# Patient Record
Sex: MALE | Race: WHITE | NOT HISPANIC OR LATINO | Employment: FULL TIME | URBAN - METROPOLITAN AREA
[De-identification: names, ages, dates, MRNs, and addresses within clinical notes are randomized per-mention and may not be internally consistent; named-entity substitution may affect disease eponyms.]

---

## 2017-02-01 DIAGNOSIS — E11.9 TYPE 2 DIABETES MELLITUS WITHOUT COMPLICATION, WITHOUT LONG-TERM CURRENT USE OF INSULIN (HCC): ICD-10-CM

## 2017-02-01 RX ORDER — FOLIC ACID 1 MG/1
1000 TABLET ORAL DAILY
Qty: 30 TABLET | Refills: 2 | Status: SHIPPED | OUTPATIENT
Start: 2017-02-01 | End: 2018-01-30 | Stop reason: SDUPTHER

## 2017-02-01 RX ORDER — ATORVASTATIN CALCIUM 80 MG/1
80 TABLET, FILM COATED ORAL DAILY
Qty: 30 TABLET | Refills: 2 | Status: SHIPPED | OUTPATIENT
Start: 2017-02-01 | End: 2017-06-14 | Stop reason: SDUPTHER

## 2017-02-01 RX ORDER — EZETIMIBE 10 MG/1
10 TABLET ORAL DAILY
Qty: 30 TABLET | Refills: 2 | Status: SHIPPED | OUTPATIENT
Start: 2017-02-01 | End: 2018-01-30 | Stop reason: SDUPTHER

## 2017-02-15 RX ORDER — GLIMEPIRIDE 4 MG/1
TABLET ORAL
Qty: 180 TABLET | Refills: 0 | Status: SHIPPED | OUTPATIENT
Start: 2017-02-15 | End: 2017-05-21 | Stop reason: SDUPTHER

## 2017-02-24 DIAGNOSIS — Z11.59 NEED FOR HEPATITIS C SCREENING TEST: Primary | ICD-10-CM

## 2017-02-28 LAB — HCV AB S/CO SERPL IA: <0.1 S/CO RATIO (ref 0–0.9)

## 2017-03-12 PROBLEM — E11.9 DIABETIC EYE EXAM: Status: ACTIVE | Noted: 2017-03-12

## 2017-03-12 PROBLEM — Z01.00 DIABETIC EYE EXAM: Status: ACTIVE | Noted: 2017-03-12

## 2017-03-12 PROBLEM — E11.9 ENCOUNTER FOR DIABETIC FOOT EXAM: Status: ACTIVE | Noted: 2017-03-12

## 2017-03-14 ENCOUNTER — OFFICE VISIT (OUTPATIENT)
Dept: INTERNAL MEDICINE | Facility: CLINIC | Age: 62
End: 2017-03-14

## 2017-03-14 VITALS
DIASTOLIC BLOOD PRESSURE: 60 MMHG | SYSTOLIC BLOOD PRESSURE: 130 MMHG | BODY MASS INDEX: 37.33 KG/M2 | OXYGEN SATURATION: 94 % | WEIGHT: 252 LBS | HEART RATE: 86 BPM | HEIGHT: 69 IN

## 2017-03-14 DIAGNOSIS — R80.9 MICROALBUMINURIA: Chronic | ICD-10-CM

## 2017-03-14 DIAGNOSIS — Z12.11 COLON CANCER SCREENING: ICD-10-CM

## 2017-03-14 DIAGNOSIS — Z23 NEED FOR PNEUMOCOCCAL VACCINATION: ICD-10-CM

## 2017-03-14 DIAGNOSIS — Z51.81 THERAPEUTIC DRUG MONITORING: ICD-10-CM

## 2017-03-14 DIAGNOSIS — E29.1 HYPOGONADISM MALE: Chronic | ICD-10-CM

## 2017-03-14 DIAGNOSIS — I25.10 OCCLUSIVE CORONARY ARTERY DISEASE: Chronic | ICD-10-CM

## 2017-03-14 DIAGNOSIS — Z11.59 NEED FOR HEPATITIS C SCREENING TEST: ICD-10-CM

## 2017-03-14 DIAGNOSIS — E11.9 ENCOUNTER FOR DIABETIC FOOT EXAM (HCC): Chronic | ICD-10-CM

## 2017-03-14 DIAGNOSIS — G47.33 OBSTRUCTIVE SLEEP APNEA: Chronic | ICD-10-CM

## 2017-03-14 DIAGNOSIS — E11.9 TYPE 2 DIABETES MELLITUS WITHOUT COMPLICATION, WITHOUT LONG-TERM CURRENT USE OF INSULIN (HCC): Primary | Chronic | ICD-10-CM

## 2017-03-14 DIAGNOSIS — E11.9 DIABETIC EYE EXAM (HCC): Chronic | ICD-10-CM

## 2017-03-14 DIAGNOSIS — Z01.00 DIABETIC EYE EXAM (HCC): Chronic | ICD-10-CM

## 2017-03-14 DIAGNOSIS — Z00.00 ROUTINE PHYSICAL EXAMINATION: ICD-10-CM

## 2017-03-14 DIAGNOSIS — Z82.49 FAMILY HISTORY OF PREMATURE CORONARY ARTERY DISEASE: Chronic | ICD-10-CM

## 2017-03-14 DIAGNOSIS — E55.9 VITAMIN D DEFICIENCY: ICD-10-CM

## 2017-03-14 DIAGNOSIS — K75.81 NASH (NONALCOHOLIC STEATOHEPATITIS): Chronic | ICD-10-CM

## 2017-03-14 DIAGNOSIS — G47.34 SLEEP RELATED HYPOXIA: Chronic | ICD-10-CM

## 2017-03-14 DIAGNOSIS — E78.5 HYPERLIPIDEMIA, UNSPECIFIED HYPERLIPIDEMIA TYPE: Chronic | ICD-10-CM

## 2017-03-14 PROBLEM — Z92.29 HISTORY OF PNEUMOCOCCAL VACCINATION: Status: ACTIVE | Noted: 2017-03-14

## 2017-03-14 PROBLEM — Z92.29 HISTORY OF PNEUMOCOCCAL VACCINATION: Status: RESOLVED | Noted: 2017-03-14 | Resolved: 2017-03-14

## 2017-03-14 PROCEDURE — 90471 IMMUNIZATION ADMIN: CPT | Performed by: INTERNAL MEDICINE

## 2017-03-14 PROCEDURE — 90732 PPSV23 VACC 2 YRS+ SUBQ/IM: CPT | Performed by: INTERNAL MEDICINE

## 2017-03-14 PROCEDURE — 99214 OFFICE O/P EST MOD 30 MIN: CPT | Performed by: INTERNAL MEDICINE
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
## 2017-03-15 ENCOUNTER — RESULTS ENCOUNTER (OUTPATIENT)
Dept: INTERNAL MEDICINE | Facility: CLINIC | Age: 62
End: 2017-03-15

## 2017-03-15 DIAGNOSIS — E11.9 TYPE 2 DIABETES MELLITUS WITHOUT COMPLICATION, WITHOUT LONG-TERM CURRENT USE OF INSULIN (HCC): Chronic | ICD-10-CM

## 2017-03-15 DIAGNOSIS — E55.9 VITAMIN D DEFICIENCY: ICD-10-CM

## 2017-03-15 DIAGNOSIS — E78.5 HYPERLIPIDEMIA, UNSPECIFIED HYPERLIPIDEMIA TYPE: Chronic | ICD-10-CM

## 2017-03-15 DIAGNOSIS — Z11.59 NEED FOR HEPATITIS C SCREENING TEST: ICD-10-CM

## 2017-03-15 DIAGNOSIS — R80.9 MICROALBUMINURIA: Chronic | ICD-10-CM

## 2017-03-15 DIAGNOSIS — Z00.00 ROUTINE PHYSICAL EXAMINATION: ICD-10-CM

## 2017-05-22 RX ORDER — GLIMEPIRIDE 4 MG/1
TABLET ORAL
Qty: 180 TABLET | Refills: 0 | Status: SHIPPED | OUTPATIENT
Start: 2017-05-22 | End: 2017-08-21 | Stop reason: SDUPTHER

## 2017-06-14 RX ORDER — ATORVASTATIN CALCIUM 80 MG/1
TABLET, FILM COATED ORAL
Qty: 30 TABLET | Refills: 0 | Status: SHIPPED | OUTPATIENT
Start: 2017-06-14 | End: 2017-07-30 | Stop reason: SDUPTHER

## 2017-06-16 LAB
25(OH)D3+25(OH)D2 SERPL-MCNC: 23.7 NG/ML (ref 30–100)
ALBUMIN SERPL-MCNC: 4.3 G/DL (ref 3.5–5.2)
ALBUMIN/CREAT UR: 7.8 MG/G CREAT (ref 0–30)
ALBUMIN/GLOB SERPL: 1.4 G/DL
ALP SERPL-CCNC: 111 U/L (ref 39–117)
ALT SERPL-CCNC: 38 U/L (ref 1–41)
APPEARANCE UR: CLEAR
AST SERPL-CCNC: 31 U/L (ref 1–40)
BILIRUB SERPL-MCNC: 1 MG/DL (ref 0.1–1.2)
BILIRUB UR QL STRIP: NEGATIVE
BUN SERPL-MCNC: 13 MG/DL (ref 8–23)
BUN/CREAT SERPL: 15.7 (ref 7–25)
CALCIUM SERPL-MCNC: 9.7 MG/DL (ref 8.6–10.5)
CHLORIDE SERPL-SCNC: 104 MMOL/L (ref 98–107)
CHOLEST SERPL-MCNC: 131 MG/DL (ref 100–199)
CK SERPL-CCNC: 73 U/L (ref 20–200)
CO2 SERPL-SCNC: 24.8 MMOL/L (ref 22–29)
COLOR UR: YELLOW
CREAT SERPL-MCNC: 0.83 MG/DL (ref 0.76–1.27)
CREAT UR-MCNC: 48.5 MG/DL
ERYTHROCYTE [DISTWIDTH] IN BLOOD BY AUTOMATED COUNT: 14.1 % (ref 11.5–14.5)
GLOBULIN SER CALC-MCNC: 3 GM/DL
GLUCOSE SERPL-MCNC: 146 MG/DL (ref 65–99)
GLUCOSE UR QL: ABNORMAL
HBA1C MFR BLD: 7.9 % (ref 4.8–5.6)
HCT VFR BLD AUTO: 51.6 % (ref 40.4–52.2)
HCV AB S/CO SERPL IA: <0.1 S/CO RATIO (ref 0–0.9)
HDL SERPL-SCNC: 31.3 UMOL/L
HDLC SERPL-MCNC: 51 MG/DL
HGB BLD-MCNC: 17.7 G/DL (ref 13.7–17.6)
HGB UR QL STRIP: NEGATIVE
KETONES UR QL STRIP: NEGATIVE
LDL SERPL QN: 20.9 NM
LDL SERPL-SCNC: 777 NMOL/L
LDL SMALL SERPL-SCNC: 164 NMOL/L
LDLC SERPL CALC-MCNC: 60 MG/DL (ref 0–99)
LEUKOCYTE ESTERASE UR QL STRIP: NEGATIVE
MCH RBC QN AUTO: 32.1 PG (ref 27–32.7)
MCHC RBC AUTO-ENTMCNC: 34.3 G/DL (ref 32.6–36.4)
MCV RBC AUTO: 93.6 FL (ref 79.8–96.2)
MICROALBUMIN UR-MCNC: 3.8 UG/ML
NITRITE UR QL STRIP: NEGATIVE
PH UR STRIP: 5.5 [PH] (ref 5–8)
PLATELET # BLD AUTO: 91 10*3/MM3 (ref 140–500)
POTASSIUM SERPL-SCNC: 4.1 MMOL/L (ref 3.5–5.2)
PROT SERPL-MCNC: 7.3 G/DL (ref 6–8.5)
PROT UR QL STRIP: NEGATIVE
PSA SERPL-MCNC: 0.2 NG/ML (ref 0–4)
RBC # BLD AUTO: 5.51 10*6/MM3 (ref 4.6–6)
SODIUM SERPL-SCNC: 143 MMOL/L (ref 136–145)
SP GR UR: ABNORMAL (ref 1–1.03)
T3FREE SERPL-MCNC: 3.3 PG/ML (ref 2–4.4)
T4 FREE SERPL-MCNC: 0.88 NG/DL (ref 0.93–1.7)
TRIGL SERPL-MCNC: 101 MG/DL (ref 0–149)
TSH SERPL DL<=0.005 MIU/L-ACNC: 1.8 MIU/ML (ref 0.27–4.2)
UROBILINOGEN UR STRIP-MCNC: ABNORMAL MG/DL
WBC # BLD AUTO: 5.45 10*3/MM3 (ref 4.5–10.7)

## 2017-06-21 ENCOUNTER — APPOINTMENT (OUTPATIENT)
Dept: GENERAL RADIOLOGY | Facility: HOSPITAL | Age: 62
End: 2017-06-21

## 2017-06-21 ENCOUNTER — APPOINTMENT (OUTPATIENT)
Dept: CT IMAGING | Facility: HOSPITAL | Age: 62
End: 2017-06-21

## 2017-06-21 ENCOUNTER — HOSPITAL ENCOUNTER (EMERGENCY)
Facility: HOSPITAL | Age: 62
Discharge: HOME | End: 2017-06-21
Attending: EMERGENCY MEDICINE | Admitting: EMERGENCY MEDICINE

## 2017-06-21 VITALS
SYSTOLIC BLOOD PRESSURE: 131 MMHG | TEMPERATURE: 97.9 F | HEIGHT: 69 IN | BODY MASS INDEX: 38.51 KG/M2 | OXYGEN SATURATION: 93 % | HEART RATE: 82 BPM | RESPIRATION RATE: 18 BRPM | WEIGHT: 260 LBS | DIASTOLIC BLOOD PRESSURE: 95 MMHG

## 2017-06-21 DIAGNOSIS — S90.32XA CONTUSION OF FOOT INCLUDING TOES, LEFT, INITIAL ENCOUNTER: ICD-10-CM
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
## 2017-06-21 DIAGNOSIS — S90.122A CONTUSION OF FOOT INCLUDING TOES, LEFT, INITIAL ENCOUNTER: ICD-10-CM

## 2017-06-21 DIAGNOSIS — S16.1XXA CERVICAL STRAIN, ACUTE, INITIAL ENCOUNTER: ICD-10-CM

## 2017-06-21 PROCEDURE — 72125 CT NECK SPINE W/O DYE: CPT

## 2017-06-21 PROCEDURE — 73630 X-RAY EXAM OF FOOT: CPT

## 2017-06-21 PROCEDURE — 70450 CT HEAD/BRAIN W/O DYE: CPT

## 2017-06-21 PROCEDURE — 99283 EMERGENCY DEPT VISIT LOW MDM: CPT
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
## 2017-06-22 ENCOUNTER — TELEPHONE (OUTPATIENT)
Dept: SOCIAL WORK | Facility: HOSPITAL | Age: 62
End: 2017-06-22

## 2017-06-22 NOTE — TELEPHONE ENCOUNTER
ED f/u phone call. States that he is doing well, and has f/u omkar't w/ PCP scheduled. No questions/concerns

## 2017-07-11 ENCOUNTER — OFFICE VISIT (OUTPATIENT)
Dept: INTERNAL MEDICINE | Facility: CLINIC | Age: 62
End: 2017-07-11

## 2017-07-11 VITALS
HEIGHT: 69 IN | SYSTOLIC BLOOD PRESSURE: 112 MMHG | OXYGEN SATURATION: 94 % | BODY MASS INDEX: 37.18 KG/M2 | WEIGHT: 251 LBS | DIASTOLIC BLOOD PRESSURE: 70 MMHG | HEART RATE: 76 BPM

## 2017-07-11 DIAGNOSIS — E55.9 VITAMIN D DEFICIENCY: Chronic | ICD-10-CM

## 2017-07-11 DIAGNOSIS — G47.33 OBSTRUCTIVE SLEEP APNEA: Chronic | ICD-10-CM

## 2017-07-11 DIAGNOSIS — Z00.00 ROUTINE PHYSICAL EXAMINATION: Primary | ICD-10-CM

## 2017-07-11 DIAGNOSIS — E78.5 HYPERLIPIDEMIA, UNSPECIFIED HYPERLIPIDEMIA TYPE: Chronic | ICD-10-CM

## 2017-07-11 DIAGNOSIS — Z12.11 COLON CANCER SCREENING: ICD-10-CM

## 2017-07-11 DIAGNOSIS — I25.10 OCCLUSIVE CORONARY ARTERY DISEASE: Chronic | ICD-10-CM

## 2017-07-11 DIAGNOSIS — G47.34 SLEEP RELATED HYPOXIA: Chronic | ICD-10-CM

## 2017-07-11 DIAGNOSIS — E29.1 HYPOGONADISM MALE: Chronic | ICD-10-CM

## 2017-07-11 DIAGNOSIS — R80.9 MICROALBUMINURIA: Chronic | ICD-10-CM

## 2017-07-11 DIAGNOSIS — I25.2 HISTORY OF MYOCARDIAL INFARCTION: Chronic | ICD-10-CM

## 2017-07-11 DIAGNOSIS — K75.81 NASH (NONALCOHOLIC STEATOHEPATITIS): Chronic | ICD-10-CM

## 2017-07-11 DIAGNOSIS — E11.9 TYPE 2 DIABETES MELLITUS WITHOUT COMPLICATION, WITHOUT LONG-TERM CURRENT USE OF INSULIN (HCC): Chronic | ICD-10-CM

## 2017-07-11 PROBLEM — Z01.00 DIABETIC EYE EXAM (HCC): Chronic | Status: ACTIVE | Noted: 2017-05-22

## 2017-07-11 PROCEDURE — 99396 PREV VISIT EST AGE 40-64: CPT | Performed by: INTERNAL MEDICINE
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
## 2017-07-31 RX ORDER — ATORVASTATIN CALCIUM 80 MG/1
TABLET, FILM COATED ORAL
Qty: 30 TABLET | Refills: 0 | Status: SHIPPED | OUTPATIENT
Start: 2017-07-31 | End: 2017-09-02 | Stop reason: SDUPTHER

## 2017-07-31 RX ORDER — SITAGLIPTIN AND METFORMIN HYDROCHLORIDE 1000; 50 MG/1; MG/1
TABLET, FILM COATED, EXTENDED RELEASE ORAL
Qty: 60 TABLET | Refills: 0 | Status: SHIPPED | OUTPATIENT
Start: 2017-07-31 | End: 2017-09-02 | Stop reason: SDUPTHER

## 2017-08-01 ENCOUNTER — OFFICE VISIT (OUTPATIENT)
Dept: INTERNAL MEDICINE | Facility: CLINIC | Age: 62
End: 2017-08-01

## 2017-08-01 VITALS
HEART RATE: 78 BPM | SYSTOLIC BLOOD PRESSURE: 118 MMHG | DIASTOLIC BLOOD PRESSURE: 60 MMHG | TEMPERATURE: 97.8 F | WEIGHT: 249.8 LBS | BODY MASS INDEX: 37 KG/M2 | HEIGHT: 69 IN | OXYGEN SATURATION: 93 %

## 2017-08-01 DIAGNOSIS — S13.4XXD WHIPLASH INJURY TO NECK, SUBSEQUENT ENCOUNTER: ICD-10-CM

## 2017-08-01 DIAGNOSIS — Z78.9 HISTORY OF MOTORCYCLE ACCIDENT: Primary | ICD-10-CM

## 2017-08-01 PROBLEM — S13.4XXA WHIPLASH INJURY TO NECK: Status: ACTIVE | Noted: 2017-08-01

## 2017-08-01 PROCEDURE — 99214 OFFICE O/P EST MOD 30 MIN: CPT | Performed by: INTERNAL MEDICINE
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
## 2017-08-21 RX ORDER — GLIMEPIRIDE 4 MG/1
TABLET ORAL
Qty: 180 TABLET | Refills: 1 | Status: SHIPPED | OUTPATIENT
Start: 2017-08-21 | End: 2018-01-30 | Stop reason: SDUPTHER

## 2017-09-05 RX ORDER — SITAGLIPTIN AND METFORMIN HYDROCHLORIDE 1000; 50 MG/1; MG/1
TABLET, FILM COATED, EXTENDED RELEASE ORAL
Qty: 60 TABLET | Refills: 3 | Status: SHIPPED | OUTPATIENT
Start: 2017-09-05 | End: 2017-09-05 | Stop reason: SDUPTHER

## 2017-09-05 RX ORDER — ATORVASTATIN CALCIUM 80 MG/1
80 TABLET, FILM COATED ORAL DAILY
Qty: 30 TABLET | Refills: 2 | Status: SHIPPED | OUTPATIENT
Start: 2017-09-05 | End: 2018-01-30 | Stop reason: SDUPTHER

## 2017-09-05 RX ORDER — ATORVASTATIN CALCIUM 80 MG/1
TABLET, FILM COATED ORAL
Qty: 30 TABLET | Refills: 3 | Status: SHIPPED | OUTPATIENT
Start: 2017-09-05 | End: 2017-09-05 | Stop reason: SDUPTHER

## 2018-01-12 DIAGNOSIS — E78.5 HYPERLIPIDEMIA, UNSPECIFIED HYPERLIPIDEMIA TYPE: Chronic | ICD-10-CM

## 2018-01-12 DIAGNOSIS — E11.9 TYPE 2 DIABETES MELLITUS WITHOUT COMPLICATION, WITHOUT LONG-TERM CURRENT USE OF INSULIN (HCC): Chronic | ICD-10-CM

## 2018-01-12 DIAGNOSIS — E55.9 VITAMIN D DEFICIENCY: Chronic | ICD-10-CM

## 2018-01-12 DIAGNOSIS — G47.34 SLEEP RELATED HYPOXIA: Chronic | ICD-10-CM

## 2018-01-18 LAB
25(OH)D3+25(OH)D2 SERPL-MCNC: 21.4 NG/ML (ref 30–100)
ALBUMIN SERPL-MCNC: 3.9 G/DL (ref 3.5–5.2)
ALBUMIN/GLOB SERPL: 1.2 G/DL
ALP SERPL-CCNC: 165 U/L (ref 39–117)
ALT SERPL-CCNC: 47 U/L (ref 1–41)
AST SERPL-CCNC: 38 U/L (ref 1–40)
BILIRUB SERPL-MCNC: 0.8 MG/DL (ref 0.1–1.2)
BUN SERPL-MCNC: 13 MG/DL (ref 8–23)
BUN/CREAT SERPL: 15.5 (ref 7–25)
CALCIUM SERPL-MCNC: 9.3 MG/DL (ref 8.6–10.5)
CHLORIDE SERPL-SCNC: 101 MMOL/L (ref 98–107)
CHOLEST SERPL-MCNC: 170 MG/DL (ref 100–199)
CK SERPL-CCNC: 74 U/L (ref 20–200)
CO2 SERPL-SCNC: 25.3 MMOL/L (ref 22–29)
CREAT SERPL-MCNC: 0.84 MG/DL (ref 0.76–1.27)
ERYTHROCYTE [DISTWIDTH] IN BLOOD BY AUTOMATED COUNT: 13.2 % (ref 11.5–14.5)
GLOBULIN SER CALC-MCNC: 3.3 GM/DL
GLUCOSE SERPL-MCNC: 313 MG/DL (ref 65–99)
HBA1C MFR BLD: 10.74 % (ref 4.8–5.6)
HCT VFR BLD AUTO: 49.5 % (ref 40.4–52.2)
HDL SERPL-SCNC: 26.4 UMOL/L
HDLC SERPL-MCNC: 49 MG/DL
HGB BLD-MCNC: 16.7 G/DL (ref 13.7–17.6)
LDL SERPL QN: 21.2 NM
LDL SERPL-SCNC: 1187 NMOL/L
LDL SMALL SERPL-SCNC: 497 NMOL/L
LDLC SERPL CALC-MCNC: 100 MG/DL (ref 0–99)
MCH RBC QN AUTO: 31.5 PG (ref 27–32.7)
MCHC RBC AUTO-ENTMCNC: 33.7 G/DL (ref 32.6–36.4)
MCV RBC AUTO: 93.4 FL (ref 79.8–96.2)
PLATELET # BLD AUTO: 91 10*3/MM3 (ref 140–500)
POTASSIUM SERPL-SCNC: 4 MMOL/L (ref 3.5–5.2)
PROT SERPL-MCNC: 7.2 G/DL (ref 6–8.5)
RBC # BLD AUTO: 5.3 10*6/MM3 (ref 4.6–6)
SODIUM SERPL-SCNC: 139 MMOL/L (ref 136–145)
T3FREE SERPL-MCNC: 3.2 PG/ML (ref 2–4.4)
T4 FREE SERPL-MCNC: 0.89 NG/DL (ref 0.93–1.7)
TRIGL SERPL-MCNC: 103 MG/DL (ref 0–149)
TSH SERPL DL<=0.005 MIU/L-ACNC: 2.14 MIU/ML (ref 0.27–4.2)
WBC # BLD AUTO: 4.66 10*3/MM3 (ref 4.5–10.7)

## 2018-01-25 ENCOUNTER — OFFICE VISIT (OUTPATIENT)
Dept: INTERNAL MEDICINE | Facility: CLINIC | Age: 63
End: 2018-01-25

## 2018-01-25 VITALS
DIASTOLIC BLOOD PRESSURE: 60 MMHG | WEIGHT: 247.8 LBS | OXYGEN SATURATION: 98 % | SYSTOLIC BLOOD PRESSURE: 134 MMHG | HEART RATE: 72 BPM | HEIGHT: 69 IN | BODY MASS INDEX: 36.7 KG/M2

## 2018-01-25 DIAGNOSIS — G47.34 SLEEP RELATED HYPOXIA: Chronic | ICD-10-CM

## 2018-01-25 DIAGNOSIS — I25.10 OCCLUSIVE CORONARY ARTERY DISEASE: Chronic | ICD-10-CM

## 2018-01-25 DIAGNOSIS — K75.81 NASH (NONALCOHOLIC STEATOHEPATITIS): Chronic | ICD-10-CM

## 2018-01-25 DIAGNOSIS — D69.6 THROMBOCYTOPENIA (HCC): ICD-10-CM

## 2018-01-25 DIAGNOSIS — E11.9 TYPE 2 DIABETES MELLITUS WITHOUT COMPLICATION, WITHOUT LONG-TERM CURRENT USE OF INSULIN (HCC): Primary | Chronic | ICD-10-CM

## 2018-01-25 DIAGNOSIS — R80.9 MICROALBUMINURIA: Chronic | ICD-10-CM

## 2018-01-25 DIAGNOSIS — E55.9 VITAMIN D DEFICIENCY: Chronic | ICD-10-CM

## 2018-01-25 DIAGNOSIS — I25.2 HISTORY OF MYOCARDIAL INFARCTION: Chronic | ICD-10-CM

## 2018-01-25 DIAGNOSIS — Z82.49 FAMILY HISTORY OF PREMATURE CORONARY ARTERY DISEASE: Chronic | ICD-10-CM

## 2018-01-25 DIAGNOSIS — E78.5 HYPERLIPIDEMIA, UNSPECIFIED HYPERLIPIDEMIA TYPE: Chronic | ICD-10-CM

## 2018-01-25 DIAGNOSIS — Z51.81 THERAPEUTIC DRUG MONITORING: ICD-10-CM

## 2018-01-25 DIAGNOSIS — G47.33 OBSTRUCTIVE SLEEP APNEA: Chronic | ICD-10-CM

## 2018-01-25 DIAGNOSIS — E29.1 HYPOGONADISM MALE: Chronic | ICD-10-CM

## 2018-01-25 DIAGNOSIS — Z12.11 COLON CANCER SCREENING: ICD-10-CM

## 2018-01-25 PROBLEM — Z78.9 HISTORY OF MOTORCYCLE ACCIDENT: Status: RESOLVED | Noted: 2017-08-01 | Resolved: 2018-01-25

## 2018-01-25 PROBLEM — S13.4XXA WHIPLASH INJURY TO NECK: Status: RESOLVED | Noted: 2017-08-01 | Resolved: 2018-01-25

## 2018-01-25 LAB
BASOPHILS # BLD AUTO: 0.02 10*3/MM3 (ref 0–0.2)
BASOPHILS NFR BLD AUTO: 0.3 % (ref 0–1.5)
EOSINOPHIL # BLD AUTO: 0.11 10*3/MM3 (ref 0–0.7)
EOSINOPHIL NFR BLD AUTO: 1.6 % (ref 0.3–6.2)
ERYTHROCYTE [DISTWIDTH] IN BLOOD BY AUTOMATED COUNT: 13.6 % (ref 11.5–14.5)
HCT VFR BLD AUTO: 51.3 % (ref 40.4–52.2)
HGB BLD-MCNC: 17.3 G/DL (ref 13.7–17.6)
IMM GRANULOCYTES # BLD: 0 10*3/MM3 (ref 0–0.03)
IMM GRANULOCYTES NFR BLD: 0 % (ref 0–0.5)
LYMPHOCYTES # BLD AUTO: 1.35 10*3/MM3 (ref 0.9–4.8)
LYMPHOCYTES NFR BLD AUTO: 19 % (ref 19.6–45.3)
MCH RBC QN AUTO: 31.3 PG (ref 27–32.7)
MCHC RBC AUTO-ENTMCNC: 33.7 G/DL (ref 32.6–36.4)
MCV RBC AUTO: 92.8 FL (ref 79.8–96.2)
MONOCYTES # BLD AUTO: 0.56 10*3/MM3 (ref 0.2–1.2)
MONOCYTES NFR BLD AUTO: 7.9 % (ref 5–12)
NEUTROPHILS # BLD AUTO: 5.05 10*3/MM3 (ref 1.9–8.1)
NEUTROPHILS NFR BLD AUTO: 71.2 % (ref 42.7–76)
NRBC BLD AUTO-RTO: 0 /100 WBC (ref 0–0)
PLATELET # BLD AUTO: 99 10*3/MM3 (ref 140–500)
RBC # BLD AUTO: 5.53 10*6/MM3 (ref 4.6–6)
WBC # BLD AUTO: 7.09 10*3/MM3 (ref 4.5–10.7)

## 2018-01-25 PROCEDURE — 99214 OFFICE O/P EST MOD 30 MIN: CPT | Performed by: INTERNAL MEDICINE
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
## 2018-01-30 DIAGNOSIS — E11.9 TYPE 2 DIABETES MELLITUS WITHOUT COMPLICATION, WITHOUT LONG-TERM CURRENT USE OF INSULIN (HCC): ICD-10-CM

## 2018-01-30 RX ORDER — FOLIC ACID 1 MG/1
1000 TABLET ORAL DAILY
Qty: 30 TABLET | Refills: 2 | Status: SHIPPED | OUTPATIENT
Start: 2018-01-30 | End: 2018-05-05 | Stop reason: SDUPTHER

## 2018-01-30 RX ORDER — EZETIMIBE 10 MG/1
10 TABLET ORAL DAILY
Qty: 30 TABLET | Refills: 2 | Status: SHIPPED | OUTPATIENT
Start: 2018-01-30 | End: 2018-05-05 | Stop reason: SDUPTHER

## 2018-01-30 RX ORDER — GLIMEPIRIDE 4 MG/1
4 TABLET ORAL 2 TIMES DAILY
Qty: 180 TABLET | Refills: 0 | Status: SHIPPED | OUTPATIENT
Start: 2018-01-30 | End: 2018-04-29 | Stop reason: SDUPTHER

## 2018-01-30 RX ORDER — ATORVASTATIN CALCIUM 80 MG/1
80 TABLET, FILM COATED ORAL DAILY
Qty: 30 TABLET | Refills: 2 | Status: SHIPPED | OUTPATIENT
Start: 2018-01-30 | End: 2018-05-06 | Stop reason: SDUPTHER

## 2018-03-01 ENCOUNTER — OFFICE VISIT (OUTPATIENT)
Dept: INTERNAL MEDICINE | Facility: CLINIC | Age: 63
End: 2018-03-01

## 2018-03-01 VITALS
SYSTOLIC BLOOD PRESSURE: 122 MMHG | OXYGEN SATURATION: 97 % | HEIGHT: 69 IN | HEART RATE: 75 BPM | BODY MASS INDEX: 36.14 KG/M2 | WEIGHT: 244 LBS | DIASTOLIC BLOOD PRESSURE: 64 MMHG

## 2018-03-01 DIAGNOSIS — R80.9 MICROALBUMINURIA: Chronic | ICD-10-CM

## 2018-03-01 DIAGNOSIS — K75.81 NASH (NONALCOHOLIC STEATOHEPATITIS): Chronic | ICD-10-CM

## 2018-03-01 DIAGNOSIS — I25.10 OCCLUSIVE CORONARY ARTERY DISEASE: Chronic | ICD-10-CM

## 2018-03-01 DIAGNOSIS — D69.6 THROMBOCYTOPENIA (HCC): ICD-10-CM

## 2018-03-01 DIAGNOSIS — E11.9 TYPE 2 DIABETES MELLITUS WITHOUT COMPLICATION, WITHOUT LONG-TERM CURRENT USE OF INSULIN (HCC): Primary | Chronic | ICD-10-CM

## 2018-03-01 DIAGNOSIS — E78.5 HYPERLIPIDEMIA, UNSPECIFIED HYPERLIPIDEMIA TYPE: Chronic | ICD-10-CM

## 2018-03-01 DIAGNOSIS — E55.9 VITAMIN D DEFICIENCY: Chronic | ICD-10-CM

## 2018-03-01 DIAGNOSIS — G47.33 OBSTRUCTIVE SLEEP APNEA: Chronic | ICD-10-CM

## 2018-03-01 DIAGNOSIS — I25.2 HISTORY OF MYOCARDIAL INFARCTION: Chronic | ICD-10-CM

## 2018-03-01 DIAGNOSIS — Z51.81 THERAPEUTIC DRUG MONITORING: ICD-10-CM

## 2018-03-01 PROCEDURE — 99214 OFFICE O/P EST MOD 30 MIN: CPT | Performed by: INTERNAL MEDICINE
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
## 2018-03-07 ENCOUNTER — HOSPITAL ENCOUNTER (OUTPATIENT)
Dept: CT IMAGING | Facility: HOSPITAL | Age: 63
Discharge: HOME | End: 2018-03-07
Admitting: INTERNAL MEDICINE

## 2018-03-07 DIAGNOSIS — K75.81 NASH (NONALCOHOLIC STEATOHEPATITIS): Chronic | ICD-10-CM

## 2018-03-07 DIAGNOSIS — D69.6 THROMBOCYTOPENIA (HCC): ICD-10-CM

## 2018-03-07 LAB — CREAT BLDA-MCNC: 0.6 MG/DL (ref 0.6–1.3)

## 2018-03-07 PROCEDURE — 82565 ASSAY OF CREATININE: CPT

## 2018-03-07 PROCEDURE — 74160 CT ABDOMEN W/CONTRAST: CPT

## 2018-03-07 PROCEDURE — 0 IOPAMIDOL 61 % SOLUTION: Performed by: INTERNAL MEDICINE

## 2018-03-07 RX ADMIN — IOPAMIDOL 85 ML: 612 INJECTION, SOLUTION INTRAVENOUS at 09:20

## 2018-03-09 DIAGNOSIS — E11.9 TYPE 2 DIABETES MELLITUS WITHOUT COMPLICATION, WITHOUT LONG-TERM CURRENT USE OF INSULIN (HCC): Chronic | ICD-10-CM

## 2018-04-30 RX ORDER — GLIMEPIRIDE 4 MG/1
TABLET ORAL
Qty: 180 TABLET | Refills: 0 | Status: SHIPPED | OUTPATIENT
Start: 2018-04-30 | End: 2018-08-20 | Stop reason: SDUPTHER

## 2018-05-05 DIAGNOSIS — E11.9 TYPE 2 DIABETES MELLITUS WITHOUT COMPLICATION, WITHOUT LONG-TERM CURRENT USE OF INSULIN (HCC): ICD-10-CM

## 2018-05-07 RX ORDER — FOLIC ACID 1 MG/1
1000 TABLET ORAL DAILY
Qty: 30 TABLET | Refills: 0 | Status: SHIPPED | OUTPATIENT
Start: 2018-05-07 | End: 2018-06-07 | Stop reason: SDUPTHER

## 2018-05-07 RX ORDER — EMPAGLIFLOZIN 25 MG/1
TABLET, FILM COATED ORAL
Qty: 30 TABLET | Refills: 0 | Status: SHIPPED | OUTPATIENT
Start: 2018-05-07 | End: 2018-06-07 | Stop reason: SDUPTHER

## 2018-05-07 RX ORDER — ATORVASTATIN CALCIUM 80 MG/1
80 TABLET, FILM COATED ORAL DAILY
Qty: 30 TABLET | Refills: 0 | Status: SHIPPED | OUTPATIENT
Start: 2018-05-07 | End: 2018-08-06 | Stop reason: SDUPTHER

## 2018-05-07 RX ORDER — EZETIMIBE 10 MG/1
10 TABLET ORAL DAILY
Qty: 30 TABLET | Refills: 0 | Status: SHIPPED | OUTPATIENT
Start: 2018-05-07 | End: 2018-06-07 | Stop reason: SDUPTHER

## 2018-05-24 RX ORDER — SITAGLIPTIN AND METFORMIN HYDROCHLORIDE 1000; 50 MG/1; MG/1
TABLET, FILM COATED, EXTENDED RELEASE ORAL
Qty: 60 TABLET | Refills: 0 | Status: SHIPPED | OUTPATIENT
Start: 2018-05-24 | End: 2018-06-20 | Stop reason: SDUPTHER

## 2018-05-30 DIAGNOSIS — D69.6 THROMBOCYTOPENIA (HCC): ICD-10-CM

## 2018-05-30 DIAGNOSIS — E78.5 HYPERLIPIDEMIA, UNSPECIFIED HYPERLIPIDEMIA TYPE: Chronic | ICD-10-CM

## 2018-05-30 DIAGNOSIS — E11.9 TYPE 2 DIABETES MELLITUS WITHOUT COMPLICATION, WITHOUT LONG-TERM CURRENT USE OF INSULIN (HCC): Chronic | ICD-10-CM

## 2018-06-02 LAB
ALBUMIN SERPL-MCNC: 4.1 G/DL (ref 3.5–5.2)
ALBUMIN/GLOB SERPL: 1.6 G/DL
ALP SERPL-CCNC: 104 U/L (ref 39–117)
ALT SERPL-CCNC: 29 U/L (ref 1–41)
AST SERPL-CCNC: 30 U/L (ref 1–40)
BILIRUB SERPL-MCNC: 0.6 MG/DL (ref 0.1–1.2)
BUN SERPL-MCNC: 12 MG/DL (ref 8–23)
BUN/CREAT SERPL: 15.2 (ref 7–25)
CALCIUM SERPL-MCNC: 9.3 MG/DL (ref 8.6–10.5)
CHLORIDE SERPL-SCNC: 104 MMOL/L (ref 98–107)
CHOLEST SERPL-MCNC: 112 MG/DL (ref 100–199)
CK SERPL-CCNC: 89 U/L (ref 20–200)
CO2 SERPL-SCNC: 27.2 MMOL/L (ref 22–29)
CREAT SERPL-MCNC: 0.79 MG/DL (ref 0.76–1.27)
ERYTHROCYTE [DISTWIDTH] IN BLOOD BY AUTOMATED COUNT: 13.6 % (ref 11.5–14.5)
GFR SERPLBLD CREATININE-BSD FMLA CKD-EPI: 100 ML/MIN/1.73
GFR SERPLBLD CREATININE-BSD FMLA CKD-EPI: 121 ML/MIN/1.73
GLOBULIN SER CALC-MCNC: 2.5 GM/DL
GLUCOSE SERPL-MCNC: 95 MG/DL (ref 65–99)
HBA1C MFR BLD: 6.6 % (ref 4.8–5.6)
HCT VFR BLD AUTO: 52.2 % (ref 40.4–52.2)
HDL SERPL-SCNC: 32.4 UMOL/L
HDLC SERPL-MCNC: 49 MG/DL
HGB BLD-MCNC: 17.6 G/DL (ref 13.7–17.6)
LDL SERPL QN: 20.6 NM
LDL SERPL-SCNC: 749 NMOL/L
LDL SMALL SERPL-SCNC: 406 NMOL/L
LDLC SERPL CALC-MCNC: 48 MG/DL (ref 0–99)
MCH RBC QN AUTO: 31.7 PG (ref 27–32.7)
MCHC RBC AUTO-ENTMCNC: 33.7 G/DL (ref 32.6–36.4)
MCV RBC AUTO: 94.1 FL (ref 79.8–96.2)
PLATELET # BLD AUTO: 86 10*3/MM3 (ref 140–500)
POTASSIUM SERPL-SCNC: 4.2 MMOL/L (ref 3.5–5.2)
PROT SERPL-MCNC: 6.6 G/DL (ref 6–8.5)
RBC # BLD AUTO: 5.55 10*6/MM3 (ref 4.6–6)
SODIUM SERPL-SCNC: 145 MMOL/L (ref 136–145)
TRIGL SERPL-MCNC: 76 MG/DL (ref 0–149)
WBC # BLD AUTO: 4.81 10*3/MM3 (ref 4.5–10.7)

## 2018-06-07 ENCOUNTER — OFFICE VISIT (OUTPATIENT)
Dept: INTERNAL MEDICINE | Facility: CLINIC | Age: 63
End: 2018-06-07

## 2018-06-07 VITALS
WEIGHT: 240 LBS | SYSTOLIC BLOOD PRESSURE: 120 MMHG | BODY MASS INDEX: 35.55 KG/M2 | DIASTOLIC BLOOD PRESSURE: 72 MMHG | HEIGHT: 69 IN | HEART RATE: 80 BPM | OXYGEN SATURATION: 90 %

## 2018-06-07 DIAGNOSIS — E11.9 TYPE 2 DIABETES MELLITUS WITHOUT COMPLICATION, WITHOUT LONG-TERM CURRENT USE OF INSULIN (HCC): ICD-10-CM

## 2018-06-07 DIAGNOSIS — K75.81 NASH (NONALCOHOLIC STEATOHEPATITIS): Chronic | ICD-10-CM

## 2018-06-07 DIAGNOSIS — E11.9 DIABETIC EYE EXAM (HCC): Chronic | ICD-10-CM

## 2018-06-07 DIAGNOSIS — E29.1 HYPOGONADISM MALE: Chronic | ICD-10-CM

## 2018-06-07 DIAGNOSIS — G47.33 OBSTRUCTIVE SLEEP APNEA: Chronic | ICD-10-CM

## 2018-06-07 DIAGNOSIS — I25.10 OCCLUSIVE CORONARY ARTERY DISEASE: Chronic | ICD-10-CM

## 2018-06-07 DIAGNOSIS — D69.6 THROMBOCYTOPENIA (HCC): Chronic | ICD-10-CM

## 2018-06-07 DIAGNOSIS — G47.34 SLEEP RELATED HYPOXIA: Chronic | ICD-10-CM

## 2018-06-07 DIAGNOSIS — Z82.49 FAMILY HISTORY OF PREMATURE CORONARY ARTERY DISEASE: Chronic | ICD-10-CM

## 2018-06-07 DIAGNOSIS — Z00.00 ROUTINE PHYSICAL EXAMINATION: ICD-10-CM

## 2018-06-07 DIAGNOSIS — Z01.00 DIABETIC EYE EXAM (HCC): Chronic | ICD-10-CM

## 2018-06-07 DIAGNOSIS — E78.5 HYPERLIPIDEMIA, UNSPECIFIED HYPERLIPIDEMIA TYPE: Chronic | ICD-10-CM

## 2018-06-07 DIAGNOSIS — E11.9 TYPE 2 DIABETES MELLITUS WITHOUT COMPLICATION, WITHOUT LONG-TERM CURRENT USE OF INSULIN (HCC): Primary | Chronic | ICD-10-CM

## 2018-06-07 DIAGNOSIS — Z51.81 THERAPEUTIC DRUG MONITORING: ICD-10-CM

## 2018-06-07 DIAGNOSIS — E11.9 ENCOUNTER FOR DIABETIC FOOT EXAM (HCC): Chronic | ICD-10-CM

## 2018-06-07 DIAGNOSIS — R80.9 MICROALBUMINURIA: Chronic | ICD-10-CM

## 2018-06-07 DIAGNOSIS — E55.9 VITAMIN D DEFICIENCY: Chronic | ICD-10-CM

## 2018-06-07 PROCEDURE — 99214 OFFICE O/P EST MOD 30 MIN: CPT | Performed by: INTERNAL MEDICINE

## 2018-06-07 RX ORDER — EMPAGLIFLOZIN 25 MG/1
TABLET, FILM COATED ORAL
Qty: 30 TABLET | Refills: 0 | Status: SHIPPED | OUTPATIENT
Start: 2018-06-07 | End: 2018-07-06 | Stop reason: SDUPTHER

## 2018-06-07 RX ORDER — FOLIC ACID 1 MG/1
1000 TABLET ORAL DAILY
Qty: 30 TABLET | Refills: 0 | Status: SHIPPED | OUTPATIENT
Start: 2018-06-07 | End: 2018-07-06 | Stop reason: SDUPTHER

## 2018-06-07 RX ORDER — EZETIMIBE 10 MG/1
10 TABLET ORAL DAILY
Qty: 30 TABLET | Refills: 0 | Status: SHIPPED | OUTPATIENT
Start: 2018-06-07 | End: 2018-07-06 | Stop reason: SDUPTHER

## 2018-06-07 NOTE — PROGRESS NOTES
06/07/2018    Patient Information  Rajan Dc                                                                                          [address] [facility] [address]      1955  [phone]    Chief Complaint:     Follow-up poorly controlled type 2 diabetes, hyperlipidemia, RUIZ, thrombocytopenia, coronary artery disease and history of MI, hypogonadism, microalbuminuria, sleep apnea and sleep related hypoxia, vitamin D deficiency, family history of premature coronary artery disease.  No new acute complaints.    History of Present Illness:    With a history of poorly controlled type 2 diabetes presents today for a follow-up with lab prior in order to monitor his chronic medical issues.  Patient also is noted to have thrombocytopenia and a CT scan of the abdomen and pelvis was ordered which we will review today.  Past medical history reviewed and updated where necessary including health maintenance parameters.  This reveals he is up-to-date or account for.  Patient will be due for his annual physical in the near future and we will defer his diabetic foot exam until that time.  We are trying to obtain documentation regarding his diabetic eye exam.    Review of Systems   Constitution: Negative.   HENT: Negative.    Eyes: Negative.    Cardiovascular: Negative.    Respiratory: Negative.    Endocrine: Negative.    Hematologic/Lymphatic: Negative.    Skin: Negative.    Musculoskeletal: Negative.    Gastrointestinal: Negative.    Genitourinary: Negative.    Neurological: Negative.    Psychiatric/Behavioral: Negative.    Allergic/Immunologic: Negative.        Active Problems:    Patient Active Problem List   Diagnosis   • Occlusive coronary artery disease, 01/01/2005--status post MI.  January 2005--CABG ×4.  Details not known.   • Folic acid deficiency   • Hyperlipidemia   • Hypogonadism male, TRT ineffective   • Microalbuminuria   • RUIZ (nonalcoholic steatohepatitis)   •  Obstructive sleep apnea, 2012--mild to moderate NIKI.  Unable to tolerate CPAP.  Cannot use oral appliance.   • Sleep related hypoxia   • Type 2 diabetes mellitus   • Vitamin D deficiency   • Therapeutic drug monitoring   • Routine physical examination   • Family history of premature coronary artery disease   • Diabetic eye exam   • Diabetic foot exam   • History of myocardial infarction, .   • Thrombocytopenia         Past Medical History:   Diagnosis Date   • Diabetic eye exam 2017--routine diabetic eye exam reveals no evidence of diabetic retinopathy.  Astigmatism, presbyopia, hypermetropia noted.  Very early cataracts noted bilaterally.  2015--patient reports a routine diabetic eye exam without evidence of diabetic retinopathy.   • Diabetic foot exam 3/12/2017    2017--diabetic foot exam reveals no evidence of diabetic foot ulcer or pre-ulcerative callus.  Distal pulses palpable.  No signs of ischemia.  Sensation subjectively intact per monofilament.   • Family history of premature coronary artery disease 2016    Father  from a myocardial infarction age 61.   • Folic acid deficiency 2016   • History of Abnormal pulse oximetry 10/07/2012    10/07/2012--overnight oximetry test revealed significant nocturnal hypoxemia. Oxygen saturations were greater than 90% for only 50.5% of the study. Oxygen saturation less than 90% for three hours 47 minutes which was 49.5% of the study. Oxygen saturation less than 88% for one hour and 36 minutes and 48 seconds, 21.1% of the study.   • History of CABG 2005 status post four-vessel CABG.   • History of myocardial infarction, . 2005--status post myocardial infarction.   2005--status post four-vessel CABG   • Hyperlipidemia 2016   • Hypogonadism male, TRT ineffective 2012--treatment for symptomatic hypogonadism begun. This was later discontinued due to  lack of efficacy and cost.   • Microalbuminuria 4/25/2014 04/25/2014--urine microalbumin elevated 28.7. Normal range 0.0--17.0.   • RUIZ (nonalcoholic steatohepatitis) 4/28/2016   • Obstructive sleep apnea, 11/13/2012--mild to moderate NIKI.  Unable to tolerate CPAP.  Cannot use oral appliance. 10/7/2012    05/02/2014--nocturnal oxygen 2 L per nasal cannula ordered.   12/03/2013--patient was referred for an oral appliance but this could not be done because patient wears dentures.   11/13/2012--diagnosed with mild to moderate obstructive sleep apnea. Patient unable to tolerate CPAP.   10/07/2012--overnight oximetry test revealed significant nocturnal hypoxemia. Oxygen saturations were greater than 90% for only 50.5% of the study. Oxygen saturation less than 90% for three hours 47 minutes which was 49.5% of the study. Oxygen saturation less than 88% for one hour and 36 minutes and 48 seconds, 21.1% of the study.   • Occlusive coronary artery disease, 01/01/2005--status post MI.  January 2005--CABG ×4.  Details not known. 1/1/2005 01/01/2005--status post myocardial infarction.   January 2005--status post four-vessel CABG   • Sleep related hypoxia 10/7/2012    05/02/2014--nocturnal oxygen 2 L per nasal cannula ordered.  12/03/2013--patient was referred for an oral appliance but this could not be done because patient wears dentures.   11/13/2012--diagnosed with mild to moderate obstructive sleep apnea. Patient unable to tolerate CPAP.   10/07/2012--overnight oximetry test revealed significant nocturnal hypoxemia. Oxygen saturations were greater than 90% for only 50.5% of the study. Oxygen saturation less than 90% for three hours 47 minutes which was 49.5% of the study. Oxygen saturation less than 88% for one hour and 36 minutes and 48 seconds, 21.1% of the study.   • Thrombocytopenia 1/25/2018 06/07/2018--follow-up.  Platelets are still low at 86.  I reviewed the results of the CT scan of the abdomen with the  patient.  Etiology of the mild splenomegaly not clear.  Patient referred to hematology.  03/07/2018--CT scan of the abdomen with contrast performed for elevated liver enzymes and thrombocytopenia.  This revealed minimal fatty infiltration of the liver.  Tiny calcified gallstone.  Mild splenomegaly.  Small amount of calcification is seen along the periphery of the spleen which could be related to remote hemorrhage.  It is of doubtful clinical significance.  Mild bilateral perinephric stranding.  Please correlate for signs of urinary tract infection.  No hydronephrosis, renal masses, or stones are seen.  01/25/2018--platelet count low at 99.  Lymphocytes low at 19%.  Otherwise, differential was unremarkable.  01/25/2018--routine follow-up.  Platelet count is 91.  CBC with differential ordered.   • Type 2 diabetes mellitus 1/1/2005    Diagnosed in 2005.   • Vitamin D deficiency 4/28/2016         Past Surgical History:   Procedure Laterality Date   • CORONARY ARTERY BYPASS GRAFT  01/2005 January 2005 status post four-vessel CABG.         No Known Allergies        Current Outpatient Prescriptions:   •  aspirin 81 MG tablet, Take 1 tablet by mouth daily., Disp: , Rfl:   •  atorvastatin (LIPITOR) 80 MG tablet, TAKE 1 TABLET BY MOUTH DAILY, Disp: 30 tablet, Rfl: 0  •  Cholecalciferol (VITAMIN D3) 5000 UNITS capsule capsule, 1 by mouth daily as directed, Disp: 30 capsule, Rfl: 11  •  ezetimibe (ZETIA) 10 MG tablet, TAKE 1 TABLET BY MOUTH DAILY, Disp: 30 tablet, Rfl: 0  •  folic acid (FOLVITE) 1 MG tablet, TAKE 1 TABLET BY MOUTH DAILY, Disp: 30 tablet, Rfl: 0  •  glimepiride (AMARYL) 4 MG tablet, TAKE 1 TABLET BY MOUTH TWICE DAILY, Disp: 180 tablet, Rfl: 0  •  JANUMET XR  MG tablet, TAKE 1 TABLET BY MOUTH TWICE DAILY, Disp: 60 tablet, Rfl: 0  •  JARDIANCE 25 MG tablet, TAKE 1 TABLET BY MOUTH EVERY MORNING BEFORE BREAKFAST, Disp: 30 tablet, Rfl: 0  •  Liraglutide (VICTOZA) 18 MG/3ML solution pen-injector injection,  "Inject 1.8 mg subcutaneously daily as directed., Disp: 3 pen, Rfl: 6      Family History   Problem Relation Age of Onset   • Other Mother         Ventricular Septal Defect   • Heart attack Father         Prior Myocardial Infarction.  Dad  from a myocardial infarction at age 59.   • Heart disease Other         Congenital Heart Disease. Multiple family members with septal defects that required surgery.         Social History     Social History   • Marital status:      Spouse name: N/A   • Number of children: N/A   • Years of education: N/A     Occupational History   •       Social History Main Topics   • Smoking status: Former Smoker     Quit date: 2005   • Smokeless tobacco: Never Used   • Alcohol use No   • Drug use: No   • Sexual activity: Yes     Partners: Female     Other Topics Concern   • Not on file     Social History Narrative   • No narrative on file         Vitals:    18 0808   BP: 120/72   Pulse: 80   SpO2: 90%   Weight: 109 kg (240 lb)   Height: 175.3 cm (69.02\")          Physical Exam:    General: Alert and oriented x 3.  No acute distress. Obese.  Normal affect.  HEENT: Pupils equal, round, reactive to light; extraocular movements intact; sclerae nonicteric; pharynx, ear canals and TMs normal.  Neck: Without JVD, thyromegaly, bruit, or adenopathy.  Lungs: Clear to auscultation in all fields.  Heart: Regular rate and rhythm without murmur, rub, gallop, or click.  Abdomen: Soft, nontender, without hepatosplenomegaly or hernia.  Bowel sounds normal.  : Deferred.  Rectal: Deferred.  Extremities: Without clubbing, cyanosis, edema, or pulse deficit.  Neurologic: Intact without focal deficit.  Normal station and gait observed during ingress and egress from the examination room.  Skin: Without significant lesion.  Musculoskeletal: Unremarkable.      Lab/other results:    I reviewed the results of CT scan of the abdomen with contrast.  NMR is absolutely perfect.  CMP is " normal.  CBC normal except platelets are low at 86.  Hemoglobin A1c excellent at 6.6.  CPK normal.    Assessment/Plan:     Diagnosis Plan   1. Type 2 diabetes mellitus without complication, without long-term current use of insulin     2. Hyperlipidemia, unspecified hyperlipidemia type     3. RUIZ (nonalcoholic steatohepatitis)     4. Thrombocytopenia     5. Occlusive coronary artery disease, 01/01/2005--status post MI.  January 2005--CABG ×4.  Details not known.     6. Hypogonadism male, TRT ineffective     7. Microalbuminuria     8. Obstructive sleep apnea, 11/13/2012--mild to moderate NIKI.  Unable to tolerate CPAP.  Cannot use oral appliance.     9. Sleep related hypoxia     10. Vitamin D deficiency     11. Family history of premature coronary artery disease     12. Diabetic eye exam     13. Diabetic foot exam     14. Therapeutic drug monitoring     15. Routine physical examination       Patient's type 2 diabetes is now under excellent control.  Hyperlipidemia also is under excellent control.  Patient has RUIZ which is documented on the recent CT scan.  Encouraged weight loss.  Patient also has thrombocytopenia and mild splenomegaly, etiology of which is not clear.  Hematology consultation is indicated.  His coronary artery disease is stable.  Patient has hypogonadism and testosterone replacement therapy was ineffective.  Microalbuminuria has been stable.  Patient has sleep apnea but cannot tolerate CPAP.  There is really not much more that we can do for this condition.  Vitamin D has been therapeutic.  Patient reports she is due for his diabetic eye exam and we are trying to obtain documentation from the last eye exam.  We will defer his diabetic foot exam for his physical in about 3 months.    Plan is as follows: Hematology referral given.  Schedule physical examination with lab in about 3 months.  Once again, I encouraged patient to get colonoscopy.        Procedures

## 2018-06-08 ENCOUNTER — CONSULT (OUTPATIENT)
Dept: ONCOLOGY | Facility: CLINIC | Age: 63
End: 2018-06-08

## 2018-06-08 ENCOUNTER — LAB (OUTPATIENT)
Dept: LAB | Facility: HOSPITAL | Age: 63
End: 2018-06-08

## 2018-06-08 VITALS
HEART RATE: 76 BPM | WEIGHT: 243 LBS | BODY MASS INDEX: 36.83 KG/M2 | RESPIRATION RATE: 16 BRPM | HEIGHT: 68 IN | DIASTOLIC BLOOD PRESSURE: 68 MMHG | TEMPERATURE: 97.9 F | SYSTOLIC BLOOD PRESSURE: 118 MMHG | OXYGEN SATURATION: 93 %

## 2018-06-08 DIAGNOSIS — D69.6 THROMBOCYTOPENIA (HCC): Primary | ICD-10-CM

## 2018-06-08 DIAGNOSIS — D69.6 THROMBOCYTOPENIA (HCC): Primary | Chronic | ICD-10-CM

## 2018-06-08 LAB
BASOPHILS # BLD AUTO: 0.03 10*3/MM3 (ref 0–0.1)
BASOPHILS NFR BLD AUTO: 0.5 % (ref 0–1.1)
DEPRECATED RDW RBC AUTO: 43.7 FL (ref 37–49)
EOSINOPHIL # BLD AUTO: 0.08 10*3/MM3 (ref 0–0.36)
EOSINOPHIL NFR BLD AUTO: 1.3 % (ref 1–5)
ERYTHROCYTE [DISTWIDTH] IN BLOOD BY AUTOMATED COUNT: 13.2 % (ref 11.7–14.5)
HCT VFR BLD AUTO: 48.7 % (ref 40–49)
HGB BLD-MCNC: 16.8 G/DL (ref 13.5–16.5)
IMM GRANULOCYTES # BLD: 0.02 10*3/MM3 (ref 0–0.03)
IMM GRANULOCYTES NFR BLD: 0.3 % (ref 0–0.5)
LYMPHOCYTES # BLD AUTO: 1.65 10*3/MM3 (ref 1–3.5)
LYMPHOCYTES NFR BLD AUTO: 25.9 % (ref 20–49)
MCH RBC QN AUTO: 31.2 PG (ref 27–33)
MCHC RBC AUTO-ENTMCNC: 34.5 G/DL (ref 32–35)
MCV RBC AUTO: 90.4 FL (ref 83–97)
MONOCYTES # BLD AUTO: 0.61 10*3/MM3 (ref 0.25–0.8)
MONOCYTES NFR BLD AUTO: 9.6 % (ref 4–12)
NEUTROPHILS # BLD AUTO: 3.98 10*3/MM3 (ref 1.5–7)
NEUTROPHILS NFR BLD AUTO: 62.4 % (ref 39–75)
NRBC BLD MANUAL-RTO: 0 /100 WBC (ref 0–0)
PLATELET # BLD AUTO: 91 10*3/MM3 (ref 150–375)
PLATELETS.RETICULATED NFR BLD AUTO: 24 % (ref 1.1–6.1)
PMV BLD AUTO: 13.7 FL (ref 8.9–12.1)
RBC # BLD AUTO: 5.39 10*6/MM3 (ref 4.3–5.5)
VIT B12 BLD-MCNC: 419 PG/ML (ref 211–946)
WBC NRBC COR # BLD: 6.37 10*3/MM3 (ref 4–10)

## 2018-06-08 PROCEDURE — 99245 OFF/OP CONSLTJ NEW/EST HI 55: CPT | Performed by: INTERNAL MEDICINE

## 2018-06-08 PROCEDURE — 85025 COMPLETE CBC W/AUTO DIFF WBC: CPT

## 2018-06-08 PROCEDURE — 82607 VITAMIN B-12: CPT | Performed by: INTERNAL MEDICINE

## 2018-06-08 PROCEDURE — 36415 COLL VENOUS BLD VENIPUNCTURE: CPT | Performed by: INTERNAL MEDICINE

## 2018-06-08 PROCEDURE — 85055 RETICULATED PLATELET ASSAY: CPT | Performed by: INTERNAL MEDICINE

## 2018-06-08 NOTE — PROGRESS NOTES
REFERRING PROVIDER:    Scott Eaton MD  [address] [facility]  [address]  [place] [address]    REASON FOR CONSULTATION:    Thrombocytopenia    HISTORY OF PRESENT ILLNESS:  Rajan Dc is a 62 y.o. male who is referred today for further evaluation of thrombocytopenia.    I have reviewed labs dating back to 2014.  His platelet count at that time was 110,000 and his platelet count has been very stable around ,000 since that time.  His hemoglobin has been a high normal.  His white blood cell count has been normal.    He generally feels well.  He has lost some weight after changing around his diabetes medication.  Energy level is excellent.  He denies any bleeding.  No history of bleeding problems.  No abdominal pain.  No known history of liver disease although he did have some fatty liver on recent CT imaging in his spleen was noted to be somewhat enlarged although no exact measurement was provided by the radiologist.        Past Medical History:   Diagnosis Date   • Diabetic eye exam 2017--routine diabetic eye exam reveals no evidence of diabetic retinopathy.  Astigmatism, presbyopia, hypermetropia noted.  Very early cataracts noted bilaterally.  2015--patient reports a routine diabetic eye exam without evidence of diabetic retinopathy.   • Diabetic foot exam 3/12/2017    2017--diabetic foot exam reveals no evidence of diabetic foot ulcer or pre-ulcerative callus.  Distal pulses palpable.  No signs of ischemia.  Sensation subjectively intact per monofilament.   • Family history of premature coronary artery disease 2016    Father  from a myocardial infarction age 61.   • Folic acid deficiency 2016   • History of Abnormal pulse oximetry 10/07/2012    10/07/2012--overnight oximetry test revealed significant nocturnal hypoxemia. Oxygen saturations were greater than 90% for only 50.5% of the study. Oxygen saturation less than 90% for three hours 47 minutes  which was 49.5% of the study. Oxygen saturation less than 88% for one hour and 36 minutes and 48 seconds, 21.1% of the study.   • History of CABG 01/2005 January 2005 status post four-vessel CABG.   • History of myocardial infarction, 2005. 4/28/2016 01/01/2005--status post myocardial infarction.   January 2005--status post four-vessel CABG   • Hyperlipidemia 4/28/2016   • Hypogonadism male, TRT ineffective 9/13/2012 09/13/2012--treatment for symptomatic hypogonadism begun. This was later discontinued due to lack of efficacy and cost.   • Microalbuminuria 4/25/2014 04/25/2014--urine microalbumin elevated 28.7. Normal range 0.0--17.0.   • RUIZ (nonalcoholic steatohepatitis) 4/28/2016   • Obstructive sleep apnea, 11/13/2012--mild to moderate NIKI.  Unable to tolerate CPAP.  Cannot use oral appliance. 10/7/2012    05/02/2014--nocturnal oxygen 2 L per nasal cannula ordered.   12/03/2013--patient was referred for an oral appliance but this could not be done because patient wears dentures.   11/13/2012--diagnosed with mild to moderate obstructive sleep apnea. Patient unable to tolerate CPAP.   10/07/2012--overnight oximetry test revealed significant nocturnal hypoxemia. Oxygen saturations were greater than 90% for only 50.5% of the study. Oxygen saturation less than 90% for three hours 47 minutes which was 49.5% of the study. Oxygen saturation less than 88% for one hour and 36 minutes and 48 seconds, 21.1% of the study.   • Occlusive coronary artery disease, 01/01/2005--status post MI.  January 2005--CABG ×4.  Details not known. 1/1/2005 01/01/2005--status post myocardial infarction.   January 2005--status post four-vessel CABG   • Sleep related hypoxia 10/7/2012    05/02/2014--nocturnal oxygen 2 L per nasal cannula ordered.  12/03/2013--patient was referred for an oral appliance but this could not be done because patient wears dentures.   11/13/2012--diagnosed with mild to moderate obstructive sleep apnea.  Patient unable to tolerate CPAP.   10/07/2012--overnight oximetry test revealed significant nocturnal hypoxemia. Oxygen saturations were greater than 90% for only 50.5% of the study. Oxygen saturation less than 90% for three hours 47 minutes which was 49.5% of the study. Oxygen saturation less than 88% for one hour and 36 minutes and 48 seconds, 21.1% of the study.   • Thrombocytopenia 1/25/2018 06/07/2018--follow-up.  Platelets are still low at 86.  I reviewed the results of the CT scan of the abdomen with the patient.  Etiology of the mild splenomegaly not clear.  Patient referred to hematology.  03/07/2018--CT scan of the abdomen with contrast performed for elevated liver enzymes and thrombocytopenia.  This revealed minimal fatty infiltration of the liver.  Tiny calcified gallstone.  Mild splenomegaly.  Small amount of calcification is seen along the periphery of the spleen which could be related to remote hemorrhage.  It is of doubtful clinical significance.  Mild bilateral perinephric stranding.  Please correlate for signs of urinary tract infection.  No hydronephrosis, renal masses, or stones are seen.  01/25/2018--platelet count low at 99.  Lymphocytes low at 19%.  Otherwise, differential was unremarkable.  01/25/2018--routine follow-up.  Platelet count is 91.  CBC with differential ordered.   • Type 2 diabetes mellitus 1/1/2005    Diagnosed in 2005.   • Vitamin D deficiency 4/28/2016       Past Surgical History:   Procedure Laterality Date   • CORONARY ARTERY BYPASS GRAFT  01/2005 January 2005 status post four-vessel CABG.       SOCIAL HISTORY:   reports that he quit smoking about 13 years ago. He has never used smokeless tobacco. He reports that he does not drink alcohol or use drugs.    FAMILY HISTORY:  family history includes Heart attack in his father; Heart disease in his other; Other in his mother.  No known family history of hematologic disease    ALLERGIES:  No Known  "Allergies    MEDICATIONS:  The medication list has been reviewed with the patient by the medical assistant, and the list has been updated in the electronic medical record, which I reviewed.  Medication dosages and frequencies were confirmed to be accurate.    Review of Systems   Constitutional: Negative for appetite change, chills, fatigue, fever and unexpected weight change.   HENT: Negative for congestion, dental problem, ear pain, hearing loss, mouth sores, nosebleeds, postnasal drip, rhinorrhea, tinnitus and trouble swallowing.    Eyes: Negative.    Respiratory: Negative for cough, chest tightness, shortness of breath, wheezing and stridor.    Cardiovascular: Negative for chest pain, palpitations and leg swelling.   Gastrointestinal: Negative for abdominal distention, abdominal pain, blood in stool, constipation, diarrhea, nausea and vomiting.   Endocrine: Negative.    Genitourinary: Negative for decreased urine volume, difficulty urinating, dysuria, flank pain, frequency, hematuria, scrotal swelling, testicular pain and urgency.   Musculoskeletal: Negative for arthralgias, back pain and joint swelling.   Allergic/Immunologic: Negative.    Neurological: Negative for dizziness, seizures, syncope, weakness, light-headedness, numbness and headaches.   Hematological: Negative for adenopathy. Does not bruise/bleed easily.   Psychiatric/Behavioral: Negative for confusion and sleep disturbance. The patient is not nervous/anxious.    All other systems reviewed and are negative.      Vitals:    06/08/18 1529   BP: 118/68   Pulse: 76   Resp: 16   Temp: 97.9 °F (36.6 °C)   TempSrc: Oral   SpO2: 93%   Weight: 110 kg (243 lb)   Height: 173.5 cm (68.31\")   PainSc: 0-No pain       Physical Exam   Constitutional: He is oriented to person, place, and time. He appears well-developed and well-nourished. No distress.   HENT:   Head: Normocephalic and atraumatic.   Mouth/Throat: Oropharynx is clear and moist.   Poor dentition "   Eyes: Conjunctivae and EOM are normal. Pupils are equal, round, and reactive to light.   Neck: Normal range of motion. Neck supple. No thyromegaly present.   Cardiovascular: Normal rate, regular rhythm and normal heart sounds.  Exam reveals no gallop and no friction rub.    No murmur heard.  Pulmonary/Chest: Effort normal and breath sounds normal. No respiratory distress. He has no wheezes. He has no rales.   Abdominal: Soft. Bowel sounds are normal. He exhibits no distension and no mass. There is no hepatosplenomegaly. There is no tenderness. There is no rebound and no guarding.   Musculoskeletal: Normal range of motion. He exhibits edema (Trace bilateral lower extremity). He exhibits no tenderness.   Lymphadenopathy:     He has no cervical adenopathy.   Neurological: He is alert and oriented to person, place, and time. He has normal reflexes.   Skin: Skin is warm and dry. No rash noted. He is not diaphoretic.   Psychiatric: He has a normal mood and affect. His behavior is normal.   Nursing note and vitals reviewed.      DIAGNOSTIC DATA:  Results for orders placed or performed in visit on 06/08/18   CBC Auto Differential   Result Value Ref Range    WBC 6.37 4.00 - 10.00 10*3/mm3    RBC 5.39 4.30 - 5.50 10*6/mm3    Hemoglobin 16.8 (H) 13.5 - 16.5 g/dL    Hematocrit 48.7 40.0 - 49.0 %    MCV 90.4 83.0 - 97.0 fL    MCH 31.2 27.0 - 33.0 pg    MCHC 34.5 32.0 - 35.0 g/dL    RDW 13.2 11.7 - 14.5 %    RDW-SD 43.7 37.0 - 49.0 fl    MPV 13.7 (H) 8.9 - 12.1 fL    Platelets 91 (L) 150 - 375 10*3/mm3    Neutrophil % 62.4 39.0 - 75.0 %    Lymphocyte % 25.9 20.0 - 49.0 %    Monocyte % 9.6 4.0 - 12.0 %    Eosinophil % 1.3 1.0 - 5.0 %    Basophil % 0.5 0.0 - 1.1 %    Immature Grans % 0.3 0.0 - 0.5 %    Neutrophils, Absolute 3.98 1.50 - 7.00 10*3/mm3    Lymphocytes, Absolute 1.65 1.00 - 3.50 10*3/mm3    Monocytes, Absolute 0.61 0.25 - 0.80 10*3/mm3    Eosinophils, Absolute 0.08 0.00 - 0.36 10*3/mm3    Basophils, Absolute 0.03 0.00  - 0.10 10*3/mm3    Immature Grans, Absolute 0.02 0.00 - 0.03 10*3/mm3    nRBC 0.0 0.0 - 0.0 /100 WBC       IMAGING:    I personally reviewed his peripheral blood smear.  White blood cells appear normal in maturation and distribution.  No hypersegmented neutrophils.  A few large platelets are present.  Otherwise platelets are normal in morphology and obviously slightly low in number.  Red blood cells appear normal.    I personally reviewed CT imaging of the abdomen and pelvis from 3/7/2018.  Mild splenomegaly.  This measured about 15 cm for me.  There is a small amount of calcification is visualized along the periphery of the spleen.  Fatty liver noted.    ASSESSMENT:  This is a 62 y.o. male with:  1.  Thrombocytopenia: This is been a chronic issue over at least the past few years based on my review of old CBCs.  We will check an immature platelet fraction today to gauge his production.  We discussed possibilities in general for this including a lack of production in the bone marrow, adequate production but increased clearance of platelets which is typically an immune mediated process, and adequate platelet production but platelet sequestration typically within an enlarged spleen.  His spleen is large for an unknown reason.  The exact measurement was not provided on the radiologist interpretation of his recent CT scan but his spleen measured approximately 15 cm for me on the imaging.  He might have some fatty liver disease on imaging which might be contributing to the splenomegaly.  His platelet count is low but adequate.  I don't think any medication is contributing to this as I reviewed his list in detail today.  We will check a vitamin B12 level.  2.  He does have a very mild erythrocytosis which we will continue to monitor.  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  He does not smoke cigarettes.  Of course polycythemia vera can be associated with splenomegaly.  My  suspicion for a myeloproliferative disorder is low.  Again, his hemoglobin and hematocrit are not that significantly elevated.  We will continue to monitor this.    PLAN:   1.  Immature platelet fraction and vitamin B12 level today  2.  I will see him back in a few months for follow-up with a CBC and immature platelet fraction with further evaluation as appropriate.  3.  I'll communicate with Dr. Trinidad, the radiologist who read his CT scan, to see if he can give me an accurate measurement on his spleen size.    Addendum: His immature platelet fraction is 24% which supports adequate production but increased clearance likely secondary to immune mediated thrombocytopenia.  No treatment is required at this time.

## 2018-06-20 RX ORDER — SITAGLIPTIN AND METFORMIN HYDROCHLORIDE 1000; 50 MG/1; MG/1
TABLET, FILM COATED, EXTENDED RELEASE ORAL
Qty: 60 TABLET | Refills: 3 | Status: SHIPPED | OUTPATIENT
Start: 2018-06-20 | End: 2018-10-21 | Stop reason: SDUPTHER

## 2018-07-06 DIAGNOSIS — E11.9 TYPE 2 DIABETES MELLITUS WITHOUT COMPLICATION, WITHOUT LONG-TERM CURRENT USE OF INSULIN (HCC): ICD-10-CM

## 2018-07-06 RX ORDER — FOLIC ACID 1 MG/1
1000 TABLET ORAL DAILY
Qty: 30 TABLET | Refills: 2 | Status: SHIPPED | OUTPATIENT
Start: 2018-07-06

## 2018-07-06 RX ORDER — EMPAGLIFLOZIN 25 MG/1
TABLET, FILM COATED ORAL
Qty: 30 TABLET | Refills: 2 | Status: SHIPPED | OUTPATIENT
Start: 2018-07-06 | End: 2019-01-04 | Stop reason: SDUPTHER

## 2018-07-06 RX ORDER — EZETIMIBE 10 MG/1
10 TABLET ORAL DAILY
Qty: 30 TABLET | Refills: 2 | Status: SHIPPED | OUTPATIENT
Start: 2018-07-06 | End: 2019-06-23

## 2018-08-06 RX ORDER — ATORVASTATIN CALCIUM 80 MG/1
80 TABLET, FILM COATED ORAL DAILY
Qty: 30 TABLET | Refills: 2 | Status: SHIPPED | OUTPATIENT
Start: 2018-08-06 | End: 2019-01-04 | Stop reason: SDUPTHER

## 2018-08-20 RX ORDER — GLIMEPIRIDE 4 MG/1
TABLET ORAL
Qty: 180 TABLET | Refills: 0 | Status: SHIPPED | OUTPATIENT
Start: 2018-08-20 | End: 2018-09-17 | Stop reason: SDUPTHER

## 2018-09-05 DIAGNOSIS — E11.9 TYPE 2 DIABETES MELLITUS WITHOUT COMPLICATION, WITHOUT LONG-TERM CURRENT USE OF INSULIN (HCC): Chronic | ICD-10-CM

## 2018-09-05 DIAGNOSIS — Z00.00 ROUTINE PHYSICAL EXAMINATION: ICD-10-CM

## 2018-09-05 DIAGNOSIS — E55.9 VITAMIN D DEFICIENCY: Chronic | ICD-10-CM

## 2018-09-05 DIAGNOSIS — E78.5 HYPERLIPIDEMIA, UNSPECIFIED HYPERLIPIDEMIA TYPE: Chronic | ICD-10-CM

## 2018-09-07 LAB
25(OH)D3+25(OH)D2 SERPL-MCNC: 32.1 NG/ML (ref 30–100)
ALBUMIN SERPL-MCNC: 4.3 G/DL (ref 3.5–5.2)
ALBUMIN/CREAT UR: 8.9 MG/G CREAT (ref 0–30)
ALBUMIN/GLOB SERPL: 1.6 G/DL
ALP SERPL-CCNC: 133 U/L (ref 39–117)
ALT SERPL-CCNC: 41 U/L (ref 1–41)
APPEARANCE UR: CLEAR
AST SERPL-CCNC: 41 U/L (ref 1–40)
BASOPHILS # BLD AUTO: 0.01 10*3/MM3 (ref 0–0.2)
BASOPHILS NFR BLD AUTO: 0.1 % (ref 0–1.5)
BILIRUB SERPL-MCNC: 0.6 MG/DL (ref 0.1–1.2)
BILIRUB UR QL STRIP: NEGATIVE
BUN SERPL-MCNC: 9 MG/DL (ref 8–23)
BUN/CREAT SERPL: 10.8 (ref 7–25)
CALCIUM SERPL-MCNC: 9.4 MG/DL (ref 8.6–10.5)
CHLORIDE SERPL-SCNC: 107 MMOL/L (ref 98–107)
CHOLEST SERPL-MCNC: 117 MG/DL (ref 100–199)
CK SERPL-CCNC: 87 U/L (ref 20–200)
CO2 SERPL-SCNC: 27.6 MMOL/L (ref 22–29)
COLOR UR: YELLOW
CREAT SERPL-MCNC: 0.83 MG/DL (ref 0.76–1.27)
CREAT UR-MCNC: 90 MG/DL
EOSINOPHIL # BLD AUTO: 0.11 10*3/MM3 (ref 0–0.7)
EOSINOPHIL NFR BLD AUTO: 1.5 % (ref 0.3–6.2)
ERYTHROCYTE [DISTWIDTH] IN BLOOD BY AUTOMATED COUNT: 13.7 % (ref 11.5–14.5)
GLOBULIN SER CALC-MCNC: 2.7 GM/DL
GLUCOSE SERPL-MCNC: 122 MG/DL (ref 65–99)
GLUCOSE UR QL: ABNORMAL
HBA1C MFR BLD: 6.68 % (ref 4.8–5.6)
HCT VFR BLD AUTO: 51.1 % (ref 40.4–52.2)
HDL SERPL-SCNC: 31.6 UMOL/L
HDLC SERPL-MCNC: 44 MG/DL
HGB BLD-MCNC: 17.3 G/DL (ref 13.7–17.6)
HGB UR QL STRIP: NEGATIVE
IMM GRANULOCYTES # BLD: 0.01 10*3/MM3 (ref 0–0.03)
IMM GRANULOCYTES NFR BLD: 0.1 % (ref 0–0.5)
KETONES UR QL STRIP: NEGATIVE
LDL SERPL QN: 21.2 NM
LDL SERPL-SCNC: 859 NMOL/L
LDL SMALL SERPL-SCNC: 434 NMOL/L
LDLC SERPL CALC-MCNC: 56 MG/DL (ref 0–99)
LEUKOCYTE ESTERASE UR QL STRIP: NEGATIVE
LYMPHOCYTES # BLD AUTO: 0.92 10*3/MM3 (ref 0.9–4.8)
LYMPHOCYTES NFR BLD AUTO: 13 % (ref 19.6–45.3)
MCH RBC QN AUTO: 31.3 PG (ref 27–32.7)
MCHC RBC AUTO-ENTMCNC: 33.9 G/DL (ref 32.6–36.4)
MCV RBC AUTO: 92.6 FL (ref 79.8–96.2)
MICROALBUMIN UR-MCNC: 8 UG/ML
MONOCYTES # BLD AUTO: 0.62 10*3/MM3 (ref 0.2–1.2)
MONOCYTES NFR BLD AUTO: 8.7 % (ref 5–12)
NEUTROPHILS # BLD AUTO: 5.44 10*3/MM3 (ref 1.9–8.1)
NEUTROPHILS NFR BLD AUTO: 76.7 % (ref 42.7–76)
NITRITE UR QL STRIP: NEGATIVE
PH UR STRIP: 5.5 [PH] (ref 5–8)
PLATELET # BLD AUTO: 93 10*3/MM3 (ref 140–500)
POTASSIUM SERPL-SCNC: 4.1 MMOL/L (ref 3.5–5.2)
PROT SERPL-MCNC: 7 G/DL (ref 6–8.5)
PROT UR QL STRIP: NEGATIVE
PSA SERPL-MCNC: 0.18 NG/ML (ref 0–4)
RBC # BLD AUTO: 5.52 10*6/MM3 (ref 4.6–6)
SODIUM SERPL-SCNC: 146 MMOL/L (ref 136–145)
SP GR UR: ABNORMAL (ref 1–1.03)
T3FREE SERPL-MCNC: 3.6 PG/ML (ref 2–4.4)
T4 FREE SERPL-MCNC: 0.95 NG/DL (ref 0.93–1.7)
TRIGL SERPL-MCNC: 84 MG/DL (ref 0–149)
TSH SERPL DL<=0.005 MIU/L-ACNC: 3.43 MIU/ML (ref 0.27–4.2)
UROBILINOGEN UR STRIP-MCNC: ABNORMAL MG/DL
WBC # BLD AUTO: 7.1 10*3/MM3 (ref 4.5–10.7)

## 2018-09-17 ENCOUNTER — OFFICE VISIT (OUTPATIENT)
Dept: INTERNAL MEDICINE | Facility: CLINIC | Age: 63
End: 2018-09-17

## 2018-09-17 VITALS
BODY MASS INDEX: 36.31 KG/M2 | OXYGEN SATURATION: 95 % | SYSTOLIC BLOOD PRESSURE: 122 MMHG | HEIGHT: 68 IN | HEART RATE: 78 BPM | WEIGHT: 239.6 LBS | DIASTOLIC BLOOD PRESSURE: 66 MMHG

## 2018-09-17 DIAGNOSIS — Z00.00 ROUTINE PHYSICAL EXAMINATION: Primary | ICD-10-CM

## 2018-09-17 DIAGNOSIS — Z51.81 THERAPEUTIC DRUG MONITORING: ICD-10-CM

## 2018-09-17 DIAGNOSIS — Z01.00 DIABETIC EYE EXAM (HCC): Chronic | ICD-10-CM

## 2018-09-17 DIAGNOSIS — I25.10 OCCLUSIVE CORONARY ARTERY DISEASE: Chronic | ICD-10-CM

## 2018-09-17 DIAGNOSIS — E11.9 TYPE 2 DIABETES MELLITUS WITHOUT COMPLICATION, WITHOUT LONG-TERM CURRENT USE OF INSULIN (HCC): Chronic | ICD-10-CM

## 2018-09-17 DIAGNOSIS — R80.9 MICROALBUMINURIA: Chronic | ICD-10-CM

## 2018-09-17 DIAGNOSIS — G47.33 OBSTRUCTIVE SLEEP APNEA: Chronic | ICD-10-CM

## 2018-09-17 DIAGNOSIS — Z12.2 ENCOUNTER FOR SCREENING FOR LUNG CANCER: ICD-10-CM

## 2018-09-17 DIAGNOSIS — E11.9 DIABETIC EYE EXAM (HCC): Chronic | ICD-10-CM

## 2018-09-17 DIAGNOSIS — D69.6 THROMBOCYTOPENIA (HCC): ICD-10-CM

## 2018-09-17 DIAGNOSIS — Z87.891 FORMER CIGARETTE SMOKER OF UNKNOWN AMOUNT: ICD-10-CM

## 2018-09-17 DIAGNOSIS — G47.34 SLEEP RELATED HYPOXIA: Chronic | ICD-10-CM

## 2018-09-17 DIAGNOSIS — E55.9 VITAMIN D DEFICIENCY: Chronic | ICD-10-CM

## 2018-09-17 DIAGNOSIS — Z82.49 FAMILY HISTORY OF PREMATURE CORONARY ARTERY DISEASE: Chronic | ICD-10-CM

## 2018-09-17 DIAGNOSIS — K75.81 NASH (NONALCOHOLIC STEATOHEPATITIS): Chronic | ICD-10-CM

## 2018-09-17 DIAGNOSIS — E78.5 HYPERLIPIDEMIA, UNSPECIFIED HYPERLIPIDEMIA TYPE: Chronic | ICD-10-CM

## 2018-09-17 DIAGNOSIS — E29.1 HYPOGONADISM MALE: Chronic | ICD-10-CM

## 2018-09-17 DIAGNOSIS — I25.2 HISTORY OF MYOCARDIAL INFARCTION: Chronic | ICD-10-CM

## 2018-09-17 DIAGNOSIS — E11.9 ENCOUNTER FOR DIABETIC FOOT EXAM (HCC): Chronic | ICD-10-CM

## 2018-09-17 PROCEDURE — 99396 PREV VISIT EST AGE 40-64: CPT | Performed by: INTERNAL MEDICINE

## 2018-09-17 RX ORDER — GLIMEPIRIDE 4 MG/1
TABLET ORAL
Qty: 90 TABLET | Refills: 3 | Status: SHIPPED | OUTPATIENT
Start: 2018-09-17 | End: 2021-06-02 | Stop reason: SDUPTHER

## 2018-09-17 NOTE — PROGRESS NOTES
09/17/2018    Patient Information  Rajan Dc                                                                                          [address] [facility] [address]      1955  [phone]    Chief Complaint:     Routine annual physical examination and follow-up lab work.    History of Present Illness:    Patient with a history of type 2 diabetes that was previously poorly controlled but now under excellent control, history of coronary artery disease and myocardial infarction in 2005, hyperlipidemia, hypogonadism, microalbuminuria, RUIZ, sleep apnea, sleep related hypoxia, vitamin D deficiency, family history of premature coronary artery disease, thrombocytopenia.  He presents today for his routine annual exam and follow-up lab work.  Patient has been working on his diet and has lost 5 pounds in about 6 months.  Past medical history reviewed and updated where necessary including health maintenance parameters.  This reveals he is up-to-date except he still has not had his colonoscopy.  He also needs diabetic eye exam and patient indicates his wife is scheduling this.  He also needs a screening lung CT scan due to his history of smoking.  Patient prefers not to get influenza vaccination.  We did discuss the shingles vaccination which she can receive at the local drug store.    Review of Systems   Constitution: Negative.   HENT: Negative.    Eyes: Negative.    Cardiovascular: Negative.    Respiratory: Negative.    Endocrine: Negative.    Hematologic/Lymphatic: Negative.    Skin: Negative.    Musculoskeletal: Negative.    Gastrointestinal: Negative.    Genitourinary: Negative.    Neurological: Negative.    Psychiatric/Behavioral: Negative.    Allergic/Immunologic: Negative.        Active Problems:    Patient Active Problem List   Diagnosis   • Occlusive coronary artery disease, 01/01/2005--status post MI.  January 2005--CABG ×4.  Details not known.   • Folic acid deficiency    • Hyperlipidemia   • Hypogonadism male, TRT ineffective   • Microalbuminuria   • RUIZ (nonalcoholic steatohepatitis)   • Obstructive sleep apnea, 2012--mild to moderate NIKI.  Unable to tolerate CPAP.  Cannot use oral appliance.   • Sleep related hypoxia   • Type 2 diabetes mellitus (CMS/[facility])   • Vitamin D deficiency   • Therapeutic drug monitoring   • Routine physical examination   • Family history of premature coronary artery disease   • Diabetic eye exam (CMS/HCC)   • Diabetic foot exam   • History of myocardial infarction, .   • Thrombocytopenia (CMS/[facility])         Past Medical History:   Diagnosis Date   • Arthritis    • Diabetic eye exam (CMS/HCC) 2017--routine diabetic eye exam reveals no evidence of diabetic retinopathy.  Astigmatism, presbyopia, hypermetropia noted.  Very early cataracts noted bilaterally.  2015--patient reports a routine diabetic eye exam without evidence of diabetic retinopathy.   • Diabetic foot exam 3/12/2017    2017--diabetic foot exam reveals no evidence of diabetic foot ulcer or pre-ulcerative callus.  Distal pulses palpable.  No signs of ischemia.  Sensation subjectively intact per monofilament.   • Family history of premature coronary artery disease 2016    Father  from a myocardial infarction age 61.   • Folic acid deficiency 2016   • History of Abnormal pulse oximetry 10/07/2012    10/07/2012--overnight oximetry test revealed significant nocturnal hypoxemia. Oxygen saturations were greater than 90% for only 50.5% of the study. Oxygen saturation less than 90% for three hours 47 minutes which was 49.5% of the study. Oxygen saturation less than 88% for one hour and 36 minutes and 48 seconds, 21.1% of the study.   • History of CABG 2005 status post four-vessel CABG.   • History of myocardial infarction, . 2005--status post myocardial infarction.   2005--status post four-vessel  CABG   • Hyperlipidemia 4/28/2016   • Hypogonadism male, TRT ineffective 9/13/2012 09/13/2012--treatment for symptomatic hypogonadism begun. This was later discontinued due to lack of efficacy and cost.   • Microalbuminuria 4/25/2014 04/25/2014--urine microalbumin elevated 28.7. Normal range 0.0--17.0.   • RUIZ (nonalcoholic steatohepatitis) 4/28/2016   • Obstructive sleep apnea, 11/13/2012--mild to moderate NIKI.  Unable to tolerate CPAP.  Cannot use oral appliance. 10/7/2012    05/02/2014--nocturnal oxygen 2 L per nasal cannula ordered.   12/03/2013--patient was referred for an oral appliance but this could not be done because patient wears dentures.   11/13/2012--diagnosed with mild to moderate obstructive sleep apnea. Patient unable to tolerate CPAP.   10/07/2012--overnight oximetry test revealed significant nocturnal hypoxemia. Oxygen saturations were greater than 90% for only 50.5% of the study. Oxygen saturation less than 90% for three hours 47 minutes which was 49.5% of the study. Oxygen saturation less than 88% for one hour and 36 minutes and 48 seconds, 21.1% of the study.   • Occlusive coronary artery disease, 01/01/2005--status post MI.  January 2005--CABG ×4.  Details not known. 1/1/2005 01/01/2005--status post myocardial infarction.   January 2005--status post four-vessel CABG   • Sleep related hypoxia 10/7/2012    05/02/2014--nocturnal oxygen 2 L per nasal cannula ordered.  12/03/2013--patient was referred for an oral appliance but this could not be done because patient wears dentures.   11/13/2012--diagnosed with mild to moderate obstructive sleep apnea. Patient unable to tolerate CPAP.   10/07/2012--overnight oximetry test revealed significant nocturnal hypoxemia. Oxygen saturations were greater than 90% for only 50.5% of the study. Oxygen saturation less than 90% for three hours 47 minutes which was 49.5% of the study. Oxygen saturation less than 88% for one hour and 36 minutes and 48  seconds, 21.1% of the study.   • Thrombocytopenia (CMS/HCC) 1/25/2018 06/07/2018--follow-up.  Platelets are still low at 86.  I reviewed the results of the CT scan of the abdomen with the patient.  Etiology of the mild splenomegaly not clear.  Patient referred to hematology.  03/07/2018--CT scan of the abdomen with contrast performed for elevated liver enzymes and thrombocytopenia.  This revealed minimal fatty infiltration of the liver.  Tiny calcified gallstone.  Mild splenomegaly.  Small amount of calcification is seen along the periphery of the spleen which could be related to remote hemorrhage.  It is of doubtful clinical significance.  Mild bilateral perinephric stranding.  Please correlate for signs of urinary tract infection.  No hydronephrosis, renal masses, or stones are seen.  01/25/2018--platelet count low at 99.  Lymphocytes low at 19%.  Otherwise, differential was unremarkable.  01/25/2018--routine follow-up.  Platelet count is 91.  CBC with differential ordered.   • Type 2 diabetes mellitus (CMS/HCC) 1/1/2005    Diagnosed in 2005.   • Vitamin D deficiency 4/28/2016         Past Surgical History:   Procedure Laterality Date   • CORONARY ARTERY BYPASS GRAFT  01/2005 January 2005 status post four-vessel CABG.         No Known Allergies        Current Outpatient Prescriptions:   •  aspirin 81 MG tablet, Take 1 tablet by mouth daily., Disp: , Rfl:   •  atorvastatin (LIPITOR) 80 MG tablet, TAKE 1 TABLET BY MOUTH DAILY, Disp: 30 tablet, Rfl: 2  •  Cholecalciferol (VITAMIN D3) 5000 UNITS capsule capsule, 1 by mouth daily as directed, Disp: 30 capsule, Rfl: 11  •  ezetimibe (ZETIA) 10 MG tablet, TAKE 1 TABLET BY MOUTH DAILY, Disp: 30 tablet, Rfl: 2  •  folic acid (FOLVITE) 1 MG tablet, TAKE 1 TABLET BY MOUTH DAILY, Disp: 30 tablet, Rfl: 2  •  glimepiride (AMARYL) 4 MG tablet, TAKE 1 TABLET BY MOUTH TWICE DAILY, Disp: 180 tablet, Rfl: 0  •  JANUMET XR  MG tablet, TAKE 1 TABLET BY MOUTH TWICE DAILY,  "Disp: 60 tablet, Rfl: 3  •  JARDIANCE 25 MG tablet, TAKE 1 TABLET BY MOUTH EVERY MORNING BEFORE BREAKFAST, Disp: 30 tablet, Rfl: 2  •  Liraglutide (VICTOZA) 18 MG/3ML solution pen-injector injection, Inject 1.8 mg subcutaneously daily as directed., Disp: 3 pen, Rfl: 6      Family History   Problem Relation Age of Onset   • Other Mother         Ventricular Septal Defect   • Heart disease Mother    • Hypertension Mother    • Cancer Mother    • Diabetes Mother    • Heart attack Father         Prior Myocardial Infarction.  Dad  from a myocardial infarction at age 59.   • Heart disease Father    • Heart disease Other         Congenital Heart Disease. Multiple family members with septal defects that required surgery.   • Heart disease Sister    • Heart disease Brother    • Cancer Daughter          Social History     Social History   • Marital status:      Spouse name: Dasha   • Number of children: N/A   • Years of education: College     Occupational History   •  [employer]     Social History Main Topics   • Smoking status: Former Smoker     Packs/day: 4.00     Years: 13.00     Types: Cigarettes     Quit date: 2005   • Smokeless tobacco: Never Used   • Alcohol use No   • Drug use: No   • Sexual activity: Yes     Partners: Female     Other Topics Concern   • Not on file     Social History Narrative   • No narrative on file         Vitals:    18 0720   BP: 122/66   BP Location: Left arm   Pulse: 78   SpO2: 95%   Weight: 109 kg (239 lb 9.6 oz)   Height: 173.5 cm (68.31\")          Physical Exam:    General: Alert and oriented x 3, with appropriate affect; no acute distress. Obese. HEENT: pupils equal, round, and reactive to light; extraocular movements intact; sclera nonicteric; nasal mucosa normal; pharynx normal; tympanic membranes and ear canals normal.  Neck: without JVD, thyromegaly, bruit, or adenopathy.  Lungs: clear to auscultation in all fields.  Heart: auscultation reveals " regular rate and rhythm without murmur, rub, gallop, or click.  Abdomen: is soft and nontender, without hepato-splenomegaly, mass or hernia. Normal bowel sounds; .  Urologic exam: reveals normal male genitalia without testicular mass or penile/scrotal lesion.  Digital rectal exam and Prostate: deferred.  Extremities: are without clubbing, cyanosis, or edema.  Vascular: no signs of peripheral arterial disease or venous insufficiency/varicosities.  Neurological: intact without focal deficit, including cranial and peripheral nerves.  Station and gait observed to be normal during ingress and egress from the examination area.  Sensation and deep tendon reflexes tested if clinically indicated and are normal.  Musculoskeletal: exam is normal, without signs of synovitis, significant degeneration or deformity. Skin examination: without rash or significant lesions.      Lab/other results:    NMR is absolutely perfect.  CBC normal except neutrophils are high at 76.7.  Platelets are low at 93.  CMP normal except blood sugar elevated at 122, sodium slightly elevated at 146, alkaline phosphatase mildly elevated 133, AST slightly elevated at 41.  Urinalysis normal except 3+ glucose as expected.  Albumin/creatinine ratio normal at 8.9.  Hemoglobin A1c excellent at 6.68.  Thyroid function tests normal.  PSA normal.  Vitamin D normal.  CPK normal.    Assessment/Plan:     Diagnosis Plan   1. Routine physical examination     2. Type 2 diabetes mellitus without complication, without long-term current use of insulin (CMS/[facility])     3. Occlusive coronary artery disease, 01/01/2005--status post MI.  January 2005--CABG ×4.  Details not known.     4. Hyperlipidemia, unspecified hyperlipidemia type     5. Hypogonadism male, TRT ineffective     6. Microalbuminuria     7. RUIZ (nonalcoholic steatohepatitis)     8. Obstructive sleep apnea, 11/13/2012--mild to moderate NKII.  Unable to tolerate CPAP.  Cannot use oral appliance.     9. Sleep related  hypoxia     10. Vitamin D deficiency     11. Family history of premature coronary artery disease     12. Diabetic eye exam (CMS/HCC)     13. Diabetic foot exam     14. History of myocardial infarction, 2005.     15. Thrombocytopenia (CMS/[facility])     16. Therapeutic drug monitoring       Patient presents with essentially normal annual physical except for the following issues: He has type 2 diabetes which is under excellent control.  In fact I think dose reduction regarding glimepiride can be made.  This may help with weight loss as well.  His coronary artery disease is stable.  Hyperlipidemia is under excellent control.  Microalbuminuria is well controlled.  Hypogonadism has not been symptomatic and TRT really did not make any difference.  He has RUIZ with mild elevation of liver enzymes.  Patient has sleep apnea but could not tolerate CPAP or an oral appliance.  He has nocturnal oxygen at home but has not been able to sleep with this.  He reports he is going to try again.  Vitamin D therapeutic.  Patient is behind on his diabetic eye exam and I encouraged him to do this.  Diabetic foot exam unremarkable.  His thrombocytopenia persists and patient does not have a follow-up with the hematologist.  I will make one.    Plan is as follows: Decrease glimepiride down to 4 mg once daily.  No other changes in medical regimen.  Hematology referral given.  Encouraged patient to get the new shingles vaccination as well as colonoscopy and I exam.    Addendum: Low dose lung CT for lung cancer screening ordered.    Procedures

## 2018-09-19 ENCOUNTER — APPOINTMENT (OUTPATIENT)
Dept: ONCOLOGY | Facility: CLINIC | Age: 63
End: 2018-09-19

## 2018-09-19 ENCOUNTER — CLINICAL SUPPORT (OUTPATIENT)
Dept: OTHER | Facility: HOSPITAL | Age: 63
End: 2018-09-19

## 2018-09-19 ENCOUNTER — HOSPITAL ENCOUNTER (OUTPATIENT)
Dept: PET IMAGING | Facility: HOSPITAL | Age: 63
Discharge: HOME | End: 2018-09-19
Admitting: CLINICAL NURSE SPECIALIST

## 2018-09-19 ENCOUNTER — OFFICE VISIT (OUTPATIENT)
Dept: ONCOLOGY | Facility: CLINIC | Age: 63
End: 2018-09-19

## 2018-09-19 ENCOUNTER — LAB (OUTPATIENT)
Dept: LAB | Facility: HOSPITAL | Age: 63
End: 2018-09-19

## 2018-09-19 ENCOUNTER — APPOINTMENT (OUTPATIENT)
Dept: LAB | Facility: HOSPITAL | Age: 63
End: 2018-09-19

## 2018-09-19 VITALS — HEIGHT: 70 IN | BODY MASS INDEX: 34.62 KG/M2 | WEIGHT: 241.8 LBS

## 2018-09-19 VITALS
SYSTOLIC BLOOD PRESSURE: 130 MMHG | RESPIRATION RATE: 16 BRPM | TEMPERATURE: 97.7 F | BODY MASS INDEX: 35.78 KG/M2 | WEIGHT: 241.6 LBS | HEIGHT: 69 IN | OXYGEN SATURATION: 96 % | HEART RATE: 77 BPM | DIASTOLIC BLOOD PRESSURE: 64 MMHG

## 2018-09-19 DIAGNOSIS — D69.6 THROMBOCYTOPENIA (HCC): ICD-10-CM

## 2018-09-19 DIAGNOSIS — Z12.2 SCREENING FOR MALIGNANT NEOPLASM OF RESPIRATORY ORGAN: Primary | ICD-10-CM

## 2018-09-19 DIAGNOSIS — Z12.2 SCREENING FOR MALIGNANT NEOPLASM OF RESPIRATORY ORGAN: ICD-10-CM

## 2018-09-19 DIAGNOSIS — D69.3 CHRONIC ITP (IDIOPATHIC THROMBOCYTOPENIA) (HCC): Primary | ICD-10-CM

## 2018-09-19 DIAGNOSIS — Z87.891 HISTORY OF SMOKING 30 OR MORE PACK YEARS: ICD-10-CM

## 2018-09-19 LAB
BASOPHILS # BLD AUTO: 0.03 10*3/MM3 (ref 0–0.1)
BASOPHILS NFR BLD AUTO: 0.5 % (ref 0–1.1)
DEPRECATED RDW RBC AUTO: 43.3 FL (ref 37–49)
EOSINOPHIL # BLD AUTO: 0.13 10*3/MM3 (ref 0–0.36)
EOSINOPHIL NFR BLD AUTO: 2.3 % (ref 1–5)
ERYTHROCYTE [DISTWIDTH] IN BLOOD BY AUTOMATED COUNT: 12.9 % (ref 11.7–14.5)
HCT VFR BLD AUTO: 48.6 % (ref 40–49)
HGB BLD-MCNC: 16.6 G/DL (ref 13.5–16.5)
IMM GRANULOCYTES # BLD: 0.01 10*3/MM3 (ref 0–0.03)
IMM GRANULOCYTES NFR BLD: 0.2 % (ref 0–0.5)
LYMPHOCYTES # BLD AUTO: 1.26 10*3/MM3 (ref 1–3.5)
LYMPHOCYTES NFR BLD AUTO: 22 % (ref 20–49)
MCH RBC QN AUTO: 31.2 PG (ref 27–33)
MCHC RBC AUTO-ENTMCNC: 34.2 G/DL (ref 32–35)
MCV RBC AUTO: 91.4 FL (ref 83–97)
MONOCYTES # BLD AUTO: 0.53 10*3/MM3 (ref 0.25–0.8)
MONOCYTES NFR BLD AUTO: 9.3 % (ref 4–12)
NEUTROPHILS # BLD AUTO: 3.76 10*3/MM3 (ref 1.5–7)
NEUTROPHILS NFR BLD AUTO: 65.7 % (ref 39–75)
NRBC BLD MANUAL-RTO: 0 /100 WBC (ref 0–0)
PLATELET # BLD AUTO: 92 10*3/MM3 (ref 150–375)
PLATELETS.RETICULATED NFR BLD AUTO: 19.7 % (ref 1.1–6.1)
PMV BLD AUTO: 12.9 FL (ref 8.9–12.1)
RBC # BLD AUTO: 5.32 10*6/MM3 (ref 4.3–5.5)
WBC NRBC COR # BLD: 5.72 10*3/MM3 (ref 4–10)

## 2018-09-19 PROCEDURE — G0297 LDCT FOR LUNG CA SCREEN: HCPCS

## 2018-09-19 PROCEDURE — 85055 RETICULATED PLATELET ASSAY: CPT | Performed by: INTERNAL MEDICINE

## 2018-09-19 PROCEDURE — 85025 COMPLETE CBC W/AUTO DIFF WBC: CPT | Performed by: INTERNAL MEDICINE

## 2018-09-19 PROCEDURE — 99213 OFFICE O/P EST LOW 20 MIN: CPT | Performed by: INTERNAL MEDICINE

## 2018-09-19 PROCEDURE — 36415 COLL VENOUS BLD VENIPUNCTURE: CPT | Performed by: INTERNAL MEDICINE

## 2018-09-19 NOTE — PROGRESS NOTES
[facility] GROUP OUTPATIENT FOLLOW UP CLINIC VISIT    REASON FOR FOLLOW-UP:    Thrombocytopenia    HISTORY OF PRESENT ILLNESS:  Rajan Dc is a 63 y.o. male who returns today for follow up of the above issue.      He was referred to the multidisciplinary thoracic clinic for low-dose screening CT due to history of tobacco use.  He had the CT scan done this morning.    He feels well.  He denies any bleeding.  He denies any acute issues.      HEMATOLOGIC HISTORY:  I have reviewed labs dating back to 12/4/2014.  His platelet count at that time was 110,000 and his platelet count has been very stable around ,000 since that time.  His hemoglobin has been a high normal.  His white blood cell count has been normal.    He was seen initially on 6/8/2018.  His platelet count was 91,000 with an immature platelet fraction of 24%, suggesting immune mediated thrombocythemia due to accelerated platelet destruction.  His vitamin B12 level was normal at 419.    CT imaging of the abdomen in the past on 3/7/2018 showed very mild splenomegaly measuring 13.1 cm x 11.4 x 9.3 cm.    ALLERGIES:  No Known Allergies    MEDICATIONS:  The medication list has been reviewed with the patient by the medical assistant, and the list has been updated in the electronic medical record, which I reviewed.  Medication dosages and frequencies were confirmed to be accurate.    REVIEW OF SYSTEMS:  PAIN:  See Vital Signs below.  GENERAL:  No fevers, chills, night sweats, or unintended weight loss.  SKIN:  Scattered abrasions  HEME/LYMPH:  No abnormal bleeding.  No palpable lymphadenopathy.  EYES:  No vision changes or diplopia.  ENT:  No sore throat or difficulty swallowing.  RESPIRATORY:  No cough, shortness of breath, hemoptysis, or wheezing.  CARDIOVASCULAR:  No chest pain, palpitations, orthopnea, or dyspnea on exertion.  GASTROINTESTINAL:  No abdominal pain, nausea, vomiting, constipation, diarrhea, melena, or  "hematochezia.  GENITOURINARY:  No dysuria or hematuria.  MUSCULOSKELETAL:  No joint pain, swelling, or erythema.  NEUROLOGIC:  No dizziness, loss of consciousness, or seizures.  PSYCHIATRIC:  No depression, anxiety, or mood changes.    Vitals:    09/19/18 0937   BP: 130/64   Pulse: 77   Resp: 16   Temp: 97.7 °F (36.5 °C)   TempSrc: Oral   SpO2: 96%   Weight: 110 kg (241 lb 9.6 oz)   Height: 176 cm (69.29\")   PainSc: 0-No pain       PHYSICAL EXAMINATION:  GENERAL:  Well-developed well-nourished male; awake, alert and oriented, in no acute distress.  SKIN:  Scattered abrasions  HEAD:  Normocephalic, atraumatic.  EYES:  Pupils equal, round and reactive to light.  Extraocular movements intact.  Conjunctivae normal.  EARS:  Hearing intact.  NOSE:  Septum midline.  No excoriations or nasal discharge.  MOUTH:  No stomatitis or ulcers.  Lips are normal.  THROAT:  Oropharynx without lesions or exudates.  NECK:  Supple with good range of motion; no thyromegaly or masses; no JVD or bruits.  LYMPHATICS:  No cervical, supraclavicular, or axillary lymphadenopathy.  CHEST:  Lungs are clear to auscultation bilaterally.  No wheezes, rales, or rhonchi.  HEART:  Regular rate; normal rhythm.  No murmurs, gallops or rubs.  ABDOMEN:  Soft, non-tender, non-distended.  Normal active bowel sounds.  No organomegaly.  EXTREMITIES:  Trace BLE edema.  NEUROLOGICAL:  No focal neurologic deficits.    DIAGNOSTIC DATA:  Results for orders placed or performed in visit on 09/19/18   Immature Platelet Fraction   Result Value Ref Range    IPF 19.70 (H) 1.10 - 6.10 %   CBC Auto Differential   Result Value Ref Range    WBC 5.72 4.00 - 10.00 10*3/mm3    RBC 5.32 4.30 - 5.50 10*6/mm3    Hemoglobin 16.6 (H) 13.5 - 16.5 g/dL    Hematocrit 48.6 40.0 - 49.0 %    MCV 91.4 83.0 - 97.0 fL    MCH 31.2 27.0 - 33.0 pg    MCHC 34.2 32.0 - 35.0 g/dL    RDW 12.9 11.7 - 14.5 %    RDW-SD 43.3 37.0 - 49.0 fl    MPV 12.9 (H) 8.9 - 12.1 fL    Platelets 92 (L) 150 - 375 " 10*3/mm3    Neutrophil % 65.7 39.0 - 75.0 %    Lymphocyte % 22.0 20.0 - 49.0 %    Monocyte % 9.3 4.0 - 12.0 %    Eosinophil % 2.3 1.0 - 5.0 %    Basophil % 0.5 0.0 - 1.1 %    Immature Grans % 0.2 0.0 - 0.5 %    Neutrophils, Absolute 3.76 1.50 - 7.00 10*3/mm3    Lymphocytes, Absolute 1.26 1.00 - 3.50 10*3/mm3    Monocytes, Absolute 0.53 0.25 - 0.80 10*3/mm3    Eosinophils, Absolute 0.13 0.00 - 0.36 10*3/mm3    Basophils, Absolute 0.03 0.00 - 0.10 10*3/mm3    Immature Grans, Absolute 0.01 0.00 - 0.03 10*3/mm3    nRBC 0.0 0.0 - 0.0 /100 WBC       IMAGING:  None reviewed    ASSESSMENT:  This is a 63 y.o. male with:  1.  Chronic thrombocytopenia with an elevated immature platelet fraction suggesting chronic ITP (immune mediated thrombocytopenia).  2.  Very mild erythrocytosis: He works with [employer] and is around carbon monoxide a lot.  He no longer smokes cigarettes.  Low suspicion for a myeloproliferative disorder.  Continue monitoring.  3.  13.1 cm spleen on imaging performed on 3/7/2018: Not clinically significant splenomegaly.    PLAN:  1.  No treatment is necessary at this time.  I will see him back in 6 months with a CBC and immature platelet fraction.

## 2018-09-19 NOTE — PROGRESS NOTES
[facility] Low-Dose Lung Cancer CT Screening Visit    Rajan Dc is a pleasant 63 y.o. male seen today at the request of Dr. Scott Eaton in our Lung Cancer Screening Program, being seen for Lung Cancer Screening Counseling and a Shared Decision Making Visit prior to Low-Dose Lung Cancer Screening CT exam.    SMOKING HISTORY:   History   Smoking Status   • Former Smoker   • Types: Cigarettes   • Quit date: 2005   Smokeless Tobacco   • Never Used     Comment: Former smoker quit 13 years ago with a 64.5 pack year history.  Smoked 1 ppd for 32 years and 4.5 ppd for 5 years and quit when he had his open heart surgery.       Rajan Dc is a former smoker quitting 13 years ago with a 64.5 pack year history.  Reports no use of alternate forms of tobacco, electronic cigarettes, marijuana or other substances.  Based on the recommendation of the [place] Preventive Services Task Force, this patient is at high risk for lung cancer and a low-dose CT screening scan is recommended.     We discussed the connection between Radon and Lung Cancer and the availability of free test kits. He does not have a basement in his home.  The patient reports occupational exposure to asbestos as he is an  and was involved in changing asbestos containing brakes for about 3 years.  Some second-hand smoke exposure as a child with his father smoking in their home.  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      The patient has had no hemoptysis, unintentional weight loss or increasing shortness of breath. The patient is asymptomatic and has no signs or symptoms of lung cancer.   The patient reports no personal history of cancer.  Additionally, reports family history of lung cancer in his maternal grandmother and maternal grandfather.      Together we discussed the potential benefits and potential harms of being screened  for lung cancer including the potential for follow-up diagnostic testing, risk for over diagnosis, false positive rate and total radiation exposure using the [facility] standardized decision aid.  We reviewed the patient's smoking history and counseled on the importance and health benefits of maintaining cigarette smoking abstinence.      Smoking Abstinence  DISCUSSION HELD TODAY:     We discussed that there are a number of resources and tobacco cessation interventions to assist with smoking cessation including the 1-800-Quit Now line, Health Department programs, [place] [facility] resources, and use of the U.[place]. Department of Health and Human Services five keys for quitting and quit plan worksheet. On a scale of zero to ten, the patient rates their motivation to stay quit at a *** out of 10 today.  The patient is confident that they will never smoke in the future.      Recommendations for continued lung cancer screening:      We discussed the NCCN guidelines for lung cancer screening and the patient verbalized understanding that annual screening is recommended until fifteen years beyond smoking as long as they have no other disease or comorbidity that would prevent them from receiving cancer treatments such as surgery should a lung cancer be detected. The [facility] Lung Cancer Screening Shared Decision-Making Tool was utilized as an aid in discussing whether or not screening was right. The patient has decided to proceed with a Low Dose Lung Cancer Screening CT today. The patient is aware that the results of his screening will be shared with the referring provider or PCP as well.       The patient verbalizes understanding that results of this low dose lung CT exam will be called and that assistance will be provided in arranging any necessary follow-up.    After review of the NCCN guidelines and recommendations for ongoing screening, the patient verbalized understanding of recommendations  for follow-up. We discussed the importance of adherence to continued annual screening until 15 years beyond smoking or until other life-limiting comorbidities are present. We discussed the impact of comorbidities and ability and willing to undergo diagnosis and treatment.    The patient was counseled on the continued health benefits of having quit tobacco, maintaining smoking abstinence, smoking cessation strategies and resources, as well as the importance of adherence to annual lung cancer low-dose CT screening.    Janna Cardoso, MSN, APRN, ACNS-BC, AOCN, CHPN  Clinical Nurse Specialist  [facility]

## 2018-10-22 RX ORDER — SITAGLIPTIN AND METFORMIN HYDROCHLORIDE 1000; 50 MG/1; MG/1
TABLET, FILM COATED, EXTENDED RELEASE ORAL
Qty: 60 TABLET | Refills: 4 | Status: SHIPPED | OUTPATIENT
Start: 2018-10-22 | End: 2019-02-13 | Stop reason: SDUPTHER

## 2019-01-04 DIAGNOSIS — E11.9 TYPE 2 DIABETES MELLITUS WITHOUT COMPLICATION, WITHOUT LONG-TERM CURRENT USE OF INSULIN (HCC): ICD-10-CM

## 2019-01-04 RX ORDER — ATORVASTATIN CALCIUM 80 MG/1
80 TABLET, FILM COATED ORAL DAILY
Qty: 30 TABLET | Refills: 2 | Status: SHIPPED | OUTPATIENT
Start: 2019-01-04 | End: 2019-05-22 | Stop reason: SDUPTHER

## 2019-01-04 RX ORDER — EMPAGLIFLOZIN 25 MG/1
TABLET, FILM COATED ORAL
Qty: 30 TABLET | Refills: 2 | Status: SHIPPED | OUTPATIENT
Start: 2019-01-04 | End: 2019-01-07 | Stop reason: SDUPTHER

## 2019-01-07 DIAGNOSIS — E11.9 TYPE 2 DIABETES MELLITUS WITHOUT COMPLICATION, WITHOUT LONG-TERM CURRENT USE OF INSULIN (HCC): ICD-10-CM

## 2019-01-08 DIAGNOSIS — E11.9 TYPE 2 DIABETES MELLITUS WITHOUT COMPLICATION, WITHOUT LONG-TERM CURRENT USE OF INSULIN (HCC): ICD-10-CM

## 2019-02-13 DIAGNOSIS — E11.9 TYPE 2 DIABETES MELLITUS WITHOUT COMPLICATION, WITHOUT LONG-TERM CURRENT USE OF INSULIN (HCC): ICD-10-CM

## 2019-03-10 DIAGNOSIS — E11.9 TYPE 2 DIABETES MELLITUS WITHOUT COMPLICATION, WITHOUT LONG-TERM CURRENT USE OF INSULIN (HCC): Chronic | ICD-10-CM

## 2019-03-11 DIAGNOSIS — D69.6 THROMBOCYTOPENIA (HCC): ICD-10-CM

## 2019-03-11 DIAGNOSIS — E11.9 TYPE 2 DIABETES MELLITUS WITHOUT COMPLICATION, WITHOUT LONG-TERM CURRENT USE OF INSULIN (HCC): Chronic | ICD-10-CM

## 2019-03-11 DIAGNOSIS — E78.5 HYPERLIPIDEMIA, UNSPECIFIED HYPERLIPIDEMIA TYPE: Chronic | ICD-10-CM

## 2019-03-13 DIAGNOSIS — E11.9 TYPE 2 DIABETES MELLITUS WITHOUT COMPLICATION, WITHOUT LONG-TERM CURRENT USE OF INSULIN (HCC): Chronic | ICD-10-CM

## 2019-03-15 LAB
ALBUMIN SERPL-MCNC: 4.3 G/DL (ref 3.5–5.2)
ALBUMIN/GLOB SERPL: 1.7 G/DL
ALP SERPL-CCNC: 114 U/L (ref 39–117)
ALT SERPL-CCNC: 35 U/L (ref 1–41)
AST SERPL-CCNC: 25 U/L (ref 1–40)
BILIRUB SERPL-MCNC: 0.9 MG/DL (ref 0.1–1.2)
BUN SERPL-MCNC: 12 MG/DL (ref 8–23)
BUN/CREAT SERPL: 16.2 (ref 7–25)
CALCIUM SERPL-MCNC: 9.4 MG/DL (ref 8.6–10.5)
CHLORIDE SERPL-SCNC: 105 MMOL/L (ref 98–107)
CHOLEST SERPL-MCNC: 136 MG/DL (ref 100–199)
CK SERPL-CCNC: 96 U/L (ref 20–200)
CO2 SERPL-SCNC: 26.2 MMOL/L (ref 22–29)
CREAT SERPL-MCNC: 0.74 MG/DL (ref 0.76–1.27)
ERYTHROCYTE [DISTWIDTH] IN BLOOD BY AUTOMATED COUNT: 13.5 % (ref 12.3–15.4)
GLOBULIN SER CALC-MCNC: 2.6 GM/DL
GLUCOSE SERPL-MCNC: 126 MG/DL (ref 65–99)
HBA1C MFR BLD: 6.7 % (ref 4.8–5.6)
HCT VFR BLD AUTO: 53 % (ref 37.5–51)
HDL SERPL-SCNC: 31.1 UMOL/L
HDLC SERPL-MCNC: 49 MG/DL
HGB BLD-MCNC: 17.4 G/DL (ref 13–17.7)
LDL SERPL QN: 21.1 NM
LDL SERPL-SCNC: 802 NMOL/L
LDL SMALL SERPL-SCNC: 287 NMOL/L
LDLC SERPL CALC-MCNC: 67 MG/DL (ref 0–99)
MCH RBC QN AUTO: 30.5 PG (ref 26.6–33)
MCHC RBC AUTO-ENTMCNC: 32.8 G/DL (ref 31.5–35.7)
MCV RBC AUTO: 93 FL (ref 79–97)
PLATELET # BLD AUTO: 88 10*3/MM3 (ref 140–450)
POTASSIUM SERPL-SCNC: 4.3 MMOL/L (ref 3.5–5.2)
PROT SERPL-MCNC: 6.9 G/DL (ref 6–8.5)
RBC # BLD AUTO: 5.7 10*6/MM3 (ref 4.14–5.8)
SODIUM SERPL-SCNC: 144 MMOL/L (ref 136–145)
TRIGL SERPL-MCNC: 98 MG/DL (ref 0–149)
WBC # BLD AUTO: 4.33 10*3/MM3 (ref 3.4–10.8)

## 2019-03-18 ENCOUNTER — OFFICE VISIT (OUTPATIENT)
Dept: ONCOLOGY | Facility: CLINIC | Age: 64
End: 2019-03-18

## 2019-03-18 ENCOUNTER — LAB (OUTPATIENT)
Dept: LAB | Facility: HOSPITAL | Age: 64
End: 2019-03-18

## 2019-03-18 VITALS
DIASTOLIC BLOOD PRESSURE: 81 MMHG | TEMPERATURE: 97.9 F | BODY MASS INDEX: 35.37 KG/M2 | OXYGEN SATURATION: 95 % | SYSTOLIC BLOOD PRESSURE: 135 MMHG | WEIGHT: 247.1 LBS | HEART RATE: 79 BPM | HEIGHT: 70 IN | RESPIRATION RATE: 16 BRPM

## 2019-03-18 DIAGNOSIS — D69.6 THROMBOCYTOPENIA (HCC): Primary | Chronic | ICD-10-CM

## 2019-03-18 DIAGNOSIS — D75.1 ERYTHROCYTOSIS: ICD-10-CM

## 2019-03-18 DIAGNOSIS — D69.3 CHRONIC ITP (IDIOPATHIC THROMBOCYTOPENIA) (HCC): ICD-10-CM

## 2019-03-18 LAB
BASOPHILS # BLD AUTO: 0.03 10*3/MM3 (ref 0–0.2)
BASOPHILS NFR BLD AUTO: 0.6 % (ref 0–1.5)
DEPRECATED RDW RBC AUTO: 43.7 FL (ref 37–54)
EOSINOPHIL # BLD AUTO: 0.16 10*3/MM3 (ref 0–0.4)
EOSINOPHIL NFR BLD AUTO: 3.1 % (ref 0.3–6.2)
ERYTHROCYTE [DISTWIDTH] IN BLOOD BY AUTOMATED COUNT: 13.2 % (ref 12.3–15.4)
HCT VFR BLD AUTO: 52.6 % (ref 37.5–51)
HGB BLD-MCNC: 18 G/DL (ref 13–17.7)
IMM GRANULOCYTES # BLD AUTO: 0.02 10*3/MM3 (ref 0–0.05)
IMM GRANULOCYTES NFR BLD AUTO: 0.4 % (ref 0–0.5)
LYMPHOCYTES # BLD AUTO: 0.91 10*3/MM3 (ref 0.7–3.1)
LYMPHOCYTES NFR BLD AUTO: 17.6 % (ref 19.6–45.3)
MCH RBC QN AUTO: 31 PG (ref 26.6–33)
MCHC RBC AUTO-ENTMCNC: 34.2 G/DL (ref 31.5–35.7)
MCV RBC AUTO: 90.5 FL (ref 79–97)
MONOCYTES # BLD AUTO: 0.44 10*3/MM3 (ref 0.1–0.9)
MONOCYTES NFR BLD AUTO: 8.5 % (ref 5–12)
NEUTROPHILS # BLD AUTO: 3.61 10*3/MM3 (ref 1.4–7)
NEUTROPHILS NFR BLD AUTO: 69.8 % (ref 42.7–76)
NRBC BLD AUTO-RTO: 0 /100 WBC (ref 0–0)
PLATELET # BLD AUTO: 84 10*3/MM3 (ref 140–450)
PLATELETS.RETICULATED NFR BLD AUTO: 18.8 % (ref 1.1–6.1)
PMV BLD AUTO: 13.8 FL (ref 6–12)
RBC # BLD AUTO: 5.81 10*6/MM3 (ref 4.14–5.8)
WBC NRBC COR # BLD: 5.17 10*3/MM3 (ref 3.4–10.8)

## 2019-03-18 PROCEDURE — 85025 COMPLETE CBC W/AUTO DIFF WBC: CPT | Performed by: INTERNAL MEDICINE

## 2019-03-18 PROCEDURE — 99214 OFFICE O/P EST MOD 30 MIN: CPT | Performed by: INTERNAL MEDICINE

## 2019-03-18 PROCEDURE — 85055 RETICULATED PLATELET ASSAY: CPT | Performed by: INTERNAL MEDICINE

## 2019-03-18 PROCEDURE — 36415 COLL VENOUS BLD VENIPUNCTURE: CPT | Performed by: INTERNAL MEDICINE

## 2019-03-18 NOTE — PROGRESS NOTES
[facility] GROUP OUTPATIENT FOLLOW UP CLINIC VISIT    REASON FOR FOLLOW-UP:    Thrombocytopenia    HISTORY OF PRESENT ILLNESS:  Rajan Dc is a 63 y.o. male who returns today for follow up of the above issue.      He continues to do well.  He continues to work around cars.  He does state that he has sleep apnea but he has been intolerant of CPAP and therefore has not been using it.  He denies any bleeding.  No fevers or chills.  No abdominal pain.    HEMATOLOGIC HISTORY:  I have reviewed labs dating back to 12/4/2014.  His platelet count at that time was 110,000 and his platelet count has been very stable around ,000 since that time.  His hemoglobin has been a high normal.  His white blood cell count has been normal.    He was seen initially on 6/8/2018.  His platelet count was 91,000 with an immature platelet fraction of 24%, suggesting immune mediated thrombocythemia due to accelerated platelet destruction.  His vitamin B12 level was normal at 419.    CT imaging of the abdomen in the past on 3/7/2018 showed very mild splenomegaly measuring 13.1 cm x 11.4 x 9.3 cm.    ALLERGIES:  No Known Allergies    MEDICATIONS:  The medication list has been reviewed with the patient by the medical assistant, and the list has been updated in the electronic medical record, which I reviewed.  Medication dosages and frequencies were confirmed to be accurate.    REVIEW OF SYSTEMS:  PAIN:  See Vital Signs below.  GENERAL:  No fevers, chills, night sweats, or unintended weight loss.  SKIN:  Scattered abrasions  HEME/LYMPH:  No abnormal bleeding.  No palpable lymphadenopathy.  EYES:  No vision changes or diplopia.  ENT:  No sore throat or difficulty swallowing.  RESPIRATORY:  No cough, shortness of breath, hemoptysis, or wheezing.  CARDIOVASCULAR:  No chest pain, palpitations, orthopnea, or dyspnea on exertion.  GASTROINTESTINAL:  No abdominal pain, nausea, vomiting, constipation, diarrhea, melena, or  "hematochezia.  GENITOURINARY:  No dysuria or hematuria.  MUSCULOSKELETAL:  No joint pain, swelling, or erythema.  NEUROLOGIC:  No dizziness, loss of consciousness, or seizures.  PSYCHIATRIC:  No depression, anxiety, or mood changes.    Vitals:    03/18/19 0848   BP: 135/81   Pulse: 79   Resp: 16   Temp: 97.9 °F (36.6 °C)   TempSrc: Oral   SpO2: 95%   Weight: 112 kg (247 lb 1.6 oz)   Height: 177 cm (69.69\")   PainSc: 0-No pain  Comment: chronic ITP       PHYSICAL EXAMINATION:  GENERAL:  Well-developed well-nourished male; awake, alert and oriented, in no acute distress.  SKIN:  Scattered abrasions  HEAD:  Normocephalic, atraumatic.  EYES:  Pupils equal, round and reactive to light.  Extraocular movements intact.  Conjunctivae normal.  EARS:  Hearing intact.  NOSE:  Septum midline.  No excoriations or nasal discharge.  MOUTH:  No stomatitis or ulcers.  Lips are normal.  THROAT:  Oropharynx without lesions or exudates.  LYMPHATICS:  No cervical, supraclavicular, or axillary lymphadenopathy.  CHEST:  Lungs are clear to auscultation bilaterally.  No wheezes, rales, or rhonchi.  HEART:  Regular rate; normal rhythm.  No murmurs, gallops or rubs.  ABDOMEN: Not examined today  EXTREMITIES:  Trace BLE edema.  NEUROLOGICAL:  No focal neurologic deficits.    DIAGNOSTIC DATA:  Results for orders placed or performed in visit on 03/18/19   Immature Platelet Fraction   Result Value Ref Range    IPF 18.80 (H) 1.10 - 6.10 %   CBC Auto Differential   Result Value Ref Range    WBC 5.17 3.40 - 10.80 10*3/mm3    RBC 5.81 (H) 4.14 - 5.80 10*6/mm3    Hemoglobin 18.0 (H) 13.0 - 17.7 g/dL    Hematocrit 52.6 (H) 37.5 - 51.0 %    MCV 90.5 79.0 - 97.0 fL    MCH 31.0 26.6 - 33.0 pg    MCHC 34.2 31.5 - 35.7 g/dL    RDW 13.2 12.3 - 15.4 %    RDW-SD 43.7 37.0 - 54.0 fl    MPV 13.8 (H) 6.0 - 12.0 fL    Platelets 84 (L) 140 - 450 10*3/mm3    Neutrophil % 69.8 42.7 - 76.0 %    Lymphocyte % 17.6 (L) 19.6 - 45.3 %    Monocyte % 8.5 5.0 - 12.0 %    " Eosinophil % 3.1 0.3 - 6.2 %    Basophil % 0.6 0.0 - 1.5 %    Immature Grans % 0.4 0.0 - 0.5 %    Neutrophils, Absolute 3.61 1.40 - 7.00 10*3/mm3    Lymphocytes, Absolute 0.91 0.70 - 3.10 10*3/mm3    Monocytes, Absolute 0.44 0.10 - 0.90 10*3/mm3    Eosinophils, Absolute 0.16 0.00 - 0.40 10*3/mm3    Basophils, Absolute 0.03 0.00 - 0.20 10*3/mm3    Immature Grans, Absolute 0.02 0.00 - 0.05 10*3/mm3    nRBC 0.0 0.0 - 0.0 /100 WBC       IMAGING:  None reviewed    ASSESSMENT:  This is a 63 y.o. male with:  1.  Chronic thrombocytopenia with an elevated immature platelet fraction suggesting chronic ITP (immune mediated thrombocytopenia).  Platelets are stable.  Continue monitoring.  2.  Erythrocytosis: His hemoglobin and hematocrit have increased today.  He does tell me that he has sleep apnea but is intolerant of CPAP.  This might be the reason.  We will further evaluate today.  3.  13.1 cm spleen on imaging performed on 3/7/2018: Not clinically significant splenomegaly.  4.  Low-dose screening chest CT performed on 9/19/2018 read as ACR lung RADS category 1.    PLAN:  1.  Further evaluation of erythrocytosis today including an erythropoietin level, carbon monoxide level, Antonio 2 mutation analysis.  2.  I will see him back in 3 months for follow-up with a CBC and immature platelet fraction.  I will notify him of any abnormal lab results that need to be addressed prior to that.  3.  If his hemoglobin continues to remain elevated, he will need therapeutic phlebotomy and he would need to see his pulmonologist again to readdress sleep apnea.

## 2019-03-19 LAB
COHGB MFR BLD: 3.2 % (ref 0–3.6)
ETHNIC BACKGROUND STATED: 6.1 MIU/ML (ref 2.6–18.5)

## 2019-03-25 ENCOUNTER — RESULTS ENCOUNTER (OUTPATIENT)
Dept: INTERNAL MEDICINE | Facility: CLINIC | Age: 64
End: 2019-03-25

## 2019-03-25 ENCOUNTER — OFFICE VISIT (OUTPATIENT)
Dept: INTERNAL MEDICINE | Facility: CLINIC | Age: 64
End: 2019-03-25

## 2019-03-25 VITALS
BODY MASS INDEX: 35.79 KG/M2 | WEIGHT: 250 LBS | HEART RATE: 84 BPM | SYSTOLIC BLOOD PRESSURE: 120 MMHG | HEIGHT: 70 IN | DIASTOLIC BLOOD PRESSURE: 66 MMHG | OXYGEN SATURATION: 93 %

## 2019-03-25 DIAGNOSIS — D69.3 CHRONIC ITP (IDIOPATHIC THROMBOCYTOPENIA) (HCC): Chronic | ICD-10-CM

## 2019-03-25 DIAGNOSIS — E11.9 TYPE 2 DIABETES MELLITUS WITHOUT COMPLICATION, WITHOUT LONG-TERM CURRENT USE OF INSULIN (HCC): Primary | Chronic | ICD-10-CM

## 2019-03-25 DIAGNOSIS — E55.9 VITAMIN D DEFICIENCY: Chronic | ICD-10-CM

## 2019-03-25 DIAGNOSIS — Z51.81 THERAPEUTIC DRUG MONITORING: ICD-10-CM

## 2019-03-25 DIAGNOSIS — R80.9 MICROALBUMINURIA: Chronic | ICD-10-CM

## 2019-03-25 DIAGNOSIS — G47.34 SLEEP RELATED HYPOXIA: Chronic | ICD-10-CM

## 2019-03-25 DIAGNOSIS — E78.5 HYPERLIPIDEMIA, UNSPECIFIED HYPERLIPIDEMIA TYPE: Chronic | ICD-10-CM

## 2019-03-25 DIAGNOSIS — Z00.00 ROUTINE PHYSICAL EXAMINATION: ICD-10-CM

## 2019-03-25 DIAGNOSIS — D75.1 ERYTHROCYTOSIS: Chronic | ICD-10-CM

## 2019-03-25 DIAGNOSIS — I25.10 OCCLUSIVE CORONARY ARTERY DISEASE: Chronic | ICD-10-CM

## 2019-03-25 DIAGNOSIS — D69.6 THROMBOCYTOPENIA (HCC): Chronic | ICD-10-CM

## 2019-03-25 DIAGNOSIS — G47.33 OBSTRUCTIVE SLEEP APNEA: Chronic | ICD-10-CM

## 2019-03-25 DIAGNOSIS — K75.81 NASH (NONALCOHOLIC STEATOHEPATITIS): Chronic | ICD-10-CM

## 2019-03-25 DIAGNOSIS — I25.2 HISTORY OF MYOCARDIAL INFARCTION: Chronic | ICD-10-CM

## 2019-03-25 DIAGNOSIS — E29.1 HYPOGONADISM MALE: Chronic | ICD-10-CM

## 2019-03-25 PROCEDURE — 99214 OFFICE O/P EST MOD 30 MIN: CPT | Performed by: INTERNAL MEDICINE

## 2019-03-25 NOTE — PROGRESS NOTES
03/25/2019    Patient Information  Rajan Dc                                                                                          [address] [facility] [address]      1955  [unfilled]  [phone] (work)    Chief Complaint:     Follow-up type 2 diabetes, hyperlipidemia, coronary artery disease, microalbuminuria, Ruiz, sleep apnea with hypoxemia, hypogonadism, vitamin D deficiency, history of myocardial infarction, thrombocytopenia/chronic ITP, erythrocytosis.  No new acute complaints.    History of Present Illness:     Patient with a history of medical problems as outlined in the chief complaint presents today for follow-up with lab prior in order to monitor his chronic medical issues.  Patient reports he currently feels well and has no new acute complaints.  Past medical history reviewed and updated were necessary including health maintenance parameters.  This reveals he is up-to-date with the exception of the new shingles vaccine.  I asked the patient to check with his insurance company to see if the vaccine is covered in the doctor's office.  If so, he can come here and have that done.  Patient is aware to to shot vaccine.    Review of Systems   Constitution: Negative.   HENT: Negative.    Eyes: Negative.    Cardiovascular: Negative.    Respiratory: Negative.    Endocrine: Negative.    Hematologic/Lymphatic: Negative.    Skin: Negative.    Musculoskeletal: Negative.    Gastrointestinal: Negative.    Genitourinary: Negative.    Neurological: Negative.    Psychiatric/Behavioral: Negative.    Allergic/Immunologic: Negative.        Active Problems:    Patient Active Problem List   Diagnosis   • Occlusive coronary artery disease, 01/01/2005--status post MI.  January 2005--CABG ×4.  Details not known.   • Folic acid deficiency   • Hyperlipidemia   • Hypogonadism male, TRT ineffective   • Microalbuminuria   • RUIZ (nonalcoholic steatohepatitis)   • Obstructive sleep apnea,  2012--mild to moderate NIKI.  Unable to tolerate CPAP.  Cannot use oral appliance.   • Sleep related hypoxia   • Type 2 diabetes mellitus (CMS/[facility])   • Vitamin D deficiency   • Therapeutic drug monitoring   • Routine physical examination   • Family history of premature coronary artery disease   • Diabetic eye exam (CMS/HCC)   • Diabetic foot exam   • History of myocardial infarction, .   • Thrombocytopenia (CMS/[facility])   • Former cigarette smoker of unknown amount   • Chronic ITP (idiopathic thrombocytopenia) (CMS/[facility])   • Erythrocytosis         Past Medical History:   Diagnosis Date   • Chronic ITP (idiopathic thrombocytopenia) (CMS/[facility]) 2018   • Diabetic eye exam (CMS/HCC) 2017--routine diabetic eye exam reveals no evidence of diabetic retinopathy.  Astigmatism, presbyopia, hypermetropia noted.  Very early cataracts noted bilaterally.  2015--patient reports a routine diabetic eye exam without evidence of diabetic retinopathy.   • Diabetic foot exam 3/12/2017    2017--diabetic foot exam reveals no evidence of diabetic foot ulcer or pre-ulcerative callus.  Distal pulses palpable.  No signs of ischemia.  Sensation subjectively intact per monofilament.   • Erythrocytosis 3/18/2019   • Family history of premature coronary artery disease 2016    Father  from a myocardial infarction age 61.   • Folic acid deficiency 2016   • History of Abnormal pulse oximetry 10/07/2012    10/07/2012--overnight oximetry test revealed significant nocturnal hypoxemia. Oxygen saturations were greater than 90% for only 50.5% of the study. Oxygen saturation less than 90% for three hours 47 minutes which was 49.5% of the study. Oxygen saturation less than 88% for one hour and 36 minutes and 48 seconds, 21.1% of the study.   • History of CABG 2005 status post four-vessel CABG.   • History of myocardial infarction, . 2005--status post myocardial  infarction.   January 2005--status post four-vessel CABG   • Hyperlipidemia 4/28/2016   • Hypogonadism male, TRT ineffective 9/13/2012 09/13/2012--treatment for symptomatic hypogonadism begun. This was later discontinued due to lack of efficacy and cost.   • Microalbuminuria 4/25/2014 04/25/2014--urine microalbumin elevated 28.7. Normal range 0.0--17.0.   • RUIZ (nonalcoholic steatohepatitis) 4/28/2016   • Obstructive sleep apnea, 11/13/2012--mild to moderate NIKI.  Unable to tolerate CPAP.  Cannot use oral appliance. 10/7/2012    05/02/2014--nocturnal oxygen 2 L per nasal cannula ordered.   12/03/2013--patient was referred for an oral appliance but this could not be done because patient wears dentures.   11/13/2012--diagnosed with mild to moderate obstructive sleep apnea. Patient unable to tolerate CPAP.   10/07/2012--overnight oximetry test revealed significant nocturnal hypoxemia. Oxygen saturations were greater than 90% for only 50.5% of the study. Oxygen saturation less than 90% for three hours 47 minutes which was 49.5% of the study. Oxygen saturation less than 88% for one hour and 36 minutes and 48 seconds, 21.1% of the study.   • Occlusive coronary artery disease, 01/01/2005--status post MI.  January 2005--CABG ×4.  Details not known. 1/1/2005 01/01/2005--status post myocardial infarction.   January 2005--status post four-vessel CABG   • Sleep related hypoxia 10/7/2012    05/02/2014--nocturnal oxygen 2 L per nasal cannula ordered.  12/03/2013--patient was referred for an oral appliance but this could not be done because patient wears dentures.   11/13/2012--diagnosed with mild to moderate obstructive sleep apnea. Patient unable to tolerate CPAP.   10/07/2012--overnight oximetry test revealed significant nocturnal hypoxemia. Oxygen saturations were greater than 90% for only 50.5% of the study. Oxygen saturation less than 90% for three hours 47 minutes which was 49.5% of the study. Oxygen saturation  less than 88% for one hour and 36 minutes and 48 seconds, 21.1% of the study.   • Thrombocytopenia (CMS/HCC) 1/25/2018 06/07/2018--follow-up.  Platelets are still low at 86.  I reviewed the results of the CT scan of the abdomen with the patient.  Etiology of the mild splenomegaly not clear.  Patient referred to hematology.  03/07/2018--CT scan of the abdomen with contrast performed for elevated liver enzymes and thrombocytopenia.  This revealed minimal fatty infiltration of the liver.  Tiny calcified gallstone.  Mild splenomegaly.  Small amount of calcification is seen along the periphery of the spleen which could be related to remote hemorrhage.  It is of doubtful clinical significance.  Mild bilateral perinephric stranding.  Please correlate for signs of urinary tract infection.  No hydronephrosis, renal masses, or stones are seen.  01/25/2018--platelet count low at 99.  Lymphocytes low at 19%.  Otherwise, differential was unremarkable.  01/25/2018--routine follow-up.  Platelet count is 91.  CBC with differential ordered.   • Type 2 diabetes mellitus (CMS/HCC) 1/1/2005    Diagnosed in 2005.   • Vitamin D deficiency 4/28/2016         Past Surgical History:   Procedure Laterality Date   • CORONARY ARTERY BYPASS GRAFT  01/2005 January 2005 status post four-vessel CABG.         No Known Allergies        Current Outpatient Medications:   •  aspirin 81 MG tablet, Take 1 tablet by mouth daily., Disp: , Rfl:   •  atorvastatin (LIPITOR) 80 MG tablet, TAKE 1 TABLET BY MOUTH DAILY, Disp: 30 tablet, Rfl: 2  •  Cholecalciferol (VITAMIN D3) 5000 UNITS capsule capsule, 1 by mouth daily as directed, Disp: 30 capsule, Rfl: 11  •  Empagliflozin (JARDIANCE) 25 MG tablet, Take 25 mg by mouth Every Morning Before Breakfast., Disp: 30 tablet, Rfl: 5  •  ezetimibe (ZETIA) 10 MG tablet, TAKE 1 TABLET BY MOUTH DAILY, Disp: 30 tablet, Rfl: 2  •  folic acid (FOLVITE) 1 MG tablet, TAKE 1 TABLET BY MOUTH DAILY, Disp: 30 tablet, Rfl:  2  •  glimepiride (AMARYL) 4 MG tablet, 1 by mouth daily for diabetes, Disp: 90 tablet, Rfl: 3  •  Insulin Pen Needle 32G X 4 MM misc, 1 each Daily., Disp: 100 each, Rfl: 0  •  Liraglutide (VICTOZA) 18 MG/3ML solution pen-injector injection, Inject 1.8 mg subcutaneously daily as directed., Disp: 3 pen, Rfl: 6  •  SITagliptin-MetFORMIN HCl ER (JANUMET XR)  MG tablet, Take 1 tablet by mouth 2 (Two) Times a Day., Disp: 60 tablet, Rfl: 5      Family History   Problem Relation Age of Onset   • Other Mother         Ventricular Septal Defect   • Heart disease Mother    • Hypertension Mother    • Cancer Mother    • Diabetes Mother    • Heart attack Father         Prior Myocardial Infarction.  Dad  from a myocardial infarction at age 59.   • Heart disease Father    • Heart disease Other         Congenital Heart Disease. Multiple family members with septal defects that required surgery.   • Heart disease Sister    • Heart disease Brother    • Cancer Daughter          Social History     Socioeconomic History   • Marital status:      Spouse name: Dasha   • Number of children: 8   • Years of education: Not on file   • Highest education level: 9th grade   Occupational History   • Occupation: [employer]     Employer: [employer]   Social Needs   • Financial resource strain: Not very hard   • Food insecurity:     Worry: Never true     Inability: Never true   • Transportation needs:     Medical: No     Non-medical: No   Tobacco Use   • Smoking status: Former Smoker     Types: Cigarettes     Last attempt to quit:      Years since quittin.2   • Smokeless tobacco: Never Used   • Tobacco comment: Former smoker quit 13 years ago with a 64.5 pack year history.  Smoked 1 ppd for 32 years and 4.5 ppd for 5 years and quit when he had his open heart surgery.   Substance and Sexual Activity   • Alcohol use: No     Frequency: Never   • Drug use: No   • Sexual activity: Yes     Partners: Female   Lifestyle  "  • Physical activity:     Days per week: 0 days     Minutes per session: 0 min   • Stress: Not at all   Relationships   • Social connections:     Talks on phone: More than three times a week     Gets together: Three times a week     Attends Jain service: Never     Active member of club or organization: No     Attends meetings of clubs or organizations: Never     Relationship status:          Vitals:    03/25/19 0848   BP: 120/66   Pulse: 84   SpO2: 93%   Weight: 113 kg (250 lb)   Height: 177 cm (69.69\")          Physical Exam:    General: Alert and oriented x 3.  No acute distress.  Normal affect. Obese. HEENT: Pupils equal, round, reactive to light; extraocular movements intact; sclerae nonicteric; pharynx, ear canals and TMs normal.  Neck: Without JVD, thyromegaly, bruit, or adenopathy.  Lungs: Clear to auscultation in all fields.  Heart: Regular rate and rhythm without murmur, rub, gallop, or click.  Abdomen: Soft, nontender, without hepatosplenomegaly or hernia.  Bowel sounds normal.  : Deferred.  Rectal: Deferred.  Extremities: Without clubbing, cyanosis, edema, or pulse deficit.  Neurologic: Intact without focal deficit.  Normal station and gait observed during ingress and egress from the examination room.  Skin: Without significant lesion.  Musculoskeletal: Unremarkable.    Lab/other results:    NMR is absolutely perfect.  CMP normal except blood sugar elevated 126.  CBC normal except hematocrit elevated at 53 and platelets are low at 88.  RY globin A1c 6.7.  CPK normal.    Assessment/Plan:     Diagnosis Plan   1. Type 2 diabetes mellitus without complication, without long-term current use of insulin (CMS/[facility])     2. Hyperlipidemia, unspecified hyperlipidemia type     3. Occlusive coronary artery disease, 01/01/2005--status post MI.  January 2005--CABG ×4.  Details not known.     4. Microalbuminuria     5. RUIZ (nonalcoholic steatohepatitis)     6. Obstructive sleep apnea, 11/13/2012--mild to " moderate NIKI.  Unable to tolerate CPAP.  Cannot use oral appliance.     7. Sleep related hypoxia     8. Hypogonadism male, TRT ineffective     9. Vitamin D deficiency     10. History of myocardial infarction, 2005.     11. Thrombocytopenia (CMS/HCC)     12. Chronic ITP (idiopathic thrombocytopenia) (CMS/HCC)     13. Erythrocytosis     14. Therapeutic drug monitoring     15. Routine physical examination       Patient has type 2 diabetes is under excellent control.  Hyperlipidemia is under perfect control which is important given his coronary artery disease.  Microalbuminuria has been mild and stable.  Patient has Maxwell but no elevation of liver enzymes.  Patient has sleep apnea with sleep-related hypoxia but is unable to tolerate CPAP.  I suspect this is playing a role in his erythrocytosis.  Patient also has hypogonadism but TRT was not effective.  Vitamin D is therapeutic.  Patient has a history of remote myocardial infarction back in 2005 without recurrence.  His thrombocytopenia/chronic ITP and erythrocytosis is being followed by the hematologist.    Plan is as follows: No change in current medical regimen.  Patient will follow-up after September 18, 2019 with lab prior this will be his annual physical.  Patient will check with his insurance company regarding the new shingles vaccine.        Procedures

## 2019-04-08 LAB — REF LAB TEST METHOD: NORMAL

## 2019-05-22 DIAGNOSIS — E11.9 TYPE 2 DIABETES MELLITUS WITHOUT COMPLICATION, WITHOUT LONG-TERM CURRENT USE OF INSULIN (HCC): Chronic | ICD-10-CM

## 2019-05-22 RX ORDER — ATORVASTATIN CALCIUM 80 MG/1
80 TABLET, FILM COATED ORAL DAILY
Qty: 30 TABLET | Refills: 3 | Status: SHIPPED | OUTPATIENT
Start: 2019-05-22 | End: 2019-09-23 | Stop reason: SDUPTHER

## 2019-06-23 ENCOUNTER — HOSPITAL ENCOUNTER (INPATIENT)
Facility: HOSPITAL | Age: 64
LOS: 3 days | Discharge: HOME | End: 2019-06-26
Attending: EMERGENCY MEDICINE | Admitting: HOSPITALIST

## 2019-06-23 ENCOUNTER — APPOINTMENT (OUTPATIENT)
Dept: GENERAL RADIOLOGY | Facility: HOSPITAL | Age: 64
End: 2019-06-23

## 2019-06-23 DIAGNOSIS — K81.0 ACUTE CHOLECYSTITIS: Primary | ICD-10-CM

## 2019-06-23 PROBLEM — R79.89 ELEVATED LFTS: Status: ACTIVE | Noted: 2019-06-23

## 2019-06-23 LAB
ALBUMIN SERPL-MCNC: 3.9 G/DL (ref 3.5–5.2)
ALBUMIN/GLOB SERPL: 1.3 G/DL
ALP SERPL-CCNC: 147 U/L (ref 39–117)
ALT SERPL W P-5'-P-CCNC: 107 U/L (ref 1–41)
ANION GAP SERPL CALCULATED.3IONS-SCNC: 11.5 MMOL/L
AST SERPL-CCNC: 93 U/L (ref 1–40)
BASOPHILS # BLD AUTO: 0.04 10*3/MM3 (ref 0–0.2)
BASOPHILS NFR BLD AUTO: 0.5 % (ref 0–1.5)
BILIRUB SERPL-MCNC: 1.7 MG/DL (ref 0.2–1.2)
BILIRUB UR QL STRIP: NEGATIVE
BUN BLD-MCNC: 14 MG/DL (ref 8–23)
BUN/CREAT SERPL: 17.3 (ref 7–25)
CALCIUM SPEC-SCNC: 8.7 MG/DL (ref 8.6–10.5)
CHLORIDE SERPL-SCNC: 105 MMOL/L (ref 98–107)
CLARITY UR: CLEAR
CO2 SERPL-SCNC: 25.5 MMOL/L (ref 22–29)
COLOR UR: ABNORMAL
CREAT BLD-MCNC: 0.81 MG/DL (ref 0.76–1.27)
DEPRECATED RDW RBC AUTO: 47.8 FL (ref 37–54)
EOSINOPHIL # BLD AUTO: 0.04 10*3/MM3 (ref 0–0.4)
EOSINOPHIL NFR BLD AUTO: 0.5 % (ref 0.3–6.2)
ERYTHROCYTE [DISTWIDTH] IN BLOOD BY AUTOMATED COUNT: 13.8 % (ref 12.3–15.4)
GFR SERPL CREATININE-BSD FRML MDRD: 97 ML/MIN/1.73
GLOBULIN UR ELPH-MCNC: 2.9 GM/DL
GLUCOSE BLD-MCNC: 124 MG/DL (ref 65–99)
GLUCOSE UR STRIP-MCNC: ABNORMAL MG/DL
HCT VFR BLD AUTO: 49.7 % (ref 37.5–51)
HGB BLD-MCNC: 16.4 G/DL (ref 13–17.7)
HGB UR QL STRIP.AUTO: NEGATIVE
HOLD SPECIMEN: NORMAL
HOLD SPECIMEN: NORMAL
IMM GRANULOCYTES # BLD AUTO: 0.02 10*3/MM3 (ref 0–0.05)
IMM GRANULOCYTES NFR BLD AUTO: 0.2 % (ref 0–0.5)
KETONES UR QL STRIP: NEGATIVE
LEUKOCYTE ESTERASE UR QL STRIP.AUTO: NEGATIVE
LIPASE SERPL-CCNC: 41 U/L (ref 13–60)
LYMPHOCYTES # BLD AUTO: 1.26 10*3/MM3 (ref 0.7–3.1)
LYMPHOCYTES NFR BLD AUTO: 14.3 % (ref 19.6–45.3)
MCH RBC QN AUTO: 30.8 PG (ref 26.6–33)
MCHC RBC AUTO-ENTMCNC: 33 G/DL (ref 31.5–35.7)
MCV RBC AUTO: 93.2 FL (ref 79–97)
MONOCYTES # BLD AUTO: 0.96 10*3/MM3 (ref 0.1–0.9)
MONOCYTES NFR BLD AUTO: 10.9 % (ref 5–12)
NEUTROPHILS # BLD AUTO: 6.5 10*3/MM3 (ref 1.7–7)
NEUTROPHILS NFR BLD AUTO: 73.6 % (ref 42.7–76)
NITRITE UR QL STRIP: NEGATIVE
NRBC BLD AUTO-RTO: 0.1 /100 WBC (ref 0–0.2)
PH UR STRIP.AUTO: <=5 [PH] (ref 5–8)
PLATELET # BLD AUTO: 102 10*3/MM3 (ref 140–450)
PMV BLD AUTO: 14.4 FL (ref 6–12)
POTASSIUM BLD-SCNC: 3.7 MMOL/L (ref 3.5–5.2)
PROT SERPL-MCNC: 6.8 G/DL (ref 6–8.5)
PROT UR QL STRIP: ABNORMAL
RBC # BLD AUTO: 5.33 10*6/MM3 (ref 4.14–5.8)
SODIUM BLD-SCNC: 142 MMOL/L (ref 136–145)
SP GR UR STRIP: >=1.03 (ref 1–1.03)
UROBILINOGEN UR QL STRIP: ABNORMAL
WBC NRBC COR # BLD: 8.82 10*3/MM3 (ref 3.4–10.8)
WHOLE BLOOD HOLD SPECIMEN: NORMAL
WHOLE BLOOD HOLD SPECIMEN: NORMAL

## 2019-06-23 PROCEDURE — 86900 BLOOD TYPING SEROLOGIC ABO: CPT | Performed by: SURGERY

## 2019-06-23 PROCEDURE — 83690 ASSAY OF LIPASE: CPT | Performed by: NURSE PRACTITIONER

## 2019-06-23 PROCEDURE — 93010 ELECTROCARDIOGRAM REPORT: CPT | Performed by: INTERNAL MEDICINE

## 2019-06-23 PROCEDURE — 25010000002 PIPERACILLIN SOD-TAZOBACTAM PER 1 G: Performed by: NURSE PRACTITIONER

## 2019-06-23 PROCEDURE — 86850 RBC ANTIBODY SCREEN: CPT | Performed by: SURGERY

## 2019-06-23 PROCEDURE — 93005 ELECTROCARDIOGRAM TRACING: CPT | Performed by: NURSE PRACTITIONER

## 2019-06-23 PROCEDURE — 71045 X-RAY EXAM CHEST 1 VIEW: CPT

## 2019-06-23 PROCEDURE — 99285 EMERGENCY DEPT VISIT HI MDM: CPT

## 2019-06-23 PROCEDURE — 85025 COMPLETE CBC W/AUTO DIFF WBC: CPT | Performed by: NURSE PRACTITIONER

## 2019-06-23 PROCEDURE — 81003 URINALYSIS AUTO W/O SCOPE: CPT | Performed by: NURSE PRACTITIONER

## 2019-06-23 PROCEDURE — 80053 COMPREHEN METABOLIC PANEL: CPT | Performed by: NURSE PRACTITIONER

## 2019-06-23 PROCEDURE — 86901 BLOOD TYPING SEROLOGIC RH(D): CPT | Performed by: SURGERY

## 2019-06-23 PROCEDURE — 36415 COLL VENOUS BLD VENIPUNCTURE: CPT | Performed by: SURGERY

## 2019-06-23 RX ORDER — ACETAMINOPHEN 500 MG
1000 TABLET ORAL ONCE
Status: COMPLETED | OUTPATIENT
Start: 2019-06-23 | End: 2019-06-23

## 2019-06-23 RX ORDER — SODIUM CHLORIDE 9 MG/ML
100 INJECTION, SOLUTION INTRAVENOUS CONTINUOUS
Status: DISCONTINUED | OUTPATIENT
Start: 2019-06-24 | End: 2019-06-25

## 2019-06-23 RX ORDER — ONDANSETRON 4 MG/1
4 TABLET, FILM COATED ORAL EVERY 6 HOURS PRN
Status: DISCONTINUED | OUTPATIENT
Start: 2019-06-23 | End: 2019-06-26 | Stop reason: HOSPADM

## 2019-06-23 RX ORDER — SODIUM CHLORIDE 0.9 % (FLUSH) 0.9 %
3 SYRINGE (ML) INJECTION EVERY 12 HOURS SCHEDULED
Status: DISCONTINUED | OUTPATIENT
Start: 2019-06-24 | End: 2019-06-26 | Stop reason: HOSPADM

## 2019-06-23 RX ORDER — SODIUM CHLORIDE 0.9 % (FLUSH) 0.9 %
1-10 SYRINGE (ML) INJECTION AS NEEDED
Status: DISCONTINUED | OUTPATIENT
Start: 2019-06-23 | End: 2019-06-26 | Stop reason: HOSPADM

## 2019-06-23 RX ORDER — SODIUM CHLORIDE 0.9 % (FLUSH) 0.9 %
10 SYRINGE (ML) INJECTION AS NEEDED
Status: DISCONTINUED | OUTPATIENT
Start: 2019-06-23 | End: 2019-06-26 | Stop reason: HOSPADM

## 2019-06-23 RX ORDER — ONDANSETRON 2 MG/ML
4 INJECTION INTRAMUSCULAR; INTRAVENOUS EVERY 6 HOURS PRN
Status: DISCONTINUED | OUTPATIENT
Start: 2019-06-23 | End: 2019-06-26 | Stop reason: HOSPADM

## 2019-06-23 RX ADMIN — SODIUM CHLORIDE 100 ML/HR: 9 INJECTION, SOLUTION INTRAVENOUS at 23:50

## 2019-06-23 RX ADMIN — ACETAMINOPHEN 1000 MG: 500 TABLET, FILM COATED ORAL at 21:28

## 2019-06-23 RX ADMIN — SODIUM CHLORIDE, PRESERVATIVE FREE 3 ML: 5 INJECTION INTRAVENOUS at 23:52

## 2019-06-23 RX ADMIN — SODIUM CHLORIDE, PRESERVATIVE FREE 10 ML: 5 INJECTION INTRAVENOUS at 21:48

## 2019-06-23 RX ADMIN — TAZOBACTAM SODIUM AND PIPERACILLIN SODIUM 3.38 G: 375; 3 INJECTION, SOLUTION INTRAVENOUS at 21:14

## 2019-06-24 ENCOUNTER — ANESTHESIA EVENT (OUTPATIENT)
Dept: PERIOP | Facility: HOSPITAL | Age: 64
End: 2019-06-24

## 2019-06-24 ENCOUNTER — ANESTHESIA (OUTPATIENT)
Dept: PERIOP | Facility: HOSPITAL | Age: 64
End: 2019-06-24

## 2019-06-24 ENCOUNTER — APPOINTMENT (OUTPATIENT)
Dept: GENERAL RADIOLOGY | Facility: HOSPITAL | Age: 64
End: 2019-06-24

## 2019-06-24 LAB
ABO GROUP BLD: NORMAL
ALBUMIN SERPL-MCNC: 3.3 G/DL (ref 3.5–5.2)
ALBUMIN/GLOB SERPL: 1 G/DL
ALP SERPL-CCNC: 127 U/L (ref 39–117)
ALT SERPL W P-5'-P-CCNC: 87 U/L (ref 1–41)
ANION GAP SERPL CALCULATED.3IONS-SCNC: 9.6 MMOL/L
AST SERPL-CCNC: 64 U/L (ref 1–40)
BASOPHILS # BLD AUTO: 0.03 10*3/MM3 (ref 0–0.2)
BASOPHILS NFR BLD AUTO: 0.4 % (ref 0–1.5)
BILIRUB SERPL-MCNC: 1.8 MG/DL (ref 0.2–1.2)
BLD GP AB SCN SERPL QL: NEGATIVE
BUN BLD-MCNC: 16 MG/DL (ref 8–23)
BUN/CREAT SERPL: 21.9 (ref 7–25)
CALCIUM SPEC-SCNC: 8.7 MG/DL (ref 8.6–10.5)
CHLORIDE SERPL-SCNC: 105 MMOL/L (ref 98–107)
CO2 SERPL-SCNC: 26.4 MMOL/L (ref 22–29)
CREAT BLD-MCNC: 0.73 MG/DL (ref 0.76–1.27)
DEPRECATED RDW RBC AUTO: 47.5 FL (ref 37–54)
EOSINOPHIL # BLD AUTO: 0.08 10*3/MM3 (ref 0–0.4)
EOSINOPHIL NFR BLD AUTO: 1 % (ref 0.3–6.2)
ERYTHROCYTE [DISTWIDTH] IN BLOOD BY AUTOMATED COUNT: 13.7 % (ref 12.3–15.4)
GFR SERPL CREATININE-BSD FRML MDRD: 109 ML/MIN/1.73
GLOBULIN UR ELPH-MCNC: 3.3 GM/DL
GLUCOSE BLD-MCNC: 139 MG/DL (ref 65–99)
GLUCOSE BLDC GLUCOMTR-MCNC: 101 MG/DL (ref 70–130)
GLUCOSE BLDC GLUCOMTR-MCNC: 107 MG/DL (ref 70–130)
GLUCOSE BLDC GLUCOMTR-MCNC: 121 MG/DL (ref 70–130)
GLUCOSE BLDC GLUCOMTR-MCNC: 195 MG/DL (ref 70–130)
HBA1C MFR BLD: 7.3 % (ref 4.8–5.6)
HCT VFR BLD AUTO: 48.7 % (ref 37.5–51)
HGB BLD-MCNC: 15.9 G/DL (ref 13–17.7)
IMM GRANULOCYTES # BLD AUTO: 0.03 10*3/MM3 (ref 0–0.05)
IMM GRANULOCYTES NFR BLD AUTO: 0.4 % (ref 0–0.5)
INR PPP: 1.18 (ref 0.9–1.1)
LYMPHOCYTES # BLD AUTO: 1.03 10*3/MM3 (ref 0.7–3.1)
LYMPHOCYTES NFR BLD AUTO: 12.9 % (ref 19.6–45.3)
MCH RBC QN AUTO: 30.6 PG (ref 26.6–33)
MCHC RBC AUTO-ENTMCNC: 32.6 G/DL (ref 31.5–35.7)
MCV RBC AUTO: 93.8 FL (ref 79–97)
MONOCYTES # BLD AUTO: 0.81 10*3/MM3 (ref 0.1–0.9)
MONOCYTES NFR BLD AUTO: 10.2 % (ref 5–12)
NEUTROPHILS # BLD AUTO: 6 10*3/MM3 (ref 1.7–7)
NEUTROPHILS NFR BLD AUTO: 75.1 % (ref 42.7–76)
NRBC BLD AUTO-RTO: 0 /100 WBC (ref 0–0.2)
PLATELET # BLD AUTO: 88 10*3/MM3 (ref 140–450)
PMV BLD AUTO: 14.4 FL (ref 6–12)
POTASSIUM BLD-SCNC: 3.8 MMOL/L (ref 3.5–5.2)
PROT SERPL-MCNC: 6.6 G/DL (ref 6–8.5)
PROTHROMBIN TIME: 14.7 SECONDS (ref 11.7–14.2)
RBC # BLD AUTO: 5.19 10*6/MM3 (ref 4.14–5.8)
RH BLD: POSITIVE
SODIUM BLD-SCNC: 141 MMOL/L (ref 136–145)
T&S EXPIRATION DATE: NORMAL
WBC NRBC COR # BLD: 7.98 10*3/MM3 (ref 3.4–10.8)

## 2019-06-24 PROCEDURE — 82962 GLUCOSE BLOOD TEST: CPT

## 2019-06-24 PROCEDURE — 87070 CULTURE OTHR SPECIMN AEROBIC: CPT | Performed by: SURGERY

## 2019-06-24 PROCEDURE — 25010000002 SUCCINYLCHOLINE PER 20 MG: Performed by: ANESTHESIOLOGY

## 2019-06-24 PROCEDURE — 25010000002 PROPOFOL 10 MG/ML EMULSION: Performed by: ANESTHESIOLOGY

## 2019-06-24 PROCEDURE — 47001 NDL BIOPSY LVR TM OTH MAJ PX: CPT | Performed by: SURGERY

## 2019-06-24 PROCEDURE — 25010000002 FENTANYL CITRATE (PF) 100 MCG/2ML SOLUTION: Performed by: ANESTHESIOLOGY

## 2019-06-24 PROCEDURE — 80053 COMPREHEN METABOLIC PANEL: CPT | Performed by: SURGERY

## 2019-06-24 PROCEDURE — 0FB00ZX EXCISION OF LIVER, OPEN APPROACH, DIAGNOSTIC: ICD-10-PCS | Performed by: SURGERY

## 2019-06-24 PROCEDURE — 85610 PROTHROMBIN TIME: CPT | Performed by: NURSE PRACTITIONER

## 2019-06-24 PROCEDURE — 87205 SMEAR GRAM STAIN: CPT | Performed by: SURGERY

## 2019-06-24 PROCEDURE — 25010000002 PIPERACILLIN SOD-TAZOBACTAM PER 1 G: Performed by: NURSE PRACTITIONER

## 2019-06-24 PROCEDURE — 0FT44ZZ RESECTION OF GALLBLADDER, PERCUTANEOUS ENDOSCOPIC APPROACH: ICD-10-PCS | Performed by: SURGERY

## 2019-06-24 PROCEDURE — P9035 PLATELET PHERES LEUKOREDUCED: HCPCS

## 2019-06-24 PROCEDURE — 99254 IP/OBS CNSLTJ NEW/EST MOD 60: CPT | Performed by: INTERNAL MEDICINE

## 2019-06-24 PROCEDURE — 74300 X-RAY BILE DUCTS/PANCREAS: CPT

## 2019-06-24 PROCEDURE — 87075 CULTR BACTERIA EXCEPT BLOOD: CPT | Performed by: SURGERY

## 2019-06-24 PROCEDURE — 63710000001 INSULIN LISPRO (HUMAN) PER 5 UNITS: Performed by: NURSE PRACTITIONER

## 2019-06-24 PROCEDURE — 36430 TRANSFUSION BLD/BLD COMPNT: CPT

## 2019-06-24 PROCEDURE — 88307 TISSUE EXAM BY PATHOLOGIST: CPT | Performed by: SURGERY

## 2019-06-24 PROCEDURE — 85025 COMPLETE CBC W/AUTO DIFF WBC: CPT | Performed by: SURGERY

## 2019-06-24 PROCEDURE — 83036 HEMOGLOBIN GLYCOSYLATED A1C: CPT | Performed by: NURSE PRACTITIONER

## 2019-06-24 PROCEDURE — 99223 1ST HOSP IP/OBS HIGH 75: CPT | Performed by: SURGERY

## 2019-06-24 PROCEDURE — BF131ZZ FLUOROSCOPY OF GALLBLADDER AND BILE DUCTS USING LOW OSMOLAR CONTRAST: ICD-10-PCS | Performed by: SURGERY

## 2019-06-24 PROCEDURE — 0 IOTHALAMATE 60 % SOLUTION: Performed by: SURGERY

## 2019-06-24 PROCEDURE — 88304 TISSUE EXAM BY PATHOLOGIST: CPT | Performed by: SURGERY

## 2019-06-24 PROCEDURE — 47563 LAPARO CHOLECYSTECTOMY/GRAPH: CPT | Performed by: SPECIALIST/TECHNOLOGIST, OTHER

## 2019-06-24 PROCEDURE — 47563 LAPARO CHOLECYSTECTOMY/GRAPH: CPT | Performed by: SURGERY

## 2019-06-24 PROCEDURE — 88313 SPECIAL STAINS GROUP 2: CPT | Performed by: SURGERY

## 2019-06-24 DEVICE — CLIP LIGAT VASC HORIZON TI MD/LG GRN 6CT: Type: IMPLANTABLE DEVICE | Site: ABDOMEN | Status: FUNCTIONAL

## 2019-06-24 RX ORDER — NALOXONE HCL 0.4 MG/ML
0.2 VIAL (ML) INJECTION AS NEEDED
Status: DISCONTINUED | OUTPATIENT
Start: 2019-06-24 | End: 2019-06-24 | Stop reason: HOSPADM

## 2019-06-24 RX ORDER — DIPHENHYDRAMINE HYDROCHLORIDE 50 MG/ML
12.5 INJECTION INTRAMUSCULAR; INTRAVENOUS
Status: DISCONTINUED | OUTPATIENT
Start: 2019-06-24 | End: 2019-06-24 | Stop reason: HOSPADM

## 2019-06-24 RX ORDER — DIPHENHYDRAMINE HCL 25 MG
25 CAPSULE ORAL
Status: DISCONTINUED | OUTPATIENT
Start: 2019-06-24 | End: 2019-06-24 | Stop reason: HOSPADM

## 2019-06-24 RX ORDER — PROMETHAZINE HYDROCHLORIDE 25 MG/ML
12.5 INJECTION, SOLUTION INTRAMUSCULAR; INTRAVENOUS ONCE AS NEEDED
Status: DISCONTINUED | OUTPATIENT
Start: 2019-06-24 | End: 2019-06-24 | Stop reason: HOSPADM

## 2019-06-24 RX ORDER — MAGNESIUM HYDROXIDE 1200 MG/15ML
LIQUID ORAL AS NEEDED
Status: DISCONTINUED | OUTPATIENT
Start: 2019-06-24 | End: 2019-06-24 | Stop reason: HOSPADM

## 2019-06-24 RX ORDER — HYDROCODONE BITARTRATE AND ACETAMINOPHEN 7.5; 325 MG/1; MG/1
1 TABLET ORAL ONCE AS NEEDED
Status: DISCONTINUED | OUTPATIENT
Start: 2019-06-24 | End: 2019-06-24 | Stop reason: HOSPADM

## 2019-06-24 RX ORDER — SODIUM CHLORIDE, SODIUM LACTATE, POTASSIUM CHLORIDE, CALCIUM CHLORIDE 600; 310; 30; 20 MG/100ML; MG/100ML; MG/100ML; MG/100ML
INJECTION, SOLUTION INTRAVENOUS CONTINUOUS PRN
Status: DISCONTINUED | OUTPATIENT
Start: 2019-06-24 | End: 2019-06-24 | Stop reason: SURG

## 2019-06-24 RX ORDER — LIDOCAINE HYDROCHLORIDE 20 MG/ML
INJECTION, SOLUTION INFILTRATION; PERINEURAL AS NEEDED
Status: DISCONTINUED | OUTPATIENT
Start: 2019-06-24 | End: 2019-06-24 | Stop reason: SURG

## 2019-06-24 RX ORDER — EPHEDRINE SULFATE 50 MG/ML
5 INJECTION, SOLUTION INTRAVENOUS ONCE AS NEEDED
Status: DISCONTINUED | OUTPATIENT
Start: 2019-06-24 | End: 2019-06-24 | Stop reason: HOSPADM

## 2019-06-24 RX ORDER — FENTANYL CITRATE 50 UG/ML
50 INJECTION, SOLUTION INTRAMUSCULAR; INTRAVENOUS
Status: DISCONTINUED | OUTPATIENT
Start: 2019-06-24 | End: 2019-06-24 | Stop reason: HOSPADM

## 2019-06-24 RX ORDER — PROMETHAZINE HYDROCHLORIDE 25 MG/1
25 TABLET ORAL ONCE AS NEEDED
Status: DISCONTINUED | OUTPATIENT
Start: 2019-06-24 | End: 2019-06-24 | Stop reason: HOSPADM

## 2019-06-24 RX ORDER — PROPOFOL 10 MG/ML
VIAL (ML) INTRAVENOUS AS NEEDED
Status: DISCONTINUED | OUTPATIENT
Start: 2019-06-24 | End: 2019-06-24 | Stop reason: SURG

## 2019-06-24 RX ORDER — NICOTINE POLACRILEX 4 MG
15 LOZENGE BUCCAL
Status: DISCONTINUED | OUTPATIENT
Start: 2019-06-24 | End: 2019-06-26 | Stop reason: HOSPADM

## 2019-06-24 RX ORDER — MIDAZOLAM HYDROCHLORIDE 1 MG/ML
1 INJECTION INTRAMUSCULAR; INTRAVENOUS
Status: DISCONTINUED | OUTPATIENT
Start: 2019-06-24 | End: 2019-06-24 | Stop reason: HOSPADM

## 2019-06-24 RX ORDER — SODIUM CHLORIDE, SODIUM LACTATE, POTASSIUM CHLORIDE, CALCIUM CHLORIDE 600; 310; 30; 20 MG/100ML; MG/100ML; MG/100ML; MG/100ML
9 INJECTION, SOLUTION INTRAVENOUS CONTINUOUS
Status: DISCONTINUED | OUTPATIENT
Start: 2019-06-24 | End: 2019-06-24

## 2019-06-24 RX ORDER — ACETAMINOPHEN 325 MG/1
650 TABLET ORAL ONCE
Status: COMPLETED | OUTPATIENT
Start: 2019-06-24 | End: 2019-06-24

## 2019-06-24 RX ORDER — FLUMAZENIL 0.1 MG/ML
0.2 INJECTION INTRAVENOUS AS NEEDED
Status: DISCONTINUED | OUTPATIENT
Start: 2019-06-24 | End: 2019-06-24 | Stop reason: HOSPADM

## 2019-06-24 RX ORDER — LIDOCAINE HYDROCHLORIDE 10 MG/ML
0.5 INJECTION, SOLUTION EPIDURAL; INFILTRATION; INTRACAUDAL; PERINEURAL ONCE AS NEEDED
Status: DISCONTINUED | OUTPATIENT
Start: 2019-06-24 | End: 2019-06-24 | Stop reason: HOSPADM

## 2019-06-24 RX ORDER — SODIUM CHLORIDE 0.9 % (FLUSH) 0.9 %
1-10 SYRINGE (ML) INJECTION AS NEEDED
Status: DISCONTINUED | OUTPATIENT
Start: 2019-06-24 | End: 2019-06-24 | Stop reason: HOSPADM

## 2019-06-24 RX ORDER — ONDANSETRON 2 MG/ML
4 INJECTION INTRAMUSCULAR; INTRAVENOUS ONCE AS NEEDED
Status: DISCONTINUED | OUTPATIENT
Start: 2019-06-24 | End: 2019-06-24 | Stop reason: HOSPADM

## 2019-06-24 RX ORDER — PROMETHAZINE HYDROCHLORIDE 25 MG/1
25 SUPPOSITORY RECTAL ONCE AS NEEDED
Status: DISCONTINUED | OUTPATIENT
Start: 2019-06-24 | End: 2019-06-24 | Stop reason: HOSPADM

## 2019-06-24 RX ORDER — MIDAZOLAM HYDROCHLORIDE 1 MG/ML
2 INJECTION INTRAMUSCULAR; INTRAVENOUS
Status: DISCONTINUED | OUTPATIENT
Start: 2019-06-24 | End: 2019-06-24 | Stop reason: HOSPADM

## 2019-06-24 RX ORDER — ACETAMINOPHEN 325 MG/1
650 TABLET ORAL ONCE AS NEEDED
Status: DISCONTINUED | OUTPATIENT
Start: 2019-06-24 | End: 2019-06-24 | Stop reason: HOSPADM

## 2019-06-24 RX ORDER — FOLIC ACID 1 MG/1
1000 TABLET ORAL DAILY
Status: DISCONTINUED | OUTPATIENT
Start: 2019-06-24 | End: 2019-06-26 | Stop reason: HOSPADM

## 2019-06-24 RX ORDER — PROMETHAZINE HYDROCHLORIDE 25 MG/ML
6.25 INJECTION, SOLUTION INTRAMUSCULAR; INTRAVENOUS
Status: DISCONTINUED | OUTPATIENT
Start: 2019-06-24 | End: 2019-06-24 | Stop reason: HOSPADM

## 2019-06-24 RX ORDER — SUCCINYLCHOLINE CHLORIDE 20 MG/ML
INJECTION INTRAMUSCULAR; INTRAVENOUS AS NEEDED
Status: DISCONTINUED | OUTPATIENT
Start: 2019-06-24 | End: 2019-06-24 | Stop reason: SURG

## 2019-06-24 RX ORDER — HYDROMORPHONE HYDROCHLORIDE 1 MG/ML
0.5 INJECTION, SOLUTION INTRAMUSCULAR; INTRAVENOUS; SUBCUTANEOUS
Status: DISCONTINUED | OUTPATIENT
Start: 2019-06-24 | End: 2019-06-24 | Stop reason: HOSPADM

## 2019-06-24 RX ORDER — OXYCODONE AND ACETAMINOPHEN 7.5; 325 MG/1; MG/1
1 TABLET ORAL ONCE AS NEEDED
Status: DISCONTINUED | OUTPATIENT
Start: 2019-06-24 | End: 2019-06-24 | Stop reason: HOSPADM

## 2019-06-24 RX ORDER — OXYCODONE HCL 10 MG/1
10 TABLET, FILM COATED, EXTENDED RELEASE ORAL ONCE
Status: COMPLETED | OUTPATIENT
Start: 2019-06-24 | End: 2019-06-24

## 2019-06-24 RX ORDER — DEXTROSE MONOHYDRATE 25 G/50ML
25 INJECTION, SOLUTION INTRAVENOUS
Status: DISCONTINUED | OUTPATIENT
Start: 2019-06-24 | End: 2019-06-26 | Stop reason: HOSPADM

## 2019-06-24 RX ORDER — MORPHINE SULFATE 2 MG/ML
2 INJECTION, SOLUTION INTRAMUSCULAR; INTRAVENOUS EVERY 4 HOURS PRN
Status: DISCONTINUED | OUTPATIENT
Start: 2019-06-24 | End: 2019-06-26 | Stop reason: HOSPADM

## 2019-06-24 RX ORDER — ATORVASTATIN CALCIUM 80 MG/1
80 TABLET, FILM COATED ORAL DAILY
Status: DISCONTINUED | OUTPATIENT
Start: 2019-06-24 | End: 2019-06-26 | Stop reason: HOSPADM

## 2019-06-24 RX ORDER — SODIUM CHLORIDE 9 MG/ML
INJECTION, SOLUTION INTRAVENOUS AS NEEDED
Status: DISCONTINUED | OUTPATIENT
Start: 2019-06-24 | End: 2019-06-24 | Stop reason: HOSPADM

## 2019-06-24 RX ORDER — ROCURONIUM BROMIDE 10 MG/ML
INJECTION, SOLUTION INTRAVENOUS AS NEEDED
Status: DISCONTINUED | OUTPATIENT
Start: 2019-06-24 | End: 2019-06-24 | Stop reason: SURG

## 2019-06-24 RX ORDER — HYDRALAZINE HYDROCHLORIDE 20 MG/ML
5 INJECTION INTRAMUSCULAR; INTRAVENOUS
Status: DISCONTINUED | OUTPATIENT
Start: 2019-06-24 | End: 2019-06-24 | Stop reason: HOSPADM

## 2019-06-24 RX ORDER — FAMOTIDINE 10 MG/ML
20 INJECTION, SOLUTION INTRAVENOUS EVERY 12 HOURS SCHEDULED
Status: DISCONTINUED | OUTPATIENT
Start: 2019-06-24 | End: 2019-06-26 | Stop reason: HOSPADM

## 2019-06-24 RX ORDER — FAMOTIDINE 10 MG/ML
20 INJECTION, SOLUTION INTRAVENOUS ONCE
Status: COMPLETED | OUTPATIENT
Start: 2019-06-24 | End: 2019-06-24

## 2019-06-24 RX ORDER — LABETALOL HYDROCHLORIDE 5 MG/ML
5 INJECTION, SOLUTION INTRAVENOUS
Status: DISCONTINUED | OUTPATIENT
Start: 2019-06-24 | End: 2019-06-24 | Stop reason: HOSPADM

## 2019-06-24 RX ORDER — BUPIVACAINE HYDROCHLORIDE AND EPINEPHRINE 5; 5 MG/ML; UG/ML
INJECTION, SOLUTION PERINEURAL AS NEEDED
Status: DISCONTINUED | OUTPATIENT
Start: 2019-06-24 | End: 2019-06-24 | Stop reason: HOSPADM

## 2019-06-24 RX ADMIN — TAZOBACTAM SODIUM AND PIPERACILLIN SODIUM 3.38 G: 375; 3 INJECTION, SOLUTION INTRAVENOUS at 10:20

## 2019-06-24 RX ADMIN — LIDOCAINE HYDROCHLORIDE 60 MG: 20 INJECTION, SOLUTION INFILTRATION; PERINEURAL at 15:30

## 2019-06-24 RX ADMIN — OXYCODONE HYDROCHLORIDE 10 MG: 10 TABLET, FILM COATED, EXTENDED RELEASE ORAL at 14:34

## 2019-06-24 RX ADMIN — FAMOTIDINE 20 MG: 10 INJECTION INTRAVENOUS at 22:07

## 2019-06-24 RX ADMIN — FENTANYL CITRATE 50 MCG: 50 INJECTION INTRAMUSCULAR; INTRAVENOUS at 15:51

## 2019-06-24 RX ADMIN — FENTANYL CITRATE 50 MCG: 50 INJECTION INTRAMUSCULAR; INTRAVENOUS at 15:56

## 2019-06-24 RX ADMIN — PROPOFOL 180 MG: 10 INJECTION, EMULSION INTRAVENOUS at 15:31

## 2019-06-24 RX ADMIN — ROCURONIUM BROMIDE 30 MG: 10 INJECTION INTRAVENOUS at 16:37

## 2019-06-24 RX ADMIN — ACETAMINOPHEN 650 MG: 325 TABLET, FILM COATED ORAL at 14:34

## 2019-06-24 RX ADMIN — ROCURONIUM BROMIDE 50 MG: 10 INJECTION INTRAVENOUS at 15:41

## 2019-06-24 RX ADMIN — SODIUM CHLORIDE, POTASSIUM CHLORIDE, SODIUM LACTATE AND CALCIUM CHLORIDE: 600; 310; 30; 20 INJECTION, SOLUTION INTRAVENOUS at 15:30

## 2019-06-24 RX ADMIN — FAMOTIDINE 20 MG: 10 INJECTION INTRAVENOUS at 15:15

## 2019-06-24 RX ADMIN — SODIUM CHLORIDE, PRESERVATIVE FREE 3 ML: 5 INJECTION INTRAVENOUS at 08:00

## 2019-06-24 RX ADMIN — TAZOBACTAM SODIUM AND PIPERACILLIN SODIUM 3.38 G: 375; 3 INJECTION, SOLUTION INTRAVENOUS at 03:54

## 2019-06-24 RX ADMIN — SODIUM CHLORIDE 100 ML/HR: 9 INJECTION, SOLUTION INTRAVENOUS at 18:49

## 2019-06-24 RX ADMIN — FENTANYL CITRATE 50 MCG: 50 INJECTION INTRAMUSCULAR; INTRAVENOUS at 15:38

## 2019-06-24 RX ADMIN — INSULIN LISPRO 2 UNITS: 100 INJECTION, SOLUTION INTRAVENOUS; SUBCUTANEOUS at 22:06

## 2019-06-24 RX ADMIN — SODIUM CHLORIDE, POTASSIUM CHLORIDE, SODIUM LACTATE AND CALCIUM CHLORIDE 9 ML/HR: 600; 310; 30; 20 INJECTION, SOLUTION INTRAVENOUS at 15:13

## 2019-06-24 RX ADMIN — SUGAMMADEX 400 MG: 100 INJECTION, SOLUTION INTRAVENOUS at 16:56

## 2019-06-24 RX ADMIN — SUCCINYLCHOLINE CHLORIDE 160 MG: 20 INJECTION, SOLUTION INTRAMUSCULAR; INTRAVENOUS; PARENTERAL at 15:30

## 2019-06-24 RX ADMIN — FAMOTIDINE 20 MG: 10 INJECTION INTRAVENOUS at 10:57

## 2019-06-24 RX ADMIN — SODIUM CHLORIDE, PRESERVATIVE FREE 3 ML: 5 INJECTION INTRAVENOUS at 22:04

## 2019-06-24 RX ADMIN — FENTANYL CITRATE 50 MCG: 50 INJECTION INTRAMUSCULAR; INTRAVENOUS at 16:37

## 2019-06-24 RX ADMIN — TAZOBACTAM SODIUM AND PIPERACILLIN SODIUM 3.38 G: 375; 3 INJECTION, SOLUTION INTRAVENOUS at 18:49

## 2019-06-24 NOTE — ANESTHESIA POSTPROCEDURE EVALUATION
"Patient: Rajan Dc    Procedure Summary     Date:  06/24/19 Room / Location:  [facility] OR  / [facility] MAIN OR    Anesthesia Start:  1522 Anesthesia Stop:  1713    Procedure:  laparoscopic cholecystectomy with intraoperative cholangiogram (N/A Abdomen) Diagnosis:       Acute cholecystitis      (Acute cholecystitis [K81.0])    Surgeon:  Vida Avilez MD Provider:  Joshua Rivera MD    Anesthesia Type:  general ASA Status:  4          Anesthesia Type: general  Last vitals  BP   118/69 (06/24/19 1458)   Temp   36.8 °C (98.3 °F) (06/24/19 1458)   Pulse   65 (06/24/19 1458)   Resp   18 (06/24/19 1458)     SpO2   94 % (06/24/19 1458)     Post Anesthesia Care and Evaluation    Patient location during evaluation: bedside  Patient participation: complete - patient participated  Level of consciousness: awake and alert  Pain management: adequate  Airway patency: patent  Anesthetic complications: No anesthetic complications    Cardiovascular status: acceptable  Respiratory status: acceptable  Hydration status: acceptable    Comments: /69 (BP Location: Left arm, Patient Position: Lying)   Pulse 65   Temp 36.8 °C (98.3 °F) (Oral)   Resp 18   Ht 175.3 cm (69\")   Wt 111 kg (245 lb 2.4 oz)   SpO2 94%   BMI 36.20 kg/m²       "

## 2019-06-24 NOTE — ANESTHESIA POSTPROCEDURE EVALUATION
Patient: Rajan Dc    Procedure Summary     Date:  06/24/19 Room / Location:  [facility] OR  / [facility] MAIN OR    Anesthesia Start:  1522 Anesthesia Stop:  1713    Procedure:  laparoscopic cholecystectomy with intraoperative cholangiogram (N/A Abdomen) Diagnosis:       Acute cholecystitis      (Acute cholecystitis [K81.0])    Surgeon:  Vida Avilez MD Provider:  Joshua Rivera MD    Anesthesia Type:  general ASA Status:  4          Anesthesia Type: general  Last vitals  BP   149/70 (06/24/19 1755)   Temp   36.7 °C (98 °F) (06/24/19 1810)   Pulse   71 (06/24/19 1755)   Resp   16 (06/24/19 1755)     SpO2   94 % (06/24/19 1810)     Post Anesthesia Care and Evaluation    Patient location during evaluation: bedside  Patient participation: complete - patient participated  Level of consciousness: awake  Pain score: 1  Pain management: adequate  Airway patency: patent  Anesthetic complications: No anesthetic complications    Cardiovascular status: acceptable  Respiratory status: acceptable  Hydration status: acceptable    Comments: -------------------------              06/24/19 1810        -------------------------   BP:                       Pulse:                    Resp:                     Temp:   36.7 °C (98 °F)   SpO2:         94%        -------------------------

## 2019-06-24 NOTE — ANESTHESIA POSTPROCEDURE EVALUATION
Patient: Rajan Dc    Procedure Summary     Date:  06/24/19 Room / Location:  [facility] OR 06 / [facility] MAIN OR    Anesthesia Start:  1522 Anesthesia Stop:  1713    Procedure:  laparoscopic cholecystectomy with intraoperative cholangiogram (N/A Abdomen) Diagnosis:       Acute cholecystitis      (Acute cholecystitis [K81.0])    Surgeon:  Vida Avilez MD Provider:  Joshua Rivera MD    Anesthesia Type:  general ASA Status:  4          Anesthesia Type: general  Last vitals  BP   118/69 (06/24/19 1458)   Temp   36.8 °C (98.3 °F) (06/24/19 1458)   Pulse   65 (06/24/19 1458)   Resp   18 (06/24/19 1458)     SpO2   94 % (06/24/19 1458)     Anesthesia Post Evaluation

## 2019-06-24 NOTE — ANESTHESIA PREPROCEDURE EVALUATION
Anesthesia Evaluation     Patient summary reviewed and Nursing notes reviewed   NPO Solid Status: > 8 hours  NPO Liquid Status: > 2 hours           Airway   Mallampati: II  Dental    (+) poor dentition    Comment: Risk of dental injury discussed with patient    Pulmonary - normal exam   (+) sleep apnea,   Cardiovascular - normal exam    ECG reviewed    (+) past MI  >12 months, CAD, CABG >6 Months, hyperlipidemia,     ROS comment: Seen this admission by cardiology    Neuro/Psych- negative ROS  GI/Hepatic/Renal/Endo    (+)   hepatitis, liver disease fatty liver disease, diabetes mellitus type 2,     Musculoskeletal     Abdominal    Substance History      OB/GYN          Other                      Anesthesia Plan    ASA 4     general

## 2019-06-24 NOTE — ANESTHESIA PROCEDURE NOTES
Airway  Urgency: elective      General Information and Staff    Patient location during procedure: OR  Anesthesiologist: Joshua Rivera MD    Indications and Patient Condition  Indications for airway management: airway protection    Preoxygenated: yes  MILS maintained throughout  Mask difficulty assessment: 1 - vent by mask    Final Airway Details  Final airway type: endotracheal airway      Successful airway: ETT  Cuffed: yes   Successful intubation technique: direct laryngoscopy  Endotracheal tube insertion site: oral  Blade: Joaquin  Blade size: 4  ETT size (mm): 7.5  Cormack-Lehane Classification: grade I - full view of glottis  Placement verified by: chest auscultation   Number of attempts at approach: 1

## 2019-06-25 LAB
ABO + RH BLD: NORMAL
ALBUMIN SERPL-MCNC: 3.2 G/DL (ref 3.5–5.2)
ALBUMIN/GLOB SERPL: 1.1 G/DL
ALP SERPL-CCNC: 112 U/L (ref 39–117)
ALT SERPL W P-5'-P-CCNC: 76 U/L (ref 1–41)
ANION GAP SERPL CALCULATED.3IONS-SCNC: 8.8 MMOL/L
AST SERPL-CCNC: 59 U/L (ref 1–40)
BH BB BLOOD EXPIRATION DATE: NORMAL
BH BB BLOOD TYPE BARCODE: 7300
BH BB DISPENSE STATUS: NORMAL
BH BB PRODUCT CODE: NORMAL
BH BB UNIT NUMBER: NORMAL
BILIRUB SERPL-MCNC: 1.2 MG/DL (ref 0.2–1.2)
BUN BLD-MCNC: 17 MG/DL (ref 8–23)
BUN/CREAT SERPL: 23.9 (ref 7–25)
CALCIUM SPEC-SCNC: 8.6 MG/DL (ref 8.6–10.5)
CHLORIDE SERPL-SCNC: 102 MMOL/L (ref 98–107)
CO2 SERPL-SCNC: 26.2 MMOL/L (ref 22–29)
CREAT BLD-MCNC: 0.71 MG/DL (ref 0.76–1.27)
DEPRECATED RDW RBC AUTO: 48.4 FL (ref 37–54)
ERYTHROCYTE [DISTWIDTH] IN BLOOD BY AUTOMATED COUNT: 13.6 % (ref 12.3–15.4)
GFR SERPL CREATININE-BSD FRML MDRD: 112 ML/MIN/1.73
GLOBULIN UR ELPH-MCNC: 3 GM/DL
GLUCOSE BLD-MCNC: 182 MG/DL (ref 65–99)
GLUCOSE BLDC GLUCOMTR-MCNC: 117 MG/DL (ref 70–130)
GLUCOSE BLDC GLUCOMTR-MCNC: 137 MG/DL (ref 70–130)
GLUCOSE BLDC GLUCOMTR-MCNC: 188 MG/DL (ref 70–130)
GLUCOSE BLDC GLUCOMTR-MCNC: 307 MG/DL (ref 70–130)
HCT VFR BLD AUTO: 46.1 % (ref 37.5–51)
HGB BLD-MCNC: 14.8 G/DL (ref 13–17.7)
MCH RBC QN AUTO: 30.6 PG (ref 26.6–33)
MCHC RBC AUTO-ENTMCNC: 32.1 G/DL (ref 31.5–35.7)
MCV RBC AUTO: 95.2 FL (ref 79–97)
PLATELET # BLD AUTO: 105 10*3/MM3 (ref 140–450)
PMV BLD AUTO: 13.5 FL (ref 6–12)
POTASSIUM BLD-SCNC: 3.8 MMOL/L (ref 3.5–5.2)
PROT SERPL-MCNC: 6.2 G/DL (ref 6–8.5)
RBC # BLD AUTO: 4.84 10*6/MM3 (ref 4.14–5.8)
SODIUM BLD-SCNC: 137 MMOL/L (ref 136–145)
UNIT  ABO: NORMAL
UNIT  RH: NORMAL
WBC NRBC COR # BLD: 5.93 10*3/MM3 (ref 3.4–10.8)

## 2019-06-25 PROCEDURE — 25010000002 PIPERACILLIN SOD-TAZOBACTAM PER 1 G: Performed by: NURSE PRACTITIONER

## 2019-06-25 PROCEDURE — 82962 GLUCOSE BLOOD TEST: CPT

## 2019-06-25 PROCEDURE — 25010000002 MORPHINE PER 10 MG: Performed by: HOSPITALIST

## 2019-06-25 PROCEDURE — 63710000001 INSULIN LISPRO (HUMAN) PER 5 UNITS: Performed by: NURSE PRACTITIONER

## 2019-06-25 PROCEDURE — 99024 POSTOP FOLLOW-UP VISIT: CPT | Performed by: SURGERY

## 2019-06-25 PROCEDURE — 85027 COMPLETE CBC AUTOMATED: CPT | Performed by: HOSPITALIST

## 2019-06-25 PROCEDURE — 94799 UNLISTED PULMONARY SVC/PX: CPT

## 2019-06-25 PROCEDURE — 99232 SBSQ HOSP IP/OBS MODERATE 35: CPT | Performed by: NURSE PRACTITIONER

## 2019-06-25 PROCEDURE — 80053 COMPREHEN METABOLIC PANEL: CPT | Performed by: HOSPITALIST

## 2019-06-25 RX ADMIN — ATORVASTATIN CALCIUM 80 MG: 80 TABLET, FILM COATED ORAL at 09:42

## 2019-06-25 RX ADMIN — SODIUM CHLORIDE, PRESERVATIVE FREE 3 ML: 5 INJECTION INTRAVENOUS at 21:39

## 2019-06-25 RX ADMIN — TAZOBACTAM SODIUM AND PIPERACILLIN SODIUM 3.38 G: 375; 3 INJECTION, SOLUTION INTRAVENOUS at 04:09

## 2019-06-25 RX ADMIN — TAZOBACTAM SODIUM AND PIPERACILLIN SODIUM 3.38 G: 375; 3 INJECTION, SOLUTION INTRAVENOUS at 13:00

## 2019-06-25 RX ADMIN — FAMOTIDINE 20 MG: 10 INJECTION INTRAVENOUS at 21:38

## 2019-06-25 RX ADMIN — INSULIN LISPRO 5 UNITS: 100 INJECTION, SOLUTION INTRAVENOUS; SUBCUTANEOUS at 13:00

## 2019-06-25 RX ADMIN — FAMOTIDINE 20 MG: 10 INJECTION INTRAVENOUS at 09:42

## 2019-06-25 RX ADMIN — FOLIC ACID 1000 MCG: 1 TABLET ORAL at 09:42

## 2019-06-25 RX ADMIN — INSULIN LISPRO 2 UNITS: 100 INJECTION, SOLUTION INTRAVENOUS; SUBCUTANEOUS at 21:38

## 2019-06-25 RX ADMIN — SODIUM CHLORIDE 100 ML/HR: 9 INJECTION, SOLUTION INTRAVENOUS at 09:43

## 2019-06-25 RX ADMIN — TAZOBACTAM SODIUM AND PIPERACILLIN SODIUM 3.38 G: 375; 3 INJECTION, SOLUTION INTRAVENOUS at 18:57

## 2019-06-25 RX ADMIN — SODIUM CHLORIDE, PRESERVATIVE FREE 3 ML: 5 INJECTION INTRAVENOUS at 09:42

## 2019-06-25 RX ADMIN — MORPHINE SULFATE 2 MG: 2 INJECTION, SOLUTION INTRAMUSCULAR; INTRAVENOUS at 21:38

## 2019-06-26 VITALS
WEIGHT: 245.15 LBS | HEART RATE: 60 BPM | OXYGEN SATURATION: 96 % | SYSTOLIC BLOOD PRESSURE: 148 MMHG | HEIGHT: 69 IN | TEMPERATURE: 97.4 F | BODY MASS INDEX: 36.31 KG/M2 | DIASTOLIC BLOOD PRESSURE: 85 MMHG | RESPIRATION RATE: 16 BRPM

## 2019-06-26 LAB
CYTO UR: NORMAL
DEPRECATED RDW RBC AUTO: 48.1 FL (ref 37–54)
ERYTHROCYTE [DISTWIDTH] IN BLOOD BY AUTOMATED COUNT: 14 % (ref 12.3–15.4)
GLUCOSE BLDC GLUCOMTR-MCNC: 165 MG/DL (ref 70–130)
GLUCOSE BLDC GLUCOMTR-MCNC: 181 MG/DL (ref 70–130)
HCT VFR BLD AUTO: 46.7 % (ref 37.5–51)
HGB BLD-MCNC: 15.7 G/DL (ref 13–17.7)
LAB AP CASE REPORT: NORMAL
LAB AP DIAGNOSIS COMMENT: NORMAL
MCH RBC QN AUTO: 31.8 PG (ref 26.6–33)
MCHC RBC AUTO-ENTMCNC: 33.6 G/DL (ref 31.5–35.7)
MCV RBC AUTO: 94.7 FL (ref 79–97)
PATH REPORT.FINAL DX SPEC: NORMAL
PATH REPORT.GROSS SPEC: NORMAL
PLATELET # BLD AUTO: 106 10*3/MM3 (ref 140–450)
PMV BLD AUTO: 13.6 FL (ref 6–12)
RBC # BLD AUTO: 4.93 10*6/MM3 (ref 4.14–5.8)
WBC NRBC COR # BLD: 5.76 10*3/MM3 (ref 3.4–10.8)

## 2019-06-26 PROCEDURE — 25010000002 PIPERACILLIN SOD-TAZOBACTAM PER 1 G: Performed by: NURSE PRACTITIONER

## 2019-06-26 PROCEDURE — 82962 GLUCOSE BLOOD TEST: CPT

## 2019-06-26 PROCEDURE — 85027 COMPLETE CBC AUTOMATED: CPT | Performed by: HOSPITALIST

## 2019-06-26 PROCEDURE — 63710000001 INSULIN LISPRO (HUMAN) PER 5 UNITS: Performed by: NURSE PRACTITIONER

## 2019-06-26 RX ADMIN — TAZOBACTAM SODIUM AND PIPERACILLIN SODIUM 3.38 G: 375; 3 INJECTION, SOLUTION INTRAVENOUS at 04:19

## 2019-06-26 RX ADMIN — ATORVASTATIN CALCIUM 80 MG: 80 TABLET, FILM COATED ORAL at 09:54

## 2019-06-26 RX ADMIN — INSULIN LISPRO 2 UNITS: 100 INJECTION, SOLUTION INTRAVENOUS; SUBCUTANEOUS at 12:49

## 2019-06-26 RX ADMIN — FOLIC ACID 1000 MCG: 1 TABLET ORAL at 09:54

## 2019-06-26 RX ADMIN — INSULIN LISPRO 2 UNITS: 100 INJECTION, SOLUTION INTRAVENOUS; SUBCUTANEOUS at 10:01

## 2019-06-26 RX ADMIN — FAMOTIDINE 20 MG: 10 INJECTION INTRAVENOUS at 09:54

## 2019-06-26 RX ADMIN — SODIUM CHLORIDE, PRESERVATIVE FREE 3 ML: 5 INJECTION INTRAVENOUS at 10:02

## 2019-06-27 ENCOUNTER — READMISSION MANAGEMENT (OUTPATIENT)
Dept: CALL CENTER | Facility: HOSPITAL | Age: 64
End: 2019-06-27

## 2019-06-27 LAB
BACTERIA SPEC AEROBE CULT: NO GROWTH
GRAM STN SPEC: NORMAL
GRAM STN SPEC: NORMAL

## 2019-06-28 ENCOUNTER — READMISSION MANAGEMENT (OUTPATIENT)
Dept: CALL CENTER | Facility: HOSPITAL | Age: 64
End: 2019-06-28

## 2019-06-28 NOTE — OUTREACH NOTE
General Surgery Week 1 Survey      Responses   Facility patient discharged from?  [facility]   Does the patient have one of the following disease processes/diagnoses(primary or secondary)?  General Surgery   Is there a successful TCM telephone encounter documented?  No   Week 1 attempt successful?  Yes   Call start time  0954   Call end time  0957   Discharge diagnosis  acute cholecystitis with cholecystectomy on 6/24   Is patient permission given to speak with other caregiver?  Yes   List who call center can speak with  wife   Person spoke with today (if not patient) and relationship  wife   Meds reviewed with patient/caregiver?  Yes   Is the patient having any side effects they believe may be caused by any medication additions or changes?  No   Does the patient have all medications related to this admission filled (includes all antibiotics, pain medications, etc.)  Yes   Is the patient taking all medications as directed (includes completed medication regime)?  Yes   Does the patient have a follow up appointment scheduled with their surgeon?  Yes   Has the patient kept scheduled appointments due by today?  N/A   Psychosocial issues?  No   Comments  Appetite WNL, having diarrhea after eat. Pt has ELODIA incision denies s/sx of inf.    Did the patient receive a copy of their discharge instructions?  Yes   Nursing interventions  Reviewed instructions with patient   What is the patient's perception of their health status since discharge?  Improving   Nursing interventions  Nurse provided patient education   Is the patient /caregiver able to teach back basic post-op care?  Continue use of incentive spirometry at least 1 week post discharge, Drive as instructed by MD in discharge instructions, Keep incision areas clean,dry and protected, Do not remove steri-strips, Lifting as instructed by MD in discharge instructions   Is the patient/caregiver able to teach back signs and symptoms of incisional infection?  Incisional  warmth, Pus or odor from incision   Is the patient/caregiver able to teach back steps to recovery at home?  Set small, achievable goals for return to baseline health, Rest and rebuild strength, gradually increase activity, Practice good oral hygiene   Is the patient/caregiver able to teach back the hierarchy of who to call/visit for symptoms/problems? PCP, Specialist, Home health nurse, Urgent Care, ED, 911  Yes   Week 1 call completed?  Yes   Revoked  No further contact(revokes)-requires comment   Graduated/Revoked comments  doing well          Taisha Pascual, RN

## 2019-06-29 LAB — BACTERIA SPEC ANAEROBE CULT: NORMAL

## 2019-07-08 ENCOUNTER — OFFICE VISIT (OUTPATIENT)
Dept: SURGERY | Facility: CLINIC | Age: 64
End: 2019-07-08

## 2019-07-08 DIAGNOSIS — K76.0 STEATOSIS OF LIVER: Primary | ICD-10-CM

## 2019-07-08 DIAGNOSIS — K81.0 ACUTE CHOLECYSTITIS: ICD-10-CM

## 2019-07-08 PROCEDURE — 99024 POSTOP FOLLOW-UP VISIT: CPT | Performed by: SURGERY

## 2019-07-08 NOTE — PROGRESS NOTES
SURGERY: RAMAN  Post op Visit  Rajandominique Dc  07/08/19    Mr Dc presents today after having undergone Surgery 6/24/19, of a laparoscopic cholecystectomy with xray, with liver biopsy.  .  His preop xrays suggested  Cirrhosis and his path showed severe steatosis and possibly stage 3-4 fibrosis.      He did remarkably well and was DC'd on POD 2.  He did receive platelets preop due to thrombocytopenia and showed no signs of bleeding.      He looks great and as always is in a great mood.    i'm going to refer to GI since he needs to be seen for his liver issues and also needs screening.    Vida Avilez MD

## 2019-07-16 DIAGNOSIS — E11.9 TYPE 2 DIABETES MELLITUS WITHOUT COMPLICATION, WITHOUT LONG-TERM CURRENT USE OF INSULIN (HCC): Chronic | ICD-10-CM

## 2019-08-19 DIAGNOSIS — E11.9 TYPE 2 DIABETES MELLITUS WITHOUT COMPLICATION, WITHOUT LONG-TERM CURRENT USE OF INSULIN (HCC): Chronic | ICD-10-CM

## 2019-09-13 DIAGNOSIS — E11.9 TYPE 2 DIABETES MELLITUS WITHOUT COMPLICATION, WITHOUT LONG-TERM CURRENT USE OF INSULIN (HCC): Chronic | ICD-10-CM

## 2019-09-13 DIAGNOSIS — D69.6 THROMBOCYTOPENIA (HCC): Chronic | ICD-10-CM

## 2019-09-13 DIAGNOSIS — E55.9 VITAMIN D DEFICIENCY: Chronic | ICD-10-CM

## 2019-09-13 DIAGNOSIS — E78.5 HYPERLIPIDEMIA, UNSPECIFIED HYPERLIPIDEMIA TYPE: Chronic | ICD-10-CM

## 2019-09-13 DIAGNOSIS — Z00.00 ROUTINE PHYSICAL EXAMINATION: ICD-10-CM

## 2019-09-14 LAB
25(OH)D3+25(OH)D2 SERPL-MCNC: 31.3 NG/ML (ref 30–100)
ALBUMIN SERPL-MCNC: 4.3 G/DL (ref 3.5–5.2)
ALBUMIN/CREAT UR: 6.3 MG/G CREAT (ref 0–30)
ALBUMIN/GLOB SERPL: 1.6 G/DL
ALP SERPL-CCNC: 137 U/L (ref 39–117)
ALT SERPL-CCNC: 29 U/L (ref 1–41)
AST SERPL-CCNC: 27 U/L (ref 1–40)
BILIRUB SERPL-MCNC: 0.9 MG/DL (ref 0.2–1.2)
BUN SERPL-MCNC: 8 MG/DL (ref 8–23)
BUN/CREAT SERPL: 10.7 (ref 7–25)
CALCIUM SERPL-MCNC: 9.2 MG/DL (ref 8.6–10.5)
CHLORIDE SERPL-SCNC: 103 MMOL/L (ref 98–107)
CHOLEST SERPL-MCNC: 128 MG/DL (ref 100–199)
CK SERPL-CCNC: 91 U/L (ref 20–200)
CO2 SERPL-SCNC: 26.8 MMOL/L (ref 22–29)
CREAT SERPL-MCNC: 0.75 MG/DL (ref 0.76–1.27)
CREAT UR-MCNC: 88.6 MG/DL
ERYTHROCYTE [DISTWIDTH] IN BLOOD BY AUTOMATED COUNT: 13.5 % (ref 12.3–15.4)
GLOBULIN SER CALC-MCNC: 2.7 GM/DL
GLUCOSE SERPL-MCNC: 113 MG/DL (ref 65–99)
HBA1C MFR BLD: 7.7 % (ref 4.8–5.6)
HCT VFR BLD AUTO: 55.7 % (ref 37.5–51)
HDL SERPL-SCNC: 29.1 UMOL/L
HDLC SERPL-MCNC: 45 MG/DL
HGB BLD-MCNC: 18.2 G/DL (ref 13–17.7)
LDL SERPL QN: 21.6 NM
LDL SERPL-SCNC: 751 NMOL/L
LDL SMALL SERPL-SCNC: 268 NMOL/L
LDLC SERPL CALC-MCNC: 62 MG/DL (ref 0–99)
MCH RBC QN AUTO: 30.6 PG (ref 26.6–33)
MCHC RBC AUTO-ENTMCNC: 32.7 G/DL (ref 31.5–35.7)
MCV RBC AUTO: 93.8 FL (ref 79–97)
MICROALBUMIN UR-MCNC: 5.6 UG/ML
PLATELET # BLD AUTO: 95 10*3/MM3 (ref 140–450)
POTASSIUM SERPL-SCNC: 3.8 MMOL/L (ref 3.5–5.2)
PROT SERPL-MCNC: 7 G/DL (ref 6–8.5)
PSA SERPL-MCNC: 0.17 NG/ML (ref 0–4)
RBC # BLD AUTO: 5.94 10*6/MM3 (ref 4.14–5.8)
SODIUM SERPL-SCNC: 143 MMOL/L (ref 136–145)
T3FREE SERPL-MCNC: 3.4 PG/ML (ref 2–4.4)
T4 FREE SERPL-MCNC: 0.97 NG/DL (ref 0.93–1.7)
TRIGL SERPL-MCNC: 106 MG/DL (ref 0–149)
TSH SERPL DL<=0.005 MIU/L-ACNC: 2.46 UIU/ML (ref 0.27–4.2)
WBC # BLD AUTO: 6.02 10*3/MM3 (ref 3.4–10.8)

## 2019-09-23 DIAGNOSIS — E11.9 TYPE 2 DIABETES MELLITUS WITHOUT COMPLICATION, WITHOUT LONG-TERM CURRENT USE OF INSULIN (HCC): Chronic | ICD-10-CM

## 2019-09-23 RX ORDER — ATORVASTATIN CALCIUM 80 MG/1
80 TABLET, FILM COATED ORAL DAILY
Qty: 30 TABLET | Refills: 0 | Status: SHIPPED | OUTPATIENT
Start: 2019-09-23 | End: 2019-10-18 | Stop reason: SDUPTHER

## 2019-09-24 ENCOUNTER — OFFICE VISIT (OUTPATIENT)
Dept: GASTROENTEROLOGY | Facility: CLINIC | Age: 64
End: 2019-09-24

## 2019-09-24 VITALS
WEIGHT: 246.2 LBS | BODY MASS INDEX: 36.46 KG/M2 | SYSTOLIC BLOOD PRESSURE: 144 MMHG | TEMPERATURE: 97.9 F | DIASTOLIC BLOOD PRESSURE: 70 MMHG | HEIGHT: 69 IN

## 2019-09-24 DIAGNOSIS — K75.81 NASH (NONALCOHOLIC STEATOHEPATITIS): Primary | ICD-10-CM

## 2019-09-24 DIAGNOSIS — Z12.11 SCREENING FOR COLON CANCER: ICD-10-CM

## 2019-09-24 PROCEDURE — 99204 OFFICE O/P NEW MOD 45 MIN: CPT | Performed by: INTERNAL MEDICINE

## 2019-09-25 LAB
AFP-TM SERPL-MCNC: 3.2 NG/ML (ref 0–8.3)
HAV AB SER QL IA: POSITIVE
HBV SURFACE AB SER QL: NON REACTIVE

## 2019-10-01 ENCOUNTER — HOSPITAL ENCOUNTER (OUTPATIENT)
Dept: ULTRASOUND IMAGING | Facility: HOSPITAL | Age: 64
Discharge: HOME | End: 2019-10-01
Admitting: INTERNAL MEDICINE

## 2019-10-01 ENCOUNTER — TELEPHONE (OUTPATIENT)
Dept: GASTROENTEROLOGY | Facility: CLINIC | Age: 64
End: 2019-10-01

## 2019-10-01 DIAGNOSIS — K75.81 NASH (NONALCOHOLIC STEATOHEPATITIS): ICD-10-CM

## 2019-10-01 PROCEDURE — 76705 ECHO EXAM OF ABDOMEN: CPT

## 2019-10-02 NOTE — TELEPHONE ENCOUNTER
Call to pt.  Advise per Dr Salazar that labs shows that has immunity to hep a and needs to get the hep b vaccination series.  Check with pcp or they also give at pharmacies.  Dr Salazar an also write an rx if needed.    Verb understanding.  States has appt with Dr Scott Eaton on 10/4 - will discuss with him.

## 2019-10-02 NOTE — TELEPHONE ENCOUNTER
pls let him know that his labs show that he has immunity to hep a and he needs to get the hep B vaccination series - check with his pcp or they also are given at pharmacies- I can write an rx if needed

## 2019-10-09 ENCOUNTER — TELEPHONE (OUTPATIENT)
Dept: GASTROENTEROLOGY | Facility: CLINIC | Age: 64
End: 2019-10-09

## 2019-10-09 NOTE — TELEPHONE ENCOUNTER
Call to pt.  Advise per Dr Salazar that liver US shows no new or concerning findings.  Verb understanding.

## 2019-10-17 ENCOUNTER — OFFICE VISIT (OUTPATIENT)
Dept: INTERNAL MEDICINE | Facility: CLINIC | Age: 64
End: 2019-10-17

## 2019-10-17 VITALS
BODY MASS INDEX: 35.49 KG/M2 | WEIGHT: 239.6 LBS | SYSTOLIC BLOOD PRESSURE: 122 MMHG | HEIGHT: 69 IN | RESPIRATION RATE: 18 BRPM | TEMPERATURE: 97.5 F | OXYGEN SATURATION: 92 % | DIASTOLIC BLOOD PRESSURE: 74 MMHG | HEART RATE: 72 BPM

## 2019-10-17 DIAGNOSIS — D75.1 POLYCYTHEMIA: Chronic | ICD-10-CM

## 2019-10-17 DIAGNOSIS — K75.81 NASH (NONALCOHOLIC STEATOHEPATITIS): ICD-10-CM

## 2019-10-17 DIAGNOSIS — E78.5 HYPERLIPIDEMIA, UNSPECIFIED HYPERLIPIDEMIA TYPE: Chronic | ICD-10-CM

## 2019-10-17 DIAGNOSIS — I25.2 HISTORY OF MYOCARDIAL INFARCTION: Chronic | ICD-10-CM

## 2019-10-17 DIAGNOSIS — Z51.81 THERAPEUTIC DRUG MONITORING: ICD-10-CM

## 2019-10-17 DIAGNOSIS — G47.33 OBSTRUCTIVE SLEEP APNEA: Chronic | ICD-10-CM

## 2019-10-17 DIAGNOSIS — I25.10 OCCLUSIVE CORONARY ARTERY DISEASE: Chronic | ICD-10-CM

## 2019-10-17 DIAGNOSIS — R80.9 MICROALBUMINURIA: Chronic | ICD-10-CM

## 2019-10-17 DIAGNOSIS — E11.9 TYPE 2 DIABETES MELLITUS WITHOUT COMPLICATION, WITHOUT LONG-TERM CURRENT USE OF INSULIN (HCC): Chronic | ICD-10-CM

## 2019-10-17 DIAGNOSIS — G47.34 SLEEP RELATED HYPOXIA: Chronic | ICD-10-CM

## 2019-10-17 DIAGNOSIS — Z00.00 ROUTINE PHYSICAL EXAMINATION: Primary | ICD-10-CM

## 2019-10-17 DIAGNOSIS — E11.9 ENCOUNTER FOR DIABETIC FOOT EXAM (HCC): Chronic | ICD-10-CM

## 2019-10-17 DIAGNOSIS — R74.8 ELEVATED LIVER ENZYMES: ICD-10-CM

## 2019-10-17 DIAGNOSIS — E55.9 VITAMIN D DEFICIENCY: Chronic | ICD-10-CM

## 2019-10-17 DIAGNOSIS — D69.3 CHRONIC ITP (IDIOPATHIC THROMBOCYTOPENIA) (HCC): Chronic | ICD-10-CM

## 2019-10-17 PROBLEM — D69.6 THROMBOCYTOPENIA: Chronic | Status: RESOLVED | Noted: 2018-01-25 | Resolved: 2019-10-17

## 2019-10-17 PROCEDURE — 99214 OFFICE O/P EST MOD 30 MIN: CPT | Performed by: INTERNAL MEDICINE

## 2019-10-17 PROCEDURE — 99396 PREV VISIT EST AGE 40-64: CPT | Performed by: INTERNAL MEDICINE

## 2019-10-17 NOTE — PROGRESS NOTES
10/17/2019    Patient Information  Rajan Dc                                                                                          [address] [facility] [facility] [address]      1955  [unfilled]  [phone] (work)    Chief Complaint:     Routine annual physical examination and follow-up blood work.  Follow-up recent cholecystectomy and evaluation for elevated liver enzymes.    History of Present Illness:    Patient with a history of multiple medical problems including type 2 diabetes, hyperlipidemia, occlusive coronary artery disease and history of myocardial infarction, microalbuminuria, untreated sleep apnea, sleep-related hypoxia, polycythemia, chronic ITP, vitamin D deficiency, Ruiz, elevated liver enzymes.  He presents today for his routine annual exam and follow-up lab work.  He reports he is feeling fairly well.  Patient also was admitted for acute cholecystitis and had a laparoscopic cholecystectomy and also underwent a liver biopsy at that time which revealed mass but there was concern for possible cirrhosis.  Patient was referred to a gastroenterologist by the general surgeon unbeknownst to me.  Work-up is in progress.  We will thoroughly address this at a later date.    Review of Systems   Constitution: Negative.   HENT: Negative.    Eyes: Negative.    Cardiovascular: Negative.    Respiratory: Negative.    Endocrine: Negative.    Hematologic/Lymphatic: Negative.    Skin: Negative.    Musculoskeletal: Negative.    Gastrointestinal: Negative.    Genitourinary: Negative.    Neurological: Negative.    Psychiatric/Behavioral: Negative.    Allergic/Immunologic: Negative.        Active Problems:    Patient Active Problem List   Diagnosis   • Occlusive coronary artery disease, 01/01/2005--status post MI.  January 2005--CABG ×4.  Details not known.   • Folic acid deficiency   • Hyperlipidemia   • Hypogonadism male, TRT ineffective   • Microalbuminuria   • RUIZ (nonalcoholic  steatohepatitis)   • Obstructive sleep apnea, 2012--mild to moderate NIKI.  Unable to tolerate CPAP.  Cannot use oral appliance.   • Sleep related hypoxia   • Type 2 diabetes mellitus (CMS/[facility])   • Vitamin D deficiency   • Therapeutic drug monitoring   • Routine physical examination   • Family history of premature coronary artery disease   • Diabetic eye exam (CMS/HCC)   • Diabetic foot exam   • History of myocardial infarction, .   • Chronic ITP (idiopathic thrombocytopenia) (CMS/[facility])   • Polycythemia   • Elevated liver enzymes   • Screening for colon cancer         Past Medical History:   Diagnosis Date   • Chronic ITP (idiopathic thrombocytopenia) (CMS/[facility]) 2018   • Diabetic eye exam (CMS/HCC) 2017--routine diabetic eye exam reveals no evidence of diabetic retinopathy.  Astigmatism, presbyopia, hypermetropia noted.  Very early cataracts noted bilaterally.  2015--patient reports a routine diabetic eye exam without evidence of diabetic retinopathy.   • Diabetic foot exam 3/12/2017    2017--diabetic foot exam reveals no evidence of diabetic foot ulcer or pre-ulcerative callus.  Distal pulses palpable.  No signs of ischemia.  Sensation subjectively intact per monofilament.   • Erythrocytosis 3/18/2019   • Family history of premature coronary artery disease 2016    Father  from a myocardial infarction age 61.   • Folic acid deficiency 2016   • History of Abnormal pulse oximetry 10/07/2012    10/07/2012--overnight oximetry test revealed significant nocturnal hypoxemia. Oxygen saturations were greater than 90% for only 50.5% of the study. Oxygen saturation less than 90% for three hours 47 minutes which was 49.5% of the study. Oxygen saturation less than 88% for one hour and 36 minutes and 48 seconds, 21.1% of the study.   • History of CABG 2005 status post four-vessel CABG.   • History of myocardial infarction, . 2016     01/01/2005--status post myocardial infarction.   January 2005--status post four-vessel CABG   • Hyperlipidemia 4/28/2016   • Hypogonadism male, TRT ineffective 9/13/2012 09/13/2012--treatment for symptomatic hypogonadism begun. This was later discontinued due to lack of efficacy and cost.   • Microalbuminuria 4/25/2014 04/25/2014--urine microalbumin elevated 28.7. Normal range 0.0--17.0.   • RUIZ (nonalcoholic steatohepatitis) 4/28/2016   • Obstructive sleep apnea, 11/13/2012--mild to moderate NIKI.  Unable to tolerate CPAP.  Cannot use oral appliance. 10/7/2012    05/02/2014--nocturnal oxygen 2 L per nasal cannula ordered.   12/03/2013--patient was referred for an oral appliance but this could not be done because patient wears dentures.   11/13/2012--diagnosed with mild to moderate obstructive sleep apnea. Patient unable to tolerate CPAP.   10/07/2012--overnight oximetry test revealed significant nocturnal hypoxemia. Oxygen saturations were greater than 90% for only 50.5% of the study. Oxygen saturation less than 90% for three hours 47 minutes which was 49.5% of the study. Oxygen saturation less than 88% for one hour and 36 minutes and 48 seconds, 21.1% of the study.   • Occlusive coronary artery disease, 01/01/2005--status post MI.  January 2005--CABG ×4.  Details not known. 1/1/2005 01/01/2005--status post myocardial infarction.   January 2005--status post four-vessel CABG   • Sleep related hypoxia 10/7/2012    05/02/2014--nocturnal oxygen 2 L per nasal cannula ordered.  12/03/2013--patient was referred for an oral appliance but this could not be done because patient wears dentures.   11/13/2012--diagnosed with mild to moderate obstructive sleep apnea. Patient unable to tolerate CPAP.   10/07/2012--overnight oximetry test revealed significant nocturnal hypoxemia. Oxygen saturations were greater than 90% for only 50.5% of the study. Oxygen saturation less than 90% for three hours 47 minutes which was 49.5%  of the study. Oxygen saturation less than 88% for one hour and 36 minutes and 48 seconds, 21.1% of the study.   • Thrombocytopenia (CMS/HCC) 1/25/2018 06/07/2018--follow-up.  Platelets are still low at 86.  I reviewed the results of the CT scan of the abdomen with the patient.  Etiology of the mild splenomegaly not clear.  Patient referred to hematology.  03/07/2018--CT scan of the abdomen with contrast performed for elevated liver enzymes and thrombocytopenia.  This revealed minimal fatty infiltration of the liver.  Tiny calcified gallstone.  Mild splenomegaly.  Small amount of calcification is seen along the periphery of the spleen which could be related to remote hemorrhage.  It is of doubtful clinical significance.  Mild bilateral perinephric stranding.  Please correlate for signs of urinary tract infection.  No hydronephrosis, renal masses, or stones are seen.  01/25/2018--platelet count low at 99.  Lymphocytes low at 19%.  Otherwise, differential was unremarkable.  01/25/2018--routine follow-up.  Platelet count is 91.  CBC with differential ordered.   • Type 2 diabetes mellitus (CMS/HCC) 1/1/2005    Diagnosed in 2005.   • Vitamin D deficiency 4/28/2016         Past Surgical History:   Procedure Laterality Date   • CHOLECYSTECTOMY WITH INTRAOPERATIVE CHOLANGIOGRAM N/A 6/24/2019    Procedure: laparoscopic cholecystectomy with intraoperative cholangiogram;  Surgeon: Vida Avilez MD;  Location: [facility];  Service: General   • CORONARY ARTERY BYPASS GRAFT  01/2005 January 2005 status post four-vessel CABG.         No Known Allergies        Current Outpatient Medications:   •  aspirin 81 MG tablet, Take 1 tablet by mouth Daily., Disp: , Rfl:   •  atorvastatin (LIPITOR) 80 MG tablet, TAKE 1 TABLET BY MOUTH DAILY, Disp: 30 tablet, Rfl: 0  •  Cholecalciferol (VITAMIN D3) 5000 UNITS capsule capsule, 1 by mouth daily as directed, Disp: 30 capsule, Rfl: 11  •  Empagliflozin (JARDIANCE) 25 MG tablet, Take 25  mg by mouth Every Morning Before Breakfast., Disp: 30 tablet, Rfl: 5  •  folic acid (FOLVITE) 1 MG tablet, TAKE 1 TABLET BY MOUTH DAILY, Disp: 30 tablet, Rfl: 2  •  glimepiride (AMARYL) 4 MG tablet, 1 by mouth daily for diabetes, Disp: 90 tablet, Rfl: 3  •  Insulin Pen Needle 32G X 4 MM misc, 1 each Daily., Disp: 100 each, Rfl: 0  •  Liraglutide (VICTOZA) 18 MG/3ML solution pen-injector injection, Inject 1.8 mg subcutaneously daily as directed., Disp: 3 pen, Rfl: 6  •  SITagliptin-MetFORMIN HCl ER (JANUMET XR)  MG tablet, Take 1 tablet by mouth 2 (Two) Times a Day., Disp: 60 tablet, Rfl: 5      Family History   Problem Relation Age of Onset   • Other Mother         Ventricular Septal Defect   • Heart disease Mother    • Hypertension Mother    • Cancer Mother    • Diabetes Mother    • Heart attack Father         Prior Myocardial Infarction.  Dad  from a myocardial infarction at age 59.   • Heart disease Father    • Heart disease Other         Congenital Heart Disease. Multiple family members with septal defects that required surgery.   • Heart disease Sister    • Heart disease Brother    • Cancer Daughter          Social History     Socioeconomic History   • Marital status:      Spouse name: Dasha   • Number of children: 8   • Years of education: Not on file   • Highest education level: 9th grade   Occupational History   • Occupation:      Employer: [employer]   Social Needs   • Financial resource strain: Not very hard   • Food insecurity:     Worry: Never true     Inability: Never true   • Transportation needs:     Medical: No     Non-medical: No   Tobacco Use   • Smoking status: Former Smoker     Types: Cigarettes     Last attempt to quit:      Years since quittin.8   • Smokeless tobacco: Never Used   • Tobacco comment: Former smoker quit 13 years ago with a 64.5 pack year history.  Smoked 1 ppd for 32 years and 4.5 ppd for 5 years and quit when he had his open  "heart surgery.   Substance and Sexual Activity   • Alcohol use: No     Frequency: Never   • Drug use: No   • Sexual activity: Yes     Partners: Female   Lifestyle   • Physical activity:     Days per week: 0 days     Minutes per session: 0 min   • Stress: Not at all   Relationships   • Social connections:     Talks on phone: More than three times a week     Gets together: Three times a week     Attends Restorationism service: Never     Active member of club or organization: No     Attends meetings of clubs or organizations: Never     Relationship status:          Vitals:    10/17/19 0748   BP: 122/74   BP Location: Left arm   Patient Position: Sitting   Cuff Size: Large Adult   Pulse: 72   Resp: 18   Temp: 97.5 °F (36.4 °C)   TempSrc: Oral   SpO2: 92%   Weight: 109 kg (239 lb 9.6 oz)   Height: 175.3 cm (69.02\")          Physical Exam:    General: Alert and oriented x 3.  No acute distress.  Normal affect. Obese. HEENT: Pupils equal, round, reactive to light; extraocular movements intact; sclerae nonicteric; pharynx, ear canals and TMs normal.  Neck: Without JVD, thyromegaly, bruit, or adenopathy.  Lungs: Clear to auscultation in all fields.  Heart: Regular rate and rhythm without murmur, rub, gallop, or click.  Abdomen: Soft, nontender, without hepatosplenomegaly or hernia.  Bowel sounds normal.  : Deferred.  Rectal: Deferred.  Extremities: Without clubbing, cyanosis, edema, or pulse deficit.  Neurologic: Intact without focal deficit.  Normal station and gait observed during ingress and egress from the examination room.  Skin: Without significant lesion.  Musculoskeletal: Unremarkable.    Lab/other results:    I reviewed the documentation from his recent hospitalization for cholecystectomy including lab work, liver biopsy, operative report, discharge summary, subsequent gastroenterology consultation, ultrasound of the liver.    NMR is normal other than HDL particle number little low at 29.1.  CMP normal except " blood sugar elevated 113 and alkaline phosphatase elevated 137.  AST and ALT are normal.  Hemoglobin is elevated at 18.2 and hematocrit elevated at 55.7.  Platelets are low at 95.  Albumin/creatinine ratio normal at 6.3.  Hemoglobin A1c 7.7.  Thyroid function test normal.  PSA normal.  Vitamin D normal.  CPK normal.    Assessment/Plan:     Diagnosis Plan   1. Routine physical examination     2. Type 2 diabetes mellitus without complication, without long-term current use of insulin (CMS/[facility])     3. Hyperlipidemia, unspecified hyperlipidemia type     4. Occlusive coronary artery disease, 01/01/2005--status post MI.  January 2005--CABG ×4.  Details not known.     5. Microalbuminuria     6. Obstructive sleep apnea, 11/13/2012--mild to moderate NIKI.  Unable to tolerate CPAP.  Cannot use oral appliance.     7. Sleep related hypoxia     8. Polycythemia     9. Chronic ITP (idiopathic thrombocytopenia) (CMS/[facility])     10. History of myocardial infarction, 2005.     11. Diabetic foot exam     12. Vitamin D deficiency     13. RUIZ (nonalcoholic steatohepatitis)     14. Elevated liver enzymes     15. Therapeutic drug monitoring       Patient presents with essentially normal annual except for the following issues: He has type 2 diabetes is under reasonable control.  Hyperlipidemia is under good control which is important given his coronary artery disease.  Microalbuminuria is also well controlled.  Patient has sleep apnea but has not been able to tolerate CPAP or use an oral appliance.  He has sleep-related hypoxia and I suspect that that is what is causing his polycythemia.  Patient has chronic ITP which has been evaluated by the hematologist.  His vitamin D is normal.  He has Ruiz and had a liver biopsy that showed some scarring worrisome for cirrhosis.  He is scheduled for an EGD in the near future to assess for varices.  He is also scheduled for colonoscopy.  His elevated liver enzymes have resolved although his alkaline  phosphatase is mildly elevated.  Interestingly, serum iron studies and hepatitis C lab was not ordered by the gastroenterologist.  Hepatocytes had elevated iron levels.    Plan is as follows: Serum iron studies today along with hepatitis C.  No changes in current medical regimen.  Overnight oximetry ordered.  Patient instructed to follow-up with me after the results of the overnight oximetry test are known.        Procedures

## 2019-10-18 DIAGNOSIS — E11.9 TYPE 2 DIABETES MELLITUS WITHOUT COMPLICATION, WITHOUT LONG-TERM CURRENT USE OF INSULIN (HCC): Chronic | ICD-10-CM

## 2019-10-18 LAB
HCV AB S/CO SERPL IA: 0.2 S/CO RATIO (ref 0–0.9)
IRON SATN MFR SERPL: 39 % (ref 20–50)
IRON SERPL-MCNC: 132 MCG/DL (ref 59–158)
TIBC SERPL-MCNC: 338 MCG/DL
UIBC SERPL-MCNC: 206 MCG/DL (ref 112–346)

## 2019-10-18 RX ORDER — ATORVASTATIN CALCIUM 80 MG/1
80 TABLET, FILM COATED ORAL DAILY
Qty: 30 TABLET | Refills: 5 | Status: SHIPPED | OUTPATIENT
Start: 2019-10-18 | End: 2020-02-17

## 2019-12-10 DIAGNOSIS — E11.9 TYPE 2 DIABETES MELLITUS WITHOUT COMPLICATION, WITHOUT LONG-TERM CURRENT USE OF INSULIN (HCC): ICD-10-CM

## 2019-12-17 RX ORDER — SITAGLIPTIN AND METFORMIN HYDROCHLORIDE 1000; 50 MG/1; MG/1
TABLET, FILM COATED, EXTENDED RELEASE ORAL
Qty: 60 TABLET | Refills: 0 | Status: SHIPPED | OUTPATIENT
Start: 2019-12-17 | End: 2020-01-16

## 2020-01-07 ENCOUNTER — APPOINTMENT (OUTPATIENT)
Dept: GENERAL RADIOLOGY | Facility: HOSPITAL | Age: 65
End: 2020-01-07

## 2020-01-07 ENCOUNTER — HOSPITAL ENCOUNTER (EMERGENCY)
Facility: HOSPITAL | Age: 65
Discharge: HOME | End: 2020-01-07
Attending: EMERGENCY MEDICINE | Admitting: EMERGENCY MEDICINE

## 2020-01-07 VITALS
TEMPERATURE: 97.7 F | HEIGHT: 69 IN | WEIGHT: 245 LBS | RESPIRATION RATE: 17 BRPM | HEART RATE: 73 BPM | BODY MASS INDEX: 36.29 KG/M2 | DIASTOLIC BLOOD PRESSURE: 85 MMHG | SYSTOLIC BLOOD PRESSURE: 141 MMHG | OXYGEN SATURATION: 96 %

## 2020-01-07 DIAGNOSIS — M54.32 SCIATICA OF LEFT SIDE: Primary | ICD-10-CM

## 2020-01-07 PROCEDURE — 25010000002 KETOROLAC TROMETHAMINE PER 15 MG: Performed by: EMERGENCY MEDICINE

## 2020-01-07 PROCEDURE — 73502 X-RAY EXAM HIP UNI 2-3 VIEWS: CPT

## 2020-01-07 PROCEDURE — 63710000001 PREDNISONE PER 1 MG: Performed by: EMERGENCY MEDICINE

## 2020-01-07 PROCEDURE — 96372 THER/PROPH/DIAG INJ SC/IM: CPT

## 2020-01-07 PROCEDURE — 99283 EMERGENCY DEPT VISIT LOW MDM: CPT

## 2020-01-07 PROCEDURE — 72110 X-RAY EXAM L-2 SPINE 4/>VWS: CPT

## 2020-01-07 RX ORDER — PREDNISONE 20 MG/1
60 TABLET ORAL ONCE
Status: COMPLETED | OUTPATIENT
Start: 2020-01-07 | End: 2020-01-07

## 2020-01-07 RX ORDER — PREDNISONE 20 MG/1
60 TABLET ORAL DAILY
Qty: 9 TABLET | Refills: 0 | Status: SHIPPED | OUTPATIENT
Start: 2020-01-07 | End: 2020-08-27

## 2020-01-07 RX ORDER — METAXALONE 800 MG/1
800 TABLET ORAL 3 TIMES DAILY
Qty: 15 TABLET | Refills: 0 | Status: SHIPPED | OUTPATIENT
Start: 2020-01-07 | End: 2020-08-27

## 2020-01-07 RX ORDER — KETOROLAC TROMETHAMINE 30 MG/ML
30 INJECTION, SOLUTION INTRAMUSCULAR; INTRAVENOUS ONCE
Status: COMPLETED | OUTPATIENT
Start: 2020-01-07 | End: 2020-01-07

## 2020-01-07 RX ORDER — NAPROXEN SODIUM 550 MG/1
550 TABLET ORAL 2 TIMES DAILY PRN
Qty: 20 TABLET | Refills: 0 | Status: SHIPPED | OUTPATIENT
Start: 2020-01-07 | End: 2020-08-27

## 2020-01-07 RX ADMIN — PREDNISONE 60 MG: 20 TABLET ORAL at 11:50

## 2020-01-07 RX ADMIN — KETOROLAC TROMETHAMINE 30 MG: 30 INJECTION, SOLUTION INTRAMUSCULAR at 11:56

## 2020-01-07 NOTE — ED NOTES
Pt reports low back pain that travels down his left leg x 1 week. Eunice Zuleta, RN  01/07/20 9527
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
## 2020-01-07 NOTE — DISCHARGE INSTRUCTIONS
Take medications as prescribed.  Monitor your blood sugar closely while taking prednisone.  Follow-up with your primary care doctor next week if symptoms persist.  Return to the emergency department for worsening pain, numbness/tingling/weakness in your legs, bowel/bladder problems, numbness in your groin, or other concern.

## 2020-01-16 DIAGNOSIS — E11.9 TYPE 2 DIABETES MELLITUS WITHOUT COMPLICATION, WITHOUT LONG-TERM CURRENT USE OF INSULIN (HCC): Chronic | ICD-10-CM

## 2020-01-16 RX ORDER — SITAGLIPTIN AND METFORMIN HYDROCHLORIDE 1000; 50 MG/1; MG/1
TABLET, FILM COATED, EXTENDED RELEASE ORAL
Qty: 60 TABLET | Refills: 3 | Status: SHIPPED | OUTPATIENT
Start: 2020-01-16 | End: 2020-04-03 | Stop reason: SDUPTHER

## 2020-02-12 DIAGNOSIS — E11.9 TYPE 2 DIABETES MELLITUS WITHOUT COMPLICATION, WITHOUT LONG-TERM CURRENT USE OF INSULIN (HCC): Chronic | ICD-10-CM

## 2020-02-17 RX ORDER — ATORVASTATIN CALCIUM 80 MG/1
80 TABLET, FILM COATED ORAL DAILY
Qty: 30 TABLET | Refills: 5 | Status: SHIPPED | OUTPATIENT
Start: 2020-02-17 | End: 2020-11-09

## 2020-03-04 ENCOUNTER — APPOINTMENT (OUTPATIENT)
Dept: ULTRASOUND IMAGING | Facility: HOSPITAL | Age: 65
End: 2020-03-04

## 2020-03-04 ENCOUNTER — HOSPITAL ENCOUNTER (EMERGENCY)
Facility: HOSPITAL | Age: 65
Discharge: HOME | End: 2020-03-05
Attending: EMERGENCY MEDICINE | Admitting: EMERGENCY MEDICINE

## 2020-03-04 DIAGNOSIS — N45.3 EPIDIDYMOORCHITIS: ICD-10-CM

## 2020-03-04 DIAGNOSIS — N49.2 CELLULITIS OF SCROTUM: ICD-10-CM

## 2020-03-04 DIAGNOSIS — Z86.39 HISTORY OF DIABETES MELLITUS: ICD-10-CM

## 2020-03-04 DIAGNOSIS — N50.89 SCROTAL EDEMA: Primary | ICD-10-CM

## 2020-03-04 LAB
ALBUMIN SERPL-MCNC: 3.6 G/DL (ref 3.5–5.2)
ALBUMIN/GLOB SERPL: 1.2 G/DL
ALP SERPL-CCNC: 143 U/L (ref 39–117)
ALT SERPL W P-5'-P-CCNC: 26 U/L (ref 1–41)
ANION GAP SERPL CALCULATED.3IONS-SCNC: 14.7 MMOL/L (ref 5–15)
AST SERPL-CCNC: 25 U/L (ref 1–40)
BASOPHILS # BLD AUTO: 0.05 10*3/MM3 (ref 0–0.2)
BASOPHILS NFR BLD AUTO: 0.7 % (ref 0–1.5)
BILIRUB SERPL-MCNC: 0.8 MG/DL (ref 0.2–1.2)
BILIRUB UR QL STRIP: NEGATIVE
BUN BLD-MCNC: 13 MG/DL (ref 8–23)
BUN/CREAT SERPL: 20 (ref 7–25)
CALCIUM SPEC-SCNC: 9.2 MG/DL (ref 8.6–10.5)
CHLORIDE SERPL-SCNC: 97 MMOL/L (ref 98–107)
CLARITY UR: CLEAR
CO2 SERPL-SCNC: 27.3 MMOL/L (ref 22–29)
COLOR UR: YELLOW
CREAT BLD-MCNC: 0.65 MG/DL (ref 0.76–1.27)
DEPRECATED RDW RBC AUTO: 42.5 FL (ref 37–54)
EOSINOPHIL # BLD AUTO: 0.16 10*3/MM3 (ref 0–0.4)
EOSINOPHIL NFR BLD AUTO: 2.1 % (ref 0.3–6.2)
ERYTHROCYTE [DISTWIDTH] IN BLOOD BY AUTOMATED COUNT: 13 % (ref 12.3–15.4)
GFR SERPL CREATININE-BSD FRML MDRD: 124 ML/MIN/1.73
GLOBULIN UR ELPH-MCNC: 3 GM/DL
GLUCOSE BLD-MCNC: 179 MG/DL (ref 65–99)
GLUCOSE UR STRIP-MCNC: ABNORMAL MG/DL
HCT VFR BLD AUTO: 45.5 % (ref 37.5–51)
HGB BLD-MCNC: 15.3 G/DL (ref 13–17.7)
HGB UR QL STRIP.AUTO: NEGATIVE
IMM GRANULOCYTES # BLD AUTO: 0.07 10*3/MM3 (ref 0–0.05)
IMM GRANULOCYTES NFR BLD AUTO: 0.9 % (ref 0–0.5)
KETONES UR QL STRIP: NEGATIVE
LEUKOCYTE ESTERASE UR QL STRIP.AUTO: NEGATIVE
LYMPHOCYTES # BLD AUTO: 1.28 10*3/MM3 (ref 0.7–3.1)
LYMPHOCYTES NFR BLD AUTO: 16.9 % (ref 19.6–45.3)
MCH RBC QN AUTO: 30.4 PG (ref 26.6–33)
MCHC RBC AUTO-ENTMCNC: 33.6 G/DL (ref 31.5–35.7)
MCV RBC AUTO: 90.3 FL (ref 79–97)
MONOCYTES # BLD AUTO: 0.77 10*3/MM3 (ref 0.1–0.9)
MONOCYTES NFR BLD AUTO: 10.2 % (ref 5–12)
NEUTROPHILS # BLD AUTO: 5.23 10*3/MM3 (ref 1.7–7)
NEUTROPHILS NFR BLD AUTO: 69.2 % (ref 42.7–76)
NITRITE UR QL STRIP: NEGATIVE
NRBC BLD AUTO-RTO: 0 /100 WBC (ref 0–0.2)
NT-PROBNP SERPL-MCNC: 215.5 PG/ML (ref 5–900)
PH UR STRIP.AUTO: <=5 [PH] (ref 5–8)
PLATELET # BLD AUTO: 136 10*3/MM3 (ref 140–450)
PMV BLD AUTO: 12.8 FL (ref 6–12)
POTASSIUM BLD-SCNC: 2.8 MMOL/L (ref 3.5–5.2)
PROT SERPL-MCNC: 6.6 G/DL (ref 6–8.5)
PROT UR QL STRIP: NEGATIVE
RBC # BLD AUTO: 5.04 10*6/MM3 (ref 4.14–5.8)
SODIUM BLD-SCNC: 139 MMOL/L (ref 136–145)
SP GR UR STRIP: >=1.03 (ref 1–1.03)
UROBILINOGEN UR QL STRIP: ABNORMAL
WBC NRBC COR # BLD: 7.56 10*3/MM3 (ref 3.4–10.8)

## 2020-03-04 PROCEDURE — 99283 EMERGENCY DEPT VISIT LOW MDM: CPT

## 2020-03-04 PROCEDURE — 81003 URINALYSIS AUTO W/O SCOPE: CPT | Performed by: PHYSICIAN ASSISTANT

## 2020-03-04 PROCEDURE — 76870 US EXAM SCROTUM: CPT

## 2020-03-04 PROCEDURE — 80053 COMPREHEN METABOLIC PANEL: CPT | Performed by: PHYSICIAN ASSISTANT

## 2020-03-04 PROCEDURE — 83880 ASSAY OF NATRIURETIC PEPTIDE: CPT | Performed by: EMERGENCY MEDICINE

## 2020-03-04 PROCEDURE — 85025 COMPLETE CBC W/AUTO DIFF WBC: CPT | Performed by: PHYSICIAN ASSISTANT

## 2020-03-04 PROCEDURE — 93976 VASCULAR STUDY: CPT

## 2020-03-04 NOTE — ED TRIAGE NOTES
Pt states he began to feel flu like symptoms on Friday.  States he laid around more and rested.  Pt states he noticed Saturday that his groin area, mostly scrotum, has been swollen.  Pt states he has had no trauma to area, no heavy lifting. Denies any pain with urination or difficulty urinating.

## 2020-03-05 VITALS
DIASTOLIC BLOOD PRESSURE: 80 MMHG | HEART RATE: 87 BPM | HEIGHT: 68 IN | SYSTOLIC BLOOD PRESSURE: 131 MMHG | BODY MASS INDEX: 37.25 KG/M2 | TEMPERATURE: 97.4 F | OXYGEN SATURATION: 95 % | RESPIRATION RATE: 18 BRPM

## 2020-03-05 RX ORDER — CIPROFLOXACIN 500 MG/1
500 TABLET, FILM COATED ORAL 2 TIMES DAILY
Qty: 13 TABLET | Refills: 0 | Status: SHIPPED | OUTPATIENT
Start: 2020-03-05 | End: 2020-08-27

## 2020-03-05 RX ORDER — POTASSIUM CHLORIDE 750 MG/1
40 CAPSULE, EXTENDED RELEASE ORAL ONCE
Status: COMPLETED | OUTPATIENT
Start: 2020-03-05 | End: 2020-03-05

## 2020-03-05 RX ORDER — CIPROFLOXACIN 500 MG/1
500 TABLET, FILM COATED ORAL ONCE
Status: COMPLETED | OUTPATIENT
Start: 2020-03-05 | End: 2020-03-05

## 2020-03-05 RX ADMIN — POTASSIUM CHLORIDE 40 MEQ: 750 CAPSULE, EXTENDED RELEASE ORAL at 00:14

## 2020-03-05 RX ADMIN — CIPROFLOXACIN HYDROCHLORIDE 500 MG: 500 TABLET, FILM COATED ORAL at 00:41
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
## 2020-03-05 NOTE — ED NOTES
Pt states flu like symptoms that started last Friday in which he was laying around. Pt states he wears his belt tight and noticed it started to be painful in his pelvic area. Pt's noticed his scrotum was swollen starting Sunday. Pt denies pain currently. Pt denies urinary symptoms.      Katrin Saenz, RN  03/04/20 6717

## 2020-03-05 NOTE — DISCHARGE INSTRUCTIONS
Complete course of antibiotics as prescribed, continue current medications, follow-up with urology as discussed, return to the emergency department for worsening symptoms as needed.

## 2020-03-09 ENCOUNTER — HOSPITAL ENCOUNTER (EMERGENCY)
Facility: HOSPITAL | Age: 65
Discharge: HOME | End: 2020-03-09
Attending: EMERGENCY MEDICINE | Admitting: EMERGENCY MEDICINE

## 2020-03-09 VITALS
OXYGEN SATURATION: 97 % | DIASTOLIC BLOOD PRESSURE: 90 MMHG | TEMPERATURE: 98.3 F | WEIGHT: 245 LBS | BODY MASS INDEX: 37.13 KG/M2 | RESPIRATION RATE: 18 BRPM | HEART RATE: 79 BPM | HEIGHT: 68 IN | SYSTOLIC BLOOD PRESSURE: 139 MMHG

## 2020-03-09 DIAGNOSIS — N50.89 SCROTAL EDEMA: Primary | ICD-10-CM

## 2020-03-09 DIAGNOSIS — N45.3 EPIDIDYMO-ORCHITIS, ACUTE: ICD-10-CM

## 2020-03-09 DIAGNOSIS — R73.9 HYPERGLYCEMIA: ICD-10-CM

## 2020-03-09 LAB
ALBUMIN SERPL-MCNC: 3.4 G/DL (ref 3.5–5.2)
ALBUMIN/GLOB SERPL: 1 G/DL
ALP SERPL-CCNC: 124 U/L (ref 39–117)
ALT SERPL W P-5'-P-CCNC: 28 U/L (ref 1–41)
ANION GAP SERPL CALCULATED.3IONS-SCNC: 10.5 MMOL/L (ref 5–15)
AST SERPL-CCNC: 25 U/L (ref 1–40)
BASOPHILS # BLD AUTO: 0.04 10*3/MM3 (ref 0–0.2)
BASOPHILS NFR BLD AUTO: 0.5 % (ref 0–1.5)
BILIRUB SERPL-MCNC: 0.9 MG/DL (ref 0.2–1.2)
BILIRUB UR QL STRIP: NEGATIVE
BUN BLD-MCNC: 7 MG/DL (ref 8–23)
BUN/CREAT SERPL: 12.5 (ref 7–25)
CALCIUM SPEC-SCNC: 8.8 MG/DL (ref 8.6–10.5)
CHLORIDE SERPL-SCNC: 100 MMOL/L (ref 98–107)
CLARITY UR: CLEAR
CO2 SERPL-SCNC: 27.5 MMOL/L (ref 22–29)
COLOR UR: YELLOW
CREAT BLD-MCNC: 0.56 MG/DL (ref 0.76–1.27)
D-LACTATE SERPL-SCNC: 1.4 MMOL/L (ref 0.5–2)
DEPRECATED RDW RBC AUTO: 42.8 FL (ref 37–54)
EOSINOPHIL # BLD AUTO: 0.07 10*3/MM3 (ref 0–0.4)
EOSINOPHIL NFR BLD AUTO: 0.9 % (ref 0.3–6.2)
ERYTHROCYTE [DISTWIDTH] IN BLOOD BY AUTOMATED COUNT: 12.7 % (ref 12.3–15.4)
GFR SERPL CREATININE-BSD FRML MDRD: 147 ML/MIN/1.73
GLOBULIN UR ELPH-MCNC: 3.3 GM/DL
GLUCOSE BLD-MCNC: 265 MG/DL (ref 65–99)
GLUCOSE UR STRIP-MCNC: ABNORMAL MG/DL
HCT VFR BLD AUTO: 46.9 % (ref 37.5–51)
HGB BLD-MCNC: 15.9 G/DL (ref 13–17.7)
HGB UR QL STRIP.AUTO: NEGATIVE
IMM GRANULOCYTES # BLD AUTO: 0.07 10*3/MM3 (ref 0–0.05)
IMM GRANULOCYTES NFR BLD AUTO: 0.9 % (ref 0–0.5)
KETONES UR QL STRIP: NEGATIVE
LEUKOCYTE ESTERASE UR QL STRIP.AUTO: NEGATIVE
LYMPHOCYTES # BLD AUTO: 0.69 10*3/MM3 (ref 0.7–3.1)
LYMPHOCYTES NFR BLD AUTO: 8.6 % (ref 19.6–45.3)
MCH RBC QN AUTO: 31 PG (ref 26.6–33)
MCHC RBC AUTO-ENTMCNC: 33.9 G/DL (ref 31.5–35.7)
MCV RBC AUTO: 91.4 FL (ref 79–97)
MONOCYTES # BLD AUTO: 0.7 10*3/MM3 (ref 0.1–0.9)
MONOCYTES NFR BLD AUTO: 8.8 % (ref 5–12)
NEUTROPHILS # BLD AUTO: 6.42 10*3/MM3 (ref 1.7–7)
NEUTROPHILS NFR BLD AUTO: 80.3 % (ref 42.7–76)
NITRITE UR QL STRIP: NEGATIVE
NRBC BLD AUTO-RTO: 0 /100 WBC (ref 0–0.2)
PH UR STRIP.AUTO: <=5 [PH] (ref 5–8)
PLATELET # BLD AUTO: 135 10*3/MM3 (ref 140–450)
PMV BLD AUTO: 12.6 FL (ref 6–12)
POTASSIUM BLD-SCNC: 3.7 MMOL/L (ref 3.5–5.2)
PROT SERPL-MCNC: 6.7 G/DL (ref 6–8.5)
PROT UR QL STRIP: NEGATIVE
RBC # BLD AUTO: 5.13 10*6/MM3 (ref 4.14–5.8)
SODIUM BLD-SCNC: 138 MMOL/L (ref 136–145)
SP GR UR STRIP: 1.03 (ref 1–1.03)
UROBILINOGEN UR QL STRIP: ABNORMAL
WBC NRBC COR # BLD: 7.99 10*3/MM3 (ref 3.4–10.8)

## 2020-03-09 PROCEDURE — 25010000002 CEFTRIAXONE PER 250 MG: Performed by: EMERGENCY MEDICINE

## 2020-03-09 PROCEDURE — 99283 EMERGENCY DEPT VISIT LOW MDM: CPT

## 2020-03-09 PROCEDURE — 96365 THER/PROPH/DIAG IV INF INIT: CPT

## 2020-03-09 PROCEDURE — 81003 URINALYSIS AUTO W/O SCOPE: CPT | Performed by: EMERGENCY MEDICINE

## 2020-03-09 PROCEDURE — 80053 COMPREHEN METABOLIC PANEL: CPT | Performed by: EMERGENCY MEDICINE

## 2020-03-09 PROCEDURE — 85025 COMPLETE CBC W/AUTO DIFF WBC: CPT | Performed by: EMERGENCY MEDICINE

## 2020-03-09 PROCEDURE — 83605 ASSAY OF LACTIC ACID: CPT | Performed by: EMERGENCY MEDICINE

## 2020-03-09 PROCEDURE — 87040 BLOOD CULTURE FOR BACTERIA: CPT | Performed by: EMERGENCY MEDICINE

## 2020-03-09 RX ORDER — TRAMADOL HYDROCHLORIDE 50 MG/1
50 TABLET ORAL EVERY 6 HOURS PRN
Qty: 15 TABLET | Refills: 0 | Status: SHIPPED | OUTPATIENT
Start: 2020-03-09 | End: 2020-08-27

## 2020-03-09 RX ORDER — SODIUM CHLORIDE 0.9 % (FLUSH) 0.9 %
10 SYRINGE (ML) INJECTION AS NEEDED
Status: DISCONTINUED | OUTPATIENT
Start: 2020-03-09 | End: 2020-03-09 | Stop reason: HOSPADM

## 2020-03-09 RX ORDER — CEFTRIAXONE SODIUM 1 G/50ML
1 INJECTION, SOLUTION INTRAVENOUS ONCE
Status: COMPLETED | OUTPATIENT
Start: 2020-03-09 | End: 2020-03-09

## 2020-03-09 RX ADMIN — SODIUM CHLORIDE 1000 ML: 9 INJECTION, SOLUTION INTRAVENOUS at 15:55

## 2020-03-09 RX ADMIN — CEFTRIAXONE SODIUM 1 G: 1 INJECTION, SOLUTION INTRAVENOUS at 18:01

## 2020-03-09 NOTE — ED TRIAGE NOTES
"Patient to er with c/o testicle pain. Patient reported he was seen here the other day for this and discharged. Patient was instructed to come to Er if things got worse.\" patient stated the pain and swelling is worse.   "

## 2020-03-09 NOTE — DISCHARGE INSTRUCTIONS
Continue your home antibiotics and follow-up with Dr. Coleman.  Call in the morning for an appointment.  Please return to emergency department if symptoms worsen with fever, increasing redness or increasing pain.
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
## 2020-03-14 LAB
BACTERIA SPEC AEROBE CULT: NORMAL
BACTERIA SPEC AEROBE CULT: NORMAL

## 2020-04-03 DIAGNOSIS — E11.9 TYPE 2 DIABETES MELLITUS WITHOUT COMPLICATION, WITHOUT LONG-TERM CURRENT USE OF INSULIN (HCC): Primary | ICD-10-CM

## 2020-04-08 ENCOUNTER — TELEPHONE (OUTPATIENT)
Dept: INTERNAL MEDICINE | Facility: CLINIC | Age: 65
End: 2020-04-08

## 2020-08-27 ENCOUNTER — OFFICE VISIT (OUTPATIENT)
Dept: INTERNAL MEDICINE | Facility: CLINIC | Age: 65
End: 2020-08-27

## 2020-08-27 VITALS
WEIGHT: 226 LBS | BODY MASS INDEX: 34.25 KG/M2 | SYSTOLIC BLOOD PRESSURE: 122 MMHG | DIASTOLIC BLOOD PRESSURE: 68 MMHG | OXYGEN SATURATION: 97 % | HEIGHT: 68 IN | HEART RATE: 75 BPM

## 2020-08-27 DIAGNOSIS — K75.81 NASH (NONALCOHOLIC STEATOHEPATITIS): Chronic | ICD-10-CM

## 2020-08-27 DIAGNOSIS — I25.2 HISTORY OF MYOCARDIAL INFARCTION: Chronic | ICD-10-CM

## 2020-08-27 DIAGNOSIS — N52.9 MALE ERECTILE DISORDER: ICD-10-CM

## 2020-08-27 DIAGNOSIS — Z51.81 THERAPEUTIC DRUG MONITORING: ICD-10-CM

## 2020-08-27 DIAGNOSIS — E55.9 VITAMIN D DEFICIENCY: Chronic | ICD-10-CM

## 2020-08-27 DIAGNOSIS — Z00.00 ROUTINE PHYSICAL EXAMINATION: ICD-10-CM

## 2020-08-27 DIAGNOSIS — D69.3 CHRONIC ITP (IDIOPATHIC THROMBOCYTOPENIA) (HCC): Chronic | ICD-10-CM

## 2020-08-27 DIAGNOSIS — R80.9 TYPE 2 DIABETES MELLITUS WITH MICROALBUMINURIA, WITHOUT LONG-TERM CURRENT USE OF INSULIN (HCC): Primary | Chronic | ICD-10-CM

## 2020-08-27 DIAGNOSIS — Z12.11 COLON CANCER SCREENING: ICD-10-CM

## 2020-08-27 DIAGNOSIS — E11.29 TYPE 2 DIABETES MELLITUS WITH MICROALBUMINURIA, WITHOUT LONG-TERM CURRENT USE OF INSULIN (HCC): Primary | Chronic | ICD-10-CM

## 2020-08-27 DIAGNOSIS — D75.1 POLYCYTHEMIA: Chronic | ICD-10-CM

## 2020-08-27 DIAGNOSIS — G47.34 SLEEP RELATED HYPOXIA: Chronic | ICD-10-CM

## 2020-08-27 DIAGNOSIS — R74.8 ELEVATED LIVER ENZYMES: ICD-10-CM

## 2020-08-27 DIAGNOSIS — E78.2 MIXED HYPERLIPIDEMIA: Chronic | ICD-10-CM

## 2020-08-27 DIAGNOSIS — G47.33 OBSTRUCTIVE SLEEP APNEA: Chronic | ICD-10-CM

## 2020-08-27 DIAGNOSIS — R80.9 MICROALBUMINURIA: Chronic | ICD-10-CM

## 2020-08-27 DIAGNOSIS — I25.10 OCCLUSIVE CORONARY ARTERY DISEASE: Chronic | ICD-10-CM

## 2020-08-27 PROCEDURE — 99214 OFFICE O/P EST MOD 30 MIN: CPT | Performed by: INTERNAL MEDICINE

## 2020-08-27 NOTE — PROGRESS NOTES
08/27/2020    Patient Information  Rajan Dc                                                                                          [id number] [facility] [id number]      1955  [unfilled]  [phone] (work)    Chief Complaint:     Follow-up multiple medical problems as outlined in chief complaint.  No new acute complaints.    History of Present Illness:    Patient with a history of type 2 diabetes, microalbuminuria, hyperlipidemia, Ruiz, occlusive coronary artery disease, sleep apnea with hypoxia, vitamin D deficiency, history of myocardial infarction in 2005, chronic ITP and polycythemia, elevated liver enzymes.  He presents today to follow-up on these multiple medical problems.  Patient has not had lab work due to Covid-19 pandemic.  See below.  His past medical history reviewed and updated were necessary including health maintenance parameters.  This reveals he needs colon cancer screening.    Review of Systems   Constitution: Negative.   HENT: Negative.    Eyes: Negative.    Cardiovascular: Negative.    Respiratory: Negative.    Endocrine: Negative.    Hematologic/Lymphatic: Negative.    Skin: Negative.    Musculoskeletal: Negative.    Gastrointestinal: Negative.    Genitourinary: Negative.    Neurological: Negative.    Psychiatric/Behavioral: Negative.    Allergic/Immunologic: Negative.        Active Problems:    Patient Active Problem List   Diagnosis   • Occlusive coronary artery disease, 01/01/2005--status post MI.  January 2005--CABG ×4.  Details not known.   • Folic acid deficiency   • Hyperlipidemia   • Hypogonadism male, TRT ineffective   • Microalbuminuria   • RUIZ (nonalcoholic steatohepatitis)   • Obstructive sleep apnea, 11/13/2012--mild to moderate NIKI.  Unable to tolerate CPAP.  Cannot use oral appliance.   • Sleep related hypoxia   • Type 2 diabetes mellitus with microalbuminuria, without long-term current use of insulin (CMS/[facility])   • Vitamin D deficiency   •  Therapeutic drug monitoring   • Routine physical examination   • Family history of premature coronary artery disease   • Diabetic eye exam (CMS/HCC)   • Diabetic foot exam   • History of myocardial infarction, .   • Chronic ITP (idiopathic thrombocytopenia) (CMS/[facility])   • Polycythemia   • Elevated liver enzymes         Past Medical History:   Diagnosis Date   • Chronic ITP (idiopathic thrombocytopenia) (CMS/[facility]) 2018   • Diabetic eye exam (CMS/HCC) 2017--routine diabetic eye exam reveals no evidence of diabetic retinopathy.  Astigmatism, presbyopia, hypermetropia noted.  Very early cataracts noted bilaterally.  2015--patient reports a routine diabetic eye exam without evidence of diabetic retinopathy.   • Diabetic foot exam 3/12/2017    2017--diabetic foot exam reveals no evidence of diabetic foot ulcer or pre-ulcerative callus.  Distal pulses palpable.  No signs of ischemia.  Sensation subjectively intact per monofilament.   • Erythrocytosis 3/18/2019   • Family history of premature coronary artery disease 2016    Father  from a myocardial infarction age 61.   • Folic acid deficiency 2016   • History of Abnormal pulse oximetry 10/07/2012    10/07/2012--overnight oximetry test revealed significant nocturnal hypoxemia. Oxygen saturations were greater than 90% for only 50.5% of the study. Oxygen saturation less than 90% for three hours 47 minutes which was 49.5% of the study. Oxygen saturation less than 88% for one hour and 36 minutes and 48 seconds, 21.1% of the study.   • History of CABG 2005 status post four-vessel CABG.   • History of myocardial infarction, . 2005--status post myocardial infarction.   2005--status post four-vessel CABG   • Hyperlipidemia 2016   • Hypogonadism male, TRT ineffective 2012--treatment for symptomatic hypogonadism begun. This was later discontinued due to lack  of efficacy and cost.   • Microalbuminuria 4/25/2014 04/25/2014--urine microalbumin elevated 28.7. Normal range 0.0--17.0.   • RUIZ (nonalcoholic steatohepatitis) 4/28/2016   • Obstructive sleep apnea, 11/13/2012--mild to moderate NIKI.  Unable to tolerate CPAP.  Cannot use oral appliance. 10/7/2012    05/02/2014--nocturnal oxygen 2 L per nasal cannula ordered.   12/03/2013--patient was referred for an oral appliance but this could not be done because patient wears dentures.   11/13/2012--diagnosed with mild to moderate obstructive sleep apnea. Patient unable to tolerate CPAP.   10/07/2012--overnight oximetry test revealed significant nocturnal hypoxemia. Oxygen saturations were greater than 90% for only 50.5% of the study. Oxygen saturation less than 90% for three hours 47 minutes which was 49.5% of the study. Oxygen saturation less than 88% for one hour and 36 minutes and 48 seconds, 21.1% of the study.   • Occlusive coronary artery disease, 01/01/2005--status post MI.  January 2005--CABG ×4.  Details not known. 1/1/2005 01/01/2005--status post myocardial infarction.   January 2005--status post four-vessel CABG   • Sleep related hypoxia 10/7/2012    05/02/2014--nocturnal oxygen 2 L per nasal cannula ordered.  12/03/2013--patient was referred for an oral appliance but this could not be done because patient wears dentures.   11/13/2012--diagnosed with mild to moderate obstructive sleep apnea. Patient unable to tolerate CPAP.   10/07/2012--overnight oximetry test revealed significant nocturnal hypoxemia. Oxygen saturations were greater than 90% for only 50.5% of the study. Oxygen saturation less than 90% for three hours 47 minutes which was 49.5% of the study. Oxygen saturation less than 88% for one hour and 36 minutes and 48 seconds, 21.1% of the study.   • Thrombocytopenia (CMS/HCC) 1/25/2018 06/07/2018--follow-up.  Platelets are still low at 86.  I reviewed the results of the CT scan of the abdomen with  the patient.  Etiology of the mild splenomegaly not clear.  Patient referred to hematology.  03/07/2018--CT scan of the abdomen with contrast performed for elevated liver enzymes and thrombocytopenia.  This revealed minimal fatty infiltration of the liver.  Tiny calcified gallstone.  Mild splenomegaly.  Small amount of calcification is seen along the periphery of the spleen which could be related to remote hemorrhage.  It is of doubtful clinical significance.  Mild bilateral perinephric stranding.  Please correlate for signs of urinary tract infection.  No hydronephrosis, renal masses, or stones are seen.  01/25/2018--platelet count low at 99.  Lymphocytes low at 19%.  Otherwise, differential was unremarkable.  01/25/2018--routine follow-up.  Platelet count is 91.  CBC with differential ordered.   • Type 2 diabetes mellitus with microalbuminuria, without long-term current use of insulin (CMS/HCC) 1/1/2005    Diagnosed in 2005.   • Vitamin D deficiency 4/28/2016         Past Surgical History:   Procedure Laterality Date   • CHOLECYSTECTOMY WITH INTRAOPERATIVE CHOLANGIOGRAM N/A 6/24/2019    Procedure: laparoscopic cholecystectomy with intraoperative cholangiogram;  Surgeon: Vida Avilez MD;  Location: [facility];  Service: General   • CORONARY ARTERY BYPASS GRAFT  01/2005 January 2005 status post four-vessel CABG.         No Known Allergies        Current Outpatient Medications:   •  aspirin 81 MG tablet, Take 1 tablet by mouth Daily., Disp: , Rfl:   •  atorvastatin (LIPITOR) 80 MG tablet, TAKE 1 TABLET BY MOUTH DAILY, Disp: 30 tablet, Rfl: 5  •  Cholecalciferol (VITAMIN D3) 5000 UNITS capsule capsule, 1 by mouth daily as directed, Disp: 30 capsule, Rfl: 11  •  Empagliflozin (JARDIANCE) 25 MG tablet, Take 25 mg by mouth Every Morning Before Breakfast., Disp: 90 tablet, Rfl: 3  •  folic acid (FOLVITE) 1 MG tablet, TAKE 1 TABLET BY MOUTH DAILY, Disp: 30 tablet, Rfl: 2  •  glimepiride (AMARYL) 4 MG tablet, 1 by  mouth daily for diabetes, Disp: 90 tablet, Rfl: 3  •  Insulin Pen Needle 32G X 4 MM misc, 1 each Daily., Disp: 100 each, Rfl: 0  •  Liraglutide (VICTOZA) 18 MG/3ML solution pen-injector injection, ADMINISTER 1.8 MG UNDER THE SKIN DAILY AS DIRECTED, Disp: 9 mL, Rfl: 3  •  SITagliptin-metFORMIN HCl ER (Janumet XR)  MG tablet, Take 1 tablet by mouth 2 (Two) Times a Day., Disp: 60 tablet, Rfl: 3      Family History   Problem Relation Age of Onset   • Other Mother         Ventricular Septal Defect   • Heart disease Mother    • Hypertension Mother    • Cancer Mother    • Diabetes Mother    • Heart attack Father         Prior Myocardial Infarction.  Dad  from a myocardial infarction at age 59.   • Heart disease Father    • Heart disease Other         Congenital Heart Disease. Multiple family members with septal defects that required surgery.   • Heart disease Sister    • Heart disease Brother    • Cancer Daughter          Social History     Socioeconomic History   • Marital status:      Spouse name: Dasha   • Number of children: 8   • Years of education: Not on file   • Highest education level: 9th grade   Occupational History   • Occupation:      Employer: [employer]   Social Needs   • Financial resource strain: Not very hard   • Food insecurity:     Worry: Never true     Inability: Never true   • Transportation needs:     Medical: No     Non-medical: No   Tobacco Use   • Smoking status: Former Smoker     Types: Cigarettes     Last attempt to quit:      Years since quitting: 15.6   • Smokeless tobacco: Never Used   • Tobacco comment: Former smoker quit 13 years ago with a 64.5 pack year history.  Smoked 1 ppd for 32 years and 4.5 ppd for 5 years and quit when he had his open heart surgery.   Substance and Sexual Activity   • Alcohol use: No     Frequency: Never   • Drug use: No   • Sexual activity: Yes     Partners: Female   Lifestyle   • Physical activity:     Days per week: 0  "days     Minutes per session: 0 min   • Stress: Not at all   Relationships   • Social connections:     Talks on phone: More than three times a week     Gets together: Three times a week     Attends Faith service: Never     Active member of club or organization: No     Attends meetings of clubs or organizations: Never     Relationship status:          Vitals:    08/27/20 1308   BP: 122/68   BP Location: Left arm   Pulse: 75   SpO2: 97%   Weight: 103 kg (226 lb)   Height: 172.7 cm (67.99\")        Body mass index is 34.37 kg/m².      Physical Exam:    General: Alert and oriented x 3.  No acute distress.  Normal affect.  HEENT: Pupils equal, round, reactive to light; extraocular movements intact; sclerae nonicteric; pharynx, ear canals and TMs normal.  Neck: Without JVD, thyromegaly, bruit, or adenopathy.  Lungs: Clear to auscultation in all fields.  Heart: Regular rate and rhythm without murmur, rub, gallop, or click.  Abdomen: Soft, nontender, without hepatosplenomegaly or hernia.  Bowel sounds normal.  : Deferred.  Rectal: Deferred.  Extremities: Without clubbing, cyanosis, edema, or pulse deficit.  Neurologic: Intact without focal deficit.  Normal station and gait observed during ingress and egress from the examination room.  Skin: Without significant lesion.  Musculoskeletal: Unremarkable.    Lab/other results:      Assessment/Plan:     Diagnosis Plan   1. Type 2 diabetes mellitus with microalbuminuria, without long-term current use of insulin (CMS/[facility])  Hemoglobin A1c   2. Microalbuminuria  Microalbumin / Creatinine Urine Ratio - Urine, Clean Catch   3. Hyperlipidemia  CK    Comprehensive Metabolic Panel    NMR LipoProfile    TSH    T4, Free    T3, Free   4. RUIZ (nonalcoholic steatohepatitis)     5. Occlusive coronary artery disease, 01/01/2005--status post MI.  January 2005--CABG ×4.  Details not known.     6. Obstructive sleep apnea, 11/13/2012--mild to moderate NIKI.  Unable to tolerate CPAP.  " Cannot use oral appliance.     7. Sleep related hypoxia     8. Vitamin D deficiency  Vitamin D 25 Hydroxy   9. History of myocardial infarction, 2005.     10. Chronic ITP (idiopathic thrombocytopenia) (CMS/HCC)     11. Polycythemia  CBC (No Diff)   12. Elevated liver enzymes  Hepatitis C Antibody   13. Therapeutic drug monitoring  PSA DIAGNOSTIC   14. Routine physical examination  CBC (No Diff)    CK    Comprehensive Metabolic Panel    Hemoglobin A1c    NMR LipoProfile    Microalbumin / Creatinine Urine Ratio - Urine, Clean Catch    Vitamin D 25 Hydroxy    TSH    T4, Free    T3, Free    PSA DIAGNOSTIC    Hepatitis C Antibody   15. Colon cancer screening  Cologuard - Stool, Per Rectum    Cologuard - Stool, Per Rectum     Patient has type 2 diabetes as well as hyperlipidemia and RUIZ.  I suspect that his blood work will look much better due to the fact that he has lost quite a bit of weight.  He has lost nearly 20 pounds since last visit and he plans on losing about 20 more.  His coronary artery disease seems to be stable.  Patient has sleep apnea and unable to tolerate CPAP or oral appliance.  Continued weight loss will improve this situation as well.  Patient will be due for his annual in October of this year and therefore I will schedule him for lab work and his annual in the future rather than doing it today or in the near future.    Plan is as follows: Schedule fasting lab, follow-up, and annual physical after October 18, 2020.  Cologuard ordered.  Recommend diabetic eye exam.        Procedures

## 2020-09-28 ENCOUNTER — TELEPHONE (OUTPATIENT)
Dept: INTERNAL MEDICINE | Facility: CLINIC | Age: 65
End: 2020-09-28

## 2020-09-28 NOTE — TELEPHONE ENCOUNTER
The patient's daughter called in stating he's had diarrhea for the last 1 week. She wants to know what he could take for it?    [address]    Best call back # [phone]

## 2020-10-13 DIAGNOSIS — D75.1 POLYCYTHEMIA: Chronic | ICD-10-CM

## 2020-10-13 DIAGNOSIS — R80.9 TYPE 2 DIABETES MELLITUS WITH MICROALBUMINURIA, WITHOUT LONG-TERM CURRENT USE OF INSULIN (HCC): Chronic | ICD-10-CM

## 2020-10-13 DIAGNOSIS — E11.29 TYPE 2 DIABETES MELLITUS WITH MICROALBUMINURIA, WITHOUT LONG-TERM CURRENT USE OF INSULIN (HCC): Chronic | ICD-10-CM

## 2020-10-13 DIAGNOSIS — E55.9 VITAMIN D DEFICIENCY: Chronic | ICD-10-CM

## 2020-10-13 DIAGNOSIS — Z51.81 THERAPEUTIC DRUG MONITORING: ICD-10-CM

## 2020-10-13 DIAGNOSIS — R74.8 ELEVATED LIVER ENZYMES: ICD-10-CM

## 2020-10-13 DIAGNOSIS — R80.9 MICROALBUMINURIA: Chronic | ICD-10-CM

## 2020-10-13 DIAGNOSIS — E11.9 TYPE 2 DIABETES MELLITUS WITHOUT COMPLICATION, WITHOUT LONG-TERM CURRENT USE OF INSULIN (HCC): ICD-10-CM

## 2020-10-13 DIAGNOSIS — Z00.00 ROUTINE PHYSICAL EXAMINATION: ICD-10-CM

## 2020-10-13 DIAGNOSIS — E78.2 MIXED HYPERLIPIDEMIA: Chronic | ICD-10-CM

## 2020-10-13 RX ORDER — EMPAGLIFLOZIN 25 MG/1
TABLET, FILM COATED ORAL
Qty: 90 TABLET | Refills: 1 | Status: SHIPPED | OUTPATIENT
Start: 2020-10-13 | End: 2021-06-02 | Stop reason: SDUPTHER

## 2020-10-13 RX ORDER — SITAGLIPTIN AND METFORMIN HYDROCHLORIDE 1000; 50 MG/1; MG/1
TABLET, FILM COATED, EXTENDED RELEASE ORAL
Qty: 60 TABLET | Refills: 5 | Status: SHIPPED | OUTPATIENT
Start: 2020-10-13 | End: 2021-05-14 | Stop reason: SDUPTHER

## 2020-11-09 DIAGNOSIS — E11.9 TYPE 2 DIABETES MELLITUS WITHOUT COMPLICATION, WITHOUT LONG-TERM CURRENT USE OF INSULIN (HCC): Chronic | ICD-10-CM

## 2020-11-09 RX ORDER — LIRAGLUTIDE 6 MG/ML
INJECTION SUBCUTANEOUS
Qty: 9 ML | Refills: 3 | Status: SHIPPED | OUTPATIENT
Start: 2020-11-09 | End: 2021-06-02 | Stop reason: SDUPTHER

## 2020-11-09 RX ORDER — ATORVASTATIN CALCIUM 80 MG/1
80 TABLET, FILM COATED ORAL DAILY
Qty: 30 TABLET | Refills: 5 | Status: SHIPPED | OUTPATIENT
Start: 2020-11-09 | End: 2021-06-02 | Stop reason: SDUPTHER

## 2021-05-14 DIAGNOSIS — E11.9 TYPE 2 DIABETES MELLITUS WITHOUT COMPLICATION, WITHOUT LONG-TERM CURRENT USE OF INSULIN (HCC): ICD-10-CM

## 2021-05-14 RX ORDER — SITAGLIPTIN AND METFORMIN HYDROCHLORIDE 1000; 50 MG/1; MG/1
TABLET, FILM COATED, EXTENDED RELEASE ORAL
Qty: 60 TABLET | Refills: 5 | Status: SHIPPED | OUTPATIENT
Start: 2021-05-14 | End: 2021-06-02 | Stop reason: SDUPTHER

## 2021-05-14 NOTE — TELEPHONE ENCOUNTER
Caller: Devorah Dc    Relationship: Emergency Contact    Best call back number:[phone]    Medication needed:   Requested Prescriptions     Pending Prescriptions Disp Refills   • SITagliptin-metFORMIN HCl ER (Janumet XR)  MG tablet 60 tablet 5     Sig: Take 1 tablet by mouth 2 (Two) Times a Day.       When do you need the refill by: ASAP    What additional details did the patient provide when requesting the medication:     PHARMACY TOLD PATIENT THEY SENT OVER PAPERWORK FOR THIS MEDICATION TWO WEEKS AGO BECAUSE THERE IS A CONFLICT WITH THE INSURANCE AND NEVER RECEIVED A RESPONSE. PLEASE ADVISE.    Does the patient have less than a 3 day supply:  [x] Yes  [] No    What is the patient's preferred pharmacy: [id number] [address] AT [facility] [phone] [facility] [phone]

## 2021-05-19 ENCOUNTER — TELEPHONE (OUTPATIENT)
Dept: INTERNAL MEDICINE | Facility: CLINIC | Age: 66
End: 2021-05-19

## 2021-05-25 ENCOUNTER — LAB (OUTPATIENT)
Dept: LAB | Facility: HOSPITAL | Age: 66
End: 2021-05-25

## 2021-05-25 LAB
25(OH)D3 SERPL-MCNC: 22.1 NG/ML (ref 30–100)
ALBUMIN SERPL-MCNC: 3.9 G/DL (ref 3.5–5.2)
ALBUMIN UR-MCNC: <1.2 MG/DL
ALBUMIN/GLOB SERPL: 1.3 G/DL
ALP SERPL-CCNC: 130 U/L (ref 39–117)
ALT SERPL W P-5'-P-CCNC: 24 U/L (ref 1–41)
ANION GAP SERPL CALCULATED.3IONS-SCNC: 9.1 MMOL/L (ref 5–15)
AST SERPL-CCNC: 21 U/L (ref 1–40)
BILIRUB SERPL-MCNC: 0.6 MG/DL (ref 0–1.2)
BUN SERPL-MCNC: 11 MG/DL (ref 8–23)
BUN/CREAT SERPL: 16.2 (ref 7–25)
CALCIUM SPEC-SCNC: 8.8 MG/DL (ref 8.6–10.5)
CHLORIDE SERPL-SCNC: 108 MMOL/L (ref 98–107)
CK SERPL-CCNC: 77 U/L (ref 20–200)
CO2 SERPL-SCNC: 22.9 MMOL/L (ref 22–29)
CREAT SERPL-MCNC: 0.68 MG/DL (ref 0.76–1.27)
CREAT UR-MCNC: 46 MG/DL
DEPRECATED RDW RBC AUTO: 38.6 FL (ref 37–54)
ERYTHROCYTE [DISTWIDTH] IN BLOOD BY AUTOMATED COUNT: 14.2 % (ref 12.3–15.4)
GFR SERPL CREATININE-BSD FRML MDRD: 117 ML/MIN/1.73
GLOBULIN UR ELPH-MCNC: 3 GM/DL
GLUCOSE SERPL-MCNC: 231 MG/DL (ref 65–99)
HBA1C MFR BLD: 8.87 % (ref 4.8–5.6)
HCT VFR BLD AUTO: 35.6 % (ref 37.5–51)
HCV AB SER DONR QL: NORMAL
HGB BLD-MCNC: 10.6 G/DL (ref 13–17.7)
MCH RBC QN AUTO: 22.7 PG (ref 26.6–33)
MCHC RBC AUTO-ENTMCNC: 29.8 G/DL (ref 31.5–35.7)
MCV RBC AUTO: 76.2 FL (ref 79–97)
MICROALBUMIN/CREAT UR: NORMAL MG/G{CREAT}
PLATELET # BLD AUTO: 116 10*3/MM3 (ref 140–450)
POTASSIUM SERPL-SCNC: 3.9 MMOL/L (ref 3.5–5.2)
PROT SERPL-MCNC: 6.9 G/DL (ref 6–8.5)
PSA SERPL-MCNC: 0.11 NG/ML (ref 0–4)
RBC # BLD AUTO: 4.67 10*6/MM3 (ref 4.14–5.8)
SODIUM SERPL-SCNC: 140 MMOL/L (ref 136–145)
T3FREE SERPL-MCNC: 2.96 PG/ML (ref 2–4.4)
T4 FREE SERPL-MCNC: 0.79 NG/DL (ref 0.93–1.7)
TSH SERPL DL<=0.05 MIU/L-ACNC: 2.45 UIU/ML (ref 0.27–4.2)
WBC # BLD AUTO: 4.73 10*3/MM3 (ref 3.4–10.8)

## 2021-05-25 PROCEDURE — 82570 ASSAY OF URINE CREATININE: CPT | Performed by: INTERNAL MEDICINE

## 2021-05-25 PROCEDURE — 80061 LIPID PANEL: CPT | Performed by: INTERNAL MEDICINE

## 2021-05-25 PROCEDURE — 85027 COMPLETE CBC AUTOMATED: CPT | Performed by: INTERNAL MEDICINE

## 2021-05-25 PROCEDURE — 83036 HEMOGLOBIN GLYCOSYLATED A1C: CPT | Performed by: INTERNAL MEDICINE

## 2021-05-25 PROCEDURE — 80053 COMPREHEN METABOLIC PANEL: CPT | Performed by: INTERNAL MEDICINE

## 2021-05-25 PROCEDURE — 83704 LIPOPROTEIN BLD QUAN PART: CPT | Performed by: INTERNAL MEDICINE

## 2021-05-25 PROCEDURE — 84439 ASSAY OF FREE THYROXINE: CPT | Performed by: INTERNAL MEDICINE

## 2021-05-25 PROCEDURE — 82043 UR ALBUMIN QUANTITATIVE: CPT | Performed by: INTERNAL MEDICINE

## 2021-05-25 PROCEDURE — 84153 ASSAY OF PSA TOTAL: CPT | Performed by: INTERNAL MEDICINE

## 2021-05-25 PROCEDURE — 82550 ASSAY OF CK (CPK): CPT | Performed by: INTERNAL MEDICINE

## 2021-05-25 PROCEDURE — 82306 VITAMIN D 25 HYDROXY: CPT | Performed by: INTERNAL MEDICINE

## 2021-05-25 PROCEDURE — 84481 FREE ASSAY (FT-3): CPT | Performed by: INTERNAL MEDICINE

## 2021-05-25 PROCEDURE — 84443 ASSAY THYROID STIM HORMONE: CPT | Performed by: INTERNAL MEDICINE

## 2021-05-25 PROCEDURE — 86803 HEPATITIS C AB TEST: CPT | Performed by: INTERNAL MEDICINE

## 2021-05-25 PROCEDURE — 36415 COLL VENOUS BLD VENIPUNCTURE: CPT

## 2021-05-26 LAB
CHOLEST SERPL-MCNC: 141 MG/DL (ref 100–199)
HDL SERPL-SCNC: 29.6 UMOL/L
HDLC SERPL-MCNC: 60 MG/DL
LDL SERPL QN: 21 NM
LDL SERPL-SCNC: 655 NMOL/L
LDL SMALL SERPL-SCNC: 118 NMOL/L
LDLC SERPL CALC-MCNC: 66 MG/DL (ref 0–99)
TRIGL SERPL-MCNC: 74 MG/DL (ref 0–149)

## 2021-06-02 ENCOUNTER — OFFICE VISIT (OUTPATIENT)
Dept: INTERNAL MEDICINE | Facility: CLINIC | Age: 66
End: 2021-06-02

## 2021-06-02 ENCOUNTER — LAB (OUTPATIENT)
Dept: LAB | Facility: HOSPITAL | Age: 66
End: 2021-06-02

## 2021-06-02 VITALS
SYSTOLIC BLOOD PRESSURE: 128 MMHG | WEIGHT: 230 LBS | DIASTOLIC BLOOD PRESSURE: 62 MMHG | BODY MASS INDEX: 34.98 KG/M2 | RESPIRATION RATE: 16 BRPM | OXYGEN SATURATION: 98 % | HEART RATE: 76 BPM

## 2021-06-02 DIAGNOSIS — E11.29 TYPE 2 DIABETES MELLITUS WITH MICROALBUMINURIA, WITHOUT LONG-TERM CURRENT USE OF INSULIN (HCC): Chronic | ICD-10-CM

## 2021-06-02 DIAGNOSIS — Z86.16 HISTORY OF COVID-19: ICD-10-CM

## 2021-06-02 DIAGNOSIS — D50.9 MICROCYTIC ANEMIA: ICD-10-CM

## 2021-06-02 DIAGNOSIS — R80.9 TYPE 2 DIABETES MELLITUS WITH MICROALBUMINURIA, WITHOUT LONG-TERM CURRENT USE OF INSULIN (HCC): Chronic | ICD-10-CM

## 2021-06-02 DIAGNOSIS — I25.10 OCCLUSIVE CORONARY ARTERY DISEASE: Chronic | ICD-10-CM

## 2021-06-02 DIAGNOSIS — Z51.81 THERAPEUTIC DRUG MONITORING: ICD-10-CM

## 2021-06-02 DIAGNOSIS — G47.34 SLEEP RELATED HYPOXIA: Chronic | ICD-10-CM

## 2021-06-02 DIAGNOSIS — R74.8 ELEVATED LIVER ENZYMES: ICD-10-CM

## 2021-06-02 DIAGNOSIS — G47.33 OBSTRUCTIVE SLEEP APNEA: Chronic | ICD-10-CM

## 2021-06-02 DIAGNOSIS — E78.2 MIXED HYPERLIPIDEMIA: Chronic | ICD-10-CM

## 2021-06-02 DIAGNOSIS — I25.2 HISTORY OF MYOCARDIAL INFARCTION: Chronic | ICD-10-CM

## 2021-06-02 DIAGNOSIS — K75.81 NASH (NONALCOHOLIC STEATOHEPATITIS): Chronic | ICD-10-CM

## 2021-06-02 DIAGNOSIS — E11.9 ENCOUNTER FOR DIABETIC FOOT EXAM (HCC): Chronic | ICD-10-CM

## 2021-06-02 DIAGNOSIS — E55.9 VITAMIN D DEFICIENCY: Chronic | ICD-10-CM

## 2021-06-02 DIAGNOSIS — Z82.49 FAMILY HISTORY OF PREMATURE CORONARY ARTERY DISEASE: Chronic | ICD-10-CM

## 2021-06-02 DIAGNOSIS — E29.1 HYPOGONADISM MALE: Chronic | ICD-10-CM

## 2021-06-02 DIAGNOSIS — Z12.11 COLON CANCER SCREENING: ICD-10-CM

## 2021-06-02 DIAGNOSIS — E66.9 NON MORBID OBESITY: ICD-10-CM

## 2021-06-02 DIAGNOSIS — D69.3 CHRONIC ITP (IDIOPATHIC THROMBOCYTOPENIA) (HCC): Chronic | ICD-10-CM

## 2021-06-02 DIAGNOSIS — E11.9 TYPE 2 DIABETES MELLITUS WITHOUT COMPLICATION, WITHOUT LONG-TERM CURRENT USE OF INSULIN (HCC): ICD-10-CM

## 2021-06-02 DIAGNOSIS — Z00.00 ROUTINE PHYSICAL EXAMINATION: Primary | ICD-10-CM

## 2021-06-02 DIAGNOSIS — D75.1 POLYCYTHEMIA: Chronic | ICD-10-CM

## 2021-06-02 DIAGNOSIS — R80.9 MICROALBUMINURIA: Chronic | ICD-10-CM

## 2021-06-02 PROBLEM — N52.9 MALE ERECTILE DISORDER: Chronic | Status: ACTIVE | Noted: 2020-08-27

## 2021-06-02 LAB
IRON 24H UR-MRATE: 18 MCG/DL (ref 59–158)
IRON SATN MFR SERPL: 4 % (ref 20–50)
TIBC SERPL-MCNC: 474 MCG/DL (ref 298–536)
TRANSFERRIN SERPL-MCNC: 318 MG/DL (ref 200–360)

## 2021-06-02 PROCEDURE — 83540 ASSAY OF IRON: CPT | Performed by: INTERNAL MEDICINE

## 2021-06-02 PROCEDURE — 99396 PREV VISIT EST AGE 40-64: CPT | Performed by: INTERNAL MEDICINE

## 2021-06-02 PROCEDURE — 86769 SARS-COV-2 COVID-19 ANTIBODY: CPT

## 2021-06-02 PROCEDURE — 99214 OFFICE O/P EST MOD 30 MIN: CPT | Performed by: INTERNAL MEDICINE

## 2021-06-02 PROCEDURE — 84466 ASSAY OF TRANSFERRIN: CPT | Performed by: INTERNAL MEDICINE

## 2021-06-02 PROCEDURE — 36415 COLL VENOUS BLD VENIPUNCTURE: CPT | Performed by: INTERNAL MEDICINE

## 2021-06-02 RX ORDER — ATORVASTATIN CALCIUM 80 MG/1
TABLET, FILM COATED ORAL
Qty: 30 TABLET | Refills: 5 | Status: SHIPPED | OUTPATIENT
Start: 2021-06-02 | End: 2021-09-07

## 2021-06-02 RX ORDER — GLIMEPIRIDE 4 MG/1
TABLET ORAL
Qty: 90 TABLET | Refills: 3 | Status: SHIPPED | OUTPATIENT
Start: 2021-06-02 | End: 2022-07-21 | Stop reason: SDUPTHER

## 2021-06-02 RX ORDER — LIRAGLUTIDE 6 MG/ML
INJECTION SUBCUTANEOUS
Qty: 9 ML | Refills: 3 | Status: SHIPPED | OUTPATIENT
Start: 2021-06-02 | End: 2021-10-06

## 2021-06-02 RX ORDER — EMPAGLIFLOZIN 25 MG/1
1 TABLET, FILM COATED ORAL
Qty: 90 TABLET | Refills: 1 | Status: SHIPPED | OUTPATIENT
Start: 2021-06-02

## 2021-06-02 RX ORDER — SITAGLIPTIN AND METFORMIN HYDROCHLORIDE 1000; 50 MG/1; MG/1
TABLET, FILM COATED, EXTENDED RELEASE ORAL
Qty: 60 TABLET | Refills: 5 | Status: SHIPPED | OUTPATIENT
Start: 2021-06-02 | End: 2022-01-27

## 2021-06-02 NOTE — PROGRESS NOTES
06/02/2021    Patient Information  Rajan Dc                                                                                          [address] [place] [id number]      1955  [unfilled]  [phone] (work)    Chief Complaint:     Physical examination and follow-up blood work.    History of Present Illness:    Patient with a history of type 2 diabetes, microalbuminuria, hyperlipidemia, Ruiz with elevated liver enzymes, occlusive coronary artery disease and history of myocardial infarction in 2005, hypogonadism, family history of premature coronary artery disease, sleep apnea, sleep-related hypoxia, polycythemia, chronic ITP, vitamin D deficiency.  He presents today for his routine annual physical examination after not being seen for nearly 2 years.  His past medical history reviewed and updated were necessary including health maintenance parameters.  This reveals he needs diabetic eye exam which I encouraged him to get.  It also appears he has not had COVID-19 vaccine and also needs the Shingrix vaccine.  I encouraged him to get these.    Review of Systems   Constitutional: Negative.   HENT: Negative.    Eyes: Negative.    Cardiovascular: Negative.    Respiratory: Negative.    Endocrine: Negative.    Hematologic/Lymphatic: Negative.    Skin: Negative.    Musculoskeletal: Negative.    Gastrointestinal: Negative.    Genitourinary: Negative.    Neurological: Negative.    Psychiatric/Behavioral: Negative.    Allergic/Immunologic: Negative.        Active Problems:    Patient Active Problem List   Diagnosis   • Occlusive coronary artery disease, 01/01/2005--status post MI.  January 2005--CABG ×4.  Details not known.   • Folic acid deficiency   • Hyperlipidemia   • Hypogonadism male, TRT ineffective   • Microalbuminuria   • RUIZ (nonalcoholic steatohepatitis)   • Obstructive sleep apnea, 11/13/2012--mild to moderate NIKI.  Unable to tolerate CPAP.  Cannot use oral appliance.   • Sleep  related hypoxia   • Type 2 diabetes mellitus with microalbuminuria, without long-term current use of insulin (CMS/HCC)   • Vitamin D deficiency   • Therapeutic drug monitoring   • Routine physical examination   • Family history of premature coronary artery disease   • Diabetic eye exam (CMS/HCC)   • Diabetic foot exam   • History of myocardial infarction, 2005.   • Chronic ITP (idiopathic thrombocytopenia) (CMS/[facility])   • Polycythemia   • Elevated liver enzymes   • Male erectile disorder   • Microcytic anemia   • Non morbid obesity         Past Medical History:   Diagnosis Date   • Chronic ITP (idiopathic thrombocytopenia) (CMS/HCC) 2018   • Diabetic eye exam (CMS/HCC) 2017--routine diabetic eye exam reveals no evidence of diabetic retinopathy.  Astigmatism, presbyopia, hypermetropia noted.  Very early cataracts noted bilaterally.  2015--patient reports a routine diabetic eye exam without evidence of diabetic retinopathy.   • Diabetic foot exam 3/12/2017    2017--diabetic foot exam reveals no evidence of diabetic foot ulcer or pre-ulcerative callus.  Distal pulses palpable.  No signs of ischemia.  Sensation subjectively intact per monofilament.   • Elevated liver enzymes 2019--AST is normal at 27, ALT normal at 2 9.  Alkaline phosphatase mildly elevated at 137.  Bilirubin is normal.  2019--ultrasound liver ordered by the gastroenterologist reveals increased hepatic echogenicity consistent with steatosis.  Previous cholecystectomy.  No biliary ductal dilatation.  2019--patient was apparently referred to the gastroenterologist by   • Erythrocytosis 3/18/2019   • Family history of premature coronary artery disease 2016    Father  from a myocardial infarction age 61.   • Folic acid deficiency 2016   • History of Abnormal pulse oximetry 10/07/2012    10/07/2012--overnight oximetry test revealed significant nocturnal  hypoxemia. Oxygen saturations were greater than 90% for only 50.5% of the study. Oxygen saturation less than 90% for three hours 47 minutes which was 49.5% of the study. Oxygen saturation less than 88% for one hour and 36 minutes and 48 seconds, 21.1% of the study.   • History of CABG 01/2005 January 2005 status post four-vessel CABG.   • History of myocardial infarction, 2005. 4/28/2016 01/01/2005--status post myocardial infarction.   January 2005--status post four-vessel CABG   • Hyperlipidemia 4/28/2016   • Hypogonadism male, TRT ineffective 9/13/2012 09/13/2012--treatment for symptomatic hypogonadism begun. This was later discontinued due to lack of efficacy and cost.   • Male erectile disorder 8/27/2020   • Microalbuminuria 4/25/2014 04/25/2014--urine microalbumin elevated 28.7. Normal range 0.0--17.0.   • RUIZ (nonalcoholic steatohepatitis) 4/28/2016   • Non morbid obesity 6/2/2021   • Obstructive sleep apnea, 11/13/2012--mild to moderate NIKI.  Unable to tolerate CPAP.  Cannot use oral appliance. 10/7/2012    05/02/2014--nocturnal oxygen 2 L per nasal cannula ordered.   12/03/2013--patient was referred for an oral appliance but this could not be done because patient wears dentures.   11/13/2012--diagnosed with mild to moderate obstructive sleep apnea. Patient unable to tolerate CPAP.   10/07/2012--overnight oximetry test revealed significant nocturnal hypoxemia. Oxygen saturations were greater than 90% for only 50.5% of the study. Oxygen saturation less than 90% for three hours 47 minutes which was 49.5% of the study. Oxygen saturation less than 88% for one hour and 36 minutes and 48 seconds, 21.1% of the study.   • Occlusive coronary artery disease, 01/01/2005--status post MI.  January 2005--CABG ×4.  Details not known. 1/1/2005 01/01/2005--status post myocardial infarction.   January 2005--status post four-vessel CABG   • Sleep related hypoxia 10/7/2012    05/02/2014--nocturnal oxygen 2 L per  nasal cannula ordered.  12/03/2013--patient was referred for an oral appliance but this could not be done because patient wears dentures.   11/13/2012--diagnosed with mild to moderate obstructive sleep apnea. Patient unable to tolerate CPAP.   10/07/2012--overnight oximetry test revealed significant nocturnal hypoxemia. Oxygen saturations were greater than 90% for only 50.5% of the study. Oxygen saturation less than 90% for three hours 47 minutes which was 49.5% of the study. Oxygen saturation less than 88% for one hour and 36 minutes and 48 seconds, 21.1% of the study.   • Thrombocytopenia (CMS/HCC) 1/25/2018 06/07/2018--follow-up.  Platelets are still low at 86.  I reviewed the results of the CT scan of the abdomen with the patient.  Etiology of the mild splenomegaly not clear.  Patient referred to hematology.  03/07/2018--CT scan of the abdomen with contrast performed for elevated liver enzymes and thrombocytopenia.  This revealed minimal fatty infiltration of the liver.  Tiny calcified gallstone.  Mild splenomegaly.  Small amount of calcification is seen along the periphery of the spleen which could be related to remote hemorrhage.  It is of doubtful clinical significance.  Mild bilateral perinephric stranding.  Please correlate for signs of urinary tract infection.  No hydronephrosis, renal masses, or stones are seen.  01/25/2018--platelet count low at 99.  Lymphocytes low at 19%.  Otherwise, differential was unremarkable.  01/25/2018--routine follow-up.  Platelet count is 91.  CBC with differential ordered.   • Type 2 diabetes mellitus with microalbuminuria, without long-term current use of insulin (CMS/HCC) 1/1/2005    Diagnosed in 2005.   • Vitamin D deficiency 4/28/2016         Past Surgical History:   Procedure Laterality Date   • CHOLECYSTECTOMY WITH INTRAOPERATIVE CHOLANGIOGRAM N/A 6/24/2019    Procedure: laparoscopic cholecystectomy with intraoperative cholangiogram;  Surgeon: Vida Avilez MD;   Location: [facility];  Service: General   • CORONARY ARTERY BYPASS GRAFT  2005 status post four-vessel CABG.         No Known Allergies        Current Outpatient Medications:   •  aspirin 81 MG tablet, Take 1 tablet by mouth Daily., Disp: , Rfl:   •  atorvastatin (LIPITOR) 80 MG tablet, Take 1 p.o. daily for high cholesterol, Disp: 30 tablet, Rfl: 5  •  Empagliflozin (Jardiance) 25 MG tablet, Take 25 mg by mouth Every Morning Before Breakfast. For diabetes, Disp: 90 tablet, Rfl: 1  •  folic acid (FOLVITE) 1 MG tablet, TAKE 1 TABLET BY MOUTH DAILY, Disp: 30 tablet, Rfl: 2  •  glimepiride (AMARYL) 4 MG tablet, Take 1 p.o. daily at supper for diabetes, Disp: 90 tablet, Rfl: 3  •  Insulin Pen Needle 32G X 4 MM misc, 1 each Daily., Disp: 100 each, Rfl: 0  •  Liraglutide (Victoza) 18 MG/3ML solution pen-injector injection, Inject 1.8 mg subcutaneously daily as directed for diabetes, Disp: 9 mL, Rfl: 3  •  SITagliptin-metFORMIN HCl ER (Janumet XR)  MG tablet, Take 1 p.o. twice daily for diabetes, Disp: 60 tablet, Rfl: 5  •  vitamin D3 125 MCG (5000 UT) capsule capsule, 1 by mouth daily as directed, Disp: 30 capsule, Rfl: 11      Family History   Problem Relation Age of Onset   • Other Mother         Ventricular Septal Defect   • Heart disease Mother    • Hypertension Mother    • Cancer Mother    • Diabetes Mother    • Heart attack Father         Prior Myocardial Infarction.  Dad  from a myocardial infarction at age 59.   • Heart disease Father    • Heart disease Other         Congenital Heart Disease. Multiple family members with septal defects that required surgery.   • Heart disease Sister    • Heart disease Brother    • Cancer Daughter          Social History     Socioeconomic History   • Marital status:      Spouse name: Dasha   • Number of children: 8   • Years of education: Not on file   • Highest education level: 9th grade   Tobacco Use   • Smoking status: Former Smoker      Types: Cigarettes     Quit date:      Years since quittin.4   • Smokeless tobacco: Never Used   • Tobacco comment: Former smoker quit 13 years ago with a 64.5 pack year history.  Smoked 1 ppd for 32 years and 4.5 ppd for 5 years and quit when he had his open heart surgery.   Vaping Use   • Vaping Use: Never used   Substance and Sexual Activity   • Alcohol use: No   • Drug use: No   • Sexual activity: Yes     Partners: Female         Vitals:    21 0736   BP: 128/62   Pulse: 76   Resp: 16   SpO2: 98%   Weight: 104 kg (230 lb)        Body mass index is 34.98 kg/m².      Physical Exam:    General: Alert and oriented x 3.  No acute distress.  Obese.  Normal affect.  HEENT: Pupils equal, round, reactive to light; extraocular movements intact; sclerae nonicteric; pharynx, ear canals and TMs normal.  Neck: Without JVD, thyromegaly, bruit, or adenopathy.  Lungs: Clear to auscultation in all fields.  Heart: Regular rate and rhythm without murmur, rub, gallop, or click.  Abdomen: Soft, nontender, without hepatosplenomegaly or hernia.  Bowel sounds normal.  : Deferred.  Rectal: Deferred.  Extremities: Without clubbing, cyanosis, edema, or pulse deficit.  Neurologic: Intact without focal deficit.  Normal station and gait observed during ingress and egress from the examination room.  Skin: Without significant lesion.  Musculoskeletal: Unremarkable.    2021--routine diabetic foot exam reveals no evidence of diabetic foot ulcer or preulcerative callus.  Distal pulses palpable and sensation is intact.    Lab/other results:    NMR reveals a total cholesterol of 141.  Triglycerides 74.  LDL particle number excellent at 655.  Small LDL particle number excellent 118.  HDL particle number low at 29.6.  CMP normal except glucose 231.  Hemoglobin A1c 8.87.  Microalbumin/creatinine ratio unable to calculate.  Vitamin D is low at 22.1.  Thyroid function test normal.  PSA normal at 0.113.  Hepatitis C antibody  screening nonreactive.  MCV is low at 96.2.  Platelets low at 116.CBC reveals a low hemoglobin of 10.6 and hematocrit 35.6.    Assessment/Plan:     Diagnosis Plan   1. Routine physical examination     2. Type 2 diabetes mellitus with microalbuminuria, without long-term current use of insulin (CMS/[facility])  Comprehensive Metabolic Panel    Hemoglobin A1c    glimepiride (AMARYL) 4 MG tablet    Liraglutide (Victoza) 18 MG/3ML solution pen-injector injection   3. Microalbuminuria     4. Hyperlipidemia  CK    Comprehensive Metabolic Panel    NMR LipoProfile    atorvastatin (LIPITOR) 80 MG tablet   5. RUIZ (nonalcoholic steatohepatitis)     6. Elevated liver enzymes     7. Occlusive coronary artery disease, 01/01/2005--status post MI.  January 2005--CABG ×4.  Details not known.     8. History of myocardial infarction, 2005.     9. Family history of premature coronary artery disease     10. Hypogonadism male, TRT ineffective     11. Obstructive sleep apnea, 11/13/2012--mild to moderate NIKI.  Unable to tolerate CPAP.  Cannot use oral appliance.     12. Sleep related hypoxia     13. Polycythemia  CBC (No Diff)   14. Chronic ITP (idiopathic thrombocytopenia) (CMS/[facility])     15. Diabetic foot exam     16. Vitamin D deficiency  vitamin D3 125 MCG (5000 UT) capsule capsule    Vitamin D 25 Hydroxy   17. Microcytic anemia  Iron Profile    Ambulatory Referral For Screening Colonoscopy    CBC (No Diff)   18. Therapeutic drug monitoring     19. Non morbid obesity     20. Colon cancer screening  Ambulatory Referral For Screening Colonoscopy   21. Type 2 diabetes mellitus without complication, without long-term current use of insulin (CMS/[facility])  SITagliptin-metFORMIN HCl ER (Janumet XR)  MG tablet    Empagliflozin (Jardiance) 25 MG tablet     Patient presents with a normal annual physical except for several issues: He has type 2 diabetes which is moderate goal.  Patient has nonmorbid obesity and I have strongly encouraged him to follow  a low carbohydrate diet, exercise, and lose weight.  Microalbuminuria appears to be controlled.  Hyperlipidemia is under good control.  He has Maxwell but his liver enzymes are now normal.  Patient has occlusive coronary artery disease and history of myocardial infarction in 2005.  He also has a family history of premature coronary artery disease.  I discussed with him the need to strictly control his risk factors.  He is not doing so.  He has sleep apnea but cannot tolerate CPAP.  This results in sleep-related hypoxia and also polycythemia.  He has low platelets and was determined to have chronic ITP by hematologist.  I have encouraged him to follow-up with them.  He also has a new microcytic anemia that needs further evaluation.  Patient has not had his colon cancer screening and I think he needs a colonoscopy due to the microcytic anemia and suspected iron deficiency.  Vitamin D is low and I encouraged him to take vitamin D supplementation.    Plan is as follows: Check serum iron studies.  Schedule colonoscopy.  Strongly recommend low carbohydrate diet, exercise, and weight loss.  Vitamin D 5000 units/day.  Patient will follow-up in 4 months with lab prior or follow-up as needed.    Addendum: Patient reports he had COVID-19 infection but is not quite sure when.  Therefore he has not had the Covid vaccine.  I will check Covid antibodies.    Procedures

## 2021-06-03 LAB — SARS-COV-2 AB SERPL QL IA: POSITIVE

## 2021-06-04 ENCOUNTER — TELEPHONE (OUTPATIENT)
Dept: GASTROENTEROLOGY | Facility: CLINIC | Age: 66
End: 2021-06-04

## 2021-08-13 ENCOUNTER — TELEPHONE (OUTPATIENT)
Dept: GASTROENTEROLOGY | Facility: CLINIC | Age: 66
End: 2021-08-13

## 2021-08-13 ENCOUNTER — OFFICE VISIT (OUTPATIENT)
Dept: GASTROENTEROLOGY | Facility: CLINIC | Age: 66
End: 2021-08-13

## 2021-08-13 VITALS — HEIGHT: 69 IN | WEIGHT: 222 LBS | TEMPERATURE: 97.8 F | BODY MASS INDEX: 32.88 KG/M2

## 2021-08-13 DIAGNOSIS — Z12.11 ENCOUNTER FOR SCREENING FOR MALIGNANT NEOPLASM OF COLON: ICD-10-CM

## 2021-08-13 DIAGNOSIS — E53.8 FOLIC ACID DEFICIENCY: Chronic | ICD-10-CM

## 2021-08-13 DIAGNOSIS — E55.9 VITAMIN D DEFICIENCY: Chronic | ICD-10-CM

## 2021-08-13 DIAGNOSIS — K75.81 NASH (NONALCOHOLIC STEATOHEPATITIS): Primary | Chronic | ICD-10-CM

## 2021-08-13 LAB
ALBUMIN SERPL-MCNC: 3.9 G/DL (ref 3.5–5.2)
ALBUMIN/GLOB SERPL: 1.5 G/DL
ALP SERPL-CCNC: 85 U/L (ref 39–117)
ALT SERPL-CCNC: 21 U/L (ref 1–41)
AST SERPL-CCNC: 27 U/L (ref 1–40)
BASOPHILS # BLD AUTO: ABNORMAL 10*3/UL
BASOPHILS # BLD MANUAL: 0.1 10*3/MM3 (ref 0–0.2)
BASOPHILS NFR BLD MANUAL: 2 % (ref 0–1.5)
BILIRUB SERPL-MCNC: 1 MG/DL (ref 0–1.2)
BUN SERPL-MCNC: 16 MG/DL (ref 8–23)
BUN/CREAT SERPL: 24.2 (ref 7–25)
CALCIUM SERPL-MCNC: 9.1 MG/DL (ref 8.6–10.5)
CHLORIDE SERPL-SCNC: 106 MMOL/L (ref 98–107)
CO2 SERPL-SCNC: 23.5 MMOL/L (ref 22–29)
CREAT SERPL-MCNC: 0.66 MG/DL (ref 0.76–1.27)
DIFFERENTIAL COMMENT: ABNORMAL
EOSINOPHIL # BLD AUTO: ABNORMAL 10*3/UL
EOSINOPHIL # BLD MANUAL: 0.1 10*3/MM3 (ref 0–0.4)
EOSINOPHIL NFR BLD AUTO: ABNORMAL %
EOSINOPHIL NFR BLD MANUAL: 2 % (ref 0.3–6.2)
ERYTHROCYTE [DISTWIDTH] IN BLOOD BY AUTOMATED COUNT: 15.6 % (ref 12.3–15.4)
GLOBULIN SER CALC-MCNC: 2.6 GM/DL
GLUCOSE SERPL-MCNC: 107 MG/DL (ref 65–99)
HCT VFR BLD AUTO: 31.9 % (ref 37.5–51)
HGB BLD-MCNC: 8.6 G/DL (ref 13–17.7)
IRON SATN MFR SERPL: 3 % (ref 20–50)
IRON SERPL-MCNC: 17 MCG/DL (ref 59–158)
LYMPHOCYTES # BLD AUTO: ABNORMAL 10*3/UL
LYMPHOCYTES # BLD MANUAL: 0.77 10*3/MM3 (ref 0.7–3.1)
LYMPHOCYTES NFR BLD AUTO: ABNORMAL %
LYMPHOCYTES NFR BLD MANUAL: 16 % (ref 19.6–45.3)
MCH RBC QN AUTO: 18.6 PG (ref 26.6–33)
MCHC RBC AUTO-ENTMCNC: 27 G/DL (ref 31.5–35.7)
MCV RBC AUTO: 69 FL (ref 79–97)
MONOCYTES # BLD MANUAL: 0.48 10*3/MM3 (ref 0.1–0.9)
MONOCYTES NFR BLD AUTO: ABNORMAL %
MONOCYTES NFR BLD MANUAL: 10 % (ref 5–12)
NEUTROPHILS # BLD MANUAL: 3.37 10*3/MM3 (ref 1.7–7)
NEUTROPHILS NFR BLD AUTO: ABNORMAL %
NEUTROPHILS NFR BLD MANUAL: 70 % (ref 42.7–76)
PLATELET # BLD AUTO: 124 10*3/MM3 (ref 140–450)
PLATELET BLD QL SMEAR: ABNORMAL
POTASSIUM SERPL-SCNC: 4.2 MMOL/L (ref 3.5–5.2)
PROT SERPL-MCNC: 6.5 G/DL (ref 6–8.5)
RBC # BLD AUTO: 4.62 10*6/MM3 (ref 4.14–5.8)
RBC MORPH BLD: ABNORMAL
SODIUM SERPL-SCNC: 138 MMOL/L (ref 136–145)
TIBC SERPL-MCNC: 495 MCG/DL
UIBC SERPL-MCNC: 478 MCG/DL (ref 112–346)
WBC # BLD AUTO: 4.82 10*3/MM3 (ref 3.4–10.8)

## 2021-08-13 PROCEDURE — 99214 OFFICE O/P EST MOD 30 MIN: CPT | Performed by: NURSE PRACTITIONER

## 2021-08-13 NOTE — TELEPHONE ENCOUNTER
spoke with pt scheduled at [facility] on aug 18 arrive at 0940 am ashley farr---marquise

## 2021-08-13 NOTE — PROGRESS NOTES
Chief Complaint   Patient presents with   • RUIZ   • Colonoscopy       Rajan Dc is a  65 y.o. male here for a follow up visit for Ruiz.    HPI  65-year-old male presents today for follow-up visit for Ruiz.  He is a patient of Dr. Salazar.  He was last seen in the office on 9/24/2019.  He is new to me today.  He has a history of Ruiz and his most recent LFTs this past May were normal.  He tells me he is not drinking any alcohol or taking any NSAIDs and.  He does have a history of type 2 diabetes.  He also has a history of vitamin D and folic acid deficiency.  Most recent liver ultrasound in 2019 was stable.  He is overdue for screening colonoscopy as well as an EGD to check for esophageal varices.  He was on the schedule previously but due to COVID-19 those had to be canceled.  The patient denies any significant GI family history at this time.  AFP done in 2019 was normal.  He denies any dysphagia, reflux, abdominal pain, nausea and vomiting, diarrhea, constipation, rectal bleeding or melena.  He tells me his appetite is good and his weight appears stable.  He does have history of cholecystectomy.  Past Medical History:   Diagnosis Date   • Chronic ITP (idiopathic thrombocytopenia) (CMS/[facility]) 9/19/2018   • Diabetic eye exam (CMS/[facility]) 5/22/2017 05/22/2017--routine diabetic eye exam reveals no evidence of diabetic retinopathy.  Astigmatism, presbyopia, hypermetropia noted.  Very early cataracts noted bilaterally.  October 2015--patient reports a routine diabetic eye exam without evidence of diabetic retinopathy.   • Diabetic foot exam 3/12/2017    03/14/2017--diabetic foot exam reveals no evidence of diabetic foot ulcer or pre-ulcerative callus.  Distal pulses palpable.  No signs of ischemia.  Sensation subjectively intact per monofilament.   • Elevated liver enzymes 6/23/2019 October 17, 2019--AST is normal at 27, ALT normal at 2 9.  Alkaline phosphatase mildly elevated at 137.  Bilirubin is normal.   2019--ultrasound liver ordered by the gastroenterologist reveals increased hepatic echogenicity consistent with steatosis.  Previous cholecystectomy.  No biliary ductal dilatation.  2019--patient was apparently referred to the gastroenterologist by   • Erythrocytosis 3/18/2019   • Family history of premature coronary artery disease 2016    Father  from a myocardial infarction age 61.   • Folic acid deficiency 2016   • History of Abnormal pulse oximetry 10/07/2012    10/07/2012--overnight oximetry test revealed significant nocturnal hypoxemia. Oxygen saturations were greater than 90% for only 50.5% of the study. Oxygen saturation less than 90% for three hours 47 minutes which was 49.5% of the study. Oxygen saturation less than 88% for one hour and 36 minutes and 48 seconds, 21.1% of the study.   • History of CABG 2005 status post four-vessel CABG.   • History of myocardial infarction, . 2005--status post myocardial infarction.   2005--status post four-vessel CABG   • Hyperlipidemia 2016   • Hypogonadism male, TRT ineffective 2012--treatment for symptomatic hypogonadism begun. This was later discontinued due to lack of efficacy and cost.   • Male erectile disorder 2020   • Microalbuminuria 2014--urine microalbumin elevated 28.7. Normal range 0.0--17.0.   • RUIZ (nonalcoholic steatohepatitis) 2016   • Non morbid obesity 2021   • Obstructive sleep apnea, 2012--mild to moderate NIKI.  Unable to tolerate CPAP.  Cannot use oral appliance. 10/7/2012    2014--nocturnal oxygen 2 L per nasal cannula ordered.   2013--patient was referred for an oral appliance but this could not be done because patient wears dentures.   2012--diagnosed with mild to moderate obstructive sleep apnea. Patient unable to tolerate CPAP.   10/07/2012--overnight oximetry test revealed  significant nocturnal hypoxemia. Oxygen saturations were greater than 90% for only 50.5% of the study. Oxygen saturation less than 90% for three hours 47 minutes which was 49.5% of the study. Oxygen saturation less than 88% for one hour and 36 minutes and 48 seconds, 21.1% of the study.   • Occlusive coronary artery disease, 01/01/2005--status post MI.  January 2005--CABG ×4.  Details not known. 1/1/2005 01/01/2005--status post myocardial infarction.   January 2005--status post four-vessel CABG   • Sleep related hypoxia 10/7/2012    05/02/2014--nocturnal oxygen 2 L per nasal cannula ordered.  12/03/2013--patient was referred for an oral appliance but this could not be done because patient wears dentures.   11/13/2012--diagnosed with mild to moderate obstructive sleep apnea. Patient unable to tolerate CPAP.   10/07/2012--overnight oximetry test revealed significant nocturnal hypoxemia. Oxygen saturations were greater than 90% for only 50.5% of the study. Oxygen saturation less than 90% for three hours 47 minutes which was 49.5% of the study. Oxygen saturation less than 88% for one hour and 36 minutes and 48 seconds, 21.1% of the study.   • Thrombocytopenia (CMS/HCC) 1/25/2018 06/07/2018--follow-up.  Platelets are still low at 86.  I reviewed the results of the CT scan of the abdomen with the patient.  Etiology of the mild splenomegaly not clear.  Patient referred to hematology.  03/07/2018--CT scan of the abdomen with contrast performed for elevated liver enzymes and thrombocytopenia.  This revealed minimal fatty infiltration of the liver.  Tiny calcified gallstone.  Mild splenomegaly.  Small amount of calcification is seen along the periphery of the spleen which could be related to remote hemorrhage.  It is of doubtful clinical significance.  Mild bilateral perinephric stranding.  Please correlate for signs of urinary tract infection.  No hydronephrosis, renal masses, or stones are seen.  01/25/2018--platelet  count low at 99.  Lymphocytes low at 19%.  Otherwise, differential was unremarkable.  2018--routine follow-up.  Platelet count is 91.  CBC with differential ordered.   • Type 2 diabetes mellitus with microalbuminuria, without long-term current use of insulin (CMS/[facility]) 2005    Diagnosed in .   • Vitamin D deficiency 2016       Past Surgical History:   Procedure Laterality Date   • CHOLECYSTECTOMY WITH INTRAOPERATIVE CHOLANGIOGRAM N/A 2019    Procedure: laparoscopic cholecystectomy with intraoperative cholangiogram;  Surgeon: Vida Avilez MD;  Location: [facility];  Service: General   • CORONARY ARTERY BYPASS GRAFT  2005 status post four-vessel CABG.       Scheduled Meds:    Continuous Infusions:No current facility-administered medications for this visit.      PRN Meds:.    No Known Allergies    Social History     Socioeconomic History   • Marital status:      Spouse name: Dasha   • Number of children: 8   • Years of education: Not on file   • Highest education level: 9th grade   Tobacco Use   • Smoking status: Former Smoker     Types: Cigarettes     Quit date:      Years since quittin.6   • Smokeless tobacco: Never Used   • Tobacco comment: Former smoker quit 13 years ago with a 64.5 pack year history.  Smoked 1 ppd for 32 years and 4.5 ppd for 5 years and quit when he had his open heart surgery.   Vaping Use   • Vaping Use: Never used   Substance and Sexual Activity   • Alcohol use: No   • Drug use: No   • Sexual activity: Yes     Partners: Female       Family History   Problem Relation Age of Onset   • Other Mother         Ventricular Septal Defect   • Heart disease Mother    • Hypertension Mother    • Cancer Mother    • Diabetes Mother    • Heart attack Father         Prior Myocardial Infarction.  Dad  from a myocardial infarction at age 59.   • Heart disease Father    • Heart disease Other         Congenital Heart Disease. Multiple family  members with septal defects that required surgery.   • Heart disease Sister    • Heart disease Brother    • Cancer Daughter        Review of Systems   Constitutional: Negative for appetite change, chills, diaphoresis, fatigue, fever and unexpected weight change.   HENT: Negative for nosebleeds, postnasal drip, sore throat, trouble swallowing and voice change.    Respiratory: Negative for cough, choking, chest tightness, shortness of breath and wheezing.    Cardiovascular: Negative for chest pain, palpitations and leg swelling.   Gastrointestinal: Negative for abdominal distention, abdominal pain, anal bleeding, blood in stool, constipation, diarrhea, nausea, rectal pain and vomiting.   Endocrine: Negative for polydipsia, polyphagia and polyuria.   Musculoskeletal: Negative for gait problem.   Skin: Negative for rash and wound.   Allergic/Immunologic: Negative for food allergies.   Neurological: Negative for dizziness, speech difficulty and light-headedness.   Psychiatric/Behavioral: Negative for confusion, self-injury, sleep disturbance and suicidal ideas.       Vitals:    08/13/21 1010   Temp: 97.8 °F (36.6 °C)       Physical Exam  Constitutional:       General: He is not in acute distress.     Appearance: He is well-developed. He is not ill-appearing.   HENT:      Head: Normocephalic.   Eyes:      Pupils: Pupils are equal, round, and reactive to light.   Cardiovascular:      Rate and Rhythm: Normal rate and regular rhythm.      Heart sounds: Normal heart sounds.   Pulmonary:      Effort: Pulmonary effort is normal.      Breath sounds: Normal breath sounds.   Abdominal:      General: Bowel sounds are normal. There is no distension.      Palpations: Abdomen is soft. There is no mass.      Tenderness: There is no abdominal tenderness. There is no guarding or rebound.      Hernia: No hernia is present.   Musculoskeletal:         General: Normal range of motion.   Skin:     General: Skin is warm and dry.    Neurological:      Mental Status: He is alert and oriented to person, place, and time.   Psychiatric:         Speech: Speech normal.         Behavior: Behavior normal.         Judgment: Judgment normal.         No radiology results for the last 7 days     Assessment and plan     1. RUIZ (nonalcoholic steatohepatitis)  - CBC & Differential  - Comprehensive Metabolic Panel  - AFP Tumor Marker  - Iron Profile  - Case Request; Standing  - Case Request  - US Liver; Future    2. Folic acid deficiency  - CBC & Differential  - Comprehensive Metabolic Panel  - AFP Tumor Marker  - Iron Profile  - Case Request; Standing  - Case Request    3. Vitamin D deficiency  - CBC & Differential  - Comprehensive Metabolic Panel  - AFP Tumor Marker  - Iron Profile  - Case Request; Standing  - Case Request    4. Encounter for screening for malignant neoplasm of colon  - Case Request; Standing  - Case Request    Reviewed most recent labs and imaging results with him today.  He needs to get on the schedule for an EGD and colonoscopy.  He is overdue for both scopes.  Patient is agreeable to doing both scopes.  We will repeat routine labs today.  We will also check a liver ultrasound.  Most recent LFTs this past May were normal.  aFP in 2019 was normal.  Liver ultrasound in 2019 was stable.  Bowels are moving well.  Good appetite.  Weight is stable.  Continue folic acid and vitamin D supplementation.  Patient to call the office with any issues.  Patient to follow-up with Dr. Salazar in 6 months.  Patient is agreeable to the plan.

## 2021-08-14 LAB — AFP-TM SERPL-MCNC: 1.2 NG/ML (ref 0–8.3)

## 2021-08-16 ENCOUNTER — TRANSCRIBE ORDERS (OUTPATIENT)
Dept: SLEEP MEDICINE | Facility: HOSPITAL | Age: 66
End: 2021-08-16

## 2021-08-16 ENCOUNTER — TELEPHONE (OUTPATIENT)
Dept: GASTROENTEROLOGY | Facility: CLINIC | Age: 66
End: 2021-08-16

## 2021-08-16 DIAGNOSIS — Z01.818 OTHER SPECIFIED PRE-OPERATIVE EXAMINATION: Primary | ICD-10-CM

## 2021-08-16 NOTE — TELEPHONE ENCOUNTER
Call from spouse, Devorah (see hipaa).  Advise per ARNOLDO Mtz note.  Verb understanding.     States is not on iron.  Dr Eaton had start vit d about 1 mo ago.      For EGD and c/s on 8/18.     Update to ARNOLDO Mtz.

## 2021-08-16 NOTE — TELEPHONE ENCOUNTER
----- Message from NETO Keane sent at 8/16/2021  9:18 AM EDT -----  Please call the patient and let him know his hemoglobin is very low at 8.6.  He was 10.62 months ago.  Is he on any iron supplementation at this point?  I know he is scheduled for EGD with Dr. Marie sanchez.

## 2021-08-16 NOTE — TELEPHONE ENCOUNTER
I would have him start on ferrous sulfate 325 mg daily over-the-counter until his scopes.  Have him take it with a sip of orange juice or a chewable vitamin C to help with absorption.

## 2021-08-17 ENCOUNTER — TELEPHONE (OUTPATIENT)
Dept: GASTROENTEROLOGY | Facility: CLINIC | Age: 66
End: 2021-08-17

## 2021-08-17 ENCOUNTER — LAB (OUTPATIENT)
Dept: LAB | Facility: HOSPITAL | Age: 66
End: 2021-08-17

## 2021-08-17 DIAGNOSIS — Z01.818 OTHER SPECIFIED PRE-OPERATIVE EXAMINATION: ICD-10-CM

## 2021-08-17 LAB — SARS-COV-2 ORF1AB RESP QL NAA+PROBE: NOT DETECTED

## 2021-08-17 PROCEDURE — U0004 COV-19 TEST NON-CDC HGH THRU: HCPCS

## 2021-08-17 PROCEDURE — C9803 HOPD COVID-19 SPEC COLLECT: HCPCS

## 2021-08-17 NOTE — TELEPHONE ENCOUNTER
Called Trena and she reports pt needs authorization for his egd that is scheduled tomorrow.   Message sent to Sera.

## 2021-08-17 NOTE — TELEPHONE ENCOUNTER
The plan did not require a PA. Epic has been updated.   Thanks   Sera       Called Trena and advised per Sera that the pt's plan does not require pa and the system has been updated.  She verb understanding.

## 2021-08-17 NOTE — TELEPHONE ENCOUNTER
----- Message from Marissa Shen sent at 8/17/2021  8:10 AM EDT -----  Regarding: Authorization  Contact: [phone]  Trena Huang from  [facility] Authorization needs authorization for pt Rajan Dc listed above. Trena can be reached at [phone] needs this by 2:30 today or pt would have to reschedule

## 2021-08-18 ENCOUNTER — ANESTHESIA (OUTPATIENT)
Dept: GASTROENTEROLOGY | Facility: HOSPITAL | Age: 66
End: 2021-08-18

## 2021-08-18 ENCOUNTER — ANESTHESIA EVENT (OUTPATIENT)
Dept: GASTROENTEROLOGY | Facility: HOSPITAL | Age: 66
End: 2021-08-18

## 2021-08-18 ENCOUNTER — HOSPITAL ENCOUNTER (OUTPATIENT)
Facility: HOSPITAL | Age: 66
Setting detail: HOSPITAL OUTPATIENT SURGERY
Discharge: HOME | End: 2021-08-18
Attending: INTERNAL MEDICINE | Admitting: INTERNAL MEDICINE

## 2021-08-18 VITALS
BODY MASS INDEX: 32.73 KG/M2 | HEIGHT: 69 IN | OXYGEN SATURATION: 95 % | RESPIRATION RATE: 16 BRPM | DIASTOLIC BLOOD PRESSURE: 73 MMHG | WEIGHT: 221 LBS | SYSTOLIC BLOOD PRESSURE: 123 MMHG | HEART RATE: 66 BPM

## 2021-08-18 DIAGNOSIS — E53.8 FOLIC ACID DEFICIENCY: ICD-10-CM

## 2021-08-18 DIAGNOSIS — K75.81 NASH (NONALCOHOLIC STEATOHEPATITIS): ICD-10-CM

## 2021-08-18 DIAGNOSIS — Z12.11 ENCOUNTER FOR SCREENING FOR MALIGNANT NEOPLASM OF COLON: ICD-10-CM

## 2021-08-18 DIAGNOSIS — E55.9 VITAMIN D DEFICIENCY: ICD-10-CM

## 2021-08-18 LAB — GLUCOSE BLDC GLUCOMTR-MCNC: 113 MG/DL (ref 70–130)

## 2021-08-18 PROCEDURE — 45390 COLONOSCOPY W/RESECTION: CPT | Performed by: INTERNAL MEDICINE

## 2021-08-18 PROCEDURE — 43235 EGD DIAGNOSTIC BRUSH WASH: CPT | Performed by: INTERNAL MEDICINE

## 2021-08-18 PROCEDURE — 25010000002 PROPOFOL 10 MG/ML EMULSION: Performed by: ANESTHESIOLOGY

## 2021-08-18 PROCEDURE — 82962 GLUCOSE BLOOD TEST: CPT

## 2021-08-18 PROCEDURE — 45385 COLONOSCOPY W/LESION REMOVAL: CPT | Performed by: INTERNAL MEDICINE

## 2021-08-18 PROCEDURE — S0260 H&P FOR SURGERY: HCPCS | Performed by: INTERNAL MEDICINE

## 2021-08-18 PROCEDURE — 88305 TISSUE EXAM BY PATHOLOGIST: CPT | Performed by: INTERNAL MEDICINE

## 2021-08-18 PROCEDURE — 45381 COLONOSCOPY SUBMUCOUS NJX: CPT | Performed by: INTERNAL MEDICINE

## 2021-08-18 PROCEDURE — 25010000002 PHENYLEPHRINE PER 1 ML: Performed by: ANESTHESIOLOGY

## 2021-08-18 DEVICE — DEV CLIP ENDO RESOLUTION360 CONTRL ROT 235CM: Type: IMPLANTABLE DEVICE | Site: SIGMOID COLON | Status: FUNCTIONAL

## 2021-08-18 RX ORDER — LIDOCAINE HYDROCHLORIDE 10 MG/ML
0.5 INJECTION, SOLUTION INFILTRATION; PERINEURAL ONCE AS NEEDED
Status: DISCONTINUED | OUTPATIENT
Start: 2021-08-18 | End: 2021-08-18 | Stop reason: HOSPADM

## 2021-08-18 RX ORDER — CARVEDILOL 3.12 MG/1
3.12 TABLET ORAL 2 TIMES DAILY WITH MEALS
Qty: 60 TABLET | Refills: 3 | Status: SHIPPED | OUTPATIENT
Start: 2021-08-18

## 2021-08-18 RX ORDER — SODIUM CHLORIDE 0.9 % (FLUSH) 0.9 %
10 SYRINGE (ML) INJECTION AS NEEDED
Status: DISCONTINUED | OUTPATIENT
Start: 2021-08-18 | End: 2021-08-18 | Stop reason: HOSPADM

## 2021-08-18 RX ORDER — LIDOCAINE HYDROCHLORIDE 20 MG/ML
INJECTION, SOLUTION INFILTRATION; PERINEURAL AS NEEDED
Status: DISCONTINUED | OUTPATIENT
Start: 2021-08-18 | End: 2021-08-18 | Stop reason: SURG

## 2021-08-18 RX ORDER — FERROUS SULFATE 325(65) MG
325 TABLET ORAL 2 TIMES DAILY WITH MEALS
Qty: 60 TABLET | Refills: 4 | Status: SHIPPED | OUTPATIENT
Start: 2021-08-18 | End: 2022-02-22

## 2021-08-18 RX ORDER — PROPOFOL 10 MG/ML
VIAL (ML) INTRAVENOUS CONTINUOUS PRN
Status: DISCONTINUED | OUTPATIENT
Start: 2021-08-18 | End: 2021-08-18 | Stop reason: SURG

## 2021-08-18 RX ORDER — PROPOFOL 10 MG/ML
VIAL (ML) INTRAVENOUS AS NEEDED
Status: DISCONTINUED | OUTPATIENT
Start: 2021-08-18 | End: 2021-08-18 | Stop reason: SURG

## 2021-08-18 RX ORDER — SODIUM CHLORIDE, SODIUM LACTATE, POTASSIUM CHLORIDE, CALCIUM CHLORIDE 600; 310; 30; 20 MG/100ML; MG/100ML; MG/100ML; MG/100ML
1000 INJECTION, SOLUTION INTRAVENOUS CONTINUOUS
Status: DISCONTINUED | OUTPATIENT
Start: 2021-08-18 | End: 2021-08-18 | Stop reason: HOSPADM

## 2021-08-18 RX ADMIN — PROPOFOL 100 MG: 10 INJECTION, EMULSION INTRAVENOUS at 11:10

## 2021-08-18 RX ADMIN — PROPOFOL 150 MG: 10 INJECTION, EMULSION INTRAVENOUS at 10:24

## 2021-08-18 RX ADMIN — PROPOFOL 100 MG: 10 INJECTION, EMULSION INTRAVENOUS at 11:16

## 2021-08-18 RX ADMIN — SODIUM CHLORIDE, POTASSIUM CHLORIDE, SODIUM LACTATE AND CALCIUM CHLORIDE 1000 ML: 600; 310; 30; 20 INJECTION, SOLUTION INTRAVENOUS at 10:15

## 2021-08-18 RX ADMIN — Medication 200 MCG/KG/MIN: at 10:25

## 2021-08-18 RX ADMIN — LIDOCAINE HYDROCHLORIDE 100 MG: 20 INJECTION, SOLUTION INFILTRATION; PERINEURAL at 10:23

## 2021-08-18 RX ADMIN — PROPOFOL 100 MG: 10 INJECTION, EMULSION INTRAVENOUS at 11:28

## 2021-08-18 RX ADMIN — PHENYLEPHRINE HYDROCHLORIDE 100 MCG: 10 INJECTION INTRAVENOUS at 10:36

## 2021-08-18 NOTE — H&P
[facility]  Pre Procedure History & Physical    Chief Complaint:   Ruiz cirrhosis, screening    Subjective     HPI:   64 yo here today for egd/colonoscopy.  He has a h/o RUIZ.  Pt reports no FH CRC/polyps.  Patient denies GI symptoms currrently.   This is his first colonoscopy.    Past Medical History:   Past Medical History:   Diagnosis Date   • Chronic ITP (idiopathic thrombocytopenia) (CMS/[facility]) 2018   • Diabetic eye exam (CMS/[facility]) 2017--routine diabetic eye exam reveals no evidence of diabetic retinopathy.  Astigmatism, presbyopia, hypermetropia noted.  Very early cataracts noted bilaterally.  2015--patient reports a routine diabetic eye exam without evidence of diabetic retinopathy.   • Diabetic foot exam 3/12/2017    2017--diabetic foot exam reveals no evidence of diabetic foot ulcer or pre-ulcerative callus.  Distal pulses palpable.  No signs of ischemia.  Sensation subjectively intact per monofilament.   • Elevated liver enzymes 2019--AST is normal at 27, ALT normal at 2 9.  Alkaline phosphatase mildly elevated at 137.  Bilirubin is normal.  2019--ultrasound liver ordered by the gastroenterologist reveals increased hepatic echogenicity consistent with steatosis.  Previous cholecystectomy.  No biliary ductal dilatation.  2019--patient was apparently referred to the gastroenterologist by   • Erythrocytosis 3/18/2019   • Family history of premature coronary artery disease 2016    Father  from a myocardial infarction age 61.   • Folic acid deficiency 2016   • History of Abnormal pulse oximetry 10/07/2012    10/07/2012--overnight oximetry test revealed significant nocturnal hypoxemia. Oxygen saturations were greater than 90% for only 50.5% of the study. Oxygen saturation less than 90% for three hours 47 minutes which was 49.5% of the study. Oxygen saturation less than 88% for one hour and  36 minutes and 48 seconds, 21.1% of the study.   • History of CABG 01/2005 January 2005 status post four-vessel CABG.   • History of myocardial infarction, 2005. 4/28/2016 01/01/2005--status post myocardial infarction.   January 2005--status post four-vessel CABG   • Hyperlipidemia 4/28/2016   • Hypogonadism male, TRT ineffective 9/13/2012 09/13/2012--treatment for symptomatic hypogonadism begun. This was later discontinued due to lack of efficacy and cost.   • Male erectile disorder 8/27/2020   • Microalbuminuria 4/25/2014 04/25/2014--urine microalbumin elevated 28.7. Normal range 0.0--17.0.   • RUIZ (nonalcoholic steatohepatitis) 4/28/2016   • Non morbid obesity 6/2/2021   • Obstructive sleep apnea, 11/13/2012--mild to moderate NIKI.  Unable to tolerate CPAP.  Cannot use oral appliance. 10/7/2012    05/02/2014--nocturnal oxygen 2 L per nasal cannula ordered.   12/03/2013--patient was referred for an oral appliance but this could not be done because patient wears dentures.   11/13/2012--diagnosed with mild to moderate obstructive sleep apnea. Patient unable to tolerate CPAP.   10/07/2012--overnight oximetry test revealed significant nocturnal hypoxemia. Oxygen saturations were greater than 90% for only 50.5% of the study. Oxygen saturation less than 90% for three hours 47 minutes which was 49.5% of the study. Oxygen saturation less than 88% for one hour and 36 minutes and 48 seconds, 21.1% of the study.   • Occlusive coronary artery disease, 01/01/2005--status post MI.  January 2005--CABG ×4.  Details not known. 1/1/2005 01/01/2005--status post myocardial infarction.   January 2005--status post four-vessel CABG   • Sleep related hypoxia 10/7/2012    05/02/2014--nocturnal oxygen 2 L per nasal cannula ordered.  12/03/2013--patient was referred for an oral appliance but this could not be done because patient wears dentures.   11/13/2012--diagnosed with mild to moderate obstructive sleep apnea. Patient  unable to tolerate CPAP.   10/07/2012--overnight oximetry test revealed significant nocturnal hypoxemia. Oxygen saturations were greater than 90% for only 50.5% of the study. Oxygen saturation less than 90% for three hours 47 minutes which was 49.5% of the study. Oxygen saturation less than 88% for one hour and 36 minutes and 48 seconds, 21.1% of the study.   • Thrombocytopenia (CMS/HCC) 1/25/2018 06/07/2018--follow-up.  Platelets are still low at 86.  I reviewed the results of the CT scan of the abdomen with the patient.  Etiology of the mild splenomegaly not clear.  Patient referred to hematology.  03/07/2018--CT scan of the abdomen with contrast performed for elevated liver enzymes and thrombocytopenia.  This revealed minimal fatty infiltration of the liver.  Tiny calcified gallstone.  Mild splenomegaly.  Small amount of calcification is seen along the periphery of the spleen which could be related to remote hemorrhage.  It is of doubtful clinical significance.  Mild bilateral perinephric stranding.  Please correlate for signs of urinary tract infection.  No hydronephrosis, renal masses, or stones are seen.  01/25/2018--platelet count low at 99.  Lymphocytes low at 19%.  Otherwise, differential was unremarkable.  01/25/2018--routine follow-up.  Platelet count is 91.  CBC with differential ordered.   • Type 2 diabetes mellitus with microalbuminuria, without long-term current use of insulin (CMS/HCC) 1/1/2005    Diagnosed in 2005.   • Vitamin D deficiency 4/28/2016       Past Surgical History:  Past Surgical History:   Procedure Laterality Date   • CHOLECYSTECTOMY WITH INTRAOPERATIVE CHOLANGIOGRAM N/A 6/24/2019    Procedure: laparoscopic cholecystectomy with intraoperative cholangiogram;  Surgeon: Vida Avilez MD;  Location: [facility];  Service: General   • CORONARY ARTERY BYPASS GRAFT  01/2005 January 2005 status post four-vessel CABG.       Family History:  Family History   Problem Relation Age of  Onset   • Other Mother         Ventricular Septal Defect   • Heart disease Mother    • Hypertension Mother    • Cancer Mother    • Diabetes Mother    • Heart attack Father         Prior Myocardial Infarction.  Dad  from a myocardial infarction at age 59.   • Heart disease Father    • Heart disease Other         Congenital Heart Disease. Multiple family members with septal defects that required surgery.   • Heart disease Sister    • Heart disease Brother    • Cancer Daughter        Social History:   reports that he quit smoking about 16 years ago. His smoking use included cigarettes. He has never used smokeless tobacco. He reports that he does not drink alcohol and does not use drugs.    Medications:   Medications Prior to Admission   Medication Sig Dispense Refill Last Dose   • aspirin 81 MG tablet Take 1 tablet by mouth Daily.      • atorvastatin (LIPITOR) 80 MG tablet Take 1 p.o. daily for high cholesterol 30 tablet 5    • Empagliflozin (Jardiance) 25 MG tablet Take 25 mg by mouth Every Morning Before Breakfast. For diabetes 90 tablet 1    • folic acid (FOLVITE) 1 MG tablet TAKE 1 TABLET BY MOUTH DAILY 30 tablet 2    • glimepiride (AMARYL) 4 MG tablet Take 1 p.o. daily at supper for diabetes 90 tablet 3    • Insulin Pen Needle 32G X 4 MM misc 1 each Daily. 100 each 0    • Liraglutide (Victoza) 18 MG/3ML solution pen-injector injection Inject 1.8 mg subcutaneously daily as directed for diabetes 9 mL 3    • SITagliptin-metFORMIN HCl ER (Janumet XR)  MG tablet Take 1 p.o. twice daily for diabetes 60 tablet 5    • vitamin D3 125 MCG (5000 UT) capsule capsule 1 by mouth daily as directed 30 capsule 11        Allergies:  Patient has no known allergies.    ROS:    Pertinent items are noted in HPI, all other systems reviewed and negative     Objective     There were no vitals taken for this visit.    Physical Exam   Constitutional: Pt is oriented to person, place, and time and well-developed, well-nourished,  and in no distress.   Mouth/Throat: Oropharynx is clear and moist.   Neck: Normal range of motion.   Cardiovascular: Normal rate, regular rhythm    Pulmonary/Chest: Effort normal    Abdominal: Soft. Nontender  Skin: Skin is warm and dry.   Psychiatric: Mood, memory, affect and judgment normal.     Assessment/Plan     Diagnosis:  RUIZ, evaluate for varices;Screening for colon cancer    Anticipated Surgical Procedure:  egd/colonoscopy    The risks, benefits, and alternatives of this procedure have been discussed with the patient or the responsible party- the patient understands and agrees to proceed.

## 2021-08-18 NOTE — ANESTHESIA POSTPROCEDURE EVALUATION
Patient: Rajan Dc    Procedure Summary     Date: 08/18/21 Room / Location: [facility] ENDOSCOPY 10 /  [facility]    Anesthesia Start: 1018 Anesthesia Stop: 1140    Procedures:       ESOPHAGOGASTRODUODENOSCOPY (N/A Esophagus)      COLONOSCOPY into cecum and terminal ileum with saline lift injection, tattoo ink injection, x1 resolution clip placed , APC and hot snare polypectomies (N/A ) Diagnosis:       RUIZ (nonalcoholic steatohepatitis)      Folic acid deficiency      Vitamin D deficiency      Encounter for screening for malignant neoplasm of colon      (RUIZ (nonalcoholic steatohepatitis) [K75.81])      (Folic acid deficiency [E53.8])      (Vitamin D deficiency [E55.9])      (Encounter for screening for malignant neoplasm of colon [Z12.11])    Surgeons: Ruth Ann Salazar MD Provider: Jeff Nowak MD    Anesthesia Type: MAC ASA Status: 4          Anesthesia Type: MAC    Vitals  Vitals Value Taken Time   /73 08/18/21 1209   Temp     Pulse 66 08/18/21 1209   Resp 16 08/18/21 1209   SpO2 95 % 08/18/21 1209           Post Anesthesia Care and Evaluation      Comments: Pt. Discharged prior to being evaluated by anesthesia.  Chart is reviewed and no complications are noted.  THIS CASE IS NOT MEDICALLY DIRECTED

## 2021-08-18 NOTE — DISCHARGE INSTRUCTIONS
For the next 24 hours patient needs to be with a responsible adult.    For 24 hours DO NOT drive, operate machinery, appliances, drink alcohol, make important decisions or sign legal documents.    Start with a light or bland diet if you are feeling sick to your stomach otherwise advance to regular diet as tolerated.    Follow recommendations on procedure report if provided by your doctor.    Call Dr Salazar for problems [phone]    Problems may include but not limited to: large amounts of bleeding, trouble breathing, repeated vomiting, severe unrelieved pain, fever or chills.

## 2021-08-18 NOTE — ANESTHESIA PREPROCEDURE EVALUATION
Anesthesia Evaluation     Patient summary reviewed and Nursing notes reviewed   NPO Solid Status: > 8 hours  NPO Liquid Status: > 8 hours           Airway   Mallampati: II  Neck ROM: full  No difficulty expected  Dental - normal exam     Pulmonary     breath sounds clear to auscultation  (+) a smoker Former, home oxygen, sleep apnea,   Cardiovascular     Rhythm: regular    (+) CAD, CABG, hyperlipidemia,       Neuro/Psych  GI/Hepatic/Renal/Endo    (+) obesity,   hepatitis, liver disease (RUIZ), diabetes mellitus,     Musculoskeletal     Abdominal   (+) obese,    Substance History      OB/GYN          Other   blood dyscrasia (polycythemia. anemia),                     Anesthesia Plan    ASA 4     MAC     intravenous induction     Anesthetic plan, all risks, benefits, and alternatives have been provided, discussed and informed consent has been obtained with: patient.

## 2021-08-19 LAB
CYTO UR: NORMAL
LAB AP CASE REPORT: NORMAL
PATH REPORT.FINAL DX SPEC: NORMAL
PATH REPORT.GROSS SPEC: NORMAL

## 2021-08-20 NOTE — PROGRESS NOTES
The polyp(s) biopsies showed adenomatous change. This is not cancerous but is considered potentially precancerous. Follow-up colonoscopy in 6 months is advised due to numerous polyps and multiple larger polyps.  Office f/u with me in 6-8 weeks - I have tentatively scheduled him for 10/19 4619 - pls let me know if this works

## 2021-08-23 ENCOUNTER — TELEPHONE (OUTPATIENT)
Dept: GASTROENTEROLOGY | Facility: CLINIC | Age: 66
End: 2021-08-23

## 2021-08-23 NOTE — TELEPHONE ENCOUNTER
----- Message from Ruth Ann Salazar MD sent at 8/20/2021  3:39 PM EDT -----  The polyp(s) biopsies showed adenomatous change. This is not cancerous but is considered potentially precancerous. Follow-up colonoscopy in 6 months is advised due to numerous polyps and multiple larger polyps.  Office f/u with me in 6-8 weeks - I have tentatively scheduled him for 10/19 1245 - pls let me know if this works

## 2021-08-23 NOTE — TELEPHONE ENCOUNTER
Call to pt  Advise per Dr Salazar note.  Verb understanding.     Per pt request, call to spouse, Devorah with request to contact office re; appt time.  Awaiting reply.

## 2021-08-24 NOTE — TELEPHONE ENCOUNTER
Attempt call to Devorah - unable to leave message.     Call to pt . Advise per appt time - states he will keep scheduled appt.

## 2021-09-06 DIAGNOSIS — E78.2 MIXED HYPERLIPIDEMIA: Chronic | ICD-10-CM

## 2021-09-06 RX ORDER — ATORVASTATIN CALCIUM 80 MG/1
TABLET, FILM COATED ORAL
Qty: 30 TABLET | Refills: 5 | Status: CANCELLED | OUTPATIENT
Start: 2021-09-06

## 2021-09-07 RX ORDER — ATORVASTATIN CALCIUM 80 MG/1
TABLET, FILM COATED ORAL
Qty: 90 TABLET | Refills: 3 | Status: SHIPPED | OUTPATIENT
Start: 2021-09-07 | End: 2022-12-02

## 2021-10-06 DIAGNOSIS — E11.29 TYPE 2 DIABETES MELLITUS WITH MICROALBUMINURIA, WITHOUT LONG-TERM CURRENT USE OF INSULIN (HCC): Chronic | ICD-10-CM

## 2021-10-06 DIAGNOSIS — R80.9 TYPE 2 DIABETES MELLITUS WITH MICROALBUMINURIA, WITHOUT LONG-TERM CURRENT USE OF INSULIN (HCC): Chronic | ICD-10-CM

## 2021-10-06 RX ORDER — LIRAGLUTIDE 6 MG/ML
INJECTION SUBCUTANEOUS
Qty: 9 ML | Refills: 3 | Status: SHIPPED | OUTPATIENT
Start: 2021-10-06 | End: 2022-07-05

## 2021-10-19 ENCOUNTER — OFFICE VISIT (OUTPATIENT)
Dept: GASTROENTEROLOGY | Facility: CLINIC | Age: 66
End: 2021-10-19

## 2021-10-19 VITALS — BODY MASS INDEX: 32.88 KG/M2 | WEIGHT: 222 LBS | HEIGHT: 69 IN | TEMPERATURE: 97.5 F

## 2021-10-19 DIAGNOSIS — D50.0 IRON DEFICIENCY ANEMIA DUE TO CHRONIC BLOOD LOSS: ICD-10-CM

## 2021-10-19 DIAGNOSIS — Z86.010 HISTORY OF ADENOMATOUS POLYP OF COLON: ICD-10-CM

## 2021-10-19 DIAGNOSIS — I85.10 SECONDARY ESOPHAGEAL VARICES WITHOUT BLEEDING (HCC): ICD-10-CM

## 2021-10-19 DIAGNOSIS — K75.81 NASH (NONALCOHOLIC STEATOHEPATITIS): Primary | ICD-10-CM

## 2021-10-19 PROCEDURE — 99214 OFFICE O/P EST MOD 30 MIN: CPT | Performed by: INTERNAL MEDICINE

## 2021-10-19 NOTE — PROGRESS NOTES
Subjective   Chief Complaint   Patient presents with   • RUIZ       Rajan Dc is a  66 y.o. male here for a follow up visit for RUIZ cirrhosis, h/o polyps.     he has a history of Ruiz cirrhosis.  He denies any new issues since his last visit.  No confusion, no change in bowel habits or blood in the stool.  Said no abdominal pain or loss of appetite.  We discussed the results from his recent endoscopic evaluation.    Recommended flu vaccination as well as Covid vaccination-patient is unvaccinated.  He is not interested in vaccines.  Reports he does not believe in them.    EGD colonoscopy 8/21 with grade 2 esophageal varices-no prior bleeding, multiple adenomatous polyps removed from the colon-6-month follow-up recommended.  HPI  Past Medical History:   Diagnosis Date   • Arthritis    • CHF (congestive heart failure) (HCC)    • Chronic ITP (idiopathic thrombocytopenia) (HCC) 9/19/2018   • Diabetic eye exam ([facility]) 5/22/2017 05/22/2017--routine diabetic eye exam reveals no evidence of diabetic retinopathy.  Astigmatism, presbyopia, hypermetropia noted.  Very early cataracts noted bilaterally.  October 2015--patient reports a routine diabetic eye exam without evidence of diabetic retinopathy.   • Diabetic foot exam 3/12/2017    03/14/2017--diabetic foot exam reveals no evidence of diabetic foot ulcer or pre-ulcerative callus.  Distal pulses palpable.  No signs of ischemia.  Sensation subjectively intact per monofilament.   • Elevated liver enzymes 6/23/2019 October 17, 2019--AST is normal at 27, ALT normal at 2 9.  Alkaline phosphatase mildly elevated at 137.  Bilirubin is normal.  October 2, 2019--ultrasound liver ordered by the gastroenterologist reveals increased hepatic echogenicity consistent with steatosis.  Previous cholecystectomy.  No biliary ductal dilatation.  September 24, 2019--patient was apparently referred to the gastroenterologist by   • Erythrocytosis 3/18/2019   • Family history of  premature coronary artery disease 2016    Father  from a myocardial infarction age 61.   • Folic acid deficiency 2016   • History of Abnormal pulse oximetry 10/07/2012    10/07/2012--overnight oximetry test revealed significant nocturnal hypoxemia. Oxygen saturations were greater than 90% for only 50.5% of the study. Oxygen saturation less than 90% for three hours 47 minutes which was 49.5% of the study. Oxygen saturation less than 88% for one hour and 36 minutes and 48 seconds, 21.1% of the study.   • History of CABG 2005 status post four-vessel CABG.   • History of myocardial infarction, . 2005--status post myocardial infarction.   2005--status post four-vessel CABG   • Hyperlipidemia 2016   • Hypogonadism male, TRT ineffective 2012--treatment for symptomatic hypogonadism begun. This was later discontinued due to lack of efficacy and cost.   • Male erectile disorder 2020   • Microalbuminuria 2014--urine microalbumin elevated 28.7. Normal range 0.0--17.0.   • RUIZ (nonalcoholic steatohepatitis) 2016   • Non morbid obesity 2021   • Obstructive sleep apnea, 2012--mild to moderate NIKI.  Unable to tolerate CPAP.  Cannot use oral appliance. 10/7/2012    2014--nocturnal oxygen 2 L per nasal cannula ordered.   2013--patient was referred for an oral appliance but this could not be done because patient wears dentures.   2012--diagnosed with mild to moderate obstructive sleep apnea. Patient unable to tolerate CPAP.   10/07/2012--overnight oximetry test revealed significant nocturnal hypoxemia. Oxygen saturations were greater than 90% for only 50.5% of the study. Oxygen saturation less than 90% for three hours 47 minutes which was 49.5% of the study. Oxygen saturation less than 88% for one hour and 36 minutes and 48 seconds, 21.1% of the study.   • Occlusive coronary artery disease,  01/01/2005--status post MI.  January 2005--CABG ×4.  Details not known. 1/1/2005 01/01/2005--status post myocardial infarction.   January 2005--status post four-vessel CABG   • Sleep related hypoxia 10/7/2012    05/02/2014--nocturnal oxygen 2 L per nasal cannula ordered.  12/03/2013--patient was referred for an oral appliance but this could not be done because patient wears dentures.   11/13/2012--diagnosed with mild to moderate obstructive sleep apnea. Patient unable to tolerate CPAP.   10/07/2012--overnight oximetry test revealed significant nocturnal hypoxemia. Oxygen saturations were greater than 90% for only 50.5% of the study. Oxygen saturation less than 90% for three hours 47 minutes which was 49.5% of the study. Oxygen saturation less than 88% for one hour and 36 minutes and 48 seconds, 21.1% of the study.   • Thrombocytopenia (HCC) 1/25/2018 06/07/2018--follow-up.  Platelets are still low at 86.  I reviewed the results of the CT scan of the abdomen with the patient.  Etiology of the mild splenomegaly not clear.  Patient referred to hematology.  03/07/2018--CT scan of the abdomen with contrast performed for elevated liver enzymes and thrombocytopenia.  This revealed minimal fatty infiltration of the liver.  Tiny calcified gallstone.  Mild splenomegaly.  Small amount of calcification is seen along the periphery of the spleen which could be related to remote hemorrhage.  It is of doubtful clinical significance.  Mild bilateral perinephric stranding.  Please correlate for signs of urinary tract infection.  No hydronephrosis, renal masses, or stones are seen.  01/25/2018--platelet count low at 99.  Lymphocytes low at 19%.  Otherwise, differential was unremarkable.  01/25/2018--routine follow-up.  Platelet count is 91.  CBC with differential ordered.   • Type 2 diabetes mellitus with microalbuminuria, without long-term current use of insulin (HCC) 1/1/2005    Diagnosed in 2005.   • Vitamin D deficiency  4/28/2016     Past Surgical History:   Procedure Laterality Date   • CHOLECYSTECTOMY WITH INTRAOPERATIVE CHOLANGIOGRAM N/A 6/24/2019    Procedure: laparoscopic cholecystectomy with intraoperative cholangiogram;  Surgeon: Vida Avilez MD;  Location: [facility] MAIN OR;  Service: General   • COLONOSCOPY N/A 8/18/2021    Procedure: COLONOSCOPY into cecum and terminal ileum with saline lift injection, tattoo ink injection, x1 resolution clip placed , APC and hot snare polypectomies;  Surgeon: Ruth Ann Salazar MD;  Location: [facility] ENDOSCOPY;  Service: Gastroenterology;  Laterality: N/A;  pre: screen  post: polyps, diverticulosis, hemorrhoids, hypertrophic anal papillae   • CORONARY ARTERY BYPASS GRAFT  01/2005 January 2005 status post four-vessel CABG.   • ENDOSCOPY N/A 8/18/2021    Procedure: ESOPHAGOGASTRODUODENOSCOPY;  Surgeon: Ruth Ann Salazar MD;  Location: [facility] ENDOSCOPY;  Service: Gastroenterology;  Laterality: N/A;  pre: Maxwell cirrhosis  post: gastric and esophageal varices, Portal hypertensive gastropathy.       Current Outpatient Medications:   •  aspirin 81 MG tablet, Take 1 tablet by mouth Daily., Disp: , Rfl:   •  atorvastatin (LIPITOR) 80 MG tablet, TAKE 1 TABLET BY MOUTH DAILY FOR HIGH CHOLESTEROL, Disp: 90 tablet, Rfl: 3  •  carvedilol (Coreg) 3.125 MG tablet, Take 1 tablet by mouth 2 (Two) Times a Day With Meals., Disp: 60 tablet, Rfl: 3  •  Empagliflozin (Jardiance) 25 MG tablet, Take 25 mg by mouth Every Morning Before Breakfast. For diabetes, Disp: 90 tablet, Rfl: 1  •  ferrous sulfate 325 (65 FE) MG tablet, Take 1 tablet by mouth 2 (Two) Times a Day With Meals., Disp: 60 tablet, Rfl: 4  •  folic acid (FOLVITE) 1 MG tablet, TAKE 1 TABLET BY MOUTH DAILY, Disp: 30 tablet, Rfl: 2  •  glimepiride (AMARYL) 4 MG tablet, Take 1 p.o. daily at supper for diabetes, Disp: 90 tablet, Rfl: 3  •  Insulin Pen Needle 32G X 4 MM misc, 1 each Daily., Disp: 100 each, Rfl: 0  •  Liraglutide (Victoza) 18 MG/3ML  solution pen-injector injection, ADMINISTER 1.8 MG UNDER THE SKIN DAILY AS DIRECTED FOR DIABETES, Disp: 9 mL, Rfl: 3  •  SITagliptin-metFORMIN HCl ER (Janumet XR)  MG tablet, Take 1 p.o. twice daily for diabetes, Disp: 60 tablet, Rfl: 5  •  vitamin D3 125 MCG (5000 UT) capsule capsule, 1 by mouth daily as directed, Disp: 30 capsule, Rfl: 11  PRN Meds:.  No Known Allergies  Social History     Socioeconomic History   • Marital status:      Spouse name: Dasha   • Number of children: 8   • Highest education level: 9th grade   Tobacco Use   • Smoking status: Former Smoker     Types: Cigarettes     Quit date:      Years since quittin.8   • Smokeless tobacco: Never Used   • Tobacco comment: Former smoker quit 13 years ago with a 64.5 pack year history.  Smoked 1 ppd for 32 years and 4.5 ppd for 5 years and quit when he had his open heart surgery.   Vaping Use   • Vaping Use: Never used   Substance and Sexual Activity   • Alcohol use: No   • Drug use: No   • Sexual activity: Yes     Partners: Female     Family History   Problem Relation Age of Onset   • Other Mother         Ventricular Septal Defect   • Heart disease Mother    • Hypertension Mother    • Cancer Mother    • Diabetes Mother    • Heart attack Father         Prior Myocardial Infarction.  Dad  from a myocardial infarction at age 59.   • Heart disease Father    • Heart disease Other         Congenital Heart Disease. Multiple family members with septal defects that required surgery.   • Heart disease Sister    • Heart disease Brother    • Cancer Daughter      Review of Systems   Constitutional: Negative for appetite change and unexpected weight change.   Gastrointestinal: Negative for abdominal pain and blood in stool.   Psychiatric/Behavioral: Negative for confusion.     Vitals:    10/19/21 1226   Temp: 97.5 °F (36.4 °C)         10/19/21  1226   Weight: 101 kg (222 lb)       Objective   Physical Exam  Constitutional:       Appearance:  He is well-developed.   HENT:      Head: Normocephalic and atraumatic.   Eyes:      General: No scleral icterus.  Abdominal:      General: There is no distension.      Palpations: Abdomen is soft.      Tenderness: There is no abdominal tenderness.   Skin:     General: Skin is warm and dry.   Neurological:      Mental Status: He is alert.       No radiology results for the last 7 days    Assessment/Plan   Diagnoses and all orders for this visit:    RUIZ (nonalcoholic steatohepatitis)  -     US Liver; Future    Iron deficiency anemia due to chronic blood loss  -     CBC & Differential  -     Ferritin  -     Iron Profile    History of adenomatous polyp of colon    Secondary esophageal varices without bleeding (HCC)      Plan:  · Discussed results from recent endoscopic evaluation.  · Continue carvedilol at current dose.  · We will need repeat colonoscopy in 6 months due to multiple adenomatous polyps, many large with piecemeal resection-we will arrange at his follow-up  · Encourage patient to get vaccinated for the flu as well as the coronavirus-he declined at this time.  · Repeat labs today to follow-up on iron deficiency anemia-he is taking iron supplementation  · Due for liver ultrasound for HCC surveillance

## 2021-10-20 ENCOUNTER — TELEPHONE (OUTPATIENT)
Dept: GASTROENTEROLOGY | Facility: CLINIC | Age: 66
End: 2021-10-20

## 2021-10-20 LAB
BASOPHILS # BLD AUTO: 0 X10E3/UL (ref 0–0.2)
BASOPHILS NFR BLD AUTO: 0 %
EOSINOPHIL # BLD AUTO: 0.2 X10E3/UL (ref 0–0.4)
EOSINOPHIL NFR BLD AUTO: 4 %
ERYTHROCYTE [DISTWIDTH] IN BLOOD BY AUTOMATED COUNT: 22.3 % (ref 11.6–15.4)
FERRITIN SERPL-MCNC: 71 NG/ML (ref 30–400)
HCT VFR BLD AUTO: 42.9 % (ref 37.5–51)
HGB BLD-MCNC: 13.2 G/DL (ref 13–17.7)
IMM GRANULOCYTES # BLD AUTO: 0 X10E3/UL (ref 0–0.1)
IMM GRANULOCYTES NFR BLD AUTO: 0 %
IRON SATN MFR SERPL: 12 % (ref 15–55)
IRON SERPL-MCNC: 37 UG/DL (ref 38–169)
LYMPHOCYTES # BLD AUTO: 1.2 X10E3/UL (ref 0.7–3.1)
LYMPHOCYTES NFR BLD AUTO: 22 %
MCH RBC QN AUTO: 27.1 PG (ref 26.6–33)
MCHC RBC AUTO-ENTMCNC: 30.8 G/DL (ref 31.5–35.7)
MCV RBC AUTO: 88 FL (ref 79–97)
MONOCYTES # BLD AUTO: 0.4 X10E3/UL (ref 0.1–0.9)
MONOCYTES NFR BLD AUTO: 7 %
MORPHOLOGY BLD-IMP: ABNORMAL
NEUTROPHILS # BLD AUTO: 3.7 X10E3/UL (ref 1.4–7)
NEUTROPHILS NFR BLD AUTO: 67 %
PLATELET # BLD AUTO: 91 X10E3/UL (ref 150–450)
RBC # BLD AUTO: 4.87 X10E6/UL (ref 4.14–5.8)
TIBC SERPL-MCNC: 297 UG/DL (ref 250–450)
UIBC SERPL-MCNC: 260 UG/DL (ref 111–343)
WBC # BLD AUTO: 5.6 X10E3/UL (ref 3.4–10.8)

## 2021-10-20 NOTE — TELEPHONE ENCOUNTER
----- Message from Ruth Ann Salazar MD sent at 10/20/2021  7:39 AM EDT -----  Anemia much improved with iron supplementation.  Iron stores are still slightly low.  Continue iron once daily.

## 2021-10-20 NOTE — PROGRESS NOTES
Anemia much improved with iron supplementation.  Iron stores are still slightly low.  Continue iron once daily.

## 2021-10-28 ENCOUNTER — LAB (OUTPATIENT)
Dept: LAB | Facility: HOSPITAL | Age: 66
End: 2021-10-28

## 2021-10-28 ENCOUNTER — OFFICE VISIT (OUTPATIENT)
Dept: INTERNAL MEDICINE | Facility: CLINIC | Age: 66
End: 2021-10-28

## 2021-10-28 VITALS
DIASTOLIC BLOOD PRESSURE: 60 MMHG | BODY MASS INDEX: 32.61 KG/M2 | WEIGHT: 220.2 LBS | HEIGHT: 69 IN | SYSTOLIC BLOOD PRESSURE: 112 MMHG | HEART RATE: 81 BPM | OXYGEN SATURATION: 97 % | RESPIRATION RATE: 18 BRPM

## 2021-10-28 DIAGNOSIS — K74.60 LIVER CIRRHOSIS SECONDARY TO NASH (HCC): Chronic | ICD-10-CM

## 2021-10-28 DIAGNOSIS — Z86.010 HISTORY OF COLON POLYPS: Chronic | ICD-10-CM

## 2021-10-28 DIAGNOSIS — K75.81 LIVER CIRRHOSIS SECONDARY TO NASH (HCC): Chronic | ICD-10-CM

## 2021-10-28 DIAGNOSIS — E66.9 NON MORBID OBESITY: Chronic | ICD-10-CM

## 2021-10-28 DIAGNOSIS — E11.29 TYPE 2 DIABETES MELLITUS WITH MICROALBUMINURIA, WITHOUT LONG-TERM CURRENT USE OF INSULIN (HCC): Primary | Chronic | ICD-10-CM

## 2021-10-28 DIAGNOSIS — I25.10 OCCLUSIVE CORONARY ARTERY DISEASE: Chronic | ICD-10-CM

## 2021-10-28 DIAGNOSIS — G47.34 SLEEP RELATED HYPOXIA: Chronic | ICD-10-CM

## 2021-10-28 DIAGNOSIS — Z51.81 THERAPEUTIC DRUG MONITORING: ICD-10-CM

## 2021-10-28 DIAGNOSIS — R80.9 TYPE 2 DIABETES MELLITUS WITH MICROALBUMINURIA, WITHOUT LONG-TERM CURRENT USE OF INSULIN (HCC): Primary | Chronic | ICD-10-CM

## 2021-10-28 DIAGNOSIS — E55.9 VITAMIN D DEFICIENCY: Chronic | ICD-10-CM

## 2021-10-28 DIAGNOSIS — I25.2 HISTORY OF MYOCARDIAL INFARCTION: Chronic | ICD-10-CM

## 2021-10-28 DIAGNOSIS — D50.9 MICROCYTIC ANEMIA: ICD-10-CM

## 2021-10-28 DIAGNOSIS — I85.00 ESOPHAGEAL VARICES DETERMINED BY ENDOSCOPY (HCC): Chronic | ICD-10-CM

## 2021-10-28 DIAGNOSIS — K57.30 DIVERTICULOSIS OF COLON: Chronic | ICD-10-CM

## 2021-10-28 DIAGNOSIS — E29.1 HYPOGONADISM MALE: Chronic | ICD-10-CM

## 2021-10-28 DIAGNOSIS — Z86.16 HISTORY OF COVID-19: Chronic | ICD-10-CM

## 2021-10-28 DIAGNOSIS — E53.8 FOLIC ACID DEFICIENCY: Chronic | ICD-10-CM

## 2021-10-28 DIAGNOSIS — E78.2 MIXED HYPERLIPIDEMIA: Chronic | ICD-10-CM

## 2021-10-28 DIAGNOSIS — K75.81 NASH (NONALCOHOLIC STEATOHEPATITIS): Chronic | ICD-10-CM

## 2021-10-28 DIAGNOSIS — D75.1 POLYCYTHEMIA: Chronic | ICD-10-CM

## 2021-10-28 DIAGNOSIS — D69.3 CHRONIC ITP (IDIOPATHIC THROMBOCYTOPENIA) (HCC): Chronic | ICD-10-CM

## 2021-10-28 DIAGNOSIS — Z82.49 FAMILY HISTORY OF PREMATURE CORONARY ARTERY DISEASE: Chronic | ICD-10-CM

## 2021-10-28 DIAGNOSIS — G47.33 OBSTRUCTIVE SLEEP APNEA: Chronic | ICD-10-CM

## 2021-10-28 DIAGNOSIS — R74.8 ELEVATED LIVER ENZYMES: Chronic | ICD-10-CM

## 2021-10-28 DIAGNOSIS — R80.9 MICROALBUMINURIA: Chronic | ICD-10-CM

## 2021-10-28 PROBLEM — Z86.0100 HISTORY OF COLON POLYPS: Status: ACTIVE | Noted: 2021-10-28

## 2021-10-28 PROBLEM — Z86.0100 HISTORY OF COLON POLYPS: Chronic | Status: ACTIVE | Noted: 2021-10-28

## 2021-10-28 LAB
25(OH)D3 SERPL-MCNC: 41.3 NG/ML (ref 30–100)
ALBUMIN SERPL-MCNC: 4.1 G/DL (ref 3.5–5.2)
ALBUMIN/GLOB SERPL: 1.3 G/DL
ALP SERPL-CCNC: 103 U/L (ref 39–117)
ALT SERPL W P-5'-P-CCNC: 27 U/L (ref 1–41)
ANION GAP SERPL CALCULATED.3IONS-SCNC: 11.6 MMOL/L (ref 5–15)
AST SERPL-CCNC: 34 U/L (ref 1–40)
BILIRUB SERPL-MCNC: 0.6 MG/DL (ref 0–1.2)
BUN SERPL-MCNC: 10 MG/DL (ref 8–23)
BUN/CREAT SERPL: 13 (ref 7–25)
CALCIUM SPEC-SCNC: 9.3 MG/DL (ref 8.6–10.5)
CHLORIDE SERPL-SCNC: 105 MMOL/L (ref 98–107)
CK SERPL-CCNC: 57 U/L (ref 20–200)
CO2 SERPL-SCNC: 24.4 MMOL/L (ref 22–29)
CREAT SERPL-MCNC: 0.77 MG/DL (ref 0.76–1.27)
DEPRECATED RDW RBC AUTO: 65 FL (ref 37–54)
ERYTHROCYTE [DISTWIDTH] IN BLOOD BY AUTOMATED COUNT: 19.7 % (ref 12.3–15.4)
GFR SERPL CREATININE-BSD FRML MDRD: 101 ML/MIN/1.73
GLOBULIN UR ELPH-MCNC: 3.2 GM/DL
GLUCOSE SERPL-MCNC: 70 MG/DL (ref 65–99)
HBA1C MFR BLD: 5.71 % (ref 4.8–5.6)
HCT VFR BLD AUTO: 46.5 % (ref 37.5–51)
HGB BLD-MCNC: 14.7 G/DL (ref 13–17.7)
IRON 24H UR-MRATE: 19 MCG/DL (ref 59–158)
IRON SATN MFR SERPL: 5 % (ref 20–50)
MCH RBC QN AUTO: 28.2 PG (ref 26.6–33)
MCHC RBC AUTO-ENTMCNC: 31.6 G/DL (ref 31.5–35.7)
MCV RBC AUTO: 89.3 FL (ref 79–97)
PLATELET # BLD AUTO: 111 10*3/MM3 (ref 140–450)
POTASSIUM SERPL-SCNC: 4.4 MMOL/L (ref 3.5–5.2)
PROT SERPL-MCNC: 7.3 G/DL (ref 6–8.5)
RBC # BLD AUTO: 5.21 10*6/MM3 (ref 4.14–5.8)
SODIUM SERPL-SCNC: 141 MMOL/L (ref 136–145)
TIBC SERPL-MCNC: 370 MCG/DL (ref 298–536)
TRANSFERRIN SERPL-MCNC: 248 MG/DL (ref 200–360)
WBC # BLD AUTO: 9.59 10*3/MM3 (ref 3.4–10.8)

## 2021-10-28 PROCEDURE — 83704 LIPOPROTEIN BLD QUAN PART: CPT | Performed by: INTERNAL MEDICINE

## 2021-10-28 PROCEDURE — 85027 COMPLETE CBC AUTOMATED: CPT | Performed by: INTERNAL MEDICINE

## 2021-10-28 PROCEDURE — 80061 LIPID PANEL: CPT | Performed by: INTERNAL MEDICINE

## 2021-10-28 PROCEDURE — 82306 VITAMIN D 25 HYDROXY: CPT | Performed by: INTERNAL MEDICINE

## 2021-10-28 PROCEDURE — 36415 COLL VENOUS BLD VENIPUNCTURE: CPT | Performed by: INTERNAL MEDICINE

## 2021-10-28 PROCEDURE — 83540 ASSAY OF IRON: CPT

## 2021-10-28 PROCEDURE — 83036 HEMOGLOBIN GLYCOSYLATED A1C: CPT | Performed by: INTERNAL MEDICINE

## 2021-10-28 PROCEDURE — 84466 ASSAY OF TRANSFERRIN: CPT

## 2021-10-28 PROCEDURE — 80053 COMPREHEN METABOLIC PANEL: CPT | Performed by: INTERNAL MEDICINE

## 2021-10-28 PROCEDURE — 82550 ASSAY OF CK (CPK): CPT | Performed by: INTERNAL MEDICINE

## 2021-10-28 PROCEDURE — 99214 OFFICE O/P EST MOD 30 MIN: CPT | Performed by: INTERNAL MEDICINE

## 2021-10-28 NOTE — PROGRESS NOTES
10/28/2021    Patient Information  Rajan Dc                                                                                          [address] [place] [id number]      1955  [unfilled]  [phone] (work)    Chief Complaint:     Follow-up multiple medical problems as outlined in history of present illness.  No new acute complaints.    History of Present Illness:    Patient with history of multiple chronic medical problems including type 2 diabetes, microalbuminuria, hyperlipidemia, Maxwell, elevated liver enzymes, cirrhosis secondary to Maxwell, esophageal varices, recent EGD and colonoscopy which revealed the esophageal varices and also revealed multiple colon polyps, history of polycythemia and microcytic anemia, history of chronic ITP, hypogonadism, occlusive coronary artery disease and history of myocardial infarction, family history of premature coronary artery disease, folic acid deficiency, sleep apnea with sleep-related hypoxia, nonmorbid obesity, history of COVID-19 infection, diverticulosis of colon.  He presents today for a follow-up but was supposed to have blood work performed prior to this visit but did not.  This is despite the fact the patient should be well aware that he needs to have blood work before he comes in for his follow-up appointments.  See below.  Past medical history reviewed and updated were necessary including health maintenance parameters.  This reveals he needs COVID-19 vaccine as well as a diabetic eye exam.  See below.    Review of Systems   Constitutional: Negative.   HENT: Negative.    Eyes: Negative.    Cardiovascular: Negative.    Respiratory: Negative.    Endocrine: Negative.    Hematologic/Lymphatic: Negative.    Skin: Negative.    Musculoskeletal: Negative.    Gastrointestinal: Negative.    Genitourinary: Negative.    Neurological: Negative.    Psychiatric/Behavioral: Negative.    Allergic/Immunologic: Negative.        Active  Problems:    Patient Active Problem List   Diagnosis   • Occlusive coronary artery disease, 01/01/2005--status post MI.  January 2005--CABG ×4.  Details not known.   • Folic acid deficiency   • Hyperlipidemia   • Hypogonadism male, TRT ineffective   • Microalbuminuria   • RUIZ (nonalcoholic steatohepatitis)   • Obstructive sleep apnea, 11/13/2012--mild to moderate NIKI.  Unable to tolerate CPAP.  Cannot use oral appliance.   • Sleep related hypoxia   • Type 2 diabetes mellitus with microalbuminuria, without long-term current use of insulin ([facility])   • Vitamin D deficiency   • Therapeutic drug monitoring   • Routine physical examination   • Family history of premature coronary artery disease   • Diabetic eye exam ([facility])   • Diabetic foot exam   • History of myocardial infarction, 2005.   • Chronic ITP (idiopathic thrombocytopenia) ([facility])   • Polycythemia   • Elevated liver enzymes   • Male erectile disorder   • Microcytic anemia   • Non morbid obesity   • History of COVID-19   • History of colon polyps, 8/18/2021--tubular adenoma x12.   • Esophageal varices determined by endoscopy ([facility])   • Liver cirrhosis secondary to RUIZ ([facility])   • Diverticulosis of colon         Past Medical History:   Diagnosis Date   • Chronic ITP (idiopathic thrombocytopenia) ([facility]) 9/19/2018   • Diabetic eye exam ([facility]) 5/22/2017 05/22/2017--routine diabetic eye exam reveals no evidence of diabetic retinopathy.  Astigmatism, presbyopia, hypermetropia noted.  Very early cataracts noted bilaterally.  October 2015--patient reports a routine diabetic eye exam without evidence of diabetic retinopathy.   • Diabetic foot exam 3/12/2017    03/14/2017--diabetic foot exam reveals no evidence of diabetic foot ulcer or pre-ulcerative callus.  Distal pulses palpable.  No signs of ischemia.  Sensation subjectively intact per monofilament.   • Diverticulosis of colon 10/28/2021    August 18, 2021--colonoscopy reveals a 6 mm polyp in the transverse colon.  There  was an 8 mm polyp in the descending colon.  A 20 mm polyp was found at 50 cm proximal to the anus.  Resection was incomplete.  The resected tissue was retrieved and coagulation for destruction was performed.  #4, sessile polyps in the sigmoid colon that were 5 to 6 mm in size.  Removed.  #2, sessile polyps in the sig   • Elevated liver enzymes 2019--AST is normal at 27, ALT normal at 2 9.  Alkaline phosphatase mildly elevated at 137.  Bilirubin is normal.  2019--ultrasound liver ordered by the gastroenterologist reveals increased hepatic echogenicity consistent with steatosis.  Previous cholecystectomy.  No biliary ductal dilatation.  2019--patient was apparently referred to the gastroenterologist by   • Erythrocytosis 3/18/2019   • Esophageal varices determined by endoscopy (HCC) 10/28/2021    2021--EGD reveals grade 2 varices in the lower third of the esophagus.  A varix with no bleeding was found in the cardia.  No stigmata of recent bleeding.  Mild portal hypertensive gastropathy was found in the gastric fundus.   • Family history of premature coronary artery disease 2016    Father  from a myocardial infarction age 61.   • Folic acid deficiency 2016   • History of Abnormal pulse oximetry 10/07/2012    10/07/2012--overnight oximetry test revealed significant nocturnal hypoxemia. Oxygen saturations were greater than 90% for only 50.5% of the study. Oxygen saturation less than 90% for three hours 47 minutes which was 49.5% of the study. Oxygen saturation less than 88% for one hour and 36 minutes and 48 seconds, 21.1% of the study.   • History of CABG 2005 status post four-vessel CABG.   • History of colon polyps, 2021--tubular adenoma x12. 10/28/2021    2021--colonoscopy reveals a 6 mm polyp in the transverse colon.  There was an 8 mm polyp in the descending colon.  A 20 mm polyp was found at 50 cm proximal  to the anus.  Resection was incomplete.  The resected tissue was retrieved and coagulation for destruction was performed.  #4, sessile polyps in the sigmoid colon that were 5 to 6 mm in size.  Removed.  #2, sessile polyps in the sig   • History of COVID-19 6/2/2021   • History of myocardial infarction, 2005. 4/28/2016 01/01/2005--status post myocardial infarction.   January 2005--status post four-vessel CABG   • Hyperlipidemia 4/28/2016   • Hypogonadism male, TRT ineffective 9/13/2012 09/13/2012--treatment for symptomatic hypogonadism begun. This was later discontinued due to lack of efficacy and cost.   • Liver cirrhosis secondary to RUIZ (HCC) 10/28/2021    August 18, 2021--EGD reveals grade 2 varices in the lower third of the esophagus.  A varix with no bleeding was found in the cardia.  No stigmata of recent bleeding.  Mild portal hypertensive gastropathy was found in the gastric fundus.  October 2, 2019--ultrasound liver ordered by the gastroenterologist reveals increased hepatic echogenicity consistent with steatosis.  Previous cholecystectomy.     • Male erectile disorder 8/27/2020   • Microalbuminuria 4/25/2014 04/25/2014--urine microalbumin elevated 28.7. Normal range 0.0--17.0.   • RUIZ (nonalcoholic steatohepatitis) 4/28/2016   • Non morbid obesity 6/2/2021   • Obstructive sleep apnea, 11/13/2012--mild to moderate NIKI.  Unable to tolerate CPAP.  Cannot use oral appliance. 10/7/2012    05/02/2014--nocturnal oxygen 2 L per nasal cannula ordered.   12/03/2013--patient was referred for an oral appliance but this could not be done because patient wears dentures.   11/13/2012--diagnosed with mild to moderate obstructive sleep apnea. Patient unable to tolerate CPAP.   10/07/2012--overnight oximetry test revealed significant nocturnal hypoxemia. Oxygen saturations were greater than 90% for only 50.5% of the study. Oxygen saturation less than 90% for three hours 47 minutes which was 49.5% of the study.  Oxygen saturation less than 88% for one hour and 36 minutes and 48 seconds, 21.1% of the study.   • Occlusive coronary artery disease, 01/01/2005--status post MI.  January 2005--CABG ×4.  Details not known. 1/1/2005 01/01/2005--status post myocardial infarction.   January 2005--status post four-vessel CABG   • Sleep related hypoxia 10/7/2012    05/02/2014--nocturnal oxygen 2 L per nasal cannula ordered.  12/03/2013--patient was referred for an oral appliance but this could not be done because patient wears dentures.   11/13/2012--diagnosed with mild to moderate obstructive sleep apnea. Patient unable to tolerate CPAP.   10/07/2012--overnight oximetry test revealed significant nocturnal hypoxemia. Oxygen saturations were greater than 90% for only 50.5% of the study. Oxygen saturation less than 90% for three hours 47 minutes which was 49.5% of the study. Oxygen saturation less than 88% for one hour and 36 minutes and 48 seconds, 21.1% of the study.   • Thrombocytopenia (HCC) 1/25/2018 06/07/2018--follow-up.  Platelets are still low at 86.  I reviewed the results of the CT scan of the abdomen with the patient.  Etiology of the mild splenomegaly not clear.  Patient referred to hematology.  03/07/2018--CT scan of the abdomen with contrast performed for elevated liver enzymes and thrombocytopenia.  This revealed minimal fatty infiltration of the liver.  Tiny calcified gallstone.  Mild splenomegaly.  Small amount of calcification is seen along the periphery of the spleen which could be related to remote hemorrhage.  It is of doubtful clinical significance.  Mild bilateral perinephric stranding.  Please correlate for signs of urinary tract infection.  No hydronephrosis, renal masses, or stones are seen.  01/25/2018--platelet count low at 99.  Lymphocytes low at 19%.  Otherwise, differential was unremarkable.  01/25/2018--routine follow-up.  Platelet count is 91.  CBC with differential ordered.   • Type 2 diabetes  mellitus with microalbuminuria, without long-term current use of insulin (HCC) 1/1/2005    Diagnosed in 2005.   • Vitamin D deficiency 4/28/2016         Past Surgical History:   Procedure Laterality Date   • CHOLECYSTECTOMY WITH INTRAOPERATIVE CHOLANGIOGRAM N/A 6/24/2019    Procedure: laparoscopic cholecystectomy with intraoperative cholangiogram;  Surgeon: Vida Avilez MD;  Location: [facility];  Service: General   • COLONOSCOPY N/A 8/18/2021 August 18, 2021--colonoscopy reveals a 6 mm polyp in the transverse colon.  There was an 8 mm polyp in the descending colon.  A 20 mm polyp was found at 50 cm proximal to the anus.  Resection was incomplete.  The resected tissue was retrieved and coagulation for destruction was performed.  #4, sessile polyps in the sigmoid colon that were 5 to 6 mm in size.  Removed.  #2, sessile polyps in the sig   • CORONARY ARTERY BYPASS GRAFT  01/2005 January 2005 status post four-vessel CABG.   • ENDOSCOPY N/A 8/18/2021 August 18, 2021--EGD reveals grade 2 varices in the lower third of the esophagus.  A varix with no bleeding was found in the cardia.  No stigmata of recent bleeding.  Mild portal hypertensive gastropathy was found in the gastric fundus.         No Known Allergies        Current Outpatient Medications:   •  aspirin 81 MG tablet, Take 1 tablet by mouth Daily., Disp: , Rfl:   •  atorvastatin (LIPITOR) 80 MG tablet, TAKE 1 TABLET BY MOUTH DAILY FOR HIGH CHOLESTEROL, Disp: 90 tablet, Rfl: 3  •  carvedilol (Coreg) 3.125 MG tablet, Take 1 tablet by mouth 2 (Two) Times a Day With Meals., Disp: 60 tablet, Rfl: 3  •  Empagliflozin (Jardiance) 25 MG tablet, Take 25 mg by mouth Every Morning Before Breakfast. For diabetes, Disp: 90 tablet, Rfl: 1  •  ferrous sulfate 325 (65 FE) MG tablet, Take 1 tablet by mouth 2 (Two) Times a Day With Meals., Disp: 60 tablet, Rfl: 4  •  folic acid (FOLVITE) 1 MG tablet, TAKE 1 TABLET BY MOUTH DAILY, Disp: 30 tablet, Rfl: 2  •   "glimepiride (AMARYL) 4 MG tablet, Take 1 p.o. daily at supper for diabetes, Disp: 90 tablet, Rfl: 3  •  Insulin Pen Needle 32G X 4 MM misc, 1 each Daily., Disp: 100 each, Rfl: 0  •  Liraglutide (Victoza) 18 MG/3ML solution pen-injector injection, ADMINISTER 1.8 MG UNDER THE SKIN DAILY AS DIRECTED FOR DIABETES, Disp: 9 mL, Rfl: 3  •  SITagliptin-metFORMIN HCl ER (Janumet XR)  MG tablet, Take 1 p.o. twice daily for diabetes, Disp: 60 tablet, Rfl: 5  •  vitamin D3 125 MCG (5000 UT) capsule capsule, 1 by mouth daily as directed, Disp: 30 capsule, Rfl: 11      Family History   Problem Relation Age of Onset   • Other Mother         Ventricular Septal Defect   • Heart disease Mother    • Hypertension Mother    • Cancer Mother    • Diabetes Mother    • Heart attack Father         Prior Myocardial Infarction.  Dad  from a myocardial infarction at age 59.   • Heart disease Father    • Heart disease Other         Congenital Heart Disease. Multiple family members with septal defects that required surgery.   • Heart disease Sister    • Heart disease Brother    • Cancer Daughter          Social History     Socioeconomic History   • Marital status:      Spouse name: Dasha   • Number of children: 8   • Highest education level: 9th grade   Tobacco Use   • Smoking status: Former Smoker     Types: Cigarettes     Quit date:      Years since quittin.8   • Smokeless tobacco: Never Used   • Tobacco comment: Former smoker quit 13 years ago with a 64.5 pack year history.  Smoked 1 ppd for 32 years and 4.5 ppd for 5 years and quit when he had his open heart surgery.   Vaping Use   • Vaping Use: Never used   Substance and Sexual Activity   • Alcohol use: No   • Drug use: No   • Sexual activity: Yes     Partners: Female         Vitals:    10/28/21 0929   BP: 112/60   Pulse: 81   Resp: 18   SpO2: 97%   Weight: 99.9 kg (220 lb 3.2 oz)   Height: 175.3 cm (69\")        Body mass index is 32.52 kg/m².      Physical " Exam:    General: Alert and oriented x 3.  No acute distress.  Obese.  Normal affect.  HEENT: Pupils equal, round, reactive to light; extraocular movements intact; sclerae nonicteric; pharynx, ear canals and TMs normal.  Neck: Without JVD, thyromegaly, bruit, or adenopathy.  Lungs: Clear to auscultation in all fields.  Heart: Regular rate and rhythm without murmur, rub, gallop, or click.  Abdomen: Soft, nontender, without hepatosplenomegaly or hernia.  Bowel sounds normal.  : Deferred.  Rectal: Deferred.  Extremities: Without clubbing, cyanosis, edema, or pulse deficit.  Neurologic: Intact without focal deficit.  Normal station and gait observed during ingress and egress from the examination room.  Skin: Without significant lesion.  Musculoskeletal: Unremarkable.    Lab/other results:    Patient did not have lab work prior to this visit.    Assessment/Plan:     Diagnosis Plan   1. Type 2 diabetes mellitus with microalbuminuria, without long-term current use of insulin (HCC)  Hemoglobin A1c    Comprehensive Metabolic Panel    Hemoglobin A1c   2. Microalbuminuria  Comprehensive Metabolic Panel    Microalbumin / Creatinine Urine Ratio - Urine, Clean Catch   3. Hyperlipidemia  NMR LipoProfile    Comprehensive Metabolic Panel    CK    CK    Comprehensive Metabolic Panel    NMR LipoProfile    TSH    T4, Free    T3, Free   4. RUIZ (nonalcoholic steatohepatitis)     5. Elevated liver enzymes     6. Liver cirrhosis secondary to RUIZ (HCC)     7. Esophageal varices determined by endoscopy (HCC)     8. Microcytic anemia  CBC (No Diff)    CBC (No Diff)    Iron Profile   9. Polycythemia  CBC (No Diff)   10. Chronic ITP (idiopathic thrombocytopenia) (HCC)     11. Hypogonadism male, TRT ineffective     12. Vitamin D deficiency  Vitamin D 25 Hydroxy    Vitamin D 25 Hydroxy   13. Occlusive coronary artery disease, 01/01/2005--status post MI.  January 2005--CABG ×4.  Details not known.     14. History of myocardial infarction,  2005.     15. Family history of premature coronary artery disease     16. Folic acid deficiency     17. Obstructive sleep apnea, 11/13/2012--mild to moderate NIKI.  Unable to tolerate CPAP.  Cannot use oral appliance.     18. Sleep related hypoxia     19. Non morbid obesity     20. History of COVID-19     21. History of colon polyps, 8/18/2021--tubular adenoma x12.     22. Diverticulosis of colon     23. Therapeutic drug monitoring         Patient has multiple medical problems as noted above that cannot be completely evaluated because patient did not have his lab work done prior to the visit.  I reviewed the recent EGD and colonoscopy.  The EGD revealed esophageal varices and the gastroenterologist feels that patient has cirrhosis of the liver based upon Maxwell.  Apparently he is a nondrinker.  Colonoscopy revealed multiple tubular adenomas and patient is well aware he needs close follow-up with repeat colonoscopies.    Plan is as follows: I have strongly encouraged patient to get Covid-19 vaccine.  Strongly recommend low carbohydrate diet, exercise, and weight loss.  We will send patient over for fasting lab work as soon as possible and reluctantly I will follow up on the phone for the results and possible further instructions.  We will set him up for fasting lab work and follow-up in 4 months.  Also encouraged patient to get diabetic eye exam.  He reports he had one last year but is not sure when but is getting close to a year.  Noted patient has Covid antibodies and has not received Covid vaccination.  He should be protected but understands we do not know for how long.    Procedures

## 2021-10-29 ENCOUNTER — TELEPHONE (OUTPATIENT)
Dept: GASTROENTEROLOGY | Facility: CLINIC | Age: 66
End: 2021-10-29

## 2021-10-29 NOTE — TELEPHONE ENCOUNTER
Overdue alert received for US liver.      Call to pt  Advise may contact [employer] [facility] to arrange above.  Verb understanding.

## 2021-10-31 LAB
CHOLEST SERPL-MCNC: 135 MG/DL (ref 100–199)
HDL SERPL-SCNC: 23.2 UMOL/L
HDLC SERPL-MCNC: 55 MG/DL
LDL SERPL QN: 21.2 NM
LDL SERPL-SCNC: 702 NMOL/L
LDL SMALL SERPL-SCNC: 126 NMOL/L
LDLC SERPL CALC-MCNC: 64 MG/DL (ref 0–99)
TRIGL SERPL-MCNC: 86 MG/DL (ref 0–149)

## 2021-12-02 ENCOUNTER — HOSPITAL ENCOUNTER (OUTPATIENT)
Dept: ULTRASOUND IMAGING | Facility: HOSPITAL | Age: 66
Discharge: HOME | End: 2021-12-02
Admitting: INTERNAL MEDICINE

## 2021-12-02 DIAGNOSIS — K75.81 NASH (NONALCOHOLIC STEATOHEPATITIS): ICD-10-CM

## 2021-12-02 PROCEDURE — 76705 ECHO EXAM OF ABDOMEN: CPT

## 2021-12-09 ENCOUNTER — TELEPHONE (OUTPATIENT)
Dept: GASTROENTEROLOGY | Facility: CLINIC | Age: 66
End: 2021-12-09

## 2021-12-09 NOTE — TELEPHONE ENCOUNTER
----- Message from Ruth Ann Salazar MD sent at 12/7/2021 12:53 PM EST -----  Liver ultrasound reflect cirrhosis-no new findings.  Follow-up with me as scheduled.

## 2022-01-27 DIAGNOSIS — E11.9 TYPE 2 DIABETES MELLITUS WITHOUT COMPLICATION, WITHOUT LONG-TERM CURRENT USE OF INSULIN: ICD-10-CM

## 2022-01-27 RX ORDER — SITAGLIPTIN AND METFORMIN HYDROCHLORIDE 1000; 50 MG/1; MG/1
TABLET, FILM COATED, EXTENDED RELEASE ORAL
Qty: 60 TABLET | Refills: 5 | Status: SHIPPED | OUTPATIENT
Start: 2022-01-27 | End: 2022-10-31

## 2022-02-22 ENCOUNTER — OFFICE VISIT (OUTPATIENT)
Dept: GASTROENTEROLOGY | Facility: CLINIC | Age: 67
End: 2022-02-22

## 2022-02-22 ENCOUNTER — TELEPHONE (OUTPATIENT)
Dept: GASTROENTEROLOGY | Facility: CLINIC | Age: 67
End: 2022-02-22

## 2022-02-22 ENCOUNTER — PREP FOR SURGERY (OUTPATIENT)
Dept: OTHER | Facility: HOSPITAL | Age: 67
End: 2022-02-22

## 2022-02-22 VITALS
WEIGHT: 218 LBS | DIASTOLIC BLOOD PRESSURE: 64 MMHG | HEIGHT: 69 IN | TEMPERATURE: 97.7 F | SYSTOLIC BLOOD PRESSURE: 122 MMHG | HEART RATE: 92 BPM | BODY MASS INDEX: 32.29 KG/M2

## 2022-02-22 DIAGNOSIS — D50.0 IRON DEFICIENCY ANEMIA DUE TO CHRONIC BLOOD LOSS: ICD-10-CM

## 2022-02-22 DIAGNOSIS — D69.6 THROMBOCYTOPENIA: ICD-10-CM

## 2022-02-22 DIAGNOSIS — Z86.010 ENCOUNTER FOR COLONOSCOPY DUE TO HISTORY OF ADENOMATOUS COLONIC POLYPS: Primary | ICD-10-CM

## 2022-02-22 DIAGNOSIS — Z86.010 HISTORY OF ADENOMATOUS POLYP OF COLON: ICD-10-CM

## 2022-02-22 DIAGNOSIS — I85.10 SECONDARY ESOPHAGEAL VARICES WITHOUT BLEEDING: ICD-10-CM

## 2022-02-22 DIAGNOSIS — K75.81 NASH (NONALCOHOLIC STEATOHEPATITIS): Primary | ICD-10-CM

## 2022-02-22 DIAGNOSIS — Z12.11 ENCOUNTER FOR COLONOSCOPY DUE TO HISTORY OF ADENOMATOUS COLONIC POLYPS: Primary | ICD-10-CM

## 2022-02-22 PROCEDURE — 99214 OFFICE O/P EST MOD 30 MIN: CPT | Performed by: INTERNAL MEDICINE

## 2022-02-22 NOTE — PROGRESS NOTES
Subjective   Chief Complaint   Patient presents with   • Cirrhosis   • Anemia       Rajan Dc is a  66 y.o. male here for a follow up visit for RUIZ cirrhosis and anemia.  He was last seen in the office in October 2021.    EGD colonoscopy 8/21 with grade 2 esophageal varices-no prior bleeding, multiple adenomatous polyps removed from the colon-6-month follow-up recommended.    Labs from earlier this month reviewed-hemoglobin is now normal.  He fell off a ladder due to the ladder slipping and broke ribs on the left side.  Went to [facility].      He denies any blood in his stool or melena.  No lower extremity swelling.  No confusion.  Bowel movements of an average-no constipation.  He reports his appetite has not been great since the fall.      HPI  Past Medical History:   Diagnosis Date   • Chronic ITP (idiopathic thrombocytopenia) ([facility]) 9/19/2018   • Diabetic eye exam ([facility]) 5/22/2017 05/22/2017--routine diabetic eye exam reveals no evidence of diabetic retinopathy.  Astigmatism, presbyopia, hypermetropia noted.  Very early cataracts noted bilaterally.  October 2015--patient reports a routine diabetic eye exam without evidence of diabetic retinopathy.   • Diabetic foot exam 3/12/2017    03/14/2017--diabetic foot exam reveals no evidence of diabetic foot ulcer or pre-ulcerative callus.  Distal pulses palpable.  No signs of ischemia.  Sensation subjectively intact per monofilament.   • Diverticulosis of colon 10/28/2021    August 18, 2021--colonoscopy reveals a 6 mm polyp in the transverse colon.  There was an 8 mm polyp in the descending colon.  A 20 mm polyp was found at 50 cm proximal to the anus.  Resection was incomplete.  The resected tissue was retrieved and coagulation for destruction was performed.  #4, sessile polyps in the sigmoid colon that were 5 to 6 mm in size.  Removed.  #2, sessile polyps in the sig   • Elevated liver enzymes 6/23/2019 October 17, 2019--AST is normal at 27, ALT normal at 2 9.   Alkaline phosphatase mildly elevated at 137.  Bilirubin is normal.  2019--ultrasound liver ordered by the gastroenterologist reveals increased hepatic echogenicity consistent with steatosis.  Previous cholecystectomy.  No biliary ductal dilatation.  2019--patient was apparently referred to the gastroenterologist by   • Erythrocytosis 3/18/2019   • Esophageal varices determined by endoscopy (HCC) 10/28/2021    2021--EGD reveals grade 2 varices in the lower third of the esophagus.  A varix with no bleeding was found in the cardia.  No stigmata of recent bleeding.  Mild portal hypertensive gastropathy was found in the gastric fundus.   • Family history of premature coronary artery disease 2016    Father  from a myocardial infarction age 61.   • Folic acid deficiency 2016   • History of Abnormal pulse oximetry 10/07/2012    10/07/2012--overnight oximetry test revealed significant nocturnal hypoxemia. Oxygen saturations were greater than 90% for only 50.5% of the study. Oxygen saturation less than 90% for three hours 47 minutes which was 49.5% of the study. Oxygen saturation less than 88% for one hour and 36 minutes and 48 seconds, 21.1% of the study.   • History of CABG 2005 status post four-vessel CABG.   • History of colon polyps, 2021--tubular adenoma x12. 10/28/2021    2021--colonoscopy reveals a 6 mm polyp in the transverse colon.  There was an 8 mm polyp in the descending colon.  A 20 mm polyp was found at 50 cm proximal to the anus.  Resection was incomplete.  The resected tissue was retrieved and coagulation for destruction was performed.  #4, sessile polyps in the sigmoid colon that were 5 to 6 mm in size.  Removed.  #2, sessile polyps in the sig   • History of COVID-19 2021   • History of myocardial infarction, . 2005--status post myocardial infarction.   2005--status post four-vessel CABG    • Hyperlipidemia 4/28/2016   • Hypogonadism male, TRT ineffective 9/13/2012 09/13/2012--treatment for symptomatic hypogonadism begun. This was later discontinued due to lack of efficacy and cost.   • Liver cirrhosis secondary to RUIZ (HCC) 10/28/2021    August 18, 2021--EGD reveals grade 2 varices in the lower third of the esophagus.  A varix with no bleeding was found in the cardia.  No stigmata of recent bleeding.  Mild portal hypertensive gastropathy was found in the gastric fundus.  October 2, 2019--ultrasound liver ordered by the gastroenterologist reveals increased hepatic echogenicity consistent with steatosis.  Previous cholecystectomy.     • Male erectile disorder 8/27/2020   • Microalbuminuria 4/25/2014 04/25/2014--urine microalbumin elevated 28.7. Normal range 0.0--17.0.   • RUIZ (nonalcoholic steatohepatitis) 4/28/2016   • Non morbid obesity 6/2/2021   • Obstructive sleep apnea, 11/13/2012--mild to moderate NIKI.  Unable to tolerate CPAP.  Cannot use oral appliance. 10/7/2012    05/02/2014--nocturnal oxygen 2 L per nasal cannula ordered.   12/03/2013--patient was referred for an oral appliance but this could not be done because patient wears dentures.   11/13/2012--diagnosed with mild to moderate obstructive sleep apnea. Patient unable to tolerate CPAP.   10/07/2012--overnight oximetry test revealed significant nocturnal hypoxemia. Oxygen saturations were greater than 90% for only 50.5% of the study. Oxygen saturation less than 90% for three hours 47 minutes which was 49.5% of the study. Oxygen saturation less than 88% for one hour and 36 minutes and 48 seconds, 21.1% of the study.   • Occlusive coronary artery disease, 01/01/2005--status post MI.  January 2005--CABG ×4.  Details not known. 1/1/2005 01/01/2005--status post myocardial infarction.   January 2005--status post four-vessel CABG   • Sleep related hypoxia 10/7/2012    05/02/2014--nocturnal oxygen 2 L per nasal cannula ordered.   12/03/2013--patient was referred for an oral appliance but this could not be done because patient wears dentures.   11/13/2012--diagnosed with mild to moderate obstructive sleep apnea. Patient unable to tolerate CPAP.   10/07/2012--overnight oximetry test revealed significant nocturnal hypoxemia. Oxygen saturations were greater than 90% for only 50.5% of the study. Oxygen saturation less than 90% for three hours 47 minutes which was 49.5% of the study. Oxygen saturation less than 88% for one hour and 36 minutes and 48 seconds, 21.1% of the study.   • Thrombocytopenia (HCC) 1/25/2018 06/07/2018--follow-up.  Platelets are still low at 86.  I reviewed the results of the CT scan of the abdomen with the patient.  Etiology of the mild splenomegaly not clear.  Patient referred to hematology.  03/07/2018--CT scan of the abdomen with contrast performed for elevated liver enzymes and thrombocytopenia.  This revealed minimal fatty infiltration of the liver.  Tiny calcified gallstone.  Mild splenomegaly.  Small amount of calcification is seen along the periphery of the spleen which could be related to remote hemorrhage.  It is of doubtful clinical significance.  Mild bilateral perinephric stranding.  Please correlate for signs of urinary tract infection.  No hydronephrosis, renal masses, or stones are seen.  01/25/2018--platelet count low at 99.  Lymphocytes low at 19%.  Otherwise, differential was unremarkable.  01/25/2018--routine follow-up.  Platelet count is 91.  CBC with differential ordered.   • Type 2 diabetes mellitus with microalbuminuria, without long-term current use of insulin (HCC) 1/1/2005    Diagnosed in 2005.   • Vitamin D deficiency 4/28/2016     Past Surgical History:   Procedure Laterality Date   • CHOLECYSTECTOMY WITH INTRAOPERATIVE CHOLANGIOGRAM N/A 6/24/2019    Procedure: laparoscopic cholecystectomy with intraoperative cholangiogram;  Surgeon: Vida Avilez MD;  Location: [facility] OR;  Service:  General   • COLONOSCOPY N/A 8/18/2021 August 18, 2021--colonoscopy reveals a 6 mm polyp in the transverse colon.  There was an 8 mm polyp in the descending colon.  A 20 mm polyp was found at 50 cm proximal to the anus.  Resection was incomplete.  The resected tissue was retrieved and coagulation for destruction was performed.  #4, sessile polyps in the sigmoid colon that were 5 to 6 mm in size.  Removed.  #2, sessile polyps in the sig   • CORONARY ARTERY BYPASS GRAFT  01/2005 January 2005 status post four-vessel CABG.   • ENDOSCOPY N/A 8/18/2021 August 18, 2021--EGD reveals grade 2 varices in the lower third of the esophagus.  A varix with no bleeding was found in the cardia.  No stigmata of recent bleeding.  Mild portal hypertensive gastropathy was found in the gastric fundus.       Current Outpatient Medications:   •  aspirin 81 MG tablet, Take 1 tablet by mouth Daily., Disp: , Rfl:   •  atorvastatin (LIPITOR) 80 MG tablet, TAKE 1 TABLET BY MOUTH DAILY FOR HIGH CHOLESTEROL, Disp: 90 tablet, Rfl: 3  •  carvedilol (Coreg) 3.125 MG tablet, Take 1 tablet by mouth 2 (Two) Times a Day With Meals., Disp: 60 tablet, Rfl: 3  •  Empagliflozin (Jardiance) 25 MG tablet, Take 25 mg by mouth Every Morning Before Breakfast. For diabetes, Disp: 90 tablet, Rfl: 1  •  ferrous sulfate 325 (65 FE) MG tablet, Take 1 tablet by mouth 2 (Two) Times a Day With Meals., Disp: 60 tablet, Rfl: 4  •  folic acid (FOLVITE) 1 MG tablet, TAKE 1 TABLET BY MOUTH DAILY, Disp: 30 tablet, Rfl: 2  •  glimepiride (AMARYL) 4 MG tablet, Take 1 p.o. daily at supper for diabetes, Disp: 90 tablet, Rfl: 3  •  Insulin Pen Needle 32G X 4 MM misc, 1 each Daily., Disp: 100 each, Rfl: 0  •  Liraglutide (Victoza) 18 MG/3ML solution pen-injector injection, ADMINISTER 1.8 MG UNDER THE SKIN DAILY AS DIRECTED FOR DIABETES, Disp: 9 mL, Rfl: 3  •  SITagliptin-metFORMIN HCl ER (Janumet XR)  MG tablet, TAKE 1 TABLET BY MOUTH TWICE DAILY FOR DIABETES, Disp:  60 tablet, Rfl: 5  •  vitamin D3 125 MCG (5000 UT) capsule capsule, 1 by mouth daily as directed, Disp: 30 capsule, Rfl: 11  PRN Meds:.  No Known Allergies  Social History     Socioeconomic History   • Marital status:      Spouse name: Dasha   • Number of children: 8   • Highest education level: 9th grade   Tobacco Use   • Smoking status: Former Smoker     Types: Cigarettes     Quit date:      Years since quittin.1   • Smokeless tobacco: Never Used   • Tobacco comment: Former smoker quit 13 years ago with a 64.5 pack year history.  Smoked 1 ppd for 32 years and 4.5 ppd for 5 years and quit when he had his open heart surgery.   Vaping Use   • Vaping Use: Never used   Substance and Sexual Activity   • Alcohol use: No   • Drug use: No   • Sexual activity: Yes     Partners: Female     Family History   Problem Relation Age of Onset   • Other Mother         Ventricular Septal Defect   • Heart disease Mother    • Hypertension Mother    • Cancer Mother    • Diabetes Mother    • Heart attack Father         Prior Myocardial Infarction.  Dad  from a myocardial infarction at age 59.   • Heart disease Father    • Heart disease Other         Congenital Heart Disease. Multiple family members with septal defects that required surgery.   • Heart disease Sister    • Heart disease Brother    • Cancer Daughter      Review of Systems   Constitutional: Negative for appetite change and unexpected weight change.   Gastrointestinal: Negative for abdominal pain and blood in stool.   Psychiatric/Behavioral: Negative for confusion.     Vitals:    22 1301   BP: 122/64   Pulse: 92   Temp: 97.7 °F (36.5 °C)         22  1301   Weight: 98.9 kg (218 lb)       Objective   Physical Exam  Constitutional:       Appearance: Normal appearance. He is well-developed.   HENT:      Head: Normocephalic and atraumatic.   Eyes:      General: No scleral icterus.     Conjunctiva/sclera: Conjunctivae normal.   Pulmonary:       Effort: Pulmonary effort is normal.   Abdominal:      General: There is no distension.      Palpations: Abdomen is soft.   Musculoskeletal:      Cervical back: Normal range of motion and neck supple.   Skin:     General: Skin is warm and dry.   Neurological:      Mental Status: He is alert.   Psychiatric:         Mood and Affect: Mood normal.         Behavior: Behavior normal.       No radiology results for the last 7 days    Assessment/Plan   Diagnoses and all orders for this visit:    RUIZ (nonalcoholic steatohepatitis)  -     US Liver; Future    Iron deficiency anemia due to chronic blood loss    History of adenomatous polyp of colon    Secondary esophageal varices without bleeding (HCC)    Thrombocytopenia (HCC)      Plan:  · Continue carvedilol at current dose.  · We will need repeat colonoscopy scheduled due to multiple adenomatous polyps, many large with piecemeal resection   · Recent labs reviewed-okay to stop iron supplement at this time.  We will repeat labs at the time of his colonoscopy  · Due for liver ultrasound for HCC surveillance in June - last u/s unremarkable

## 2022-02-22 NOTE — TELEPHONE ENCOUNTER
SPOKE WITH PT IN OFFICE SCHEDULED AT [facility] ON APRIL 18 ARRIVE  PM YONAS VELASQUEZ---HANDED PT [employer] INSTRUCTIONAL PACKET IN OFFICE

## 2022-02-24 ENCOUNTER — LAB (OUTPATIENT)
Dept: LAB | Facility: HOSPITAL | Age: 67
End: 2022-02-24

## 2022-02-24 DIAGNOSIS — R80.9 TYPE 2 DIABETES MELLITUS WITH MICROALBUMINURIA, WITHOUT LONG-TERM CURRENT USE OF INSULIN: Chronic | ICD-10-CM

## 2022-02-24 DIAGNOSIS — D50.9 MICROCYTIC ANEMIA: ICD-10-CM

## 2022-02-24 DIAGNOSIS — R80.9 MICROALBUMINURIA: Chronic | ICD-10-CM

## 2022-02-24 DIAGNOSIS — E78.2 MIXED HYPERLIPIDEMIA: Chronic | ICD-10-CM

## 2022-02-24 DIAGNOSIS — E55.9 VITAMIN D DEFICIENCY: Chronic | ICD-10-CM

## 2022-02-24 DIAGNOSIS — E11.29 TYPE 2 DIABETES MELLITUS WITH MICROALBUMINURIA, WITHOUT LONG-TERM CURRENT USE OF INSULIN: Chronic | ICD-10-CM

## 2022-02-24 LAB
25(OH)D3 SERPL-MCNC: 33.9 NG/ML (ref 30–100)
ALBUMIN SERPL-MCNC: 3.5 G/DL (ref 3.5–5.2)
ALBUMIN UR-MCNC: 2.3 MG/DL
ALBUMIN/GLOB SERPL: 1 G/DL
ALP SERPL-CCNC: 231 U/L (ref 39–117)
ALT SERPL W P-5'-P-CCNC: 20 U/L (ref 1–41)
ANION GAP SERPL CALCULATED.3IONS-SCNC: 9 MMOL/L (ref 5–15)
AST SERPL-CCNC: 24 U/L (ref 1–40)
BILIRUB SERPL-MCNC: 0.5 MG/DL (ref 0–1.2)
BUN SERPL-MCNC: 13 MG/DL (ref 8–23)
BUN/CREAT SERPL: 18.6 (ref 7–25)
CALCIUM SPEC-SCNC: 8.8 MG/DL (ref 8.6–10.5)
CHLORIDE SERPL-SCNC: 105 MMOL/L (ref 98–107)
CK SERPL-CCNC: 61 U/L (ref 20–200)
CO2 SERPL-SCNC: 25 MMOL/L (ref 22–29)
CREAT SERPL-MCNC: 0.7 MG/DL (ref 0.76–1.27)
CREAT UR-MCNC: 403.2 MG/DL
DEPRECATED RDW RBC AUTO: 44.1 FL (ref 37–54)
ERYTHROCYTE [DISTWIDTH] IN BLOOD BY AUTOMATED COUNT: 13.7 % (ref 12.3–15.4)
GFR SERPL CREATININE-BSD FRML MDRD: 113 ML/MIN/1.73
GLOBULIN UR ELPH-MCNC: 3.6 GM/DL
GLUCOSE SERPL-MCNC: 162 MG/DL (ref 65–99)
HBA1C MFR BLD: 7.6 % (ref 4.8–5.6)
HCT VFR BLD AUTO: 47.2 % (ref 37.5–51)
HGB BLD-MCNC: 15.5 G/DL (ref 13–17.7)
MCH RBC QN AUTO: 29.4 PG (ref 26.6–33)
MCHC RBC AUTO-ENTMCNC: 32.8 G/DL (ref 31.5–35.7)
MCV RBC AUTO: 89.6 FL (ref 79–97)
MICROALBUMIN/CREAT UR: 5.7 MG/G
PLATELET # BLD AUTO: 155 10*3/MM3 (ref 140–450)
PMV BLD AUTO: 12.9 FL (ref 6–12)
POTASSIUM SERPL-SCNC: 3.8 MMOL/L (ref 3.5–5.2)
PROT SERPL-MCNC: 7.1 G/DL (ref 6–8.5)
RBC # BLD AUTO: 5.27 10*6/MM3 (ref 4.14–5.8)
SODIUM SERPL-SCNC: 139 MMOL/L (ref 136–145)
T3FREE SERPL-MCNC: 2.66 PG/ML (ref 2–4.4)
T4 FREE SERPL-MCNC: 0.91 NG/DL (ref 0.93–1.7)
TSH SERPL DL<=0.05 MIU/L-ACNC: 1.93 UIU/ML (ref 0.27–4.2)
WBC NRBC COR # BLD: 5.47 10*3/MM3 (ref 3.4–10.8)

## 2022-02-24 PROCEDURE — 83704 LIPOPROTEIN BLD QUAN PART: CPT

## 2022-02-24 PROCEDURE — 80050 GENERAL HEALTH PANEL: CPT

## 2022-02-24 PROCEDURE — 36415 COLL VENOUS BLD VENIPUNCTURE: CPT

## 2022-02-24 PROCEDURE — 82550 ASSAY OF CK (CPK): CPT

## 2022-02-24 PROCEDURE — 82043 UR ALBUMIN QUANTITATIVE: CPT

## 2022-02-24 PROCEDURE — 83036 HEMOGLOBIN GLYCOSYLATED A1C: CPT

## 2022-02-24 PROCEDURE — 82306 VITAMIN D 25 HYDROXY: CPT

## 2022-02-24 PROCEDURE — 82570 ASSAY OF URINE CREATININE: CPT

## 2022-02-24 PROCEDURE — 84481 FREE ASSAY (FT-3): CPT

## 2022-02-24 PROCEDURE — 80061 LIPID PANEL: CPT

## 2022-02-24 PROCEDURE — 84439 ASSAY OF FREE THYROXINE: CPT

## 2022-02-26 LAB
CHOLEST SERPL-MCNC: 133 MG/DL (ref 100–199)
HDL SERPL-SCNC: 19.3 UMOL/L
HDLC SERPL-MCNC: 39 MG/DL
LDL SERPL QN: 21.3 NM
LDL SERPL-SCNC: 918 NMOL/L
LDL SMALL SERPL-SCNC: 398 NMOL/L
LDLC SERPL CALC-MCNC: 77 MG/DL (ref 0–99)
TRIGL SERPL-MCNC: 91 MG/DL (ref 0–149)

## 2022-03-07 ENCOUNTER — OFFICE VISIT (OUTPATIENT)
Dept: INTERNAL MEDICINE | Facility: CLINIC | Age: 67
End: 2022-03-07

## 2022-03-07 ENCOUNTER — HOSPITAL ENCOUNTER (OUTPATIENT)
Dept: ULTRASOUND IMAGING | Facility: HOSPITAL | Age: 67
Discharge: HOME | End: 2022-03-07
Admitting: INTERNAL MEDICINE

## 2022-03-07 VITALS
HEIGHT: 69 IN | BODY MASS INDEX: 33 KG/M2 | RESPIRATION RATE: 18 BRPM | DIASTOLIC BLOOD PRESSURE: 60 MMHG | OXYGEN SATURATION: 96 % | WEIGHT: 222.8 LBS | HEART RATE: 88 BPM | SYSTOLIC BLOOD PRESSURE: 128 MMHG

## 2022-03-07 DIAGNOSIS — R74.8 ELEVATED LIVER ENZYMES: Chronic | ICD-10-CM

## 2022-03-07 DIAGNOSIS — K75.81 NASH (NONALCOHOLIC STEATOHEPATITIS): ICD-10-CM

## 2022-03-07 DIAGNOSIS — D50.9 MICROCYTIC ANEMIA: ICD-10-CM

## 2022-03-07 DIAGNOSIS — I85.00 ESOPHAGEAL VARICES DETERMINED BY ENDOSCOPY: Chronic | ICD-10-CM

## 2022-03-07 DIAGNOSIS — K75.81 NASH (NONALCOHOLIC STEATOHEPATITIS): Chronic | ICD-10-CM

## 2022-03-07 DIAGNOSIS — D75.1 POLYCYTHEMIA: Chronic | ICD-10-CM

## 2022-03-07 DIAGNOSIS — I25.10 OCCLUSIVE CORONARY ARTERY DISEASE: Chronic | ICD-10-CM

## 2022-03-07 DIAGNOSIS — I25.2 HISTORY OF MYOCARDIAL INFARCTION: Chronic | ICD-10-CM

## 2022-03-07 DIAGNOSIS — K74.60 LIVER CIRRHOSIS SECONDARY TO NASH: Chronic | ICD-10-CM

## 2022-03-07 DIAGNOSIS — G47.33 OBSTRUCTIVE SLEEP APNEA: Chronic | ICD-10-CM

## 2022-03-07 DIAGNOSIS — Z00.00 ROUTINE PHYSICAL EXAMINATION: ICD-10-CM

## 2022-03-07 DIAGNOSIS — Z86.16 HISTORY OF COVID-19: Chronic | ICD-10-CM

## 2022-03-07 DIAGNOSIS — E78.2 MIXED HYPERLIPIDEMIA: Chronic | ICD-10-CM

## 2022-03-07 DIAGNOSIS — K75.81 LIVER CIRRHOSIS SECONDARY TO NASH: Chronic | ICD-10-CM

## 2022-03-07 DIAGNOSIS — E53.8 FOLIC ACID DEFICIENCY: Chronic | ICD-10-CM

## 2022-03-07 DIAGNOSIS — Z51.81 THERAPEUTIC DRUG MONITORING: ICD-10-CM

## 2022-03-07 DIAGNOSIS — G47.34 SLEEP RELATED HYPOXIA: Chronic | ICD-10-CM

## 2022-03-07 DIAGNOSIS — R80.9 TYPE 2 DIABETES MELLITUS WITH MICROALBUMINURIA, WITHOUT LONG-TERM CURRENT USE OF INSULIN: Primary | Chronic | ICD-10-CM

## 2022-03-07 DIAGNOSIS — E55.9 VITAMIN D DEFICIENCY: Chronic | ICD-10-CM

## 2022-03-07 DIAGNOSIS — E66.9 NON MORBID OBESITY: Chronic | ICD-10-CM

## 2022-03-07 DIAGNOSIS — D69.3 CHRONIC ITP (IDIOPATHIC THROMBOCYTOPENIA): Chronic | ICD-10-CM

## 2022-03-07 DIAGNOSIS — E29.1 HYPOGONADISM MALE: Chronic | ICD-10-CM

## 2022-03-07 DIAGNOSIS — E11.29 TYPE 2 DIABETES MELLITUS WITH MICROALBUMINURIA, WITHOUT LONG-TERM CURRENT USE OF INSULIN: Primary | Chronic | ICD-10-CM

## 2022-03-07 DIAGNOSIS — R80.9 MICROALBUMINURIA: Chronic | ICD-10-CM

## 2022-03-07 DIAGNOSIS — Z86.010 HISTORY OF COLON POLYPS: Chronic | ICD-10-CM

## 2022-03-07 PROBLEM — E11.9 TYPE 2 DIABETES MELLITUS WITHOUT COMPLICATION: Chronic | Status: ACTIVE | Noted: 2022-03-07

## 2022-03-07 PROBLEM — E11.9 TYPE 2 DIABETES MELLITUS WITHOUT COMPLICATION: Status: ACTIVE | Noted: 2022-03-07

## 2022-03-07 PROCEDURE — 76705 ECHO EXAM OF ABDOMEN: CPT

## 2022-03-07 PROCEDURE — 99214 OFFICE O/P EST MOD 30 MIN: CPT | Performed by: INTERNAL MEDICINE

## 2022-03-07 NOTE — PROGRESS NOTES
03/07/2022    Patient Information  Rajan Dc                                                                                          [address] [place] [id number]      1955  [unfilled]  [phone] (work)    Chief Complaint:     Follow-up blood work in order to monitor chronic medical issues listed in history of present illness.  No new acute complaints.    History of Present Illness:    Patient with multiple medical problems including type 2 diabetes, microalbuminuria, hyperlipidemia, Ruiz, vitamin D deficiency, occlusive coronary artery disease and history of myocardial infarction, folic acid deficiency, obstructive sleep apnea, sleep-related hypoxia, chronic ITP, polycythemia, elevated liver enzymes, history of colon polyps, liver cirrhosis secondary to Ruiz with esophageal varices, nonmorbid obesity, history of Covid-19, microcytic anemia.  He presents today for follow-up with lab prior in order to monitor his chronic medical issues.  His past medical history reviewed and updated were necessary including health maintenance parameters.  This reveals he is up-to-date or else accounted for after today's visit.    Review of Systems   Constitutional: Negative.   HENT: Negative.    Eyes: Negative.    Cardiovascular: Negative.    Respiratory: Negative.    Endocrine: Negative.    Hematologic/Lymphatic: Negative.    Skin: Negative.    Musculoskeletal: Negative.    Gastrointestinal: Negative.    Genitourinary: Negative.    Neurological: Negative.    Psychiatric/Behavioral: Negative.    Allergic/Immunologic: Negative.        Active Problems:    Patient Active Problem List   Diagnosis   • Occlusive coronary artery disease, 01/01/2005--status post MI.  January 2005--CABG ×4.  Details not known.   • Folic acid deficiency   • Hyperlipidemia   • Hypogonadism male, TRT ineffective   • Microalbuminuria   • RUIZ (nonalcoholic steatohepatitis)   • Obstructive sleep apnea, 11/13/2012--mild to  moderate NIKI.  Unable to tolerate CPAP.  Cannot use oral appliance.   • Sleep related hypoxia   • Type 2 diabetes mellitus with microalbuminuria, without long-term current use of insulin (HCC)   • Vitamin D deficiency   • Therapeutic drug monitoring   • Routine physical examination   • Family history of premature coronary artery disease   • Diabetic eye exam (HCC)   • Diabetic foot exam   • History of myocardial infarction, 2005.   • Chronic ITP (idiopathic thrombocytopenia) (HCC)   • Polycythemia   • Elevated liver enzymes   • Male erectile disorder   • Microcytic anemia   • Non morbid obesity   • History of COVID-19   • History of colon polyps, 8/18/2021--tubular adenoma x12.   • Esophageal varices determined by endoscopy (HCC)   • Liver cirrhosis secondary to RUIZ (HCC)   • Diverticulosis of colon         Past Medical History:   Diagnosis Date   • Chronic ITP (idiopathic thrombocytopenia) ([facility]) 9/19/2018   • Diabetic eye exam ([facility]) 5/22/2017 05/22/2017--routine diabetic eye exam reveals no evidence of diabetic retinopathy.  Astigmatism, presbyopia, hypermetropia noted.  Very early cataracts noted bilaterally.  October 2015--patient reports a routine diabetic eye exam without evidence of diabetic retinopathy.   • Diabetic foot exam 3/12/2017    03/14/2017--diabetic foot exam reveals no evidence of diabetic foot ulcer or pre-ulcerative callus.  Distal pulses palpable.  No signs of ischemia.  Sensation subjectively intact per monofilament.   • Diverticulosis of colon 10/28/2021    August 18, 2021--colonoscopy reveals a 6 mm polyp in the transverse colon.  There was an 8 mm polyp in the descending colon.  A 20 mm polyp was found at 50 cm proximal to the anus.  Resection was incomplete.  The resected tissue was retrieved and coagulation for destruction was performed.  #4, sessile polyps in the sigmoid colon that were 5 to 6 mm in size.  Removed.  #2, sessile polyps in the sig   • Elevated liver enzymes 6/23/2019     2019--AST is normal at 27, ALT normal at 2 9.  Alkaline phosphatase mildly elevated at 137.  Bilirubin is normal.  2019--ultrasound liver ordered by the gastroenterologist reveals increased hepatic echogenicity consistent with steatosis.  Previous cholecystectomy.  No biliary ductal dilatation.  2019--patient was apparently referred to the gastroenterologist by   • Erythrocytosis 3/18/2019   • Esophageal varices determined by endoscopy (HCC) 10/28/2021    2021--EGD reveals grade 2 varices in the lower third of the esophagus.  A varix with no bleeding was found in the cardia.  No stigmata of recent bleeding.  Mild portal hypertensive gastropathy was found in the gastric fundus.   • Family history of premature coronary artery disease 2016    Father  from a myocardial infarction age 61.   • Folic acid deficiency 2016   • History of Abnormal pulse oximetry 10/07/2012    10/07/2012--overnight oximetry test revealed significant nocturnal hypoxemia. Oxygen saturations were greater than 90% for only 50.5% of the study. Oxygen saturation less than 90% for three hours 47 minutes which was 49.5% of the study. Oxygen saturation less than 88% for one hour and 36 minutes and 48 seconds, 21.1% of the study.   • History of CABG 2005 status post four-vessel CABG.   • History of colon polyps, 2021--tubular adenoma x12. 10/28/2021    2021--colonoscopy reveals a 6 mm polyp in the transverse colon.  There was an 8 mm polyp in the descending colon.  A 20 mm polyp was found at 50 cm proximal to the anus.  Resection was incomplete.  The resected tissue was retrieved and coagulation for destruction was performed.  #4, sessile polyps in the sigmoid colon that were 5 to 6 mm in size.  Removed.  #2, sessile polyps in the sig   • History of COVID-19 2021   • History of myocardial infarction, . 2005--status post myocardial  infarction.   January 2005--status post four-vessel CABG   • Hyperlipidemia 4/28/2016   • Hypogonadism male, TRT ineffective 9/13/2012 09/13/2012--treatment for symptomatic hypogonadism begun. This was later discontinued due to lack of efficacy and cost.   • Liver cirrhosis secondary to RUIZ (HCC) 10/28/2021    August 18, 2021--EGD reveals grade 2 varices in the lower third of the esophagus.  A varix with no bleeding was found in the cardia.  No stigmata of recent bleeding.  Mild portal hypertensive gastropathy was found in the gastric fundus.  October 2, 2019--ultrasound liver ordered by the gastroenterologist reveals increased hepatic echogenicity consistent with steatosis.  Previous cholecystectomy.     • Male erectile disorder 8/27/2020   • Microalbuminuria 4/25/2014 04/25/2014--urine microalbumin elevated 28.7. Normal range 0.0--17.0.   • RUIZ (nonalcoholic steatohepatitis) 4/28/2016   • Non morbid obesity 6/2/2021   • Obstructive sleep apnea, 11/13/2012--mild to moderate NIKI.  Unable to tolerate CPAP.  Cannot use oral appliance. 10/7/2012    05/02/2014--nocturnal oxygen 2 L per nasal cannula ordered.   12/03/2013--patient was referred for an oral appliance but this could not be done because patient wears dentures.   11/13/2012--diagnosed with mild to moderate obstructive sleep apnea. Patient unable to tolerate CPAP.   10/07/2012--overnight oximetry test revealed significant nocturnal hypoxemia. Oxygen saturations were greater than 90% for only 50.5% of the study. Oxygen saturation less than 90% for three hours 47 minutes which was 49.5% of the study. Oxygen saturation less than 88% for one hour and 36 minutes and 48 seconds, 21.1% of the study.   • Occlusive coronary artery disease, 01/01/2005--status post MI.  January 2005--CABG ×4.  Details not known. 1/1/2005 01/01/2005--status post myocardial infarction.   January 2005--status post four-vessel CABG   • Sleep related hypoxia 10/7/2012     05/02/2014--nocturnal oxygen 2 L per nasal cannula ordered.  12/03/2013--patient was referred for an oral appliance but this could not be done because patient wears dentures.   11/13/2012--diagnosed with mild to moderate obstructive sleep apnea. Patient unable to tolerate CPAP.   10/07/2012--overnight oximetry test revealed significant nocturnal hypoxemia. Oxygen saturations were greater than 90% for only 50.5% of the study. Oxygen saturation less than 90% for three hours 47 minutes which was 49.5% of the study. Oxygen saturation less than 88% for one hour and 36 minutes and 48 seconds, 21.1% of the study.   • Thrombocytopenia (HCC) 1/25/2018 06/07/2018--follow-up.  Platelets are still low at 86.  I reviewed the results of the CT scan of the abdomen with the patient.  Etiology of the mild splenomegaly not clear.  Patient referred to hematology.  03/07/2018--CT scan of the abdomen with contrast performed for elevated liver enzymes and thrombocytopenia.  This revealed minimal fatty infiltration of the liver.  Tiny calcified gallstone.  Mild splenomegaly.  Small amount of calcification is seen along the periphery of the spleen which could be related to remote hemorrhage.  It is of doubtful clinical significance.  Mild bilateral perinephric stranding.  Please correlate for signs of urinary tract infection.  No hydronephrosis, renal masses, or stones are seen.  01/25/2018--platelet count low at 99.  Lymphocytes low at 19%.  Otherwise, differential was unremarkable.  01/25/2018--routine follow-up.  Platelet count is 91.  CBC with differential ordered.   • Type 2 diabetes mellitus with microalbuminuria, without long-term current use of insulin (HCC) 1/1/2005    Diagnosed in 2005.   • Vitamin D deficiency 4/28/2016         Past Surgical History:   Procedure Laterality Date   • CHOLECYSTECTOMY WITH INTRAOPERATIVE CHOLANGIOGRAM N/A 6/24/2019    Procedure: laparoscopic cholecystectomy with intraoperative cholangiogram;   Surgeon: Vida Avilez MD;  Location: [facility];  Service: General   • COLONOSCOPY N/A 8/18/2021 August 18, 2021--colonoscopy reveals a 6 mm polyp in the transverse colon.  There was an 8 mm polyp in the descending colon.  A 20 mm polyp was found at 50 cm proximal to the anus.  Resection was incomplete.  The resected tissue was retrieved and coagulation for destruction was performed.  #4, sessile polyps in the sigmoid colon that were 5 to 6 mm in size.  Removed.  #2, sessile polyps in the sig   • CORONARY ARTERY BYPASS GRAFT  01/2005 January 2005 status post four-vessel CABG.   • ENDOSCOPY N/A 8/18/2021 August 18, 2021--EGD reveals grade 2 varices in the lower third of the esophagus.  A varix with no bleeding was found in the cardia.  No stigmata of recent bleeding.  Mild portal hypertensive gastropathy was found in the gastric fundus.         No Known Allergies        Current Outpatient Medications:   •  aspirin 81 MG tablet, Take 1 tablet by mouth Daily., Disp: , Rfl:   •  atorvastatin (LIPITOR) 80 MG tablet, TAKE 1 TABLET BY MOUTH DAILY FOR HIGH CHOLESTEROL, Disp: 90 tablet, Rfl: 3  •  carvedilol (Coreg) 3.125 MG tablet, Take 1 tablet by mouth 2 (Two) Times a Day With Meals., Disp: 60 tablet, Rfl: 3  •  Empagliflozin (Jardiance) 25 MG tablet, Take 25 mg by mouth Every Morning Before Breakfast. For diabetes, Disp: 90 tablet, Rfl: 1  •  folic acid (FOLVITE) 1 MG tablet, TAKE 1 TABLET BY MOUTH DAILY, Disp: 30 tablet, Rfl: 2  •  glimepiride (AMARYL) 4 MG tablet, Take 1 p.o. daily at supper for diabetes, Disp: 90 tablet, Rfl: 3  •  Insulin Pen Needle 32G X 4 MM misc, 1 each Daily., Disp: 100 each, Rfl: 0  •  Liraglutide (Victoza) 18 MG/3ML solution pen-injector injection, ADMINISTER 1.8 MG UNDER THE SKIN DAILY AS DIRECTED FOR DIABETES, Disp: 9 mL, Rfl: 3  •  SITagliptin-metFORMIN HCl ER (Janumet XR)  MG tablet, TAKE 1 TABLET BY MOUTH TWICE DAILY FOR DIABETES, Disp: 60 tablet, Rfl: 5  •  vitamin  "D3 125 MCG (5000 UT) capsule capsule, 1 by mouth daily as directed, Disp: 30 capsule, Rfl: 11      Family History   Problem Relation Age of Onset   • Other Mother         Ventricular Septal Defect   • Heart disease Mother    • Hypertension Mother    • Cancer Mother    • Diabetes Mother    • Heart attack Father         Prior Myocardial Infarction.  Dad  from a myocardial infarction at age 59.   • Heart disease Father    • Heart disease Other         Congenital Heart Disease. Multiple family members with septal defects that required surgery.   • Heart disease Sister    • Heart disease Brother    • Cancer Daughter          Social History     Socioeconomic History   • Marital status:      Spouse name: Dasha   • Number of children: 8   • Highest education level: 9th grade   Tobacco Use   • Smoking status: Former Smoker     Types: Cigarettes     Quit date:      Years since quittin.1   • Smokeless tobacco: Never Used   • Tobacco comment: Former smoker quit 13 years ago with a 64.5 pack year history.  Smoked 1 ppd for 32 years and 4.5 ppd for 5 years and quit when he had his open heart surgery.   Vaping Use   • Vaping Use: Never used   Substance and Sexual Activity   • Alcohol use: No   • Drug use: No   • Sexual activity: Yes     Partners: Female         Vitals:    22 1433   BP: 128/60   Pulse: 88   Resp: 18   SpO2: 96%   Weight: 101 kg (222 lb 12.8 oz)   Height: 175.3 cm (69\")        Body mass index is 32.9 kg/m².      Physical Exam:    General: Alert and oriented x 3.  No acute distress.  Normal affect.  Obese.  HEENT: Pupils equal, round, reactive to light; extraocular movements intact; sclerae nonicteric; pharynx, ear canals and TMs normal.  Neck: Without JVD, thyromegaly, bruit, or adenopathy.  Lungs: Clear to auscultation in all fields.  Heart: Regular rate and rhythm without murmur, rub, gallop, or click.  Abdomen: Soft, nontender, without hepatosplenomegaly or hernia.  Bowel sounds " normal.  : Deferred.  Rectal: Deferred.  Extremities: Without clubbing, cyanosis, edema, or pulse deficit.  Neurologic: Intact without focal deficit.  Normal station and gait observed during ingress and egress from the examination room.  Skin: Without significant lesion.  Musculoskeletal: Unremarkable.    Lab/other results:    CBC is normal including platelets of 155.  CPK normal.  CMP normal except glucose 162.  Alkaline phosphatase is elevated at 231.  Hemoglobin A1c 7.6.  Thyroid function tests are normal.  NMR reveals total cholesterol 133.  Triglycerides 91.  LDL particle #918.  HDL particle #19.3.  Microalbumin/creatinine ratio 5.7.  Vitamin D normal at 33.9.      Assessment/Plan:     Diagnosis Plan   1. Type 2 diabetes mellitus with microalbuminuria, without long-term current use of insulin (HCC)  Comprehensive Metabolic Panel    Hemoglobin A1c    Microalbumin / Creatinine Urine Ratio - Urine, Clean Catch    Urinalysis With Microscopic If Indicated (No Culture) - Urine, Clean Catch   2. Hyperlipidemia  CK    Comprehensive Metabolic Panel    NMR LipoProfile    TSH    T4, Free    T3, Free   3. Microalbuminuria  Microalbumin / Creatinine Urine Ratio - Urine, Clean Catch   4. RUIZ (nonalcoholic steatohepatitis)     5. Vitamin D deficiency  Vitamin D 25 Hydroxy   6. Occlusive coronary artery disease, 01/01/2005--status post MI.  January 2005--CABG ×4.  Details not known.     7. History of myocardial infarction, 2005.     8. Folic acid deficiency     9. Hypogonadism male, TRT ineffective     10. Obstructive sleep apnea, 11/13/2012--mild to moderate NIKI.  Unable to tolerate CPAP.  Cannot use oral appliance.     11. Sleep related hypoxia     12. Chronic ITP (idiopathic thrombocytopenia) (HCC)  CBC (No Diff)   13. Polycythemia  CBC (No Diff)   14. Elevated liver enzymes     15. History of colon polyps, 8/18/2021--tubular adenoma x12.     16. Liver cirrhosis secondary to RUIZ (HCC)     17. Esophageal varices  determined by endoscopy (HCC)     18. Non morbid obesity     19. History of COVID-19     20. Microcytic anemia     21. Therapeutic drug monitoring  CBC (No Diff)    CK    Comprehensive Metabolic Panel    Hemoglobin A1c    NMR LipoProfile    Microalbumin / Creatinine Urine Ratio - Urine, Clean Catch    Vitamin D 25 Hydroxy    Urinalysis With Microscopic If Indicated (No Culture) - Urine, Clean Catch    TSH    T4, Free    T3, Free    PSA DIAGNOSTIC   22. Routine physical examination  CBC (No Diff)    CK    Comprehensive Metabolic Panel    Hemoglobin A1c    NMR LipoProfile    Microalbumin / Creatinine Urine Ratio - Urine, Clean Catch    Vitamin D 25 Hydroxy    Urinalysis With Microscopic If Indicated (No Culture) - Urine, Clean Catch    TSH    T4, Free    T3, Free    PSA DIAGNOSTIC     Patient has type 2 diabetes is under fairly reasonable control.  This is associated with hyperlipidemia and microalbuminuria.  Microalbuminuria is mild.  Hyperlipidemia is under fairly reasonable control and the best that we can get it without patient working harder on his diet.  Strongly recommend this.  Patient has Maxwell and he also has cirrhosis with esophageal varices and this has been evaluated by the gastroenterology service.  Vitamin D has been in normal range.  His occlusive coronary artery disease has been stable.  He has a history of hypergonadism which we are not treating because it was ineffective and also his multiple risk factors are playing a role.  Patient has sleep apnea and cannot tolerate CPAP or an oral appliance.  Chronic ITP may have resolved but we will continue to monitor.  Polycythemia has resolved.  Elevated liver enzymes are now normal.  Patient has a history of colon polyps and is up-to-date on his colonoscopy.  He has nonmorbid obesity as noted.  He also has a history of COVID-19 infection and does not want the Covid vaccine.  Microcytic anemia has resolved.    Plan is as follows: Strongly recommend low  carbohydrate diet, exercise, and weight loss.  I am not going to make any changes to his current medical regimen.  I will have him follow-up in 4 months with lab prior for his annual physical.  Otherwise he will follow-up as needed.      Procedures

## 2022-03-10 ENCOUNTER — TELEPHONE (OUTPATIENT)
Dept: GASTROENTEROLOGY | Facility: CLINIC | Age: 67
End: 2022-03-10

## 2022-03-10 NOTE — TELEPHONE ENCOUNTER
----- Message from Ruth Ann Salazar MD sent at 3/10/2022  3:36 PM EST -----  U/s shows stable appearing liver - no new changes - cirrhosis again noted

## 2022-07-03 DIAGNOSIS — R80.9 TYPE 2 DIABETES MELLITUS WITH MICROALBUMINURIA, WITHOUT LONG-TERM CURRENT USE OF INSULIN: Chronic | ICD-10-CM

## 2022-07-03 DIAGNOSIS — E11.29 TYPE 2 DIABETES MELLITUS WITH MICROALBUMINURIA, WITHOUT LONG-TERM CURRENT USE OF INSULIN: Chronic | ICD-10-CM

## 2022-07-05 RX ORDER — LIRAGLUTIDE 6 MG/ML
INJECTION SUBCUTANEOUS
Qty: 9 ML | Refills: 3 | Status: SHIPPED | OUTPATIENT
Start: 2022-07-05 | End: 2022-07-21

## 2022-07-13 ENCOUNTER — LAB (OUTPATIENT)
Dept: LAB | Facility: HOSPITAL | Age: 67
End: 2022-07-13

## 2022-07-13 DIAGNOSIS — Z51.81 THERAPEUTIC DRUG MONITORING: ICD-10-CM

## 2022-07-13 DIAGNOSIS — E78.2 MIXED HYPERLIPIDEMIA: Chronic | ICD-10-CM

## 2022-07-13 DIAGNOSIS — E11.29 TYPE 2 DIABETES MELLITUS WITH MICROALBUMINURIA, WITHOUT LONG-TERM CURRENT USE OF INSULIN: Chronic | ICD-10-CM

## 2022-07-13 DIAGNOSIS — R80.9 MICROALBUMINURIA: Chronic | ICD-10-CM

## 2022-07-13 DIAGNOSIS — D69.3 CHRONIC ITP (IDIOPATHIC THROMBOCYTOPENIA): Chronic | ICD-10-CM

## 2022-07-13 DIAGNOSIS — E55.9 VITAMIN D DEFICIENCY: Chronic | ICD-10-CM

## 2022-07-13 DIAGNOSIS — Z00.00 ROUTINE PHYSICAL EXAMINATION: ICD-10-CM

## 2022-07-13 DIAGNOSIS — R80.9 TYPE 2 DIABETES MELLITUS WITH MICROALBUMINURIA, WITHOUT LONG-TERM CURRENT USE OF INSULIN: Chronic | ICD-10-CM

## 2022-07-13 DIAGNOSIS — D75.1 POLYCYTHEMIA: Chronic | ICD-10-CM

## 2022-07-13 LAB
25(OH)D3 SERPL-MCNC: 42.6 NG/ML (ref 30–100)
ALBUMIN SERPL-MCNC: 3.9 G/DL (ref 3.5–5.2)
ALBUMIN UR-MCNC: 3 MG/DL
ALBUMIN/GLOB SERPL: 1.4 G/DL
ALP SERPL-CCNC: 123 U/L (ref 39–117)
ALT SERPL W P-5'-P-CCNC: 18 U/L (ref 1–41)
ANION GAP SERPL CALCULATED.3IONS-SCNC: 8 MMOL/L (ref 5–15)
AST SERPL-CCNC: 18 U/L (ref 1–40)
BILIRUB SERPL-MCNC: 0.8 MG/DL (ref 0–1.2)
BILIRUB UR QL STRIP: NEGATIVE
BUN SERPL-MCNC: 10 MG/DL (ref 8–23)
BUN/CREAT SERPL: 14.5 (ref 7–25)
CALCIUM SPEC-SCNC: 9 MG/DL (ref 8.6–10.5)
CHLORIDE SERPL-SCNC: 106 MMOL/L (ref 98–107)
CK SERPL-CCNC: 86 U/L (ref 20–200)
CLARITY UR: CLEAR
CO2 SERPL-SCNC: 26 MMOL/L (ref 22–29)
COLOR UR: YELLOW
CREAT SERPL-MCNC: 0.69 MG/DL (ref 0.76–1.27)
CREAT UR-MCNC: 217.6 MG/DL
DEPRECATED RDW RBC AUTO: 40.5 FL (ref 37–54)
EGFRCR SERPLBLD CKD-EPI 2021: 102.7 ML/MIN/1.73
ERYTHROCYTE [DISTWIDTH] IN BLOOD BY AUTOMATED COUNT: 13.1 % (ref 12.3–15.4)
GLOBULIN UR ELPH-MCNC: 2.7 GM/DL
GLUCOSE SERPL-MCNC: 238 MG/DL (ref 65–99)
GLUCOSE UR STRIP-MCNC: ABNORMAL MG/DL
HBA1C MFR BLD: 11.4 % (ref 4.8–5.6)
HCT VFR BLD AUTO: 43.6 % (ref 37.5–51)
HGB BLD-MCNC: 14.1 G/DL (ref 13–17.7)
HGB UR QL STRIP.AUTO: NEGATIVE
KETONES UR QL STRIP: NEGATIVE
LEUKOCYTE ESTERASE UR QL STRIP.AUTO: NEGATIVE
MCH RBC QN AUTO: 27.5 PG (ref 26.6–33)
MCHC RBC AUTO-ENTMCNC: 32.3 G/DL (ref 31.5–35.7)
MCV RBC AUTO: 85 FL (ref 79–97)
MICROALBUMIN/CREAT UR: 13.8 MG/G
NITRITE UR QL STRIP: NEGATIVE
PH UR STRIP.AUTO: 5.5 [PH] (ref 5–8)
PLATELET # BLD AUTO: 74 10*3/MM3 (ref 140–450)
POTASSIUM SERPL-SCNC: 4.2 MMOL/L (ref 3.5–5.2)
PROT SERPL-MCNC: 6.6 G/DL (ref 6–8.5)
PROT UR QL STRIP: ABNORMAL
PSA SERPL-MCNC: 0.11 NG/ML (ref 0–4)
RBC # BLD AUTO: 5.13 10*6/MM3 (ref 4.14–5.8)
SODIUM SERPL-SCNC: 140 MMOL/L (ref 136–145)
SP GR UR STRIP: >=1.03 (ref 1–1.03)
T3FREE SERPL-MCNC: 2.86 PG/ML (ref 2–4.4)
T4 FREE SERPL-MCNC: 0.87 NG/DL (ref 0.93–1.7)
TSH SERPL DL<=0.05 MIU/L-ACNC: 2.17 UIU/ML (ref 0.27–4.2)
UROBILINOGEN UR QL STRIP: ABNORMAL
WBC NRBC COR # BLD: 3.25 10*3/MM3 (ref 3.4–10.8)

## 2022-07-13 PROCEDURE — 80050 GENERAL HEALTH PANEL: CPT

## 2022-07-13 PROCEDURE — 81003 URINALYSIS AUTO W/O SCOPE: CPT

## 2022-07-13 PROCEDURE — 84439 ASSAY OF FREE THYROXINE: CPT

## 2022-07-13 PROCEDURE — 83036 HEMOGLOBIN GLYCOSYLATED A1C: CPT

## 2022-07-13 PROCEDURE — 82306 VITAMIN D 25 HYDROXY: CPT

## 2022-07-13 PROCEDURE — 36415 COLL VENOUS BLD VENIPUNCTURE: CPT

## 2022-07-13 PROCEDURE — 82570 ASSAY OF URINE CREATININE: CPT

## 2022-07-13 PROCEDURE — 83704 LIPOPROTEIN BLD QUAN PART: CPT

## 2022-07-13 PROCEDURE — 84153 ASSAY OF PSA TOTAL: CPT

## 2022-07-13 PROCEDURE — 80061 LIPID PANEL: CPT

## 2022-07-13 PROCEDURE — 84481 FREE ASSAY (FT-3): CPT

## 2022-07-13 PROCEDURE — 82550 ASSAY OF CK (CPK): CPT

## 2022-07-13 PROCEDURE — 82043 UR ALBUMIN QUANTITATIVE: CPT

## 2022-07-15 LAB
CHOLEST SERPL-MCNC: 128 MG/DL (ref 100–199)
HDL SERPL-SCNC: 25 UMOL/L
HDLC SERPL-MCNC: 58 MG/DL
LDL SERPL QN: 20.8 NM
LDL SERPL-SCNC: 469 NMOL/L
LDL SMALL SERPL-SCNC: <90 NMOL/L
LDLC SERPL CALC-MCNC: 54 MG/DL (ref 0–99)
TRIGL SERPL-MCNC: 80 MG/DL (ref 0–149)

## 2022-07-21 ENCOUNTER — OFFICE VISIT (OUTPATIENT)
Dept: INTERNAL MEDICINE | Facility: CLINIC | Age: 67
End: 2022-07-21

## 2022-07-21 VITALS
OXYGEN SATURATION: 96 % | WEIGHT: 220 LBS | RESPIRATION RATE: 18 BRPM | SYSTOLIC BLOOD PRESSURE: 128 MMHG | HEART RATE: 75 BPM | BODY MASS INDEX: 32.58 KG/M2 | DIASTOLIC BLOOD PRESSURE: 70 MMHG | HEIGHT: 69 IN

## 2022-07-21 DIAGNOSIS — E11.29 TYPE 2 DIABETES MELLITUS WITH MICROALBUMINURIA, WITHOUT LONG-TERM CURRENT USE OF INSULIN: Chronic | ICD-10-CM

## 2022-07-21 DIAGNOSIS — E53.8 FOLIC ACID DEFICIENCY: Chronic | ICD-10-CM

## 2022-07-21 DIAGNOSIS — D50.9 MICROCYTIC ANEMIA: ICD-10-CM

## 2022-07-21 DIAGNOSIS — I85.00 ESOPHAGEAL VARICES DETERMINED BY ENDOSCOPY: Chronic | ICD-10-CM

## 2022-07-21 DIAGNOSIS — E66.9 NON MORBID OBESITY: Chronic | ICD-10-CM

## 2022-07-21 DIAGNOSIS — G47.33 OBSTRUCTIVE SLEEP APNEA: Chronic | ICD-10-CM

## 2022-07-21 DIAGNOSIS — R80.9 TYPE 2 DIABETES MELLITUS WITH MICROALBUMINURIA, WITHOUT LONG-TERM CURRENT USE OF INSULIN: Chronic | ICD-10-CM

## 2022-07-21 DIAGNOSIS — Z51.81 THERAPEUTIC DRUG MONITORING: ICD-10-CM

## 2022-07-21 DIAGNOSIS — K75.81 LIVER CIRRHOSIS SECONDARY TO NASH: Chronic | ICD-10-CM

## 2022-07-21 DIAGNOSIS — I25.10 OCCLUSIVE CORONARY ARTERY DISEASE: Chronic | ICD-10-CM

## 2022-07-21 DIAGNOSIS — K75.81 NASH (NONALCOHOLIC STEATOHEPATITIS): Chronic | ICD-10-CM

## 2022-07-21 DIAGNOSIS — E29.1 HYPOGONADISM MALE: Chronic | ICD-10-CM

## 2022-07-21 DIAGNOSIS — E11.65 POORLY CONTROLLED TYPE 2 DIABETES MELLITUS: ICD-10-CM

## 2022-07-21 DIAGNOSIS — I25.2 HISTORY OF MYOCARDIAL INFARCTION: Chronic | ICD-10-CM

## 2022-07-21 DIAGNOSIS — R80.9 MICROALBUMINURIA: Chronic | ICD-10-CM

## 2022-07-21 DIAGNOSIS — E55.9 VITAMIN D DEFICIENCY: Chronic | ICD-10-CM

## 2022-07-21 DIAGNOSIS — Z00.00 ROUTINE PHYSICAL EXAMINATION: Primary | ICD-10-CM

## 2022-07-21 DIAGNOSIS — D69.3 CHRONIC ITP (IDIOPATHIC THROMBOCYTOPENIA): Chronic | ICD-10-CM

## 2022-07-21 DIAGNOSIS — Z86.010 HISTORY OF COLON POLYPS: Chronic | ICD-10-CM

## 2022-07-21 DIAGNOSIS — Z23 NEED FOR PNEUMOCOCCAL VACCINATION: ICD-10-CM

## 2022-07-21 DIAGNOSIS — E78.2 MIXED HYPERLIPIDEMIA: Chronic | ICD-10-CM

## 2022-07-21 DIAGNOSIS — G47.34 SLEEP RELATED HYPOXIA: Chronic | ICD-10-CM

## 2022-07-21 DIAGNOSIS — E11.9 ENCOUNTER FOR DIABETIC FOOT EXAM: Chronic | ICD-10-CM

## 2022-07-21 DIAGNOSIS — K74.60 LIVER CIRRHOSIS SECONDARY TO NASH: Chronic | ICD-10-CM

## 2022-07-21 DIAGNOSIS — Z82.49 FAMILY HISTORY OF PREMATURE CORONARY ARTERY DISEASE: Chronic | ICD-10-CM

## 2022-07-21 DIAGNOSIS — R74.8 ELEVATED LIVER ENZYMES: Chronic | ICD-10-CM

## 2022-07-21 DIAGNOSIS — Z86.16 HISTORY OF COVID-19: Chronic | ICD-10-CM

## 2022-07-21 PROBLEM — D75.1 POLYCYTHEMIA: Chronic | Status: RESOLVED | Noted: 2019-03-18 | Resolved: 2022-07-21

## 2022-07-21 PROCEDURE — 99214 OFFICE O/P EST MOD 30 MIN: CPT | Performed by: INTERNAL MEDICINE

## 2022-07-21 PROCEDURE — 90471 IMMUNIZATION ADMIN: CPT | Performed by: INTERNAL MEDICINE

## 2022-07-21 PROCEDURE — 90677 PCV20 VACCINE IM: CPT | Performed by: INTERNAL MEDICINE

## 2022-07-21 PROCEDURE — 99397 PER PM REEVAL EST PAT 65+ YR: CPT | Performed by: INTERNAL MEDICINE

## 2022-07-21 RX ORDER — GLIMEPIRIDE 4 MG/1
TABLET ORAL
Qty: 180 TABLET | Refills: 3 | Status: SHIPPED | OUTPATIENT
Start: 2022-07-21

## 2022-07-21 RX ORDER — ORAL SEMAGLUTIDE 3 MG/1
3 TABLET ORAL
Qty: 30 TABLET | Refills: 0 | Status: SHIPPED | OUTPATIENT
Start: 2022-07-21 | End: 2022-08-18

## 2022-07-21 NOTE — PROGRESS NOTES
07/21/2022    Patient Information  Rajan Dc                                                                                          [address] [place] [id number]      1955  [unfilled]  [phone] (work)    Chief Complaint:     Routine physical examination follow-up blood work in order to monitor chronic medical issues listed in history of present illness.  No new acute complaints.    History of Present Illness:    Patient with multiple medical problems including type 2 diabetes, microalbuminuria, hyperlipidemia, Ruiz, elevated liver enzymes, cirrhosis of the liver related to RUIZ, esophageal varices, history of colon polyps, vitamin D deficiency, occlusive coronary artery disease, history of myocardial infarction, folic acid deficiency, hypogonadism, obstructive sleep apnea, sleep-related hypoxia, family history of premature coronary artery disease, chronic ITP, previous microcytic anemia, history of COVID-19 infection, nonmorbid obesity.  He presents today for his routine annual physical examination and follow-up blood work.  His past medical history reviewed and updated were necessary including health maintenance parameters.  This reveals he is up-to-date or else accounted for after today's visit with the exception of Prevnar 20 and diabetic eye exam as well as AAA screening.  See below.  Patient also needs Zostavax.    Review of Systems   Constitutional: Negative.   HENT: Negative.    Eyes: Negative.    Cardiovascular: Negative.    Respiratory: Negative.    Endocrine: Negative.    Hematologic/Lymphatic: Negative.    Skin: Negative.    Musculoskeletal: Negative.    Gastrointestinal: Negative.    Genitourinary: Negative.    Neurological: Negative.    Psychiatric/Behavioral: Negative.    Allergic/Immunologic: Negative.        Active Problems:    Patient Active Problem List   Diagnosis   • Occlusive coronary artery disease, 01/01/2005--status post MI.  January 2005--CABG ×4.   Details not known.   • Folic acid deficiency   • Hyperlipidemia   • Hypogonadism male, TRT ineffective   • Microalbuminuria   • RUIZ (nonalcoholic steatohepatitis)   • Obstructive sleep apnea, 11/13/2012--mild to moderate NIKI.  Unable to tolerate CPAP.  Cannot use oral appliance.   • Sleep related hypoxia   • Type 2 diabetes mellitus with microalbuminuria, without long-term current use of insulin (HCC)   • Vitamin D deficiency   • Therapeutic drug monitoring   • Routine physical examination   • Family history of premature coronary artery disease   • Diabetic eye exam (Formerly [facility])   • Diabetic foot exam   • History of myocardial infarction, 2005.   • Chronic ITP (idiopathic thrombocytopenia) (Formerly [facility])   • Elevated liver enzymes   • Male erectile disorder   • Microcytic anemia   • Non morbid obesity   • History of COVID-19   • History of colon polyps, 8/18/2021--tubular adenoma x12.   • Esophageal varices determined by endoscopy (Formerly [facility])   • Liver cirrhosis secondary to RUIZ (HCC)   • Diverticulosis of colon         Past Medical History:   Diagnosis Date   • Chronic ITP (idiopathic thrombocytopenia) (Formerly [facility]) 09/19/2018   • Diabetic eye exam (Formerly [facility]) 05/22/2017 05/22/2017--routine diabetic eye exam reveals no evidence of diabetic retinopathy.  Astigmatism, presbyopia, hypermetropia noted.  Very early cataracts noted bilaterally.  October 2015--patient reports a routine diabetic eye exam without evidence of diabetic retinopathy.   • Diabetic foot exam 03/12/2017 03/14/2017--diabetic foot exam reveals no evidence of diabetic foot ulcer or pre-ulcerative callus.  Distal pulses palpable.  No signs of ischemia.  Sensation subjectively intact per monofilament.   • Diverticulosis of colon 10/28/2021    August 18, 2021--colonoscopy reveals a 6 mm polyp in the transverse colon.  There was an 8 mm polyp in the descending colon.  A 20 mm polyp was found at 50 cm proximal to the anus.  Resection was incomplete.  The resected tissue was  retrieved and coagulation for destruction was performed.  #4, sessile polyps in the sigmoid colon that were 5 to 6 mm in size.  Removed.  #2, sessile polyps in the sig   • Elevated liver enzymes 2019--AST is normal at 27, ALT normal at 2 9.  Alkaline phosphatase mildly elevated at 137.  Bilirubin is normal.  2019--ultrasound liver ordered by the gastroenterologist reveals increased hepatic echogenicity consistent with steatosis.  Previous cholecystectomy.  No biliary ductal dilatation.  2019--patient was apparently referred to the gastroenterologist by   • Esophageal varices determined by endoscopy (HCC) 10/28/2021    2021--EGD reveals grade 2 varices in the lower third of the esophagus.  A varix with no bleeding was found in the cardia.  No stigmata of recent bleeding.  Mild portal hypertensive gastropathy was found in the gastric fundus.   • Family history of premature coronary artery disease 2016    Father  from a myocardial infarction age 61.   • Folic acid deficiency 2016   • History of Abnormal pulse oximetry 10/07/2012    10/07/2012--overnight oximetry test revealed significant nocturnal hypoxemia. Oxygen saturations were greater than 90% for only 50.5% of the study. Oxygen saturation less than 90% for three hours 47 minutes which was 49.5% of the study. Oxygen saturation less than 88% for one hour and 36 minutes and 48 seconds, 21.1% of the study.   • History of CABG 2005 status post four-vessel CABG.   • History of colon polyps, 2021--tubular adenoma x12. 10/28/2021    2021--colonoscopy reveals a 6 mm polyp in the transverse colon.  There was an 8 mm polyp in the descending colon.  A 20 mm polyp was found at 50 cm proximal to the anus.  Resection was incomplete.  The resected tissue was retrieved and coagulation for destruction was performed.  #4, sessile polyps in the sigmoid colon that were 5  to 6 mm in size.  Removed.  #2, sessile polyps in the sig   • History of COVID-19 06/02/2021   • History of myocardial infarction, 2005. 04/28/2016 01/01/2005--status post myocardial infarction.   January 2005--status post four-vessel CABG   • Hyperlipidemia 04/28/2016   • Hypogonadism male, TRT ineffective 09/13/2012 09/13/2012--treatment for symptomatic hypogonadism begun. This was later discontinued due to lack of efficacy and cost.   • Liver cirrhosis secondary to RUIZ (HCC) 10/28/2021    August 18, 2021--EGD reveals grade 2 varices in the lower third of the esophagus.  A varix with no bleeding was found in the cardia.  No stigmata of recent bleeding.  Mild portal hypertensive gastropathy was found in the gastric fundus.  October 2, 2019--ultrasound liver ordered by the gastroenterologist reveals increased hepatic echogenicity consistent with steatosis.  Previous cholecystectomy.     • Male erectile disorder 08/27/2020   • Microalbuminuria 04/25/2014 04/25/2014--urine microalbumin elevated 28.7. Normal range 0.0--17.0.   • RUIZ (nonalcoholic steatohepatitis) 04/28/2016   • Non morbid obesity 06/02/2021   • Obstructive sleep apnea, 11/13/2012--mild to moderate NIKI.  Unable to tolerate CPAP.  Cannot use oral appliance. 10/07/2012    05/02/2014--nocturnal oxygen 2 L per nasal cannula ordered.   12/03/2013--patient was referred for an oral appliance but this could not be done because patient wears dentures.   11/13/2012--diagnosed with mild to moderate obstructive sleep apnea. Patient unable to tolerate CPAP.   10/07/2012--overnight oximetry test revealed significant nocturnal hypoxemia. Oxygen saturations were greater than 90% for only 50.5% of the study. Oxygen saturation less than 90% for three hours 47 minutes which was 49.5% of the study. Oxygen saturation less than 88% for one hour and 36 minutes and 48 seconds, 21.1% of the study.   • Occlusive coronary artery disease, 01/01/2005--status post MI.   January 2005--CABG ×4.  Details not known. 01/01/2005 01/01/2005--status post myocardial infarction.   January 2005--status post four-vessel CABG   • Sleep related hypoxia 10/07/2012    05/02/2014--nocturnal oxygen 2 L per nasal cannula ordered.  12/03/2013--patient was referred for an oral appliance but this could not be done because patient wears dentures.   11/13/2012--diagnosed with mild to moderate obstructive sleep apnea. Patient unable to tolerate CPAP.   10/07/2012--overnight oximetry test revealed significant nocturnal hypoxemia. Oxygen saturations were greater than 90% for only 50.5% of the study. Oxygen saturation less than 90% for three hours 47 minutes which was 49.5% of the study. Oxygen saturation less than 88% for one hour and 36 minutes and 48 seconds, 21.1% of the study.   • Type 2 diabetes mellitus with microalbuminuria, without long-term current use of insulin (HCC) 01/01/2005    Diagnosed in 2005.   • Vitamin D deficiency 04/28/2016         Past Surgical History:   Procedure Laterality Date   • CHOLECYSTECTOMY WITH INTRAOPERATIVE CHOLANGIOGRAM N/A 6/24/2019    Procedure: laparoscopic cholecystectomy with intraoperative cholangiogram;  Surgeon: Vida Avilez MD;  Location: [facility];  Service: General   • COLONOSCOPY N/A 8/18/2021 August 18, 2021--colonoscopy reveals a 6 mm polyp in the transverse colon.  There was an 8 mm polyp in the descending colon.  A 20 mm polyp was found at 50 cm proximal to the anus.  Resection was incomplete.  The resected tissue was retrieved and coagulation for destruction was performed.  #4, sessile polyps in the sigmoid colon that were 5 to 6 mm in size.  Removed.  #2, sessile polyps in the sig   • CORONARY ARTERY BYPASS GRAFT  01/2005 January 2005 status post four-vessel CABG.   • ENDOSCOPY N/A 8/18/2021 August 18, 2021--EGD reveals grade 2 varices in the lower third of the esophagus.  A varix with no bleeding was found in the cardia.  No stigmata  of recent bleeding.  Mild portal hypertensive gastropathy was found in the gastric fundus.         No Known Allergies        Current Outpatient Medications:   •  aspirin 81 MG tablet, Take 1 tablet by mouth Daily., Disp: , Rfl:   •  atorvastatin (LIPITOR) 80 MG tablet, TAKE 1 TABLET BY MOUTH DAILY FOR HIGH CHOLESTEROL, Disp: 90 tablet, Rfl: 3  •  carvedilol (Coreg) 3.125 MG tablet, Take 1 tablet by mouth 2 (Two) Times a Day With Meals., Disp: 60 tablet, Rfl: 3  •  Empagliflozin (Jardiance) 25 MG tablet, Take 25 mg by mouth Every Morning Before Breakfast. For diabetes, Disp: 90 tablet, Rfl: 1  •  folic acid (FOLVITE) 1 MG tablet, TAKE 1 TABLET BY MOUTH DAILY, Disp: 30 tablet, Rfl: 2  •  glimepiride (AMARYL) 4 MG tablet, Take 1 p.o. twice daily at breakfast and supper for diabetes, Disp: 180 tablet, Rfl: 3  •  SITagliptin-metFORMIN HCl ER (Janumet XR)  MG tablet, TAKE 1 TABLET BY MOUTH TWICE DAILY FOR DIABETES, Disp: 60 tablet, Rfl: 5  •  vitamin D3 125 MCG (5000 UT) capsule capsule, 1 by mouth daily as directed, Disp: 30 capsule, Rfl: 11  •  Semaglutide (Rybelsus) 3 MG tablet, Take 1 tablet by mouth Daily Before Supper. For diabetes, Disp: 30 tablet, Rfl: 0      Family History   Problem Relation Age of Onset   • Other Mother         Ventricular Septal Defect   • Heart disease Mother    • Hypertension Mother    • Cancer Mother    • Diabetes Mother    • Heart attack Father         Prior Myocardial Infarction.  Dad  from a myocardial infarction at age 59.   • Heart disease Father    • Heart disease Other         Congenital Heart Disease. Multiple family members with septal defects that required surgery.   • Heart disease Sister    • Heart disease Brother    • Cancer Daughter          Social History     Socioeconomic History   • Marital status:      Spouse name: Dasha   • Number of children: 8   • Highest education level: 9th grade   Tobacco Use   • Smoking status: Former Smoker     Types: Cigarettes  "    Quit date:      Years since quittin.5   • Smokeless tobacco: Never Used   • Tobacco comment: Former smoker quit 13 years ago with a 64.5 pack year history.  Smoked 1 ppd for 32 years and 4.5 ppd for 5 years and quit when he had his open heart surgery.   Vaping Use   • Vaping Use: Never used   Substance and Sexual Activity   • Alcohol use: No   • Drug use: No   • Sexual activity: Yes     Partners: Female         Vitals:    22 0835   BP: 128/70   Pulse: 75   Resp: 18   SpO2: 96%   Weight: 99.8 kg (220 lb)   Height: 175.3 cm (69\")        Body mass index is 32.49 kg/m².      Physical Exam:    General: Alert and oriented x 3.  No acute distress.  Obese.  Normal affect.  HEENT: Pupils equal, round, reactive to light; extraocular movements intact; sclerae nonicteric; pharynx, ear canals and TMs normal.  Neck: Without JVD, thyromegaly, bruit, or adenopathy.  Lungs: Clear to auscultation in all fields.  Heart: Regular rate and rhythm without murmur, rub, gallop, or click.  Abdomen: Soft, nontender, without hepatosplenomegaly or hernia.  Bowel sounds normal.  : Deferred.  Rectal: Deferred.  Extremities: Without clubbing, cyanosis, edema, or pulse deficit.  Neurologic: Intact without focal deficit.  Normal station and gait observed during ingress and egress from the examination room.  Skin: Without significant lesion.  Musculoskeletal: Unremarkable.    2022--routine diabetic foot exam reveals no evidence of diabetic foot ulcer or preulcerative callus.  Pulses palpable and sensation intact.    Lab/other results:    PSA normal at 0.112.  Thyroid function tests are normal.  Urinalysis reveals 3+ glucose and trace protein.  Vitamin D normal at 42.6.  Microalbumin/creatinine ratio 13.8.  Total cholesterol is 128, triglycerides 80, LDL particle number excellent 469, HDL particle number low at 25.0.  Hemoglobin A1c 11.4.  Serum glucose was 238 fasting.  Alkaline phosphatase slightly elevated at 123.  AST " and ALT are normal.  CPK normal at 86.  CBC normal except white count low at 3.25 and platelets low at 74.    Assessment/Plan:     Diagnosis Plan   1. Routine physical examination     2. Type 2 diabetes mellitus with microalbuminuria, without long-term current use of insulin (HCC)  Semaglutide (Rybelsus) 3 MG tablet    glimepiride (AMARYL) 4 MG tablet    Comprehensive Metabolic Panel    Hemoglobin A1c   3. Microalbuminuria     4. Hyperlipidemia  NMR LipoProfile    CK    Homocysteine   5. RUIZ (nonalcoholic steatohepatitis)     6. Elevated liver enzymes     7. Liver cirrhosis secondary to RUIZ (HCC)     8. Esophageal varices determined by endoscopy (HCC)     9. History of colon polyps, 8/18/2021--tubular adenoma x12.     10. Vitamin D deficiency     11. Occlusive coronary artery disease, 01/01/2005--status post MI.  January 2005--CABG ×4.  Details not known.     12. History of myocardial infarction, 2005.     13. Folic acid deficiency  Homocysteine   14. Hypogonadism male, TRT ineffective     15. Obstructive sleep apnea, 11/13/2012--mild to moderate NIKI.  Unable to tolerate CPAP.  Cannot use oral appliance.     16. Sleep related hypoxia     17. Family history of premature coronary artery disease     18. Diabetic foot exam     19. Chronic ITP (idiopathic thrombocytopenia) (HCC)     20. Microcytic anemia     21. History of COVID-19     22. Non morbid obesity     23. Therapeutic drug monitoring     24. Poorly controlled type 2 diabetes mellitus (HCC)     25. Need for pneumococcal vaccination  Pneumococcal Conjugate Vaccine 20-Valent (PCV20)     Patient presents for his routine physical exam/preventative visit and has several medical problems that need to be addressed.  He has type 2 diabetes that is under absolutely poor control.  Patient has nonmorbid obesity and he desperately needs to lose weight.  I have strongly recommended low carbohydrate diet, exercise, and weight loss.  His microalbuminuria is mild and stable.   Hyperlipidemia is under good control or at least as good control as we can get it which is very important given his coronary artery disease that seems to be stable at the present time.  However, patient is at risk for future cardiovascular events as well as stroke.  We had a long discussion regarding this today.  Vitamin D is in the normal range with supplementation.  Patient also has RUIZ and cirrhosis of the liver believed to be related to the RUIZ and this was also associated with esophageal varices.  He is followed by the gastroenterology service for this.  He has folic acid deficiency and homocystine needs to be checked at the next visit.  He also has a history of hypergonadism which cannot be treated for obvious reasons.  He has sleep apnea but cannot tolerate CPAP or an oral appliance.  This is very unfortunate given all of his risk factors and has problem with weight gain.  He has chronic ITP but his thrombocytopenia is also likely related to some element of hypersplenism given his esophageal varices and cirrhosis.  Microcytic anemia has resolved.  He has a history of colon polyps and is up-to-date on his colonoscopy.  Patient is also had an EGD within the past 6 months.    Several preventative health issues discussed including review of vaccinations and recommendations, including dietary issues, exercise and weight loss.  Safe sex practices discussed.  Patient advised to wear seatbelt whenever driving and avoid texting and driving.  Also advised to look both ways before crossing the street.  Patient is up-to-date on his colonoscopy advised to avoid tobacco products and minimize alcohol consumption.    Plan is as follows: I have once again strongly recommended low carbohydrate diet, exercise, and weight loss.  Medication adjustment is definitely indicated.  It appears that terzepatide has not covered by the patient's insurance plan and this is unfortunate given the excellent data this new class of medication  has in regards to glucose control as well as weight loss.  Patient is only taking glimepiride 4 mg once daily and we will increase this to 4 mg twice daily.  Start Rybelsus 3 mg/day for the next 30 days.  We will then plan on increasing to 7 mg/day.  Patient instructed to stay on Jardiance 25 mg/day and to stay on Janumet extended release 50/1001 p.o. twice daily.  Compliance with medication strongly discussed.  Patient's risk factor for future cardiovascular events including sudden death and myocardial infarction discussed.  Risk factors for stroke also discussed.  I made it clear that patient is at high risk for developing these complications of poorly controlled diabetes along with the rest of his multiple medical problems.  Prevnar 20 given.    Encouraged patient to get diabetic eye exam.  Strongly recommend vascular screen.  Encourage patient to check with his insurance company regarding Shingrix vaccine.  I will have patient follow-up in 3 to 4 weeks to reassess his situation without lab prior.  I can order it that day.        Procedures

## 2022-07-22 ENCOUNTER — TRANSCRIBE ORDERS (OUTPATIENT)
Dept: ADMINISTRATIVE | Facility: HOSPITAL | Age: 67
End: 2022-07-22

## 2022-07-22 DIAGNOSIS — Z13.6 ENCOUNTER FOR SCREENING FOR VASCULAR DISEASE: Primary | ICD-10-CM

## 2022-08-01 NOTE — OUTREACH NOTE
Prep Survey      Responses   Facility patient discharged from?  [facility]   Is patient eligible?  Yes   Discharge diagnosis  acute cholecystitis with cholecystectomy on 6/24   Does the patient have one of the following disease processes/diagnoses(primary or secondary)?  General Surgery   Does the patient have Home health ordered?  No   Is there a DME ordered?  No   Prep survey completed?  Yes          Rosalina Díaz RN        
- - -

## 2022-08-08 ENCOUNTER — TELEPHONE (OUTPATIENT)
Dept: INTERNAL MEDICINE | Facility: CLINIC | Age: 67
End: 2022-08-08

## 2022-08-08 NOTE — TELEPHONE ENCOUNTER
Caller: Devorah Dc    Relationship to patient: Emergency Contact    Best call back number: [phone]    Patient is needing: PATIENT IS COMING IN FOR AN APPOINTMENT 8-18-22 AND PATIENTS WIFE STATES PATIENT CAN NOT GET RID OF HIS HEADACHE. HE ALSO HAS NAUSEA. PATIENTS WIFE STATES PATIENT STOPPED TAKING VICTOZA AND WAS SWITCHED TO RYBELSUS AND PATIENTS WIFE IS WONDERING IF THAT MIGHT BE THE CAUSE OF THE HEADACHES. SHE IS REQUESTING A PHONE CALL BACK.

## 2022-08-09 NOTE — TELEPHONE ENCOUNTER
Tell the wife that the Rybelsus and Victoza are essentially the same thing.  Is just that the Rybelsus is oral instead of an injection.  I doubt this is causing it.  I do not have any appointments.  I do think the patient needs to be seen regarding this headache because it could be a severe problem.  Maybe he should go to the emergency room.

## 2022-08-10 ENCOUNTER — APPOINTMENT (OUTPATIENT)
Dept: CT IMAGING | Facility: HOSPITAL | Age: 67
End: 2022-08-10

## 2022-08-10 ENCOUNTER — HOSPITAL ENCOUNTER (EMERGENCY)
Facility: HOSPITAL | Age: 67
Discharge: HOME | End: 2022-08-10
Attending: EMERGENCY MEDICINE | Admitting: EMERGENCY MEDICINE

## 2022-08-10 VITALS
HEART RATE: 75 BPM | WEIGHT: 224.87 LBS | DIASTOLIC BLOOD PRESSURE: 68 MMHG | RESPIRATION RATE: 15 BRPM | SYSTOLIC BLOOD PRESSURE: 127 MMHG | HEIGHT: 69 IN | OXYGEN SATURATION: 98 % | BODY MASS INDEX: 33.31 KG/M2 | TEMPERATURE: 97.8 F

## 2022-08-10 DIAGNOSIS — R51.9 NONINTRACTABLE EPISODIC HEADACHE, UNSPECIFIED HEADACHE TYPE: Primary | ICD-10-CM

## 2022-08-10 DIAGNOSIS — R73.9 HYPERGLYCEMIA: ICD-10-CM

## 2022-08-10 LAB
ALBUMIN SERPL-MCNC: 3.6 G/DL (ref 3.5–5.2)
ALBUMIN/GLOB SERPL: 1.3 G/DL
ALP SERPL-CCNC: 141 U/L (ref 39–117)
ALT SERPL W P-5'-P-CCNC: 21 U/L (ref 1–41)
ANION GAP SERPL CALCULATED.3IONS-SCNC: 10 MMOL/L (ref 5–15)
AST SERPL-CCNC: 25 U/L (ref 1–40)
BASOPHILS # BLD AUTO: 0.01 10*3/MM3 (ref 0–0.2)
BASOPHILS NFR BLD AUTO: 0.3 % (ref 0–1.5)
BILIRUB SERPL-MCNC: 0.4 MG/DL (ref 0–1.2)
BUN SERPL-MCNC: 7 MG/DL (ref 8–23)
BUN/CREAT SERPL: 11.1 (ref 7–25)
CALCIUM SPEC-SCNC: 8.6 MG/DL (ref 8.6–10.5)
CHLORIDE SERPL-SCNC: 106 MMOL/L (ref 98–107)
CO2 SERPL-SCNC: 22 MMOL/L (ref 22–29)
CREAT SERPL-MCNC: 0.63 MG/DL (ref 0.76–1.27)
DEPRECATED RDW RBC AUTO: 44.1 FL (ref 37–54)
EGFRCR SERPLBLD CKD-EPI 2021: 105.6 ML/MIN/1.73
EOSINOPHIL # BLD AUTO: 0.08 10*3/MM3 (ref 0–0.4)
EOSINOPHIL NFR BLD AUTO: 2.4 % (ref 0.3–6.2)
ERYTHROCYTE [DISTWIDTH] IN BLOOD BY AUTOMATED COUNT: 14.2 % (ref 12.3–15.4)
GLOBULIN UR ELPH-MCNC: 2.7 GM/DL
GLUCOSE SERPL-MCNC: 232 MG/DL (ref 65–99)
HCT VFR BLD AUTO: 42.9 % (ref 37.5–51)
HGB BLD-MCNC: 13.8 G/DL (ref 13–17.7)
LYMPHOCYTES # BLD AUTO: 0.72 10*3/MM3 (ref 0.7–3.1)
LYMPHOCYTES NFR BLD AUTO: 21.4 % (ref 19.6–45.3)
MCH RBC QN AUTO: 27.5 PG (ref 26.6–33)
MCHC RBC AUTO-ENTMCNC: 32.2 G/DL (ref 31.5–35.7)
MCV RBC AUTO: 85.6 FL (ref 79–97)
MONOCYTES # BLD AUTO: 0.31 10*3/MM3 (ref 0.1–0.9)
MONOCYTES NFR BLD AUTO: 9.2 % (ref 5–12)
NEUTROPHILS NFR BLD AUTO: 2.24 10*3/MM3 (ref 1.7–7)
NEUTROPHILS NFR BLD AUTO: 66.4 % (ref 42.7–76)
PLATELET # BLD AUTO: 75 10*3/MM3 (ref 140–450)
POTASSIUM SERPL-SCNC: 3.7 MMOL/L (ref 3.5–5.2)
PROT SERPL-MCNC: 6.3 G/DL (ref 6–8.5)
RBC # BLD AUTO: 5.01 10*6/MM3 (ref 4.14–5.8)
SODIUM SERPL-SCNC: 138 MMOL/L (ref 136–145)
WBC NRBC COR # BLD: 3.37 10*3/MM3 (ref 3.4–10.8)

## 2022-08-10 PROCEDURE — 80053 COMPREHEN METABOLIC PANEL: CPT | Performed by: EMERGENCY MEDICINE

## 2022-08-10 PROCEDURE — 70450 CT HEAD/BRAIN W/O DYE: CPT

## 2022-08-10 PROCEDURE — 85025 COMPLETE CBC W/AUTO DIFF WBC: CPT | Performed by: EMERGENCY MEDICINE

## 2022-08-10 PROCEDURE — 99283 EMERGENCY DEPT VISIT LOW MDM: CPT

## 2022-08-10 RX ORDER — SODIUM CHLORIDE 0.9 % (FLUSH) 0.9 %
10 SYRINGE (ML) INJECTION AS NEEDED
Status: DISCONTINUED | OUTPATIENT
Start: 2022-08-10 | End: 2022-08-10 | Stop reason: HOSPADM

## 2022-08-10 RX ADMIN — SODIUM CHLORIDE 500 ML: 9 INJECTION, SOLUTION INTRAVENOUS at 08:11
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
## 2022-08-10 NOTE — ED NOTES
Patient from home via PV; c/o migraine that is worse when out in the heat or the sun. This migraine started one month ago. No other neuro deficits noted in triage.

## 2022-08-10 NOTE — DISCHARGE INSTRUCTIONS
Must follow-up with PCP for further evaluation and review of medications as we discussed.  Drink plenty of fluids to stay well-hydrated.

## 2022-08-18 ENCOUNTER — OFFICE VISIT (OUTPATIENT)
Dept: INTERNAL MEDICINE | Facility: CLINIC | Age: 67
End: 2022-08-18

## 2022-08-18 VITALS
OXYGEN SATURATION: 98 % | WEIGHT: 220.8 LBS | SYSTOLIC BLOOD PRESSURE: 122 MMHG | HEIGHT: 69 IN | HEART RATE: 80 BPM | DIASTOLIC BLOOD PRESSURE: 62 MMHG | BODY MASS INDEX: 32.7 KG/M2 | RESPIRATION RATE: 18 BRPM

## 2022-08-18 DIAGNOSIS — E78.2 MIXED HYPERLIPIDEMIA: Chronic | ICD-10-CM

## 2022-08-18 DIAGNOSIS — G47.33 OBSTRUCTIVE SLEEP APNEA: Chronic | ICD-10-CM

## 2022-08-18 DIAGNOSIS — K74.60 LIVER CIRRHOSIS SECONDARY TO NASH: Chronic | ICD-10-CM

## 2022-08-18 DIAGNOSIS — K75.81 NASH (NONALCOHOLIC STEATOHEPATITIS): Chronic | ICD-10-CM

## 2022-08-18 DIAGNOSIS — E11.29 TYPE 2 DIABETES MELLITUS WITH MICROALBUMINURIA, WITHOUT LONG-TERM CURRENT USE OF INSULIN: Chronic | ICD-10-CM

## 2022-08-18 DIAGNOSIS — R80.9 TYPE 2 DIABETES MELLITUS WITH MICROALBUMINURIA, WITHOUT LONG-TERM CURRENT USE OF INSULIN: Chronic | ICD-10-CM

## 2022-08-18 DIAGNOSIS — R80.9 MICROALBUMINURIA: Chronic | ICD-10-CM

## 2022-08-18 DIAGNOSIS — R25.2 MUSCLE CRAMPS: ICD-10-CM

## 2022-08-18 DIAGNOSIS — R74.8 ELEVATED LIVER ENZYMES: Chronic | ICD-10-CM

## 2022-08-18 DIAGNOSIS — I25.2 HISTORY OF MYOCARDIAL INFARCTION: Chronic | ICD-10-CM

## 2022-08-18 DIAGNOSIS — G43.709 CHRONIC MIGRAINE W/O AURA W/O STATUS MIGRAINOSUS, NOT INTRACTABLE: ICD-10-CM

## 2022-08-18 DIAGNOSIS — I25.10 OCCLUSIVE CORONARY ARTERY DISEASE: Chronic | ICD-10-CM

## 2022-08-18 DIAGNOSIS — E11.65 POORLY CONTROLLED TYPE 2 DIABETES MELLITUS: Primary | ICD-10-CM

## 2022-08-18 DIAGNOSIS — I85.00 ESOPHAGEAL VARICES DETERMINED BY ENDOSCOPY: Chronic | ICD-10-CM

## 2022-08-18 DIAGNOSIS — D50.9 MICROCYTIC ANEMIA: ICD-10-CM

## 2022-08-18 DIAGNOSIS — E53.8 FOLIC ACID DEFICIENCY: Chronic | ICD-10-CM

## 2022-08-18 DIAGNOSIS — Z86.16 HISTORY OF COVID-19: Chronic | ICD-10-CM

## 2022-08-18 DIAGNOSIS — K75.81 LIVER CIRRHOSIS SECONDARY TO NASH: Chronic | ICD-10-CM

## 2022-08-18 DIAGNOSIS — Z51.81 THERAPEUTIC DRUG MONITORING: ICD-10-CM

## 2022-08-18 PROCEDURE — 99214 OFFICE O/P EST MOD 30 MIN: CPT | Performed by: INTERNAL MEDICINE

## 2022-08-18 RX ORDER — RIMEGEPANT SULFATE 75 MG/75MG
75 TABLET, ORALLY DISINTEGRATING ORAL EVERY OTHER DAY
Qty: 15 TABLET | Refills: 11 | Status: SHIPPED | OUTPATIENT
Start: 2022-08-18 | End: 2022-10-06

## 2022-08-18 RX ORDER — ORAL SEMAGLUTIDE 7 MG/1
7 TABLET ORAL DAILY
Qty: 30 TABLET | Refills: 6 | Status: SHIPPED | OUTPATIENT
Start: 2022-08-18

## 2022-08-18 RX ORDER — QUINIDINE SULFATE 200 MG
TABLET ORAL
Qty: 30 CAPSULE
Start: 2022-08-18

## 2022-08-18 NOTE — PROGRESS NOTES
08/18/2022    Patient Information  Rajan Dc                                                                                          [address] [place] [id number]      1955  [unfilled]  [phone] (work)    Chief Complaint:     Follow-up recent emergency room visit for headaches.  Complaining of muscle cramps.  Follow-up multiple medical problems as outlined in chief complaint.    History of Present Illness:     Patient with poorly controlled type 2 diabetes, microalbuminuria, hyperlipidemia, Maxwell, cirrhosis liver secondary to Maxwell, esophageal varices, elevated liver enzymes, occlusive coronary artery disease and history of myocardial infarction, obstructive sleep apnea, history of COVID-19 infection, microcytic anemia, chronic migraine, muscle cramps.  He presents today for a follow-up and in particular a follow-up related to recent emergency room visit when he presented with complaints of migraine headaches.  Patient reports he is still continuing to have headaches.  These are unlike any headaches he has had before.  He had a work-up in the emergency room including a brain scan which was positive for an old stroke.  They did not place him on any preventative medicine.  Patient also is complaining of muscle cramps that are particularly bad at night.  He is on statin therapy and not taking coenzyme every 10.  His past medical history reviewed and updated were necessary including health maintenance parameters.  This reveals he is up-to-date or else accounted for after today's visit.    Review of Systems   Constitutional: Negative.   Eyes: Negative.  Negative for blurred vision, double vision, pain, photophobia, vision loss in left eye, vision loss in right eye, visual disturbance and visual halos.   Cardiovascular: Negative.    Respiratory: Negative.    Endocrine: Negative.    Hematologic/Lymphatic: Negative.    Skin: Negative.    Musculoskeletal: Positive for arthritis, back  pain and joint pain.   Gastrointestinal: Negative.    Genitourinary: Negative.    Neurological: Positive for difficulty with concentration and headaches. Negative for aphonia, brief paralysis, disturbances in coordination, dizziness, focal weakness, light-headedness, numbness, paresthesias and weakness.   Psychiatric/Behavioral: Negative.    Allergic/Immunologic: Negative.        Active Problems:    Patient Active Problem List   Diagnosis   • Occlusive coronary artery disease, 01/01/2005--status post MI.  January 2005--CABG ×4.  Details not known.   • Folic acid deficiency   • Hyperlipidemia   • Hypogonadism male, TRT ineffective   • Microalbuminuria   • RUIZ (nonalcoholic steatohepatitis)   • Obstructive sleep apnea, 11/13/2012--mild to moderate NIKI.  Unable to tolerate CPAP.  Cannot use oral appliance.   • Sleep related hypoxia   • Type 2 diabetes mellitus with microalbuminuria, without long-term current use of insulin (Formerly [facility])   • Vitamin D deficiency   • Therapeutic drug monitoring   • Routine physical examination   • Family history of premature coronary artery disease   • Diabetic eye exam (Formerly [facility])   • Diabetic foot exam   • History of myocardial infarction, 2005.   • Chronic ITP (idiopathic thrombocytopenia) (Formerly [facility])   • Elevated liver enzymes   • Male erectile disorder   • Non morbid obesity   • History of COVID-19   • History of colon polyps, 8/18/2021--tubular adenoma x12.   • Esophageal varices determined by endoscopy (Formerly [facility])   • Liver cirrhosis secondary to RUIZ (Formerly [facility])   • Diverticulosis of colon   • Chronic migraine w/o aura w/o status migrainosus, not intractable   • Muscle cramps         Past Medical History:   Diagnosis Date   • Chronic ITP (idiopathic thrombocytopenia) (Formerly [facility]) 09/19/2018   • Chronic migraine w/o aura w/o status migrainosus, not intractable 8/18/2022   • Diabetic eye exam (Formerly [facility]) 05/22/2017 05/22/2017--routine diabetic eye exam reveals no evidence of diabetic retinopathy.  Astigmatism, presbyopia,  hypermetropia noted.  Very early cataracts noted bilaterally.  2015--patient reports a routine diabetic eye exam without evidence of diabetic retinopathy.   • Diabetic foot exam 2017--diabetic foot exam reveals no evidence of diabetic foot ulcer or pre-ulcerative callus.  Distal pulses palpable.  No signs of ischemia.  Sensation subjectively intact per monofilament.   • Diverticulosis of colon 10/28/2021    2021--colonoscopy reveals a 6 mm polyp in the transverse colon.  There was an 8 mm polyp in the descending colon.  A 20 mm polyp was found at 50 cm proximal to the anus.  Resection was incomplete.  The resected tissue was retrieved and coagulation for destruction was performed.  #4, sessile polyps in the sigmoid colon that were 5 to 6 mm in size.  Removed.  #2, sessile polyps in the sig   • Elevated liver enzymes 2019--AST is normal at 27, ALT normal at 2 9.  Alkaline phosphatase mildly elevated at 137.  Bilirubin is normal.  2019--ultrasound liver ordered by the gastroenterologist reveals increased hepatic echogenicity consistent with steatosis.  Previous cholecystectomy.  No biliary ductal dilatation.  2019--patient was apparently referred to the gastroenterologist by   • Esophageal varices determined by endoscopy (HCC) 10/28/2021    2021--EGD reveals grade 2 varices in the lower third of the esophagus.  A varix with no bleeding was found in the cardia.  No stigmata of recent bleeding.  Mild portal hypertensive gastropathy was found in the gastric fundus.   • Family history of premature coronary artery disease 2016    Father  from a myocardial infarction age 61.   • Folic acid deficiency 2016   • History of Abnormal pulse oximetry 10/07/2012    10/07/2012--overnight oximetry test revealed significant nocturnal hypoxemia. Oxygen saturations were greater than 90% for only 50.5% of the study. Oxygen  saturation less than 90% for three hours 47 minutes which was 49.5% of the study. Oxygen saturation less than 88% for one hour and 36 minutes and 48 seconds, 21.1% of the study.   • History of CABG 01/2005 January 2005 status post four-vessel CABG.   • History of colon polyps, 8/18/2021--tubular adenoma x12. 10/28/2021    August 18, 2021--colonoscopy reveals a 6 mm polyp in the transverse colon.  There was an 8 mm polyp in the descending colon.  A 20 mm polyp was found at 50 cm proximal to the anus.  Resection was incomplete.  The resected tissue was retrieved and coagulation for destruction was performed.  #4, sessile polyps in the sigmoid colon that were 5 to 6 mm in size.  Removed.  #2, sessile polyps in the sig   • History of COVID-19 06/02/2021   • History of myocardial infarction, 2005. 04/28/2016 01/01/2005--status post myocardial infarction.   January 2005--status post four-vessel CABG   • Hyperlipidemia 04/28/2016   • Hypogonadism male, TRT ineffective 09/13/2012 09/13/2012--treatment for symptomatic hypogonadism begun. This was later discontinued due to lack of efficacy and cost.   • Liver cirrhosis secondary to RUIZ (HCC) 10/28/2021    August 18, 2021--EGD reveals grade 2 varices in the lower third of the esophagus.  A varix with no bleeding was found in the cardia.  No stigmata of recent bleeding.  Mild portal hypertensive gastropathy was found in the gastric fundus.  October 2, 2019--ultrasound liver ordered by the gastroenterologist reveals increased hepatic echogenicity consistent with steatosis.  Previous cholecystectomy.     • Male erectile disorder 08/27/2020   • Microalbuminuria 04/25/2014 04/25/2014--urine microalbumin elevated 28.7. Normal range 0.0--17.0.   • RUIZ (nonalcoholic steatohepatitis) 04/28/2016   • Non morbid obesity 06/02/2021   • Obstructive sleep apnea, 11/13/2012--mild to moderate NIKI.  Unable to tolerate CPAP.  Cannot use oral appliance. 10/07/2012     05/02/2014--nocturnal oxygen 2 L per nasal cannula ordered.   12/03/2013--patient was referred for an oral appliance but this could not be done because patient wears dentures.   11/13/2012--diagnosed with mild to moderate obstructive sleep apnea. Patient unable to tolerate CPAP.   10/07/2012--overnight oximetry test revealed significant nocturnal hypoxemia. Oxygen saturations were greater than 90% for only 50.5% of the study. Oxygen saturation less than 90% for three hours 47 minutes which was 49.5% of the study. Oxygen saturation less than 88% for one hour and 36 minutes and 48 seconds, 21.1% of the study.   • Occlusive coronary artery disease, 01/01/2005--status post MI.  January 2005--CABG ×4.  Details not known. 01/01/2005 01/01/2005--status post myocardial infarction.   January 2005--status post four-vessel CABG   • Sleep related hypoxia 10/07/2012    05/02/2014--nocturnal oxygen 2 L per nasal cannula ordered.  12/03/2013--patient was referred for an oral appliance but this could not be done because patient wears dentures.   11/13/2012--diagnosed with mild to moderate obstructive sleep apnea. Patient unable to tolerate CPAP.   10/07/2012--overnight oximetry test revealed significant nocturnal hypoxemia. Oxygen saturations were greater than 90% for only 50.5% of the study. Oxygen saturation less than 90% for three hours 47 minutes which was 49.5% of the study. Oxygen saturation less than 88% for one hour and 36 minutes and 48 seconds, 21.1% of the study.   • Type 2 diabetes mellitus with microalbuminuria, without long-term current use of insulin (HCC) 01/01/2005    Diagnosed in 2005.   • Vitamin D deficiency 04/28/2016         Past Surgical History:   Procedure Laterality Date   • CHOLECYSTECTOMY WITH INTRAOPERATIVE CHOLANGIOGRAM N/A 6/24/2019    Procedure: laparoscopic cholecystectomy with intraoperative cholangiogram;  Surgeon: Vida Avilez MD;  Location: [facility];  Service: General   • COLONOSCOPY  N/A 8/18/2021 August 18, 2021--colonoscopy reveals a 6 mm polyp in the transverse colon.  There was an 8 mm polyp in the descending colon.  A 20 mm polyp was found at 50 cm proximal to the anus.  Resection was incomplete.  The resected tissue was retrieved and coagulation for destruction was performed.  #4, sessile polyps in the sigmoid colon that were 5 to 6 mm in size.  Removed.  #2, sessile polyps in the sig   • CORONARY ARTERY BYPASS GRAFT  01/2005 January 2005 status post four-vessel CABG.   • ENDOSCOPY N/A 8/18/2021 August 18, 2021--EGD reveals grade 2 varices in the lower third of the esophagus.  A varix with no bleeding was found in the cardia.  No stigmata of recent bleeding.  Mild portal hypertensive gastropathy was found in the gastric fundus.         No Known Allergies        Current Outpatient Medications:   •  aspirin 81 MG tablet, Take 1 tablet by mouth Daily., Disp: , Rfl:   •  atorvastatin (LIPITOR) 80 MG tablet, TAKE 1 TABLET BY MOUTH DAILY FOR HIGH CHOLESTEROL, Disp: 90 tablet, Rfl: 3  •  carvedilol (Coreg) 3.125 MG tablet, Take 1 tablet by mouth 2 (Two) Times a Day With Meals., Disp: 60 tablet, Rfl: 3  •  Empagliflozin (Jardiance) 25 MG tablet, Take 25 mg by mouth Every Morning Before Breakfast. For diabetes, Disp: 90 tablet, Rfl: 1  •  folic acid (FOLVITE) 1 MG tablet, TAKE 1 TABLET BY MOUTH DAILY, Disp: 30 tablet, Rfl: 2  •  glimepiride (AMARYL) 4 MG tablet, Take 1 p.o. twice daily at breakfast and supper for diabetes, Disp: 180 tablet, Rfl: 3  •  SITagliptin-metFORMIN HCl ER (Janumet XR)  MG tablet, TAKE 1 TABLET BY MOUTH TWICE DAILY FOR DIABETES, Disp: 60 tablet, Rfl: 5  •  vitamin D3 125 MCG (5000 UT) capsule capsule, 1 by mouth daily as directed, Disp: 30 capsule, Rfl: 11  •  Coenzyme Q10 (Co Q-10) 400 MG capsule, Take 1 p.o. daily with food for muscle cramps and heart, Disp: 30 capsule, Rfl:   •  Rimegepant Sulfate (Nurtec) 75 MG tablet dispersible tablet, Take 1 tablet by  "mouth Every Other Day., Disp: 15 tablet, Rfl: 11  •  Semaglutide (Rybelsus) 7 MG tablet, Take 7 mg by mouth Daily., Disp: 30 tablet, Rfl: 6      Family History   Problem Relation Age of Onset   • Other Mother         Ventricular Septal Defect   • Heart disease Mother    • Hypertension Mother    • Cancer Mother    • Diabetes Mother    • Heart attack Father         Prior Myocardial Infarction.  Dad  from a myocardial infarction at age 59.   • Heart disease Father    • Heart disease Other         Congenital Heart Disease. Multiple family members with septal defects that required surgery.   • Heart disease Sister    • Heart disease Brother    • Cancer Daughter          Social History     Socioeconomic History   • Marital status:      Spouse name: Dasha   • Number of children: 8   • Highest education level: 9th grade   Tobacco Use   • Smoking status: Former Smoker     Types: Cigarettes     Quit date:      Years since quittin.6   • Smokeless tobacco: Never Used   • Tobacco comment: Former smoker quit 13 years ago with a 64.5 pack year history.  Smoked 1 ppd for 32 years and 4.5 ppd for 5 years and quit when he had his open heart surgery.   Vaping Use   • Vaping Use: Never used   Substance and Sexual Activity   • Alcohol use: No   • Drug use: No   • Sexual activity: Yes     Partners: Female         Vitals:    22 0857   BP: 122/62   Pulse: 80   Resp: 18   SpO2: 98%   Weight: 100 kg (220 lb 12.8 oz)   Height: 175.3 cm (69\")        Body mass index is 32.61 kg/m².      Physical Exam:    General: Alert and oriented x 3.  No acute distress.  Obese.  Normal affect.  HEENT: Pupils equal, round, reactive to light; extraocular movements intact; sclerae nonicteric; pharynx, ear canals and TMs normal.  Neck: Without JVD, thyromegaly, bruit, or adenopathy.  Lungs: Clear to auscultation in all fields.  Heart: Regular rate and rhythm without murmur, rub, gallop, or click.  Abdomen: Soft, nontender, without " hepatosplenomegaly or hernia.  Bowel sounds normal.  : Deferred.  Rectal: Deferred.  Extremities: Without clubbing, cyanosis, edema, or pulse deficit.  Neurologic: Intact without focal deficit.  Normal station and gait observed during ingress and egress from the examination room.  Skin: Without significant lesion.  Musculoskeletal: Unremarkable.    Lab/other results:    I reviewed the documentation from the emergency room visit including the brain scan.    Assessment/Plan:     Diagnosis Plan   1. Poorly controlled type 2 diabetes mellitus (HCC)     2. Type 2 diabetes mellitus with microalbuminuria, without long-term current use of insulin (HCC)  Semaglutide (Rybelsus) 7 MG tablet    Hemoglobin A1c    Comprehensive Metabolic Panel   3. Microalbuminuria     4. Hyperlipidemia  Coenzyme Q10 (Co Q-10) 400 MG capsule    Homocysteine    CK    NMR LipoProfile   5. RUIZ (nonalcoholic steatohepatitis)     6. Liver cirrhosis secondary to RUIZ (HCC)     7. Esophageal varices determined by endoscopy (HCC)     8. Elevated liver enzymes     9. Occlusive coronary artery disease, 01/01/2005--status post MI.  January 2005--CABG ×4.  Details not known.  Coenzyme Q10 (Co Q-10) 400 MG capsule   10. History of myocardial infarction, 2005.     11. Obstructive sleep apnea, 11/13/2012--mild to moderate NIKI.  Unable to tolerate CPAP.  Cannot use oral appliance.     12. History of COVID-19     13. Microcytic anemia     14. Chronic migraine w/o aura w/o status migrainosus, not intractable  Rimegepant Sulfate (Nurtec) 75 MG tablet dispersible tablet   15. Muscle cramps  Coenzyme Q10 (Co Q-10) 400 MG capsule   16. Therapeutic drug monitoring     17. Folic acid deficiency  Homocysteine     Patient has poorly controlled type 2 diabetes and medication changes indicated.  He has migraine headaches that occur nearly every day and patient needs to be on prophylactic medication.  In regards to his muscle cramps, he is on statin therapy and I think he  would benefit from coenzyme every 10.    Plan is as follows: Start Nurtec 75 mg ODT every other day for migraine prevention.  Increase Rybelsus to 7 mg/day.  Start co-Q10 400 mg every day to help with the muscle cramps.  No other changes in medical regimen.  Patient will obtain fasting lab work in 6 weeks and then follow-up 1 week later.        Procedures

## 2022-09-30 DIAGNOSIS — E78.2 MIXED HYPERLIPIDEMIA: ICD-10-CM

## 2022-09-30 DIAGNOSIS — E53.8 FOLIC ACID DEFICIENCY: ICD-10-CM

## 2022-09-30 DIAGNOSIS — E11.29 TYPE 2 DIABETES MELLITUS WITH MICROALBUMINURIA, WITHOUT LONG-TERM CURRENT USE OF INSULIN: ICD-10-CM

## 2022-09-30 DIAGNOSIS — R80.9 TYPE 2 DIABETES MELLITUS WITH MICROALBUMINURIA, WITHOUT LONG-TERM CURRENT USE OF INSULIN: ICD-10-CM

## 2022-09-30 DIAGNOSIS — E53.8 FOLIC ACID DEFICIENCY: Primary | ICD-10-CM

## 2022-10-01 LAB
ALBUMIN SERPL-MCNC: 4.1 G/DL (ref 3.5–5.2)
ALBUMIN/GLOB SERPL: 1.7 G/DL
ALP SERPL-CCNC: 126 U/L (ref 39–117)
ALT SERPL-CCNC: 22 U/L (ref 1–41)
AST SERPL-CCNC: 26 U/L (ref 1–40)
BILIRUB SERPL-MCNC: 1 MG/DL (ref 0–1.2)
BUN SERPL-MCNC: 9 MG/DL (ref 8–23)
BUN/CREAT SERPL: 12.2 (ref 7–25)
CALCIUM SERPL-MCNC: 9.2 MG/DL (ref 8.6–10.5)
CHLORIDE SERPL-SCNC: 105 MMOL/L (ref 98–107)
CHOLEST SERPL-MCNC: 126 MG/DL (ref 100–199)
CK SERPL-CCNC: 104 U/L (ref 20–200)
CO2 SERPL-SCNC: 26.4 MMOL/L (ref 22–29)
CREAT SERPL-MCNC: 0.74 MG/DL (ref 0.76–1.27)
EGFRCR SERPLBLD CKD-EPI 2021: 99.9 ML/MIN/1.73
GLOBULIN SER CALC-MCNC: 2.4 GM/DL
GLUCOSE SERPL-MCNC: 90 MG/DL (ref 65–99)
HBA1C MFR BLD: 7.6 % (ref 4.8–5.6)
HCYS SERPL-SCNC: 10.2 UMOL/L (ref 0–17.2)
HDL SERPL-SCNC: 25.8 UMOL/L
HDLC SERPL-MCNC: 47 MG/DL
LDL SERPL QN: 21.7 NM
LDL SERPL-SCNC: 658 NMOL/L
LDL SMALL SERPL-SCNC: 243 NMOL/L
LDLC SERPL CALC-MCNC: 63 MG/DL (ref 0–99)
POTASSIUM SERPL-SCNC: 4.3 MMOL/L (ref 3.5–5.2)
PROT SERPL-MCNC: 6.5 G/DL (ref 6–8.5)
SODIUM SERPL-SCNC: 141 MMOL/L (ref 136–145)
TRIGL SERPL-MCNC: 79 MG/DL (ref 0–149)

## 2022-10-06 ENCOUNTER — OFFICE VISIT (OUTPATIENT)
Dept: INTERNAL MEDICINE | Facility: CLINIC | Age: 67
End: 2022-10-06

## 2022-10-06 VITALS
BODY MASS INDEX: 34.04 KG/M2 | HEART RATE: 80 BPM | OXYGEN SATURATION: 98 % | WEIGHT: 229.8 LBS | RESPIRATION RATE: 18 BRPM | SYSTOLIC BLOOD PRESSURE: 132 MMHG | DIASTOLIC BLOOD PRESSURE: 60 MMHG | HEIGHT: 69 IN

## 2022-10-06 DIAGNOSIS — I25.10 OCCLUSIVE CORONARY ARTERY DISEASE: Chronic | ICD-10-CM

## 2022-10-06 DIAGNOSIS — I85.00 ESOPHAGEAL VARICES DETERMINED BY ENDOSCOPY: Chronic | ICD-10-CM

## 2022-10-06 DIAGNOSIS — E66.9 NON MORBID OBESITY: Chronic | ICD-10-CM

## 2022-10-06 DIAGNOSIS — E78.2 MIXED HYPERLIPIDEMIA: Chronic | ICD-10-CM

## 2022-10-06 DIAGNOSIS — D69.3 CHRONIC ITP (IDIOPATHIC THROMBOCYTOPENIA): Chronic | ICD-10-CM

## 2022-10-06 DIAGNOSIS — G43.709 CHRONIC MIGRAINE W/O AURA W/O STATUS MIGRAINOSUS, NOT INTRACTABLE: Chronic | ICD-10-CM

## 2022-10-06 DIAGNOSIS — Z23 NEED FOR SHINGLES VACCINE: ICD-10-CM

## 2022-10-06 DIAGNOSIS — K75.81 NASH (NONALCOHOLIC STEATOHEPATITIS): Chronic | ICD-10-CM

## 2022-10-06 DIAGNOSIS — E53.8 FOLIC ACID DEFICIENCY: Chronic | ICD-10-CM

## 2022-10-06 DIAGNOSIS — G47.33 OBSTRUCTIVE SLEEP APNEA: Chronic | ICD-10-CM

## 2022-10-06 DIAGNOSIS — G47.34 SLEEP RELATED HYPOXIA: Chronic | ICD-10-CM

## 2022-10-06 DIAGNOSIS — Z86.010 HISTORY OF COLON POLYPS: Chronic | ICD-10-CM

## 2022-10-06 DIAGNOSIS — R80.9 MICROALBUMINURIA: Chronic | ICD-10-CM

## 2022-10-06 DIAGNOSIS — Z51.81 THERAPEUTIC DRUG MONITORING: ICD-10-CM

## 2022-10-06 DIAGNOSIS — R74.8 ELEVATED LIVER ENZYMES: Chronic | ICD-10-CM

## 2022-10-06 DIAGNOSIS — K75.81 LIVER CIRRHOSIS SECONDARY TO NASH: Chronic | ICD-10-CM

## 2022-10-06 DIAGNOSIS — K74.60 LIVER CIRRHOSIS SECONDARY TO NASH: Chronic | ICD-10-CM

## 2022-10-06 DIAGNOSIS — R80.9 TYPE 2 DIABETES MELLITUS WITH MICROALBUMINURIA, WITHOUT LONG-TERM CURRENT USE OF INSULIN: Primary | Chronic | ICD-10-CM

## 2022-10-06 DIAGNOSIS — E55.9 VITAMIN D DEFICIENCY: Chronic | ICD-10-CM

## 2022-10-06 DIAGNOSIS — Z86.16 HISTORY OF COVID-19: Chronic | ICD-10-CM

## 2022-10-06 DIAGNOSIS — E11.29 TYPE 2 DIABETES MELLITUS WITH MICROALBUMINURIA, WITHOUT LONG-TERM CURRENT USE OF INSULIN: Primary | Chronic | ICD-10-CM

## 2022-10-06 DIAGNOSIS — I25.2 HISTORY OF MYOCARDIAL INFARCTION: Chronic | ICD-10-CM

## 2022-10-06 PROBLEM — R25.2 MUSCLE CRAMPS: Status: RESOLVED | Noted: 2022-08-18 | Resolved: 2022-10-06

## 2022-10-06 PROCEDURE — 99214 OFFICE O/P EST MOD 30 MIN: CPT | Performed by: INTERNAL MEDICINE

## 2022-10-06 NOTE — PROGRESS NOTES
10/06/2022    Patient Information  Rajan Dc                                                                                          [address] [place] [id number]      1955  [unfilled]  [phone] (work)    Chief Complaint:     Follow-up lab work in order to monitor chronic medical issues listed in history of present illness.  Follow-up migraine headaches and initiation of new medication.  Recent increase of diabetes medication.    History of Present Illness:    Patient with multiple chronic medical problems including type 2 diabetes, microalbuminuria, hyperlipidemia, Maxwell, elevated liver enzymes, folic acid deficiency, vitamin D deficiency, cirrhosis of liver secondary to Maxwell with esophageal varices, history of COVID-19 infection, occlusive coronary artery disease and history of myocardial infarction, sleep apnea with nocturnal hypoxia, chronic ITP, nonmorbid obesity, history of colon polyps, chronic migraine headaches.  He presents today to follow-up with lab prior in order to monitor his chronic medical issues.  Patient also was started on Nurtec every other day 75 mg in hopes that this would prevent his migraine headaches.  See below.  Past medical history reviewed and updated were necessary including health maintenance parameters.  This reveals he needs Shingrix vaccine and also diabetic eye exam which I encouraged him to get.    Review of Systems   Constitutional: Negative.   HENT: Negative.    Eyes: Negative.    Cardiovascular: Negative.    Respiratory: Negative.    Endocrine: Negative.    Hematologic/Lymphatic: Negative.    Skin: Negative.    Musculoskeletal: Negative.    Gastrointestinal: Negative.    Genitourinary: Negative.    Neurological: Negative.    Psychiatric/Behavioral: Negative.    Allergic/Immunologic: Negative.        Active Problems:    Patient Active Problem List   Diagnosis   • Occlusive coronary artery disease, 01/01/2005--status post MI.  January  2005--CABG ×4.  Details not known.   • Folic acid deficiency   • Hyperlipidemia   • Hypogonadism male, TRT ineffective   • Microalbuminuria   • RUIZ (nonalcoholic steatohepatitis)   • Obstructive sleep apnea, 11/13/2012--mild to moderate NIKI.  Unable to tolerate CPAP.  Cannot use oral appliance.   • Sleep related hypoxia   • Type 2 diabetes mellitus with microalbuminuria, without long-term current use of insulin (HCC)   • Vitamin D deficiency   • Therapeutic drug monitoring   • Routine physical examination   • Family history of premature coronary artery disease   • Diabetic eye exam ([facility])   • Diabetic foot exam   • History of myocardial infarction, 2005.   • Chronic ITP (idiopathic thrombocytopenia) ([facility])   • Elevated liver enzymes   • Male erectile disorder   • Non morbid obesity   • History of COVID-19   • History of colon polyps, 8/18/2021--tubular adenoma x12.   • Esophageal varices determined by endoscopy ([facility])   • Liver cirrhosis secondary to RUIZ ([facility])   • Diverticulosis of colon   • Chronic migraine w/o aura w/o status migrainosus, not intractable         Past Medical History:   Diagnosis Date   • Chronic ITP (idiopathic thrombocytopenia) ([facility]) 09/19/2018   • Chronic migraine w/o aura w/o status migrainosus, not intractable 8/18/2022   • Diabetic eye exam ([facility]) 05/22/2017 05/22/2017--routine diabetic eye exam reveals no evidence of diabetic retinopathy.  Astigmatism, presbyopia, hypermetropia noted.  Very early cataracts noted bilaterally.  October 2015--patient reports a routine diabetic eye exam without evidence of diabetic retinopathy.   • Diabetic foot exam 03/12/2017 03/14/2017--diabetic foot exam reveals no evidence of diabetic foot ulcer or pre-ulcerative callus.  Distal pulses palpable.  No signs of ischemia.  Sensation subjectively intact per monofilament.   • Diverticulosis of colon 10/28/2021    August 18, 2021--colonoscopy reveals a 6 mm polyp in the transverse colon.  There was an 8 mm  polyp in the descending colon.  A 20 mm polyp was found at 50 cm proximal to the anus.  Resection was incomplete.  The resected tissue was retrieved and coagulation for destruction was performed.  #4, sessile polyps in the sigmoid colon that were 5 to 6 mm in size.  Removed.  #2, sessile polyps in the sig   • Elevated liver enzymes 2019--AST is normal at 27, ALT normal at 2 9.  Alkaline phosphatase mildly elevated at 137.  Bilirubin is normal.  2019--ultrasound liver ordered by the gastroenterologist reveals increased hepatic echogenicity consistent with steatosis.  Previous cholecystectomy.  No biliary ductal dilatation.  2019--patient was apparently referred to the gastroenterologist by   • Esophageal varices determined by endoscopy (HCC) 10/28/2021    2021--EGD reveals grade 2 varices in the lower third of the esophagus.  A varix with no bleeding was found in the cardia.  No stigmata of recent bleeding.  Mild portal hypertensive gastropathy was found in the gastric fundus.   • Family history of premature coronary artery disease 2016    Father  from a myocardial infarction age 61.   • Folic acid deficiency 2016   • History of Abnormal pulse oximetry 10/07/2012    10/07/2012--overnight oximetry test revealed significant nocturnal hypoxemia. Oxygen saturations were greater than 90% for only 50.5% of the study. Oxygen saturation less than 90% for three hours 47 minutes which was 49.5% of the study. Oxygen saturation less than 88% for one hour and 36 minutes and 48 seconds, 21.1% of the study.   • History of CABG 2005 status post four-vessel CABG.   • History of colon polyps, 2021--tubular adenoma x12. 10/28/2021    2021--colonoscopy reveals a 6 mm polyp in the transverse colon.  There was an 8 mm polyp in the descending colon.  A 20 mm polyp was found at 50 cm proximal to the anus.  Resection was  incomplete.  The resected tissue was retrieved and coagulation for destruction was performed.  #4, sessile polyps in the sigmoid colon that were 5 to 6 mm in size.  Removed.  #2, sessile polyps in the sig   • History of COVID-19 06/02/2021   • History of myocardial infarction, 2005. 04/28/2016 01/01/2005--status post myocardial infarction.   January 2005--status post four-vessel CABG   • Hyperlipidemia 04/28/2016   • Hypogonadism male, TRT ineffective 09/13/2012 09/13/2012--treatment for symptomatic hypogonadism begun. This was later discontinued due to lack of efficacy and cost.   • Liver cirrhosis secondary to RUIZ (HCC) 10/28/2021    August 18, 2021--EGD reveals grade 2 varices in the lower third of the esophagus.  A varix with no bleeding was found in the cardia.  No stigmata of recent bleeding.  Mild portal hypertensive gastropathy was found in the gastric fundus.  October 2, 2019--ultrasound liver ordered by the gastroenterologist reveals increased hepatic echogenicity consistent with steatosis.  Previous cholecystectomy.     • Male erectile disorder 08/27/2020   • Microalbuminuria 04/25/2014 04/25/2014--urine microalbumin elevated 28.7. Normal range 0.0--17.0.   • RUIZ (nonalcoholic steatohepatitis) 04/28/2016   • Non morbid obesity 06/02/2021   • Obstructive sleep apnea, 11/13/2012--mild to moderate NIKI.  Unable to tolerate CPAP.  Cannot use oral appliance. 10/07/2012    05/02/2014--nocturnal oxygen 2 L per nasal cannula ordered.   12/03/2013--patient was referred for an oral appliance but this could not be done because patient wears dentures.   11/13/2012--diagnosed with mild to moderate obstructive sleep apnea. Patient unable to tolerate CPAP.   10/07/2012--overnight oximetry test revealed significant nocturnal hypoxemia. Oxygen saturations were greater than 90% for only 50.5% of the study. Oxygen saturation less than 90% for three hours 47 minutes which was 49.5% of the study. Oxygen saturation  less than 88% for one hour and 36 minutes and 48 seconds, 21.1% of the study.   • Occlusive coronary artery disease, 01/01/2005--status post MI.  January 2005--CABG ×4.  Details not known. 01/01/2005 01/01/2005--status post myocardial infarction.   January 2005--status post four-vessel CABG   • Sleep related hypoxia 10/07/2012    05/02/2014--nocturnal oxygen 2 L per nasal cannula ordered.  12/03/2013--patient was referred for an oral appliance but this could not be done because patient wears dentures.   11/13/2012--diagnosed with mild to moderate obstructive sleep apnea. Patient unable to tolerate CPAP.   10/07/2012--overnight oximetry test revealed significant nocturnal hypoxemia. Oxygen saturations were greater than 90% for only 50.5% of the study. Oxygen saturation less than 90% for three hours 47 minutes which was 49.5% of the study. Oxygen saturation less than 88% for one hour and 36 minutes and 48 seconds, 21.1% of the study.   • Type 2 diabetes mellitus with microalbuminuria, without long-term current use of insulin (HCC) 01/01/2005    Diagnosed in 2005.   • Vitamin D deficiency 04/28/2016         Past Surgical History:   Procedure Laterality Date   • CHOLECYSTECTOMY WITH INTRAOPERATIVE CHOLANGIOGRAM N/A 6/24/2019    Procedure: laparoscopic cholecystectomy with intraoperative cholangiogram;  Surgeon: Vida Avilez MD;  Location: [facility];  Service: General   • COLONOSCOPY N/A 8/18/2021 August 18, 2021--colonoscopy reveals a 6 mm polyp in the transverse colon.  There was an 8 mm polyp in the descending colon.  A 20 mm polyp was found at 50 cm proximal to the anus.  Resection was incomplete.  The resected tissue was retrieved and coagulation for destruction was performed.  #4, sessile polyps in the sigmoid colon that were 5 to 6 mm in size.  Removed.  #2, sessile polyps in the sig   • CORONARY ARTERY BYPASS GRAFT  01/2005 January 2005 status post four-vessel CABG.   • ENDOSCOPY N/A 8/18/2021     2021--EGD reveals grade 2 varices in the lower third of the esophagus.  A varix with no bleeding was found in the cardia.  No stigmata of recent bleeding.  Mild portal hypertensive gastropathy was found in the gastric fundus.         No Known Allergies        Current Outpatient Medications:   •  aspirin 81 MG tablet, Take 1 tablet by mouth Daily., Disp: , Rfl:   •  atorvastatin (LIPITOR) 80 MG tablet, TAKE 1 TABLET BY MOUTH DAILY FOR HIGH CHOLESTEROL, Disp: 90 tablet, Rfl: 3  •  carvedilol (Coreg) 3.125 MG tablet, Take 1 tablet by mouth 2 (Two) Times a Day With Meals., Disp: 60 tablet, Rfl: 3  •  Coenzyme Q10 (Co Q-10) 400 MG capsule, Take 1 p.o. daily with food for muscle cramps and heart, Disp: 30 capsule, Rfl:   •  Empagliflozin (Jardiance) 25 MG tablet, Take 25 mg by mouth Every Morning Before Breakfast. For diabetes, Disp: 90 tablet, Rfl: 1  •  folic acid (FOLVITE) 1 MG tablet, TAKE 1 TABLET BY MOUTH DAILY, Disp: 30 tablet, Rfl: 2  •  glimepiride (AMARYL) 4 MG tablet, Take 1 p.o. twice daily at breakfast and supper for diabetes, Disp: 180 tablet, Rfl: 3  •  Semaglutide (Rybelsus) 7 MG tablet, Take 7 mg by mouth Daily., Disp: 30 tablet, Rfl: 6  •  SITagliptin-metFORMIN HCl ER (Janumet XR)  MG tablet, TAKE 1 TABLET BY MOUTH TWICE DAILY FOR DIABETES, Disp: 60 tablet, Rfl: 5  •  vitamin D3 125 MCG (5000 UT) capsule capsule, 1 by mouth daily as directed, Disp: 30 capsule, Rfl: 11      Family History   Problem Relation Age of Onset   • Other Mother         Ventricular Septal Defect   • Heart disease Mother    • Hypertension Mother    • Cancer Mother    • Diabetes Mother    • Heart attack Father         Prior Myocardial Infarction.  Dad  from a myocardial infarction at age 59.   • Heart disease Father    • Heart disease Other         Congenital Heart Disease. Multiple family members with septal defects that required surgery.   • Heart disease Sister    • Heart disease Brother    • Cancer Daughter  "         Social History     Socioeconomic History   • Marital status:      Spouse name: Dasha   • Number of children: 8   • Highest education level: 9th grade   Tobacco Use   • Smoking status: Former Smoker     Types: Cigarettes     Quit date:      Years since quittin.7   • Smokeless tobacco: Never Used   • Tobacco comment: Former smoker quit 13 years ago with a 64.5 pack year history.  Smoked 1 ppd for 32 years and 4.5 ppd for 5 years and quit when he had his open heart surgery.   Vaping Use   • Vaping Use: Never used   Substance and Sexual Activity   • Alcohol use: No   • Drug use: No   • Sexual activity: Yes     Partners: Female         Vitals:    10/06/22 0729   BP: 132/60   Pulse: 80   Resp: 18   SpO2: 98%   Weight: 104 kg (229 lb 12.8 oz)   Height: 175.3 cm (69\")        Body mass index is 33.94 kg/m².      Physical Exam:    General: Alert and oriented x 3.  No acute distress.  Obese.  Normal affect.  HEENT: Pupils equal, round, reactive to light; extraocular movements intact; sclerae nonicteric; pharynx, ear canals and TMs normal.  Neck: Without JVD, thyromegaly, bruit, or adenopathy.  Lungs: Clear to auscultation in all fields.  Heart: Regular rate and rhythm without murmur, rub, gallop, or click.  Abdomen: Soft, nontender, without hepatosplenomegaly or hernia.  Bowel sounds normal.  : Deferred.  Rectal: Deferred.  Extremities: Without clubbing, cyanosis, edema, or pulse deficit.  Neurologic: Intact without focal deficit.  Normal station and gait observed during ingress and egress from the examination room.  Skin: Without significant lesion.  Musculoskeletal: Unremarkable.    Lab/other results:    Homocystine is normal at 10.0.  CPK normal.  Total cholesterol 126, triglycerides 79, LDL particle #658, small LDL particle #243, HDL particle number low at 25.8.  Hemoglobin A1c 7.6.  CMP is normal including liver enzymes.  Alkaline phosphatase is slightly elevated 126.    Assessment/Plan:     " Diagnosis Plan   1. Type 2 diabetes mellitus with microalbuminuria, without long-term current use of insulin (HCC)  Comprehensive Metabolic Panel    Hemoglobin A1c    Microalbumin / Creatinine Urine Ratio - Urine, Clean Catch    Ambulatory Referral to Diabetic Education    Ambulatory Referral to Nutrition Services   2. Microalbuminuria  Microalbumin / Creatinine Urine Ratio - Urine, Clean Catch   3. Hyperlipidemia  CK    Comprehensive Metabolic Panel    NMR LipoProfile    Homocysteine    TSH    T4, Free    T3, Free    Ambulatory Referral to Nutrition Services   4. RUIZ (nonalcoholic steatohepatitis)  Comprehensive Metabolic Panel   5. Elevated liver enzymes  Comprehensive Metabolic Panel   6. Liver cirrhosis secondary to RUIZ (HCC)     7. Esophageal varices determined by endoscopy (HCC)     8. Folic acid deficiency  Homocysteine   9. Vitamin D deficiency  Vitamin D 25 Hydroxy   10. History of COVID-19  SARS-CoV-2 Antibodies ([facility])   11. Occlusive coronary artery disease, 01/01/2005--status post MI.  January 2005--CABG ×4.  Details not known.     12. History of myocardial infarction, 2005.     13. Obstructive sleep apnea, 11/13/2012--mild to moderate NIKI.  Unable to tolerate CPAP.  Cannot use oral appliance.     14. Sleep related hypoxia     15. Chronic ITP (idiopathic thrombocytopenia) (HCC)     16. Non morbid obesity     17. History of colon polyps, 8/18/2021--tubular adenoma x12.  Ambulatory Referral For Screening Colonoscopy   18. Chronic migraine w/o aura w/o status migrainosus, not intractable     19. Therapeutic drug monitoring  CBC (No Diff)   20. Need for shingles vaccine       Patient has type 2 diabetes that now is under reasonable control after medication adjustments.  Patient has nonmorbid obesity have strongly encouraged low carbohydrate diet and weight loss.  Microalbuminuria has been stable.  Hyperlipidemia is now under excellent control.  Patient has documented RUIZ and elevated liver enzymes which  have improved.  He also has cirrhosis of the liver believed to be related to the RUIZ and esophageal varices.  He is followed by the gastroenterology service.  He has a history of colon polyps and is overdue for his colonoscopy which I have ordered.  He has a history of COVID-19 infection in the past and we will monitor his antibodies in the future.  His coronary artery disease seems to be stable but patient is certainly at risk for progression given his multiple risk factors.  Importance of controlling his risk factors once again discussed with this patient.  He has sleep apnea but unfortunately is unable to tolerate CPAP or an oral appliance.  He has chronic ITP which has been evaluated by the hematologist and we will continue to monitor.  He has a history of colon polyps and is due for colonoscopy.  His migraine headaches have not improved with the Nurtec and we will therefore discontinue it.    Plan is as follows: No change in current medical regimen except formally discontinued Nurtec.  Once again I strongly recommend weight loss and low-carb diet.  Colonoscopy ordered.  Shingrix vaccine given.  Encourage patient to see the ophthalmologist for diabetic eye exam.  I will have him follow-up in 4 months with fasting lab prior to reassess.    Addendum: Patient agreeable to be seen by diabetic educator and I think this will be very helpful particularly with his weight loss and diabetes control.  Referrals placed.    Addendum #2: As it turns out we are out of Shingrix vaccine.  We will address this at the next visit.      Procedures

## 2022-10-31 DIAGNOSIS — E11.9 TYPE 2 DIABETES MELLITUS WITHOUT COMPLICATION, WITHOUT LONG-TERM CURRENT USE OF INSULIN: ICD-10-CM

## 2022-10-31 RX ORDER — SITAGLIPTIN AND METFORMIN HYDROCHLORIDE 1000; 50 MG/1; MG/1
TABLET, FILM COATED, EXTENDED RELEASE ORAL
Qty: 60 TABLET | Refills: 5 | Status: SHIPPED | OUTPATIENT
Start: 2022-10-31

## 2022-11-30 DIAGNOSIS — E78.2 MIXED HYPERLIPIDEMIA: Chronic | ICD-10-CM

## 2022-12-02 RX ORDER — ATORVASTATIN CALCIUM 80 MG/1
TABLET, FILM COATED ORAL
Qty: 90 TABLET | Refills: 2 | Status: SHIPPED | OUTPATIENT
Start: 2022-12-02

## 2022-12-05 ENCOUNTER — HOSPITAL ENCOUNTER (OUTPATIENT)
Dept: CARDIOLOGY | Facility: HOSPITAL | Age: 67
Discharge: HOME | End: 2022-12-05
Admitting: INTERNAL MEDICINE

## 2022-12-05 VITALS
SYSTOLIC BLOOD PRESSURE: 137 MMHG | BODY MASS INDEX: 34.21 KG/M2 | HEIGHT: 69 IN | DIASTOLIC BLOOD PRESSURE: 76 MMHG | WEIGHT: 231 LBS | HEART RATE: 72 BPM

## 2022-12-05 DIAGNOSIS — Z13.6 ENCOUNTER FOR SCREENING FOR VASCULAR DISEASE: ICD-10-CM

## 2022-12-05 LAB
BH CV ECHO MEAS - DIST AO DIAM: 1.4 CM
BH CV VAS BP LEFT ARM: NORMAL MMHG
BH CV VAS BP RIGHT ARM: NORMAL MMHG
BH CV XLRA MEAS - MID AO DIAM: 1.7 CM
BH CV XLRA MEAS - PAD LEFT ABI DP: 1.16
BH CV XLRA MEAS - PAD LEFT ABI PT: 1.31
BH CV XLRA MEAS - PAD LEFT ARM: 134 MMHG
BH CV XLRA MEAS - PAD LEFT LEG DP: 159 MMHG
BH CV XLRA MEAS - PAD LEFT LEG PT: 180 MMHG
BH CV XLRA MEAS - PAD RIGHT ABI DP: 1.18
BH CV XLRA MEAS - PAD RIGHT ABI PT: 1.24
BH CV XLRA MEAS - PAD RIGHT ARM: 137 MMHG
BH CV XLRA MEAS - PAD RIGHT LEG DP: 161 MMHG
BH CV XLRA MEAS - PAD RIGHT LEG PT: 170 MMHG
BH CV XLRA MEAS - PROX AO DIAM: 1.87 CM
BH CV XLRA MEAS LEFT ICA/CCA RATIO: 1.14
BH CV XLRA MEAS LEFT MID CCA PSV: NORMAL CM/SEC
BH CV XLRA MEAS LEFT MID ICA PSV: NORMAL CM/SEC
BH CV XLRA MEAS LEFT PROX ECA PSV: NORMAL CM/SEC
BH CV XLRA MEAS RIGHT ICA/CCA RATIO: 1.61
BH CV XLRA MEAS RIGHT MID CCA PSV: NORMAL CM/SEC
BH CV XLRA MEAS RIGHT MID ICA PSV: NORMAL CM/SEC
BH CV XLRA MEAS RIGHT PROX ECA PSV: NORMAL CM/SEC
MAXIMAL PREDICTED HEART RATE: 154 BPM
STRESS TARGET HR: 131 BPM

## 2022-12-05 PROCEDURE — 93799 UNLISTED CV SVC/PROCEDURE: CPT

## 2023-01-30 ENCOUNTER — OFFICE VISIT (OUTPATIENT)
Dept: INTERNAL MEDICINE | Facility: CLINIC | Age: 68
End: 2023-01-30
Payer: COMMERCIAL

## 2023-01-30 VITALS
WEIGHT: 240 LBS | SYSTOLIC BLOOD PRESSURE: 120 MMHG | DIASTOLIC BLOOD PRESSURE: 70 MMHG | BODY MASS INDEX: 35.55 KG/M2 | HEIGHT: 69 IN | OXYGEN SATURATION: 95 % | TEMPERATURE: 97.5 F | HEART RATE: 80 BPM

## 2023-01-30 DIAGNOSIS — T78.40XA ALLERGIC REACTION, INITIAL ENCOUNTER: ICD-10-CM

## 2023-01-30 DIAGNOSIS — L30.9 DERMATITIS: Primary | ICD-10-CM

## 2023-01-30 PROCEDURE — 99213 OFFICE O/P EST LOW 20 MIN: CPT | Performed by: FAMILY MEDICINE

## 2023-01-30 RX ORDER — METHYLPREDNISOLONE 4 MG/1
TABLET ORAL
Qty: 1 EACH | Refills: 0 | Status: SHIPPED | OUTPATIENT
Start: 2023-01-30

## 2023-01-30 RX ORDER — LORATADINE 10 MG/1
10 TABLET ORAL DAILY
Qty: 90 TABLET | Refills: 0 | Status: SHIPPED | OUTPATIENT
Start: 2023-01-30

## 2023-01-30 NOTE — PROGRESS NOTES
"Chief Complaint  Rash (Pt believe he is experiencing shingles )    Subjective        Rajan Dc presents to [facility]  History of Present Illness    Patient states that he got a new comforter.  He states that since comforter is coming for about 2 to 3 weeks, he has developed a rash over his upper body including both sides.  Patient initially believed it was shingles, and that is why he presented today's visit.  Upon further questioning he states that the comforter and that she thought only thing that are new, but they were not washed when it was opened up.  He states that he has taken Benadryl.  But does not provide him the relief that he needs.    Objective   Vital Signs:  /70   Pulse 80   Temp 97.5 °F (36.4 °C)   Ht 174 cm (68.5\")   Wt 109 kg (240 lb)   SpO2 95%   BMI 35.96 kg/m²   Estimated body mass index is 35.96 kg/m² as calculated from the following:    Height as of this encounter: 174 cm (68.5\").    Weight as of this encounter: 109 kg (240 lb).             Physical Exam  Vitals and nursing note reviewed.   Constitutional:       Appearance: He is well-developed.   HENT:      Head: Normocephalic and atraumatic.   Musculoskeletal:      Cervical back: Normal range of motion and neck supple.   Neurological:      Mental Status: He is alert and oriented to person, place, and time.   Psychiatric:         Behavior: Behavior normal.        Result Review :                   Assessment and Plan   Diagnoses and all orders for this visit:    1. Dermatitis (Primary)  -     Ambulatory Referral to Dermatology  -     methylPREDNISolone (MEDROL) 4 MG dose pack; Take as directed on package instructions.  Dispense: 1 each; Refill: 0  -     loratadine (Claritin) 10 MG tablet; Take 1 tablet by mouth Daily.  Dispense: 90 tablet; Refill: 0    2. Allergic reaction, initial encounter  -     methylPREDNISolone (MEDROL) 4 MG dose pack; Take as directed on package instructions.  Dispense: " 1 each; Refill: 0  -     loratadine (Claritin) 10 MG tablet; Take 1 tablet by mouth Daily.  Dispense: 90 tablet; Refill: 0      Discussed with patient that we will start him on Medrol Dosepak.  He should also start loratadine daily. Recommend seeing dermatology if no improvement. Recommend washing linens.        Follow Up   No follow-ups on file.  Patient was given instructions and counseling regarding his condition or for health maintenance advice. Please see specific information pulled into the AVS if appropriate.

## 2023-04-18 ENCOUNTER — OFFICE VISIT (OUTPATIENT)
Dept: INTERNAL MEDICINE | Facility: CLINIC | Age: 68
End: 2023-04-18
Payer: COMMERCIAL

## 2023-04-18 VITALS
BODY MASS INDEX: 36.29 KG/M2 | WEIGHT: 245 LBS | TEMPERATURE: 97.5 F | HEART RATE: 85 BPM | SYSTOLIC BLOOD PRESSURE: 130 MMHG | DIASTOLIC BLOOD PRESSURE: 78 MMHG | RESPIRATION RATE: 16 BRPM | OXYGEN SATURATION: 95 % | HEIGHT: 69 IN

## 2023-04-18 DIAGNOSIS — K42.9 UMBILICAL HERNIA WITHOUT OBSTRUCTION AND WITHOUT GANGRENE: Primary | ICD-10-CM

## 2023-04-18 NOTE — PROGRESS NOTES
"Chief Complaint  Hernia     Subjective        Rajan Dc presents to [facility]  History of Present Illness    Patient is a pleasant 67-year-old male who typically sees Dr. Eaton here in the office.  Patient is new to me and presents the clinic today with complaints of a bulging area in his umbilical area.  Patient states the one on the left has been there for many months 3 to be specific and the 1 bedside at has been there for about 1 month.  Patient denies any fever, chills, or pain at this time.  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    Objective   Vital Signs:  /78   Pulse 85   Temp 97.5 °F (36.4 °C)   Resp 16   Ht 174 cm (68.5\")   Wt 111 kg (245 lb)   SpO2 95%   BMI 36.71 kg/m²   Estimated body mass index is 36.71 kg/m² as calculated from the following:    Height as of this encounter: 174 cm (68.5\").    Weight as of this encounter: 111 kg (245 lb).             Physical Exam  Vitals and nursing note reviewed.   Constitutional:       Appearance: Normal appearance.   HENT:      Head: Normocephalic.      Nose: Nose normal.      Mouth/Throat:      Mouth: Mucous membranes are moist.   Eyes:      Pupils: Pupils are equal, round, and reactive to light.   Cardiovascular:      Rate and Rhythm: Normal rate and regular rhythm.      Pulses: Normal pulses.      Heart sounds: Normal heart sounds.   Pulmonary:      Effort: Pulmonary effort is normal. No respiratory distress.      Breath sounds: Normal breath sounds. No stridor. No wheezing, rhonchi or rales.   Chest:      Chest wall: No tenderness.   Abdominal:      General: There is distension.      Tenderness: There is no abdominal tenderness.      Hernia: A hernia is present.      Comments: Umbilical hernia x2 present times greater than 3 months   Musculoskeletal:         General: Normal range of motion.   Skin:     General: Skin is warm.      Capillary Refill: Capillary refill " takes less than 2 seconds.   Neurological:      Mental Status: He is alert and oriented to person, place, and time.   Psychiatric:         Behavior: Behavior normal.        Result Review :    Common labs        8/10/2022    08:09 9/30/2022    11:07 1/30/2023    12:55   Common Labs   Glucose 232   90   189     BUN 7   9   9     Creatinine 0.63   0.74   0.69     Sodium 138   141   141     Potassium 3.7   4.3   4.1     Chloride 106   105   103     Calcium 8.6   9.2   8.9     Total Protein  6.5   7.0     Albumin 3.60   4.10   3.9     Total Bilirubin 0.4   1.0   0.8     Alkaline Phosphatase 141   126   148     AST (SGOT) 25   26   24     ALT (SGPT) 21   22   21     WBC 3.37    6.0     Hemoglobin 13.8    14.3     Hematocrit 42.9    45.1     Platelets 75    116     Total Cholesterol  126   145     Triglycerides  79   88     Hemoglobin A1C  7.60   9.9     Microalbumin, Urine   12.1                    Assessment and Plan   Diagnoses and all orders for this visit:    1. Umbilical hernia without obstruction and without gangrene (Primary)  -     Ambulatory Referral to General Surgery    An ambulatory referral to general surgery has been placed in the office today.  Patient is aware if he has any worsening symptoms to contact the office.  Such as fever, chills, worsening abdominal/umbilical pain.  Patient verbalizes understanding of treatment plan at this time.         Follow Up   Return if symptoms worsen or fail to improve.  Patient was given instructions and counseling regarding his condition or for health maintenance advice. Please see specific information pulled into the AVS if appropriate.

## 2023-04-18 NOTE — PATIENT INSTRUCTIONS
An ambulatory referral to general surgery has been placed in the office today.  Patient is aware if he has any worsening symptoms to contact the office.  Such as fever, chills, worsening abdominal/umbilical pain

## 2023-05-12 ENCOUNTER — PATIENT ROUNDING (BHMG ONLY) (OUTPATIENT)
Dept: SURGERY | Facility: CLINIC | Age: 68
End: 2023-05-12
Payer: COMMERCIAL

## 2023-05-12 ENCOUNTER — OFFICE VISIT (OUTPATIENT)
Dept: SURGERY | Facility: CLINIC | Age: 68
End: 2023-05-12
Payer: COMMERCIAL

## 2023-05-12 VITALS
WEIGHT: 245 LBS | RESPIRATION RATE: 16 BRPM | BODY MASS INDEX: 36.29 KG/M2 | SYSTOLIC BLOOD PRESSURE: 126 MMHG | DIASTOLIC BLOOD PRESSURE: 78 MMHG | HEIGHT: 69 IN | HEART RATE: 88 BPM

## 2023-05-12 DIAGNOSIS — K42.9 UMBILICAL HERNIA WITHOUT OBSTRUCTION AND WITHOUT GANGRENE: Primary | ICD-10-CM

## 2023-05-12 PROCEDURE — 99204 OFFICE O/P NEW MOD 45 MIN: CPT | Performed by: SURGERY

## 2023-05-12 NOTE — PROGRESS NOTES
May 12, 2023    Hello, may I speak with Rajan Dc?    My name is AMINAH      [facility]  [address] [place] [id number] [phone].    Before we get started may I verify your date of birth? 1955    I am calling to officially welcome you to our practice and ask about your recent visit. Is this a good time to talk? yes    Tell me about your visit with us. What things went well?  VISIT WENT WELL       We're always looking for ways to make our patients' experiences even better. Do you have recommendations on ways we may improve?  no    Overall were you satisfied with your first visit to our practice? yes       I appreciate you taking the time to speak with me today. Is there anything else I can do for you? no      Thank you, and have a great day.

## 2023-05-12 NOTE — LETTER
May 12, 2023     Scott Eaton MD  [address] [facility]  [address]  [facility] [address]    Patient: Rajan Dc   YOB: 1955   Date of Visit: 5/12/2023       Dear Dr. Angelito MD:    Thank you for referring Rajan Dc to me for evaluation. Below are the relevant portions of my assessment and plan of care.    If you have questions, please do not hesitate to call me. I look forward to following Rajan along with you.         Sincerely,        Kari Alejandro DO        CC: No Recipients    Kari Alejandro DO  05/12/23 1011  Sign when Signing Visit  Rajan Dc 67 y.o. male presents @ the req of [facility] for eval of umb hernia.  Pt states he first noticed it about 1 yr ago and it has increased in size.   Chief Complaint   Patient presents with   • Hernia     UMB HERNIA             HPI   Above note and agree.  This very pleasant 67-year-old male is here with an umbilical hernia.  He has had an umbilical hernia for a long time but over the past year and another 1 appeared next to it.  It has been growing.  It does not hurt.  He tolerates a regular diet without nausea or vomiting.  He has no chest pain or shortness of breath.  He has no fevers or chills.  He has no other complaints.      Review of Systems   All other systems reviewed and are negative.            Current Outpatient Medications:   •  aspirin 81 MG tablet, Take 1 tablet by mouth Daily., Disp: , Rfl:   •  atorvastatin (LIPITOR) 80 MG tablet, TAKE 1 TABLET BY MOUTH DAILY FOR HIGH CHOLESTEROL, Disp: 90 tablet, Rfl: 2  •  carvedilol (Coreg) 3.125 MG tablet, Take 1 tablet by mouth 2 (Two) Times a Day With Meals., Disp: 60 tablet, Rfl: 3  •  Coenzyme Q10 (Co Q-10) 400 MG capsule, Take 1 p.o. daily with food for muscle cramps and heart, Disp: 30 capsule, Rfl:   •  Empagliflozin (Jardiance) 25 MG tablet, Take 25 mg by mouth Every Morning Before Breakfast. For diabetes, Disp: 90 tablet, Rfl: 1  •  folic acid (FOLVITE) 1 MG  tablet, TAKE 1 TABLET BY MOUTH DAILY, Disp: 30 tablet, Rfl: 2  •  glimepiride (AMARYL) 4 MG tablet, Take 1 p.o. twice daily at breakfast and supper for diabetes, Disp: 180 tablet, Rfl: 3  •  Janumet XR  MG tablet, TAKE 1 TABLET BY MOUTH TWICE DAILY FOR DIABETES, Disp: 60 tablet, Rfl: 5  •  loratadine (Claritin) 10 MG tablet, Take 1 tablet by mouth Daily., Disp: 90 tablet, Rfl: 0  •  methylPREDNISolone (MEDROL) 4 MG dose pack, Take as directed on package instructions., Disp: 1 each, Rfl: 0  •  Semaglutide (Rybelsus) 7 MG tablet, Take 7 mg by mouth Daily., Disp: 30 tablet, Rfl: 6  •  vitamin D3 125 MCG (5000 UT) capsule capsule, 1 by mouth daily as directed, Disp: 30 capsule, Rfl: 11        No Known Allergies        Past Medical History:   Diagnosis Date   • Chronic ITP (idiopathic thrombocytopenia) 09/19/2018   • Chronic migraine w/o aura w/o status migrainosus, not intractable 8/18/2022   • Diabetic eye exam 05/22/2017 05/22/2017--routine diabetic eye exam reveals no evidence of diabetic retinopathy.  Astigmatism, presbyopia, hypermetropia noted.  Very early cataracts noted bilaterally.  October 2015--patient reports a routine diabetic eye exam without evidence of diabetic retinopathy.   • Diabetic foot exam 03/12/2017 03/14/2017--diabetic foot exam reveals no evidence of diabetic foot ulcer or pre-ulcerative callus.  Distal pulses palpable.  No signs of ischemia.  Sensation subjectively intact per monofilament.   • Diverticulosis of colon 10/28/2021    August 18, 2021--colonoscopy reveals a 6 mm polyp in the transverse colon.  There was an 8 mm polyp in the descending colon.  A 20 mm polyp was found at 50 cm proximal to the anus.  Resection was incomplete.  The resected tissue was retrieved and coagulation for destruction was performed.  #4, sessile polyps in the sigmoid colon that were 5 to 6 mm in size.  Removed.  #2, sessile polyps in the sig   • Elevated liver enzymes 06/23/2019 October 17,  --AST is normal at 27, ALT normal at 2 9.  Alkaline phosphatase mildly elevated at 137.  Bilirubin is normal.  2019--ultrasound liver ordered by the gastroenterologist reveals increased hepatic echogenicity consistent with steatosis.  Previous cholecystectomy.  No biliary ductal dilatation.  2019--patient was apparently referred to the gastroenterologist by   • Esophageal varices determined by endoscopy 10/28/2021    2021--EGD reveals grade 2 varices in the lower third of the esophagus.  A varix with no bleeding was found in the cardia.  No stigmata of recent bleeding.  Mild portal hypertensive gastropathy was found in the gastric fundus.   • Family history of premature coronary artery disease 2016    Father  from a myocardial infarction age 61.   • Folic acid deficiency 2016   • History of Abnormal pulse oximetry 10/07/2012    10/07/2012--overnight oximetry test revealed significant nocturnal hypoxemia. Oxygen saturations were greater than 90% for only 50.5% of the study. Oxygen saturation less than 90% for three hours 47 minutes which was 49.5% of the study. Oxygen saturation less than 88% for one hour and 36 minutes and 48 seconds, 21.1% of the study.   • History of CABG 2005 status post four-vessel CABG.   • History of colon polyps, 2021--tubular adenoma x12. 10/28/2021    2021--colonoscopy reveals a 6 mm polyp in the transverse colon.  There was an 8 mm polyp in the descending colon.  A 20 mm polyp was found at 50 cm proximal to the anus.  Resection was incomplete.  The resected tissue was retrieved and coagulation for destruction was performed.  #4, sessile polyps in the sigmoid colon that were 5 to 6 mm in size.  Removed.  #2, sessile polyps in the sig   • History of COVID-19 2021   • History of myocardial infarction, . 2016--status post myocardial infarction.   2005--status post  four-vessel CABG   • Hyperlipidemia 04/28/2016   • Hypogonadism male, TRT ineffective 09/13/2012 09/13/2012--treatment for symptomatic hypogonadism begun. This was later discontinued due to lack of efficacy and cost.   • Liver cirrhosis secondary to RUIZ 10/28/2021    August 18, 2021--EGD reveals grade 2 varices in the lower third of the esophagus.  A varix with no bleeding was found in the cardia.  No stigmata of recent bleeding.  Mild portal hypertensive gastropathy was found in the gastric fundus.  October 2, 2019--ultrasound liver ordered by the gastroenterologist reveals increased hepatic echogenicity consistent with steatosis.  Previous cholecystectomy.     • Male erectile disorder 08/27/2020   • Microalbuminuria 04/25/2014 04/25/2014--urine microalbumin elevated 28.7. Normal range 0.0--17.0.   • RUIZ (nonalcoholic steatohepatitis) 04/28/2016   • Non morbid obesity 06/02/2021   • Obstructive sleep apnea, 11/13/2012--mild to moderate NIKI.  Unable to tolerate CPAP.  Cannot use oral appliance. 10/07/2012    05/02/2014--nocturnal oxygen 2 L per nasal cannula ordered.   12/03/2013--patient was referred for an oral appliance but this could not be done because patient wears dentures.   11/13/2012--diagnosed with mild to moderate obstructive sleep apnea. Patient unable to tolerate CPAP.   10/07/2012--overnight oximetry test revealed significant nocturnal hypoxemia. Oxygen saturations were greater than 90% for only 50.5% of the study. Oxygen saturation less than 90% for three hours 47 minutes which was 49.5% of the study. Oxygen saturation less than 88% for one hour and 36 minutes and 48 seconds, 21.1% of the study.   • Occlusive coronary artery disease, 01/01/2005--status post MI.  January 2005--CABG ×4.  Details not known. 01/01/2005 01/01/2005--status post myocardial infarction.   January 2005--status post four-vessel CABG   • Sleep related hypoxia 10/07/2012    05/02/2014--nocturnal oxygen 2 L per nasal  cannula ordered.  2013--patient was referred for an oral appliance but this could not be done because patient wears dentures.   2012--diagnosed with mild to moderate obstructive sleep apnea. Patient unable to tolerate CPAP.   10/07/2012--overnight oximetry test revealed significant nocturnal hypoxemia. Oxygen saturations were greater than 90% for only 50.5% of the study. Oxygen saturation less than 90% for three hours 47 minutes which was 49.5% of the study. Oxygen saturation less than 88% for one hour and 36 minutes and 48 seconds, 21.1% of the study.   • Type 2 diabetes mellitus with microalbuminuria, without long-term current use of insulin 2005    Diagnosed in .   • Vitamin D deficiency 2016           Past Surgical History:   Procedure Laterality Date   • CHOLECYSTECTOMY WITH INTRAOPERATIVE CHOLANGIOGRAM N/A 2019    Procedure: laparoscopic cholecystectomy with intraoperative cholangiogram;  Surgeon: Vida Avilez MD;  Location: [facility];  Service: General   • COLONOSCOPY N/A 2021--colonoscopy reveals a 6 mm polyp in the transverse colon.  There was an 8 mm polyp in the descending colon.  A 20 mm polyp was found at 50 cm proximal to the anus.  Resection was incomplete.  The resected tissue was retrieved and coagulation for destruction was performed.  #4, sessile polyps in the sigmoid colon that were 5 to 6 mm in size.  Removed.  #2, sessile polyps in the sig   • CORONARY ARTERY BYPASS GRAFT  2005 status post four-vessel CABG.   • ENDOSCOPY N/A 2021--EGD reveals grade 2 varices in the lower third of the esophagus.  A varix with no bleeding was found in the cardia.  No stigmata of recent bleeding.  Mild portal hypertensive gastropathy was found in the gastric fundus.           Social History     Tobacco Use   • Smoking status: Former     Types: Cigarettes     Quit date:      Years since quittin.3   •  "Smokeless tobacco: Never   • Tobacco comments:     Former smoker quit 13 years ago with a 64.5 pack year history.  Smoked 1 ppd for 32 years and 4.5 ppd for 5 years and quit when he had his open heart surgery.   Vaping Use   • Vaping Use: Never used   Substance Use Topics   • Alcohol use: No   • Drug use: No           Immunization History   Administered Date(s) Administered   • Pneumococcal Conjugate 20-Valent (PCV20) 07/21/2022   • Pneumococcal Polysaccharide (PPSV23) 03/14/2017   • Tdap 02/05/2015           Physical Exam  Vitals and nursing note reviewed.   Constitutional:       Appearance: Normal appearance.   HENT:      Head: Normocephalic and atraumatic.   Cardiovascular:      Rate and Rhythm: Normal rate and regular rhythm.   Pulmonary:      Effort: Pulmonary effort is normal.      Breath sounds: Normal breath sounds.   Abdominal:      General: Bowel sounds are normal.      Palpations: Abdomen is soft.      Comments: Large umbilical hernia noted   Musculoskeletal:         General: No swelling or tenderness.   Skin:     General: Skin is warm and dry.   Neurological:      General: No focal deficit present.      Mental Status: He is alert and oriented to person, place, and time.   Psychiatric:         Mood and Affect: Mood normal.         Behavior: Behavior normal.         Debilities/Disabilities Identified: None    Emotional Behavior: Appropriate      /78   Pulse 88   Resp 16   Ht 175.3 cm (69\")   Wt 111 kg (245 lb)   BMI 36.18 kg/m²         Diagnoses and all orders for this visit:    1. Umbilical hernia without obstruction and without gangrene (Primary)      The risks and benefits of repairing this hernia were discussed with this patient.  Benefits and risks, not limited to but including:  Bleeding, infection, recurrence, formation of a hematoma or seroma, injury to intra-abdominal structures, DVT, PE, atelectasis, pneumonia, anesthesia complications.  The patient appeared to understand and is " willing to proceed.    Thank you for allowing me to participate in the care of this interesting patient.

## 2023-05-12 NOTE — H&P
Rajan Dc 67 y.o. male presents @ the req of [facility] for eval of umb hernia.  Pt states he first noticed it about 1 yr ago and it has increased in size.   Chief Complaint   Patient presents with   • Hernia     UMB HERNIA             HPI   Above note and agree.  This very pleasant 67-year-old male is here with an umbilical hernia.  He has had an umbilical hernia for a long time but over the past year and another 1 appeared next to it.  It has been growing.  It does not hurt.  He tolerates a regular diet without nausea or vomiting.  He has no chest pain or shortness of breath.  He has no fevers or chills.  He has no other complaints.      Review of Systems   All other systems reviewed and are negative.            Current Outpatient Medications:   •  aspirin 81 MG tablet, Take 1 tablet by mouth Daily., Disp: , Rfl:   •  atorvastatin (LIPITOR) 80 MG tablet, TAKE 1 TABLET BY MOUTH DAILY FOR HIGH CHOLESTEROL, Disp: 90 tablet, Rfl: 2  •  carvedilol (Coreg) 3.125 MG tablet, Take 1 tablet by mouth 2 (Two) Times a Day With Meals., Disp: 60 tablet, Rfl: 3  •  Coenzyme Q10 (Co Q-10) 400 MG capsule, Take 1 p.o. daily with food for muscle cramps and heart, Disp: 30 capsule, Rfl:   •  Empagliflozin (Jardiance) 25 MG tablet, Take 25 mg by mouth Every Morning Before Breakfast. For diabetes, Disp: 90 tablet, Rfl: 1  •  folic acid (FOLVITE) 1 MG tablet, TAKE 1 TABLET BY MOUTH DAILY, Disp: 30 tablet, Rfl: 2  •  glimepiride (AMARYL) 4 MG tablet, Take 1 p.o. twice daily at breakfast and supper for diabetes, Disp: 180 tablet, Rfl: 3  •  Janumet XR  MG tablet, TAKE 1 TABLET BY MOUTH TWICE DAILY FOR DIABETES, Disp: 60 tablet, Rfl: 5  •  loratadine (Claritin) 10 MG tablet, Take 1 tablet by mouth Daily., Disp: 90 tablet, Rfl: 0  •  methylPREDNISolone (MEDROL) 4 MG dose pack, Take as directed on package instructions., Disp: 1 each, Rfl: 0  •  Semaglutide (Rybelsus) 7 MG tablet, Take 7 mg by mouth Daily., Disp: 30 tablet,  Rfl: 6  •  vitamin D3 125 MCG (5000 UT) capsule capsule, 1 by mouth daily as directed, Disp: 30 capsule, Rfl: 11        No Known Allergies        Past Medical History:   Diagnosis Date   • Chronic ITP (idiopathic thrombocytopenia) 09/19/2018   • Chronic migraine w/o aura w/o status migrainosus, not intractable 8/18/2022   • Diabetic eye exam 05/22/2017 05/22/2017--routine diabetic eye exam reveals no evidence of diabetic retinopathy.  Astigmatism, presbyopia, hypermetropia noted.  Very early cataracts noted bilaterally.  October 2015--patient reports a routine diabetic eye exam without evidence of diabetic retinopathy.   • Diabetic foot exam 03/12/2017 03/14/2017--diabetic foot exam reveals no evidence of diabetic foot ulcer or pre-ulcerative callus.  Distal pulses palpable.  No signs of ischemia.  Sensation subjectively intact per monofilament.   • Diverticulosis of colon 10/28/2021    August 18, 2021--colonoscopy reveals a 6 mm polyp in the transverse colon.  There was an 8 mm polyp in the descending colon.  A 20 mm polyp was found at 50 cm proximal to the anus.  Resection was incomplete.  The resected tissue was retrieved and coagulation for destruction was performed.  #4, sessile polyps in the sigmoid colon that were 5 to 6 mm in size.  Removed.  #2, sessile polyps in the sig   • Elevated liver enzymes 06/23/2019 October 17, 2019--AST is normal at 27, ALT normal at 2 9.  Alkaline phosphatase mildly elevated at 137.  Bilirubin is normal.  October 2, 2019--ultrasound liver ordered by the gastroenterologist reveals increased hepatic echogenicity consistent with steatosis.  Previous cholecystectomy.  No biliary ductal dilatation.  September 24, 2019--patient was apparently referred to the gastroenterologist by   • Esophageal varices determined by endoscopy 10/28/2021    August 18, 2021--EGD reveals grade 2 varices in the lower third of the esophagus.  A varix with no bleeding was found in the cardia.  No  stigmata of recent bleeding.  Mild portal hypertensive gastropathy was found in the gastric fundus.   • Family history of premature coronary artery disease 2016    Father  from a myocardial infarction age 61.   • Folic acid deficiency 2016   • History of Abnormal pulse oximetry 10/07/2012    10/07/2012--overnight oximetry test revealed significant nocturnal hypoxemia. Oxygen saturations were greater than 90% for only 50.5% of the study. Oxygen saturation less than 90% for three hours 47 minutes which was 49.5% of the study. Oxygen saturation less than 88% for one hour and 36 minutes and 48 seconds, 21.1% of the study.   • History of CABG 2005 status post four-vessel CABG.   • History of colon polyps, 2021--tubular adenoma x12. 10/28/2021    2021--colonoscopy reveals a 6 mm polyp in the transverse colon.  There was an 8 mm polyp in the descending colon.  A 20 mm polyp was found at 50 cm proximal to the anus.  Resection was incomplete.  The resected tissue was retrieved and coagulation for destruction was performed.  #4, sessile polyps in the sigmoid colon that were 5 to 6 mm in size.  Removed.  #2, sessile polyps in the sig   • History of COVID-19 2021   • History of myocardial infarction, . 2016--status post myocardial infarction.   2005--status post four-vessel CABG   • Hyperlipidemia 2016   • Hypogonadism male, TRT ineffective 2012--treatment for symptomatic hypogonadism begun. This was later discontinued due to lack of efficacy and cost.   • Liver cirrhosis secondary to RUIZ 10/28/2021    2021--EGD reveals grade 2 varices in the lower third of the esophagus.  A varix with no bleeding was found in the cardia.  No stigmata of recent bleeding.  Mild portal hypertensive gastropathy was found in the gastric fundus.  2019--ultrasound liver ordered by the gastroenterologist reveals  increased hepatic echogenicity consistent with steatosis.  Previous cholecystectomy.     • Male erectile disorder 08/27/2020   • Microalbuminuria 04/25/2014 04/25/2014--urine microalbumin elevated 28.7. Normal range 0.0--17.0.   • RUIZ (nonalcoholic steatohepatitis) 04/28/2016   • Non morbid obesity 06/02/2021   • Obstructive sleep apnea, 11/13/2012--mild to moderate NIKI.  Unable to tolerate CPAP.  Cannot use oral appliance. 10/07/2012    05/02/2014--nocturnal oxygen 2 L per nasal cannula ordered.   12/03/2013--patient was referred for an oral appliance but this could not be done because patient wears dentures.   11/13/2012--diagnosed with mild to moderate obstructive sleep apnea. Patient unable to tolerate CPAP.   10/07/2012--overnight oximetry test revealed significant nocturnal hypoxemia. Oxygen saturations were greater than 90% for only 50.5% of the study. Oxygen saturation less than 90% for three hours 47 minutes which was 49.5% of the study. Oxygen saturation less than 88% for one hour and 36 minutes and 48 seconds, 21.1% of the study.   • Occlusive coronary artery disease, 01/01/2005--status post MI.  January 2005--CABG ×4.  Details not known. 01/01/2005 01/01/2005--status post myocardial infarction.   January 2005--status post four-vessel CABG   • Sleep related hypoxia 10/07/2012    05/02/2014--nocturnal oxygen 2 L per nasal cannula ordered.  12/03/2013--patient was referred for an oral appliance but this could not be done because patient wears dentures.   11/13/2012--diagnosed with mild to moderate obstructive sleep apnea. Patient unable to tolerate CPAP.   10/07/2012--overnight oximetry test revealed significant nocturnal hypoxemia. Oxygen saturations were greater than 90% for only 50.5% of the study. Oxygen saturation less than 90% for three hours 47 minutes which was 49.5% of the study. Oxygen saturation less than 88% for one hour and 36 minutes and 48 seconds, 21.1% of the study.   • Type 2  diabetes mellitus with microalbuminuria, without long-term current use of insulin 2005    Diagnosed in .   • Vitamin D deficiency 2016           Past Surgical History:   Procedure Laterality Date   • CHOLECYSTECTOMY WITH INTRAOPERATIVE CHOLANGIOGRAM N/A 2019    Procedure: laparoscopic cholecystectomy with intraoperative cholangiogram;  Surgeon: Vida Avilez MD;  Location: [facility];  Service: General   • COLONOSCOPY N/A 2021--colonoscopy reveals a 6 mm polyp in the transverse colon.  There was an 8 mm polyp in the descending colon.  A 20 mm polyp was found at 50 cm proximal to the anus.  Resection was incomplete.  The resected tissue was retrieved and coagulation for destruction was performed.  #4, sessile polyps in the sigmoid colon that were 5 to 6 mm in size.  Removed.  #2, sessile polyps in the sig   • CORONARY ARTERY BYPASS GRAFT  2005 status post four-vessel CABG.   • ENDOSCOPY N/A 2021--EGD reveals grade 2 varices in the lower third of the esophagus.  A varix with no bleeding was found in the cardia.  No stigmata of recent bleeding.  Mild portal hypertensive gastropathy was found in the gastric fundus.           Social History     Tobacco Use   • Smoking status: Former     Types: Cigarettes     Quit date:      Years since quittin.3   • Smokeless tobacco: Never   • Tobacco comments:     Former smoker quit 13 years ago with a 64.5 pack year history.  Smoked 1 ppd for 32 years and 4.5 ppd for 5 years and quit when he had his open heart surgery.   Vaping Use   • Vaping Use: Never used   Substance Use Topics   • Alcohol use: No   • Drug use: No           Immunization History   Administered Date(s) Administered   • Pneumococcal Conjugate 20-Valent (PCV20) 2022   • Pneumococcal Polysaccharide (PPSV23) 2017   • Tdap 2015           Physical Exam  Vitals and nursing note reviewed.   Constitutional:  "      Appearance: Normal appearance.   HENT:      Head: Normocephalic and atraumatic.   Cardiovascular:      Rate and Rhythm: Normal rate and regular rhythm.   Pulmonary:      Effort: Pulmonary effort is normal.      Breath sounds: Normal breath sounds.   Abdominal:      General: Bowel sounds are normal.      Palpations: Abdomen is soft.      Comments: Large umbilical hernia noted   Musculoskeletal:         General: No swelling or tenderness.   Skin:     General: Skin is warm and dry.   Neurological:      General: No focal deficit present.      Mental Status: He is alert and oriented to person, place, and time.   Psychiatric:         Mood and Affect: Mood normal.         Behavior: Behavior normal.         Debilities/Disabilities Identified: None    Emotional Behavior: Appropriate      /78   Pulse 88   Resp 16   Ht 175.3 cm (69\")   Wt 111 kg (245 lb)   BMI 36.18 kg/m²         Diagnoses and all orders for this visit:    1. Umbilical hernia without obstruction and without gangrene (Primary)      The risks and benefits of repairing this hernia were discussed with this patient.  Benefits and risks, not limited to but including:  Bleeding, infection, recurrence, formation of a hematoma or seroma, injury to intra-abdominal structures, DVT, PE, atelectasis, pneumonia, anesthesia complications.  The patient appeared to understand and is willing to proceed.    Thank you for allowing me to participate in the care of this interesting patient.            "

## 2023-05-12 NOTE — PROGRESS NOTES
Rajan Dc 67 y.o. male presents @ the req of [facility] for eval of umb hernia.  Pt states he first noticed it about 1 yr ago and it has increased in size.   Chief Complaint   Patient presents with   • Hernia     UMB HERNIA             HPI   Above note and agree.  This very pleasant 67-year-old male is here with an umbilical hernia.  He has had an umbilical hernia for a long time but over the past year and another 1 appeared next to it.  It has been growing.  It does not hurt.  He tolerates a regular diet without nausea or vomiting.  He has no chest pain or shortness of breath.  He has no fevers or chills.  He has no other complaints.      Review of Systems   All other systems reviewed and are negative.            Current Outpatient Medications:   •  aspirin 81 MG tablet, Take 1 tablet by mouth Daily., Disp: , Rfl:   •  atorvastatin (LIPITOR) 80 MG tablet, TAKE 1 TABLET BY MOUTH DAILY FOR HIGH CHOLESTEROL, Disp: 90 tablet, Rfl: 2  •  carvedilol (Coreg) 3.125 MG tablet, Take 1 tablet by mouth 2 (Two) Times a Day With Meals., Disp: 60 tablet, Rfl: 3  •  Coenzyme Q10 (Co Q-10) 400 MG capsule, Take 1 p.o. daily with food for muscle cramps and heart, Disp: 30 capsule, Rfl:   •  Empagliflozin (Jardiance) 25 MG tablet, Take 25 mg by mouth Every Morning Before Breakfast. For diabetes, Disp: 90 tablet, Rfl: 1  •  folic acid (FOLVITE) 1 MG tablet, TAKE 1 TABLET BY MOUTH DAILY, Disp: 30 tablet, Rfl: 2  •  glimepiride (AMARYL) 4 MG tablet, Take 1 p.o. twice daily at breakfast and supper for diabetes, Disp: 180 tablet, Rfl: 3  •  Janumet XR  MG tablet, TAKE 1 TABLET BY MOUTH TWICE DAILY FOR DIABETES, Disp: 60 tablet, Rfl: 5  •  loratadine (Claritin) 10 MG tablet, Take 1 tablet by mouth Daily., Disp: 90 tablet, Rfl: 0  •  methylPREDNISolone (MEDROL) 4 MG dose pack, Take as directed on package instructions., Disp: 1 each, Rfl: 0  •  Semaglutide (Rybelsus) 7 MG tablet, Take 7 mg by mouth Daily., Disp: 30 tablet,  Rfl: 6  •  vitamin D3 125 MCG (5000 UT) capsule capsule, 1 by mouth daily as directed, Disp: 30 capsule, Rfl: 11        No Known Allergies        Past Medical History:   Diagnosis Date   • Chronic ITP (idiopathic thrombocytopenia) 09/19/2018   • Chronic migraine w/o aura w/o status migrainosus, not intractable 8/18/2022   • Diabetic eye exam 05/22/2017 05/22/2017--routine diabetic eye exam reveals no evidence of diabetic retinopathy.  Astigmatism, presbyopia, hypermetropia noted.  Very early cataracts noted bilaterally.  October 2015--patient reports a routine diabetic eye exam without evidence of diabetic retinopathy.   • Diabetic foot exam 03/12/2017 03/14/2017--diabetic foot exam reveals no evidence of diabetic foot ulcer or pre-ulcerative callus.  Distal pulses palpable.  No signs of ischemia.  Sensation subjectively intact per monofilament.   • Diverticulosis of colon 10/28/2021    August 18, 2021--colonoscopy reveals a 6 mm polyp in the transverse colon.  There was an 8 mm polyp in the descending colon.  A 20 mm polyp was found at 50 cm proximal to the anus.  Resection was incomplete.  The resected tissue was retrieved and coagulation for destruction was performed.  #4, sessile polyps in the sigmoid colon that were 5 to 6 mm in size.  Removed.  #2, sessile polyps in the sig   • Elevated liver enzymes 06/23/2019 October 17, 2019--AST is normal at 27, ALT normal at 2 9.  Alkaline phosphatase mildly elevated at 137.  Bilirubin is normal.  October 2, 2019--ultrasound liver ordered by the gastroenterologist reveals increased hepatic echogenicity consistent with steatosis.  Previous cholecystectomy.  No biliary ductal dilatation.  September 24, 2019--patient was apparently referred to the gastroenterologist by   • Esophageal varices determined by endoscopy 10/28/2021    August 18, 2021--EGD reveals grade 2 varices in the lower third of the esophagus.  A varix with no bleeding was found in the cardia.  No  stigmata of recent bleeding.  Mild portal hypertensive gastropathy was found in the gastric fundus.   • Family history of premature coronary artery disease 2016    Father  from a myocardial infarction age 61.   • Folic acid deficiency 2016   • History of Abnormal pulse oximetry 10/07/2012    10/07/2012--overnight oximetry test revealed significant nocturnal hypoxemia. Oxygen saturations were greater than 90% for only 50.5% of the study. Oxygen saturation less than 90% for three hours 47 minutes which was 49.5% of the study. Oxygen saturation less than 88% for one hour and 36 minutes and 48 seconds, 21.1% of the study.   • History of CABG 2005 status post four-vessel CABG.   • History of colon polyps, 2021--tubular adenoma x12. 10/28/2021    2021--colonoscopy reveals a 6 mm polyp in the transverse colon.  There was an 8 mm polyp in the descending colon.  A 20 mm polyp was found at 50 cm proximal to the anus.  Resection was incomplete.  The resected tissue was retrieved and coagulation for destruction was performed.  #4, sessile polyps in the sigmoid colon that were 5 to 6 mm in size.  Removed.  #2, sessile polyps in the sig   • History of COVID-19 2021   • History of myocardial infarction, . 2016--status post myocardial infarction.   2005--status post four-vessel CABG   • Hyperlipidemia 2016   • Hypogonadism male, TRT ineffective 2012--treatment for symptomatic hypogonadism begun. This was later discontinued due to lack of efficacy and cost.   • Liver cirrhosis secondary to RUIZ 10/28/2021    2021--EGD reveals grade 2 varices in the lower third of the esophagus.  A varix with no bleeding was found in the cardia.  No stigmata of recent bleeding.  Mild portal hypertensive gastropathy was found in the gastric fundus.  2019--ultrasound liver ordered by the gastroenterologist reveals  increased hepatic echogenicity consistent with steatosis.  Previous cholecystectomy.     • Male erectile disorder 08/27/2020   • Microalbuminuria 04/25/2014 04/25/2014--urine microalbumin elevated 28.7. Normal range 0.0--17.0.   • RUIZ (nonalcoholic steatohepatitis) 04/28/2016   • Non morbid obesity 06/02/2021   • Obstructive sleep apnea, 11/13/2012--mild to moderate NIKI.  Unable to tolerate CPAP.  Cannot use oral appliance. 10/07/2012    05/02/2014--nocturnal oxygen 2 L per nasal cannula ordered.   12/03/2013--patient was referred for an oral appliance but this could not be done because patient wears dentures.   11/13/2012--diagnosed with mild to moderate obstructive sleep apnea. Patient unable to tolerate CPAP.   10/07/2012--overnight oximetry test revealed significant nocturnal hypoxemia. Oxygen saturations were greater than 90% for only 50.5% of the study. Oxygen saturation less than 90% for three hours 47 minutes which was 49.5% of the study. Oxygen saturation less than 88% for one hour and 36 minutes and 48 seconds, 21.1% of the study.   • Occlusive coronary artery disease, 01/01/2005--status post MI.  January 2005--CABG ×4.  Details not known. 01/01/2005 01/01/2005--status post myocardial infarction.   January 2005--status post four-vessel CABG   • Sleep related hypoxia 10/07/2012    05/02/2014--nocturnal oxygen 2 L per nasal cannula ordered.  12/03/2013--patient was referred for an oral appliance but this could not be done because patient wears dentures.   11/13/2012--diagnosed with mild to moderate obstructive sleep apnea. Patient unable to tolerate CPAP.   10/07/2012--overnight oximetry test revealed significant nocturnal hypoxemia. Oxygen saturations were greater than 90% for only 50.5% of the study. Oxygen saturation less than 90% for three hours 47 minutes which was 49.5% of the study. Oxygen saturation less than 88% for one hour and 36 minutes and 48 seconds, 21.1% of the study.   • Type 2  diabetes mellitus with microalbuminuria, without long-term current use of insulin 2005    Diagnosed in .   • Vitamin D deficiency 2016           Past Surgical History:   Procedure Laterality Date   • CHOLECYSTECTOMY WITH INTRAOPERATIVE CHOLANGIOGRAM N/A 2019    Procedure: laparoscopic cholecystectomy with intraoperative cholangiogram;  Surgeon: Vida Avilez MD;  Location: [facility];  Service: General   • COLONOSCOPY N/A 2021--colonoscopy reveals a 6 mm polyp in the transverse colon.  There was an 8 mm polyp in the descending colon.  A 20 mm polyp was found at 50 cm proximal to the anus.  Resection was incomplete.  The resected tissue was retrieved and coagulation for destruction was performed.  #4, sessile polyps in the sigmoid colon that were 5 to 6 mm in size.  Removed.  #2, sessile polyps in the sig   • CORONARY ARTERY BYPASS GRAFT  2005 status post four-vessel CABG.   • ENDOSCOPY N/A 2021--EGD reveals grade 2 varices in the lower third of the esophagus.  A varix with no bleeding was found in the cardia.  No stigmata of recent bleeding.  Mild portal hypertensive gastropathy was found in the gastric fundus.           Social History     Tobacco Use   • Smoking status: Former     Types: Cigarettes     Quit date:      Years since quittin.3   • Smokeless tobacco: Never   • Tobacco comments:     Former smoker quit 13 years ago with a 64.5 pack year history.  Smoked 1 ppd for 32 years and 4.5 ppd for 5 years and quit when he had his open heart surgery.   Vaping Use   • Vaping Use: Never used   Substance Use Topics   • Alcohol use: No   • Drug use: No           Immunization History   Administered Date(s) Administered   • Pneumococcal Conjugate 20-Valent (PCV20) 2022   • Pneumococcal Polysaccharide (PPSV23) 2017   • Tdap 2015           Physical Exam  Vitals and nursing note reviewed.   Constitutional:  "      Appearance: Normal appearance.   HENT:      Head: Normocephalic and atraumatic.   Cardiovascular:      Rate and Rhythm: Normal rate and regular rhythm.   Pulmonary:      Effort: Pulmonary effort is normal.      Breath sounds: Normal breath sounds.   Abdominal:      General: Bowel sounds are normal.      Palpations: Abdomen is soft.      Comments: Large umbilical hernia noted   Musculoskeletal:         General: No swelling or tenderness.   Skin:     General: Skin is warm and dry.   Neurological:      General: No focal deficit present.      Mental Status: He is alert and oriented to person, place, and time.   Psychiatric:         Mood and Affect: Mood normal.         Behavior: Behavior normal.         Debilities/Disabilities Identified: None    Emotional Behavior: Appropriate      /78   Pulse 88   Resp 16   Ht 175.3 cm (69\")   Wt 111 kg (245 lb)   BMI 36.18 kg/m²         Diagnoses and all orders for this visit:    1. Umbilical hernia without obstruction and without gangrene (Primary)       The risks and benefits of repairing this hernia were discussed with this patient.  Benefits and risks, not limited to but including:  Bleeding, infection, recurrence, formation of a hematoma or seroma, injury to intra-abdominal structures, DVT, PE, atelectasis, pneumonia, anesthesia complications.  The patient appeared to understand and is willing to proceed.    Thank you for allowing me to participate in the care of this interesting patient.        "

## 2023-05-14 LAB — MRSA SPEC QL CULT: NEGATIVE

## 2023-05-19 ENCOUNTER — PRE-ADMISSION TESTING (OUTPATIENT)
Dept: PREADMISSION TESTING | Facility: HOSPITAL | Age: 68
End: 2023-05-19
Payer: COMMERCIAL

## 2023-05-19 VITALS
DIASTOLIC BLOOD PRESSURE: 77 MMHG | SYSTOLIC BLOOD PRESSURE: 140 MMHG | RESPIRATION RATE: 16 BRPM | HEART RATE: 82 BPM | WEIGHT: 232.59 LBS | HEIGHT: 69 IN | BODY MASS INDEX: 34.45 KG/M2 | OXYGEN SATURATION: 95 %

## 2023-05-19 LAB
ALBUMIN SERPL-MCNC: 3.7 G/DL (ref 3.5–5.2)
ALBUMIN/GLOB SERPL: 1.1 G/DL
ALP SERPL-CCNC: 144 U/L (ref 39–117)
ALT SERPL W P-5'-P-CCNC: 20 U/L (ref 1–41)
ANION GAP SERPL CALCULATED.3IONS-SCNC: 10 MMOL/L (ref 5–15)
AST SERPL-CCNC: 23 U/L (ref 1–40)
BILIRUB SERPL-MCNC: 0.9 MG/DL (ref 0–1.2)
BUN SERPL-MCNC: 11 MG/DL (ref 8–23)
BUN/CREAT SERPL: 16.9 (ref 7–25)
CALCIUM SPEC-SCNC: 8.8 MG/DL (ref 8.6–10.5)
CHLORIDE SERPL-SCNC: 105 MMOL/L (ref 98–107)
CO2 SERPL-SCNC: 23 MMOL/L (ref 22–29)
CREAT SERPL-MCNC: 0.65 MG/DL (ref 0.76–1.27)
DEPRECATED RDW RBC AUTO: 42.4 FL (ref 37–54)
EGFRCR SERPLBLD CKD-EPI 2021: 103.9 ML/MIN/1.73
ERYTHROCYTE [DISTWIDTH] IN BLOOD BY AUTOMATED COUNT: 16.1 % (ref 12.3–15.4)
GLOBULIN UR ELPH-MCNC: 3.4 GM/DL
GLUCOSE SERPL-MCNC: 306 MG/DL (ref 65–99)
HBA1C MFR BLD: 12.9 % (ref 4.8–5.6)
HCT VFR BLD AUTO: 37.5 % (ref 37.5–51)
HGB BLD-MCNC: 10.7 G/DL (ref 13–17.7)
MCH RBC QN AUTO: 21 PG (ref 26.6–33)
MCHC RBC AUTO-ENTMCNC: 28.5 G/DL (ref 31.5–35.7)
MCV RBC AUTO: 73.7 FL (ref 79–97)
PLATELET # BLD AUTO: 92 10*3/MM3 (ref 140–450)
PMV BLD AUTO: ABNORMAL FL
POTASSIUM SERPL-SCNC: 4 MMOL/L (ref 3.5–5.2)
PROT SERPL-MCNC: 7.1 G/DL (ref 6–8.5)
QT INTERVAL: 457 MS
RBC # BLD AUTO: 5.09 10*6/MM3 (ref 4.14–5.8)
SODIUM SERPL-SCNC: 138 MMOL/L (ref 136–145)
WBC NRBC COR # BLD: 4.34 10*3/MM3 (ref 3.4–10.8)

## 2023-05-19 PROCEDURE — 80053 COMPREHEN METABOLIC PANEL: CPT | Performed by: SURGERY

## 2023-05-19 PROCEDURE — 36415 COLL VENOUS BLD VENIPUNCTURE: CPT

## 2023-05-19 PROCEDURE — 83036 HEMOGLOBIN GLYCOSYLATED A1C: CPT | Performed by: SURGERY

## 2023-05-19 PROCEDURE — 93005 ELECTROCARDIOGRAM TRACING: CPT

## 2023-05-19 PROCEDURE — 85027 COMPLETE CBC AUTOMATED: CPT | Performed by: SURGERY

## 2023-05-19 NOTE — DISCHARGE INSTRUCTIONS
PRE-ADMISSION TESTING INSTRUCTIONS FOR ADULTS    Take these medications the morning of surgery with a small sip of water:  atorvastatin; if you get carvedilol before surgery take it also      Do not take any insulin or diabetes medications the morning of surgery.      No aspirin, advil, aleve, ibuprofen, naproxen, diet pills, decongestants, or herbal/vitamins for a week prior to surgery.       Tylenol/Acetaminophen is okay to take if needed.    General Instructions:    DO NOT EAT SOLID FOOD OR DRINK LIQUIDS AFTER MIDNIGHT THE NIGHT BEFORE SURGERY. No gum, mints, or hard candy after midnight the night before surgery.      Patients who avoid smoking, chewing tobacco and alcohol for 4 weeks prior to surgery have a reduced risk of post-operative complications.  If at all possible, quit smoking as many days before surgery as you can.    Do not smoke, use chewing tobacco or drink alcohol the day of surgery    Bring your C-PAP/ BI-PAP machine if you use one.  Wear clean comfortable clothes.  Do not wear contact lenses, lotion, deodorant, or make-up.  Bring a case for your glasses if applicable. You may brush your teeth the morning of surgery.  You may wear dentures/partials, do not put adhesive/glue on them.  Leave all other jewelry and valuables at home.      Preventing a Surgical Site Infection:    Shower the night before and on the morning of surgery using the chlorhexidine soap you were given.  Use a clean washcloth with the soap.  Place clean sheets on your bed after showering the night before surgery. Do not use the CHG soap on your hair, face, or private areas. Wash your body gently for five (5) minutes. Do not scrub your skin.  Dry with a clean towel and dress in clean clothing.  Do not shave the surgical area for 10 days-2 weeks prior to surgery  because the razor can irritate skin and make it easier to develop an infection.  Make sure you, your family, and all healthcare providers clean their hands with soap and  water or an alcohol based hand  before caring for you or your wound.      Day of surgery:    Your surgeon’s office will advise you of your arrival time for the day of surgery.    Upon arrival, a Pre-op nurse and Anesthesia provider will review your health history, obtain vital signs, and answer questions you may have. The anesthesia provider will also discuss the type of anesthesia that will be needed for your procedure, which may include general anesthesia. The only belongings needed at this time will be your home medications and if applicable your C-PAP/BI-PAP machine.  If you are staying overnight your family can leave the rest of your belongings in the car and bring them to your room later.  A Pre-op nurse will start an IV and you may receive medication in preparation for surgery, including something to help you relax.  Your family will be able to see you in the Pre-op area.  While you are in surgery your family should notify the waiting room  if they leave the waiting room area and provide a contact phone number.    IF you have any questions, you can call the Pre-Admission Department at [phone] or your surgeon's office.  Notify your surgeon if  you become sick, have a fever, productive cough, or cannot be here the day of surgery    Please be aware that surgery does come with discomfort.  We want to make every effort to control your discomfort so please discuss any uncontrolled symptoms with your nurse.   Your doctor will most likely have prescribed pain medications.      If you are going home after surgery, you will receive individualized written care instructions before being discharged.  A responsible adult (over the age of 18) must drive you to and from the hospital on the day of your surgery and stay with you for 24 hours after anesthesia.    If you are staying overnight following surgery, you will be transported to your hospital room following the recovery period.  [facility]  [facility] has all private rooms.    You may receive a survey regarding the care you received. Your feedback is very important and will be used to collect the necessary data to help us to continue to provide excellent care.     Deductibles and co-payments are collected on the day of service. Please be prepared to pay the required co-pay, deductible or deposit on the day of service as defined by your plan.

## 2023-05-19 NOTE — PAT
Pt and spouse here for PAT visit.  Pre-op tests completed, chg soap given, and instructions reviewed.  Instructed nothing to eat/drink after midnight night prior, voiced understanding.

## 2023-05-22 ENCOUNTER — TELEPHONE (OUTPATIENT)
Dept: SURGERY | Facility: CLINIC | Age: 68
End: 2023-05-22
Payer: COMMERCIAL

## 2023-05-22 ENCOUNTER — TELEPHONE (OUTPATIENT)
Dept: INTERNAL MEDICINE | Facility: CLINIC | Age: 68
End: 2023-05-22

## 2023-05-22 DIAGNOSIS — Z01.810 ENCOUNTER FOR PRE-OPERATIVE CARDIOVASCULAR CLEARANCE: Primary | ICD-10-CM

## 2023-05-22 NOTE — TELEPHONE ENCOUNTER
Notified per PAT that anesthesia is requiring med and cardio clr.  Spoke with pt and he will call PCP and obtain OV for med clr.  Appt sched with Dr. Oconnor 05/25/2023 @ 2:30 pm @ [facility].  Surg sched 05/25/2023 is cx'd and will resched after clr is obtained.

## 2023-05-22 NOTE — PROGRESS NOTES
Let's see if we can get him into his pcp for his sugars.  Getting those under control before surgery would be ideal

## 2023-05-22 NOTE — TELEPHONE ENCOUNTER
PATIENT'S WIFE CALLED FOR A MEDICAL CLEARANCE APPOINTMENT FOR HERNIA SURGERY. HE IS SCHEDULED 5/25/23    PLEASE CALL [phone]    NO APPOINTMENTS AVAILABLE BEFORE THEN

## 2023-05-23 ENCOUNTER — TELEPHONE (OUTPATIENT)
Dept: INTERNAL MEDICINE | Facility: CLINIC | Age: 68
End: 2023-05-23

## 2023-05-23 NOTE — TELEPHONE ENCOUNTER
Pt is scheduled to see Dr. Ibarra tomorrow. His notes from the surgeon state that his blood sugar is out of control and they have cancelled the surgery so that he can get that back down.  His A1C is 12.9 which is up from 9.9 in January.  I spoke to his wife and she wants to keep the appt with Dr. Ibarra so he can go over the medications for pt's diabetes with him.

## 2023-05-23 NOTE — TELEPHONE ENCOUNTER
Caller: Devorah Dc    Relationship: Emergency Contact    Best call back number: [phone]    What is the best time to reach you: ANYTIME     Who are you requesting to speak with (clinical staff, provider,  specific staff member):CLINICAL STAFF   Do you know the name of the person who called WIFE    What was the call regarding: PATIENTS WIFE CALLED AND STATED AND WANTEDTO KNOW WHATS THE HOLD UP IS REGARDING THE SURGERY CLEARANCE LETTER. PLEASE CALL. SURGERY IS SCHEDULED FOR 5/25/23.  Do you require a callback:YES

## 2023-05-24 ENCOUNTER — TELEPHONE (OUTPATIENT)
Dept: SURGERY | Facility: CLINIC | Age: 68
End: 2023-05-24
Payer: COMMERCIAL

## 2023-05-24 ENCOUNTER — PRIOR AUTHORIZATION (OUTPATIENT)
Dept: INTERNAL MEDICINE | Facility: CLINIC | Age: 68
End: 2023-05-24
Payer: COMMERCIAL

## 2023-05-24 ENCOUNTER — OFFICE VISIT (OUTPATIENT)
Dept: INTERNAL MEDICINE | Facility: CLINIC | Age: 68
End: 2023-05-24
Payer: COMMERCIAL

## 2023-05-24 VITALS
HEIGHT: 69 IN | BODY MASS INDEX: 34.44 KG/M2 | WEIGHT: 232.5 LBS | OXYGEN SATURATION: 94 % | DIASTOLIC BLOOD PRESSURE: 58 MMHG | HEART RATE: 80 BPM | SYSTOLIC BLOOD PRESSURE: 136 MMHG

## 2023-05-24 DIAGNOSIS — E11.29 TYPE 2 DIABETES MELLITUS WITH MICROALBUMINURIA, WITHOUT LONG-TERM CURRENT USE OF INSULIN: Chronic | ICD-10-CM

## 2023-05-24 DIAGNOSIS — E11.9 TYPE 2 DIABETES MELLITUS WITHOUT COMPLICATION, WITHOUT LONG-TERM CURRENT USE OF INSULIN: ICD-10-CM

## 2023-05-24 DIAGNOSIS — R80.9 TYPE 2 DIABETES MELLITUS WITH MICROALBUMINURIA, WITHOUT LONG-TERM CURRENT USE OF INSULIN: Chronic | ICD-10-CM

## 2023-05-24 DIAGNOSIS — D64.9 ANEMIA, UNSPECIFIED TYPE: Primary | ICD-10-CM

## 2023-05-24 DIAGNOSIS — D69.3 CHRONIC ITP (IDIOPATHIC THROMBOCYTOPENIA): Chronic | ICD-10-CM

## 2023-05-24 RX ORDER — ORAL SEMAGLUTIDE 3 MG/1
1 TABLET ORAL DAILY
Qty: 30 TABLET | Refills: 0 | Status: SHIPPED | OUTPATIENT
Start: 2023-05-24

## 2023-05-24 RX ORDER — GLIMEPIRIDE 4 MG/1
TABLET ORAL
Qty: 180 TABLET | Refills: 3 | Status: SHIPPED | OUTPATIENT
Start: 2023-05-24

## 2023-05-24 NOTE — TELEPHONE ENCOUNTER
Per the direction of Dr. Alejandro, phone call to pt to discuss anemia and poss ENDO.  Informed pt Dr. Alejandro would be happy to do his scopes as soon as cardio clr is obtained.  Pt is agreeable and will contact this office when cardio clr is rec'd.

## 2023-05-24 NOTE — TELEPHONE ENCOUNTER
Rybelsus PA:    Request Reference Number: [id number]. ONELIA ORR 3MG is approved through 05/24/2024

## 2023-05-24 NOTE — PROGRESS NOTES
"Chief Complaint  Hernia (Surgery clearance ) and Med Refill (Has not had other medications marked as not taking for about 6 months )    Subjective        Rajan Dc presents to [facility]  History of Present Illness  Patient appointment to reestablish diabetic plan as he is run out of his medications and does not recall the last time he took Rybelsus glimepiride Jardiance.  He also has been eating more ice and trying to drink fewer sodas although he will get tired in the afternoon have to have \"sugar load\"  His herniated is not painful  He was also found to have some drop in hemoglobin with preoperative labs along with A1c greater than 12  He has follow-up with cardiology tomorrow  Objective   Vital Signs:  /58   Pulse 80   Ht 175.3 cm (69.02\")   Wt 105 kg (232 lb 8 oz)   SpO2 94%   BMI 34.32 kg/m²   Estimated body mass index is 34.32 kg/m² as calculated from the following:    Height as of this encounter: 175.3 cm (69.02\").    Weight as of this encounter: 105 kg (232 lb 8 oz).             Physical Exam  Vitals and nursing note reviewed.   HENT:      Head: Normocephalic and atraumatic.   Eyes:      General: No scleral icterus.  Cardiovascular:      Rate and Rhythm: Normal rate and regular rhythm.      Pulses: Normal pulses.      Heart sounds: Normal heart sounds.   Pulmonary:      Effort: Pulmonary effort is normal.      Breath sounds: Normal breath sounds.   Abdominal:      Tenderness: There is no right CVA tenderness or left CVA tenderness.      Hernia: A hernia is present.      Comments: Umbilical hernia nontender   Musculoskeletal:      Right lower leg: Edema present.      Left lower leg: Edema present.      Comments: 1+ mid calf   Skin:     Coloration: Skin is not pale.   Neurological:      Mental Status: He is alert.   Psychiatric:         Mood and Affect: Mood normal.         Behavior: Behavior normal.         Thought Content: Thought content normal.         " Judgment: Judgment normal.        Result Review :    Common labs        9/30/2022    11:07 1/30/2023    12:55 5/19/2023    10:13   Common Labs   Glucose 90   189   306     BUN 9   9   11     Creatinine 0.74   0.69   0.65     Sodium 141   141   138     Potassium 4.3   4.1   4.0     Chloride 105   103   105     Calcium 9.2   8.9   8.8     Total Protein 6.5   7.0      Albumin 4.10   3.9   3.7     Total Bilirubin 1.0   0.8   0.9     Alkaline Phosphatase 126   148   144     AST (SGOT) 26   24   23     ALT (SGPT) 22   21   20     WBC  6.0   4.34     Hemoglobin  14.3   10.7     Hematocrit  45.1   37.5     Platelets  116   92     Total Cholesterol 126   145      Triglycerides 79   88      Hemoglobin A1C 7.60   9.9   12.90     Microalbumin, Urine  12.1                     Assessment and Plan   Diagnoses and all orders for this visit:    1. Anemia, unspecified type (Primary)  -     CBC & Differential  -     Iron  -     Ferritin  -     Reticulocytes    2. Type 2 diabetes mellitus without complication, without long-term current use of insulin  -     empagliflozin (Jardiance) 25 MG tablet tablet; Take 1 tablet by mouth Every Morning Before Breakfast. For diabetes  Dispense: 90 tablet; Refill: 1    3. Type 2 diabetes mellitus with microalbuminuria, without long-term current use of insulin  -     glimepiride (AMARYL) 4 MG tablet; Take 1 p.o. twice daily at breakfast and supper for diabetes  Dispense: 180 tablet; Refill: 3  -     Semaglutide (Rybelsus) 3 MG tablet; Take 1 tablet by mouth Daily.  Dispense: 30 tablet; Refill: 0    4. Chronic ITP (idiopathic thrombocytopenia)    Restart Jardiance glimepiride Rybelsus  He is going to start at lower doses  Eventually may consider Synjardy with Rybelsus discontinue Jovanumet  Patient presently not medically optimized for umbilical hernia repair  Patient was also to have follow-up colonoscopy with multiple polyps removed back in August 2021 did not have follow-up at that time         Follow  Up   Return in about 1 month (around 6/24/2023), or if symptoms worsen or fail to improve, for Recheck.  Patient was given instructions and counseling regarding his condition or for health maintenance advice. Please see specific information pulled into the AVS if appropriate.

## 2023-05-25 ENCOUNTER — TELEPHONE (OUTPATIENT)
Dept: GASTROENTEROLOGY | Facility: CLINIC | Age: 68
End: 2023-05-25
Payer: COMMERCIAL

## 2023-05-25 ENCOUNTER — OFFICE VISIT (OUTPATIENT)
Dept: CARDIOLOGY | Facility: CLINIC | Age: 68
End: 2023-05-25
Payer: COMMERCIAL

## 2023-05-25 VITALS
HEIGHT: 69 IN | DIASTOLIC BLOOD PRESSURE: 66 MMHG | HEART RATE: 87 BPM | BODY MASS INDEX: 34.66 KG/M2 | SYSTOLIC BLOOD PRESSURE: 133 MMHG | WEIGHT: 234 LBS

## 2023-05-25 DIAGNOSIS — R94.31 ABNORMAL EKG: Primary | ICD-10-CM

## 2023-05-25 DIAGNOSIS — E78.00 PURE HYPERCHOLESTEROLEMIA: ICD-10-CM

## 2023-05-25 DIAGNOSIS — I85.00 ESOPHAGEAL VARICES DETERMINED BY ENDOSCOPY: Chronic | ICD-10-CM

## 2023-05-25 DIAGNOSIS — Z01.818 PRE-OP TESTING: ICD-10-CM

## 2023-05-25 DIAGNOSIS — Z86.010 HISTORY OF COLON POLYPS: Primary | Chronic | ICD-10-CM

## 2023-05-25 LAB
BASOPHILS # BLD AUTO: ABNORMAL 10*3/UL
BASOPHILS # BLD MANUAL: 0.04 10*3/MM3 (ref 0–0.2)
BASOPHILS NFR BLD MANUAL: 1.1 % (ref 0–1.5)
DIFFERENTIAL COMMENT: ABNORMAL
EOSINOPHIL # BLD AUTO: ABNORMAL 10*3/UL
EOSINOPHIL # BLD MANUAL: 0.22 10*3/MM3 (ref 0–0.4)
EOSINOPHIL NFR BLD AUTO: ABNORMAL %
EOSINOPHIL NFR BLD MANUAL: 5.6 % (ref 0.3–6.2)
ERYTHROCYTE [DISTWIDTH] IN BLOOD BY AUTOMATED COUNT: 15.1 % (ref 12.3–15.4)
HCT VFR BLD AUTO: 37.6 % (ref 37.5–51)
HGB BLD-MCNC: 10.7 G/DL (ref 13–17.7)
LYMPHOCYTES # BLD AUTO: ABNORMAL 10*3/UL
LYMPHOCYTES # BLD MANUAL: 1.02 10*3/MM3 (ref 0.7–3.1)
LYMPHOCYTES NFR BLD AUTO: ABNORMAL %
LYMPHOCYTES NFR BLD MANUAL: 25.8 % (ref 19.6–45.3)
MCH RBC QN AUTO: 20.8 PG (ref 26.6–33)
MCHC RBC AUTO-ENTMCNC: 28.5 G/DL (ref 31.5–35.7)
MCV RBC AUTO: 73.2 FL (ref 79–97)
MONOCYTES # BLD MANUAL: 0.13 10*3/MM3 (ref 0.1–0.9)
MONOCYTES NFR BLD AUTO: ABNORMAL %
MONOCYTES NFR BLD MANUAL: 3.4 % (ref 5–12)
NEUTROPHILS # BLD MANUAL: 2.53 10*3/MM3 (ref 1.7–7)
NEUTROPHILS NFR BLD AUTO: ABNORMAL %
NEUTROPHILS NFR BLD MANUAL: 64 % (ref 42.7–76)
PLATELET # BLD AUTO: 94 10*3/MM3 (ref 140–450)
PLATELET BLD QL SMEAR: ABNORMAL
RBC # BLD AUTO: 5.14 10*6/MM3 (ref 4.14–5.8)
RBC MORPH BLD: ABNORMAL
WBC # BLD AUTO: 3.96 10*3/MM3 (ref 3.4–10.8)

## 2023-05-25 RX ORDER — DOXYCYCLINE HYCLATE 50 MG/1
324 CAPSULE, GELATIN COATED ORAL
Qty: 90 TABLET | Refills: 1 | Status: SHIPPED | OUTPATIENT
Start: 2023-05-25

## 2023-05-25 NOTE — PROGRESS NOTES
DOUBLE HERNIA REPAIR - DR. CUADRA   Subjective:        [facility]  Cardiology Consult Note    Patient Identification:  Name: Rajan Dc  Age: 67 y.o.  Sex: male  :  1955  MRN: [id number]             CC  CLEARANCE FOR HERNIA    DM    SOB    RBBB    DM    F/H    H/O MI IN     CABG  History of Present Illness:   67 years old male here for the cardiac evaluation as well as establishment of the care needs a clearance for the surgery for the hernia    Patient has significant cardiac risk factors with diabetes      Raajn Dc has been complaining of the shortness of breath mild-to-moderate in intensity with mild-to-moderate usually relieved with rest associated with anxiety and fatigue    Significant risk factors including previous history of the CABG    Comorbid cardiac risk factors:     Past Medical History:  Past Medical History:   Diagnosis Date    Chronic ITP (idiopathic thrombocytopenia) 2018    Chronic migraine w/o aura w/o status migrainosus, not intractable 2022    Diabetic eye exam 2017--routine diabetic eye exam reveals no evidence of diabetic retinopathy.  Astigmatism, presbyopia, hypermetropia noted.  Very early cataracts noted bilaterally.  2015--patient reports a routine diabetic eye exam without evidence of diabetic retinopathy.    Diabetic foot exam 2017--diabetic foot exam reveals no evidence of diabetic foot ulcer or pre-ulcerative callus.  Distal pulses palpable.  No signs of ischemia.  Sensation subjectively intact per monofilament.    Diverticulosis of colon 10/28/2021    2021--colonoscopy reveals a 6 mm polyp in the transverse colon.  There was an 8 mm polyp in the descending colon.  A 20 mm polyp was found at 50 cm proximal to the anus.  Resection was incomplete.  The resected tissue was retrieved and coagulation for destruction was performed.  #4, sessile polyps in the sigmoid colon that  were 5 to 6 mm in size.  Removed.  #2, sessile polyps in the sig    Elevated liver enzymes 2019--AST is normal at 27, ALT normal at 2 9.  Alkaline phosphatase mildly elevated at 137.  Bilirubin is normal.  2019--ultrasound liver ordered by the gastroenterologist reveals increased hepatic echogenicity consistent with steatosis.  Previous cholecystectomy.  No biliary ductal dilatation.  2019--patient was apparently referred to the gastroenterologist by    Esophageal varices determined by endoscopy 10/28/2021    2021--EGD reveals grade 2 varices in the lower third of the esophagus.  A varix with no bleeding was found in the cardia.  No stigmata of recent bleeding.  Mild portal hypertensive gastropathy was found in the gastric fundus.    Family history of premature coronary artery disease 2016    Father  from a myocardial infarction age 61.    Folic acid deficiency 2016    History of Abnormal pulse oximetry 10/07/2012    10/07/2012--overnight oximetry test revealed significant nocturnal hypoxemia. Oxygen saturations were greater than 90% for only 50.5% of the study. Oxygen saturation less than 90% for three hours 47 minutes which was 49.5% of the study. Oxygen saturation less than 88% for one hour and 36 minutes and 48 seconds, 21.1% of the study.    History of CABG 2005 status post four-vessel CABG.    History of colon polyps, 2021--tubular adenoma x12. 10/28/2021    2021--colonoscopy reveals a 6 mm polyp in the transverse colon.  There was an 8 mm polyp in the descending colon.  A 20 mm polyp was found at 50 cm proximal to the anus.  Resection was incomplete.  The resected tissue was retrieved and coagulation for destruction was performed.  #4, sessile polyps in the sigmoid colon that were 5 to 6 mm in size.  Removed.  #2, sessile polyps in the sig    History of COVID-19 2021    History of myocardial  infarction, 2005. 04/28/2016 01/01/2005--status post myocardial infarction.   January 2005--status post four-vessel CABG    Hyperlipidemia 04/28/2016    Hypogonadism male, TRT ineffective 09/13/2012 09/13/2012--treatment for symptomatic hypogonadism begun. This was later discontinued due to lack of efficacy and cost.    Liver cirrhosis secondary to RUIZ 10/28/2021    August 18, 2021--EGD reveals grade 2 varices in the lower third of the esophagus.  A varix with no bleeding was found in the cardia.  No stigmata of recent bleeding.  Mild portal hypertensive gastropathy was found in the gastric fundus.  October 2, 2019--ultrasound liver ordered by the gastroenterologist reveals increased hepatic echogenicity consistent with steatosis.  Previous cholecystectomy.      Male erectile disorder 08/27/2020    Microalbuminuria 04/25/2014 04/25/2014--urine microalbumin elevated 28.7. Normal range 0.0--17.0.    RUIZ (nonalcoholic steatohepatitis) 04/28/2016    Non morbid obesity 06/02/2021    Obstructive sleep apnea, 11/13/2012--mild to moderate NIKI.  Unable to tolerate CPAP.  Cannot use oral appliance. 10/07/2012    05/02/2014--nocturnal oxygen 2 L per nasal cannula ordered.   12/03/2013--patient was referred for an oral appliance but this could not be done because patient wears dentures.   11/13/2012--diagnosed with mild to moderate obstructive sleep apnea. Patient unable to tolerate CPAP.   10/07/2012--overnight oximetry test revealed significant nocturnal hypoxemia. Oxygen saturations were greater than 90% for only 50.5% of the study. Oxygen saturation less than 90% for three hours 47 minutes which was 49.5% of the study. Oxygen saturation less than 88% for one hour and 36 minutes and 48 seconds, 21.1% of the study.    Occlusive coronary artery disease, 01/01/2005--status post MI.  January 2005--CABG ×4.  Details not known. 01/01/2005 01/01/2005--status post myocardial infarction.   January 2005--status post  four-vessel CABG    Sleep related hypoxia 10/07/2012    05/02/2014--nocturnal oxygen 2 L per nasal cannula ordered.  12/03/2013--patient was referred for an oral appliance but this could not be done because patient wears dentures.   11/13/2012--diagnosed with mild to moderate obstructive sleep apnea. Patient unable to tolerate CPAP.   10/07/2012--overnight oximetry test revealed significant nocturnal hypoxemia. Oxygen saturations were greater than 90% for only 50.5% of the study. Oxygen saturation less than 90% for three hours 47 minutes which was 49.5% of the study. Oxygen saturation less than 88% for one hour and 36 minutes and 48 seconds, 21.1% of the study.    Type 2 diabetes mellitus with microalbuminuria, without long-term current use of insulin 01/01/2005    Diagnosed in 2005.    Vitamin D deficiency 04/28/2016     Past Surgical History:  Past Surgical History:   Procedure Laterality Date    CARDIAC CATHETERIZATION      CHOLECYSTECTOMY WITH INTRAOPERATIVE CHOLANGIOGRAM N/A 06/24/2019    Procedure: laparoscopic cholecystectomy with intraoperative cholangiogram;  Surgeon: Vida Avilez MD;  Location: [facility];  Service: General    COLONOSCOPY N/A 08/18/2021 August 18, 2021--colonoscopy reveals a 6 mm polyp in the transverse colon.  There was an 8 mm polyp in the descending colon.  A 20 mm polyp was found at 50 cm proximal to the anus.  Resection was incomplete.  The resected tissue was retrieved and coagulation for destruction was performed.  #4, sessile polyps in the sigmoid colon that were 5 to 6 mm in size.  Removed.  #2, sessile polyps in the sig    CORONARY ARTERY BYPASS GRAFT  01/2005 January 2005 status post four-vessel CABG.    ENDOSCOPY N/A 08/18/2021 August 18, 2021--EGD reveals grade 2 varices in the lower third of the esophagus.  A varix with no bleeding was found in the cardia.  No stigmata of recent bleeding.  Mild portal hypertensive gastropathy was found in the gastric fundus.       Allergies:  No Known Allergies  Home Meds:  (Not in a hospital admission)    Current Meds:     Current Outpatient Medications:     aspirin 81 MG tablet, Take 1 tablet by mouth Daily. (Patient taking differently: Take 1 tablet by mouth Daily. On hold for surgery), Disp: , Rfl:     atorvastatin (LIPITOR) 80 MG tablet, TAKE 1 TABLET BY MOUTH DAILY FOR HIGH CHOLESTEROL (Patient taking differently: Take 1 tablet by mouth Daily. Take dos), Disp: 90 tablet, Rfl: 2    carvedilol (Coreg) 3.125 MG tablet, Take 1 tablet by mouth 2 (Two) Times a Day With Meals., Disp: 60 tablet, Rfl: 3    empagliflozin (Jardiance) 25 MG tablet tablet, Take 1 tablet by mouth Every Morning Before Breakfast. For diabetes, Disp: 90 tablet, Rfl: 1    ferrous gluconate (FERGON) 324 MG tablet, Take 1 tablet by mouth Daily With Breakfast., Disp: 90 tablet, Rfl: 1    glimepiride (AMARYL) 4 MG tablet, Take 1 p.o. twice daily at breakfast and supper for diabetes, Disp: 180 tablet, Rfl: 3    Janumet XR  MG tablet, TAKE 1 TABLET BY MOUTH TWICE DAILY FOR DIABETES (Patient taking differently: Take 1 tablet by mouth 2 (Two) Times a Day. TAKE 1 TABLET BY MOUTH TWICE DAILY FOR DIABETES), Disp: 60 tablet, Rfl: 5    Semaglutide (Rybelsus) 3 MG tablet, Take 1 tablet by mouth Daily., Disp: 30 tablet, Rfl: 0    vitamin D3 125 MCG (5000 UT) capsule capsule, 1 by mouth daily as directed (Patient taking differently: Take 1 capsule by mouth Daily. 1 by mouth daily as directed), Disp: 30 capsule, Rfl: 11    loratadine (CLARITIN) 10 MG tablet, TAKE 1 TABLET BY MOUTH ONCE DAILY, Disp: 90 tablet, Rfl: 0  Social History:   Social History     Tobacco Use    Smoking status: Former     Packs/day: 0.50     Years: 40.00     Pack years: 20.00     Types: Cigarettes     Quit date:      Years since quittin.4    Smokeless tobacco: Never    Tobacco comments:     Former smoker quit 13 years ago with a 64.5 pack year history.  Smoked 1 ppd for 32 years and 4.5 ppd for  5 years and quit when he had his open heart surgery.   Substance Use Topics    Alcohol use: No      Family History:  Family History   Problem Relation Age of Onset    Other Mother         Ventricular Septal Defect    Heart disease Mother     Hypertension Mother     Cancer Mother     Diabetes Mother     Heart attack Father         Prior Myocardial Infarction.  Dad  from a myocardial infarction at age 59.    Heart disease Father     Heart disease Sister     Heart disease Brother     Cancer Daughter     Heart disease Other         Congenital Heart Disease. Multiple family members with septal defects that required surgery.    Malig Hyperthermia Neg Hx         Review of Systems    Constitutional: No weakness,fatigue, fever, rigors, chills   Eyes: No vision changes, eye pain   ENT/oropharynx: No difficulty swallowing, sore throat, epistaxis, changes in hearing   Cardiovascular: No chest pain, chest tightness, palpitations, paroxysmal nocturnal dyspnea, orthopnea, diaphoresis, dizziness / syncopal episode   Respiratory: No shortness of breath, dyspnea on exertion, cough, wheezing hemoptysis   Gastrointestinal: No abdominal pain, nausea, vomiting, diarrhea, bloody stools   Genitourinary: No hematuria, dysuria   Neurological: No headache, tremors, numbness,  one-sided weakness    Musculoskeletal: No cramps, myalgias,  joint pain, joint swelling   Integument: No rash, edema           Constitutional:       Physical Exam   General:  Appears in no acute distress  Eyes: PERTL,  HEENT:  No JVD. Thyroid not visibly enlarged. No mucosal pallor or cyanosis  Respiratory: Respirations regular and unlabored at rest. BBS with good air entry in all fields. No crackles, rubs or wheezes auscultated  Cardiovascular: S1S2 Regular rate and rhythm. No murmur, rub or gallop auscultated. No carotid bruits. DP/PT pulses    . No pretibial pitting edema  Gastrointestinal: Abdomen soft, flat, non tender. Bowel sounds present. No  hepatosplenomegaly. No ascites  Musculoskeletal: DAVENPORT x4. No abnormal movements  Extremities: No digital clubbing or cyanosis  Skin: Color pink. Skin warm and dry to touch. No rashes  No xanthoma  Neuro: AAO x3 CN II-XII grossly intact          Procedures        Cardiographics  ECG:     Telemetry:    Echocardiogram:     Imaging  Chest X-ray:     Lab Review                 @[email or web address]@                Assessment:/ Recommendations / Plan:   Patient Active Problem List   Diagnosis    Occlusive coronary artery disease, 01/01/2005--status post MI.  January 2005--CABG ×4.  Details not known.    Folic acid deficiency    Hyperlipidemia    Hypogonadism male, TRT ineffective    Microalbuminuria    RUIZ (nonalcoholic steatohepatitis)    Obstructive sleep apnea, 11/13/2012--mild to moderate NIKI.  Unable to tolerate CPAP.  Cannot use oral appliance.    Sleep related hypoxia    Type 2 diabetes mellitus without complication, without long-term current use of insulin    Vitamin D deficiency    Therapeutic drug monitoring    Routine physical examination    Family history of premature coronary artery disease    Diabetic eye exam    Pre-op testing    History of myocardial infarction, 2005.    Chronic ITP (idiopathic thrombocytopenia)    Elevated liver enzymes    Male erectile disorder    Anemia    Non morbid obesity    History of COVID-19    History of colon polyps, 8/18/2021--tubular adenoma x12.    Esophageal varices determined by endoscopy    Liver cirrhosis secondary to RUIZ    Diverticulosis of colon    Chronic migraine w/o aura w/o status migrainosus, not intractable    Umbilical hernia without obstruction and without gangrene    Abnormal EKG    Pure hypercholesterolemia                    ICD-10-CM ICD-9-CM   1. Abnormal EKG  R94.31 794.31   2. Pre-op testing  Z01.818 V72.84   3. Pure hypercholesterolemia  E78.00 272.0     1. Abnormal EKG  Considering the patient's symptoms as well as clinical situation and  EKG findings, along  with cardiac risk factors, ischemic workup is necessary to rule out ischemic cardiomyopathy, stress induced arrhythmias, and functional capacity for diagnosis as well as prognostic consideration    - Stress Test With Myocardial Perfusion One Day  - Adult Transthoracic Echo Complete W/ Cont if Necessary Per Protocol    2. Pre-op testing  Considering patient's medical condition as well as the risk factors, patient will require echocardiogram for further evaluation for the LV function, four-chamber evaluation, including the pressures, valvular function and  pericardial disease and pericardial effusion    - Stress Test With Myocardial Perfusion One Day  - Adult Transthoracic Echo Complete W/ Cont if Necessary Per Protocol    3. Pure hypercholesterolemia  Continue current treatment       STRESS CARDIOLYTE, ECHO    FOLLOW UP    Labs/tests ordered for heriberto Oconnor MD  6/16/2023, 12:41 EDT      EMR Dragon/Transcription:   Dictated utilizing Dragon dictation

## 2023-06-02 ENCOUNTER — TELEPHONE (OUTPATIENT)
Dept: GASTROENTEROLOGY | Facility: CLINIC | Age: 68
End: 2023-06-02

## 2023-06-02 NOTE — TELEPHONE ENCOUNTER
Caller: Devorah Dc    Relationship to patient: Emergency Contact    Best call back number: [phone]    Chief complaint: PATIENT'S WIFE CALLED AND STATED THAT HER  RECEIVED A LETTER STATING THAT HE NEEDED TO SCHEDULE HIS SCOPE. UNABLE TO WARM TRANSFER. PLEASE CALL PATIENT'S WIFE BACK.        If rescheduling, when is the original appointment: 04/18/22

## 2023-06-04 DIAGNOSIS — L30.9 DERMATITIS: ICD-10-CM

## 2023-06-04 DIAGNOSIS — T78.40XA ALLERGIC REACTION, INITIAL ENCOUNTER: ICD-10-CM

## 2023-06-05 RX ORDER — LORATADINE 10 MG/1
TABLET ORAL
Qty: 90 TABLET | Refills: 0 | Status: SHIPPED | OUTPATIENT
Start: 2023-06-05

## 2023-06-05 NOTE — TELEPHONE ENCOUNTER
Rx Refill Note  Requested Prescriptions     Pending Prescriptions Disp Refills    loratadine (CLARITIN) 10 MG tablet [Pharmacy Med Name: LORATADINE 10MG TABLETS] 90 tablet 0     Sig: TAKE 1 TABLET BY MOUTH ONCE DAILY      Last office visit with prescribing clinician: 1/30/2023   Last telemedicine visit with prescribing clinician: Visit date not found   Next office visit with prescribing clinician: Visit date not found                         Would you like a call back once the refill request has been completed: [] Yes [] No    If the office needs to give you a call back, can they leave a voicemail: [] Yes [] No    Ana Jones MA  06/05/23, 15:39 EDT

## 2023-06-08 NOTE — TELEPHONE ENCOUNTER
Caller: Devorah Dc    Relationship to patient: Emergency Contact    Best call back number: [phone]    Chief complaint: SCHEDULE COLONOSCOPY AND EGD     Type of visit: GASTRO PROCEDURE     Additional notes: PATIENT EC CALLING IN TO SCHEDULE PATIENT FOR COLONOSCOPY & EGD. HUB STAFF ATTEMPTED TO FOLLOW WARM TRANSFER PROCESS BUT WAS UNSUCCESSFUL. PLEASE REVIEW AND CONTACT PATIENT TO SCHEDULE.

## 2023-06-16 PROBLEM — R94.31 ABNORMAL EKG: Status: ACTIVE | Noted: 2023-06-16

## 2023-06-16 PROBLEM — E78.00 PURE HYPERCHOLESTEROLEMIA: Status: ACTIVE | Noted: 2023-06-16

## 2023-06-16 PROBLEM — Z01.818 PRE-OP TESTING: Status: ACTIVE | Noted: 2017-03-12

## 2023-06-19 LAB
AORTIC DIMENSIONLESS INDEX: 0.6 (DI)
BH CV ECHO MEAS - AO MAX PG: 10.9 MMHG
BH CV ECHO MEAS - AO MEAN PG: 5.2 MMHG
BH CV ECHO MEAS - AO ROOT DIAM: 3.2 CM
BH CV ECHO MEAS - AO V2 MAX: 165.1 CM/SEC
BH CV ECHO MEAS - AO V2 VTI: 35.7 CM
BH CV ECHO MEAS - AVA(I,D): 1.55 CM2
BH CV ECHO MEAS - EDV(CUBED): 192.4 ML
BH CV ECHO MEAS - EDV(MOD-SP2): 99 ML
BH CV ECHO MEAS - EDV(MOD-SP4): 105 ML
BH CV ECHO MEAS - EF(MOD-BP): 50.3 %
BH CV ECHO MEAS - EF(MOD-SP2): 49.5 %
BH CV ECHO MEAS - EF(MOD-SP4): 49.5 %
BH CV ECHO MEAS - ESV(CUBED): 108.6 ML
BH CV ECHO MEAS - ESV(MOD-SP2): 50 ML
BH CV ECHO MEAS - ESV(MOD-SP4): 53 ML
BH CV ECHO MEAS - FS: 17.3 %
BH CV ECHO MEAS - IVS/LVPW: 1.02 CM
BH CV ECHO MEAS - IVSD: 0.8 CM
BH CV ECHO MEAS - LAT PEAK E' VEL: 8.6 CM/SEC
BH CV ECHO MEAS - LV DIASTOLIC VOL/BSA (35-75): 47.5 CM2
BH CV ECHO MEAS - LV MASS(C)D: 170.9 GRAMS
BH CV ECHO MEAS - LV MAX PG: 3.4 MMHG
BH CV ECHO MEAS - LV MEAN PG: 1.59 MMHG
BH CV ECHO MEAS - LV SYSTOLIC VOL/BSA (12-30): 24 CM2
BH CV ECHO MEAS - LV V1 MAX: 92.4 CM/SEC
BH CV ECHO MEAS - LV V1 VTI: 20.2 CM
BH CV ECHO MEAS - LVIDD: 5.8 CM
BH CV ECHO MEAS - LVIDS: 4.8 CM
BH CV ECHO MEAS - LVOT AREA: 2.7 CM2
BH CV ECHO MEAS - LVOT DIAM: 1.87 CM
BH CV ECHO MEAS - LVPWD: 0.78 CM
BH CV ECHO MEAS - MED PEAK E' VEL: 6.9 CM/SEC
BH CV ECHO MEAS - MV A DUR: 0.12 SEC
BH CV ECHO MEAS - MV A MAX VEL: 67.9 CM/SEC
BH CV ECHO MEAS - MV DEC SLOPE: 317.6 CM/SEC2
BH CV ECHO MEAS - MV DEC TIME: 185 MSEC
BH CV ECHO MEAS - MV E MAX VEL: 97.5 CM/SEC
BH CV ECHO MEAS - MV E/A: 1.44
BH CV ECHO MEAS - MV MAX PG: 4.4 MMHG
BH CV ECHO MEAS - MV MEAN PG: 2.3 MMHG
BH CV ECHO MEAS - MV P1/2T: 96.1 MSEC
BH CV ECHO MEAS - MV V2 VTI: 31.1 CM
BH CV ECHO MEAS - MVA(P1/2T): 2.29 CM2
BH CV ECHO MEAS - MVA(VTI): 1.78 CM2
BH CV ECHO MEAS - PA ACC TIME: 0.13 SEC
BH CV ECHO MEAS - PA V2 MAX: 126.2 CM/SEC
BH CV ECHO MEAS - PI END-D VEL: 100.4 CM/SEC
BH CV ECHO MEAS - PULM A REVS DUR: 0.13 SEC
BH CV ECHO MEAS - PULM A REVS VEL: 25.7 CM/SEC
BH CV ECHO MEAS - PULM DIAS VEL: 58.8 CM/SEC
BH CV ECHO MEAS - PULM S/D: 0.83
BH CV ECHO MEAS - PULM SYS VEL: 48.7 CM/SEC
BH CV ECHO MEAS - RAP SYSTOLE: 3 MMHG
BH CV ECHO MEAS - RV MAX PG: 2.17 MMHG
BH CV ECHO MEAS - RV V1 MAX: 73.7 CM/SEC
BH CV ECHO MEAS - RV V1 VTI: 15.9 CM
BH CV ECHO MEAS - RVSP: 14 MMHG
BH CV ECHO MEAS - SI(MOD-SP2): 22.2 ML/M2
BH CV ECHO MEAS - SI(MOD-SP4): 23.5 ML/M2
BH CV ECHO MEAS - SV(LVOT): 55.3 ML
BH CV ECHO MEAS - SV(MOD-SP2): 49 ML
BH CV ECHO MEAS - SV(MOD-SP4): 52 ML
BH CV ECHO MEAS - TAPSE (>1.6): 1.75 CM
BH CV ECHO MEAS - TR MAX PG: 11 MMHG
BH CV ECHO MEAS - TR MAX VEL: 165.6 CM/SEC
BH CV ECHO MEASUREMENTS AVERAGE E/E' RATIO: 12.58
BH CV XLRA - RV BASE: 3.7 CM
BH CV XLRA - TDI S': 7.8 CM/SEC
LEFT ATRIUM VOLUME INDEX: 33.4 ML/M2
MAXIMAL PREDICTED HEART RATE: 154 BPM
STRESS TARGET HR: 131 BPM

## 2023-06-22 LAB
BH CV REST NUCLEAR ISOTOPE DOSE: 10.9 MCI
BH CV STRESS BP STAGE 1: NORMAL
BH CV STRESS COMMENTS STAGE 1: NORMAL
BH CV STRESS DOSE REGADENOSON STAGE 1: 0.4
BH CV STRESS DURATION MIN STAGE 1: 1
BH CV STRESS DURATION SEC STAGE 1: 56
BH CV STRESS GRADE STAGE 1: 0
BH CV STRESS HR STAGE 1: 92
BH CV STRESS METS STAGE 1: 1.5
BH CV STRESS NUCLEAR ISOTOPE DOSE: 32.1 MCI
BH CV STRESS PROTOCOL 1: NORMAL
BH CV STRESS RECOVERY BP: NORMAL MMHG
BH CV STRESS RECOVERY HR: 83 BPM
BH CV STRESS SPEED STAGE 1: 1
BH CV STRESS STAGE 1: 1
LV EF NUC BP: 55 %
MAXIMAL PREDICTED HEART RATE: 154 BPM
PERCENT MAX PREDICTED HR: 59.74 %
STRESS BASELINE BP: NORMAL MMHG
STRESS BASELINE HR: 77 BPM
STRESS O2 SAT REST: 96 %
STRESS PERCENT HR: 70 %
STRESS POST ESTIMATED WORKLOAD: 1.5 METS
STRESS POST EXERCISE DUR MIN: 1 MIN
STRESS POST EXERCISE DUR SEC: 56 SEC
STRESS POST PEAK BP: NORMAL MMHG
STRESS POST PEAK HR: 92 BPM
STRESS TARGET HR: 131 BPM

## 2023-06-26 ENCOUNTER — TELEPHONE (OUTPATIENT)
Dept: INTERNAL MEDICINE | Facility: CLINIC | Age: 68
End: 2023-06-26

## 2023-06-26 NOTE — TELEPHONE ENCOUNTER
Caller: Devorah Dc    Relationship to patient: Emergency Contact    Best call back number: [phone]    Patient is needing: PATIENT'S WIFE CALLED TO SCHEDULE A PRE OP APPOINTMENT FOR THE PATIENT. PATIENT WAS DENIED SURGERY DUE TO HIS A1C AND IRON LEVELS. IN ORDER TO HAVE SURGERY RESCHEDULED, PATIENT WILL NEED TO HAVE A PRE OP SURGERY. THE HUB DID NOT HAVE ANY AVAILABILITY WITH DR. BENAVIDES DUE TO [facility] NOT SHOWING APPOINTMENTS. THERE WAS ALSO NO APPOINTMENTS WITH NAOMIE MENENDEZ. PLEASE CALL PATIENT'S WIFE TO SCHEDULE ASAP.

## 2023-06-27 NOTE — TELEPHONE ENCOUNTER
This patient has to have quite a few things done before he can cleared for surgery.  He needs a follow up with Dr. Ibarra, a scope with GI, and his A1C under much better control.  He was supposed to follow up with Dr. Ibarra after one month but he did not.  Please schedule a follow up with Dr. Ibarra.

## 2023-07-03 ENCOUNTER — TELEPHONE (OUTPATIENT)
Dept: INTERNAL MEDICINE | Facility: CLINIC | Age: 68
End: 2023-07-03

## 2023-07-03 NOTE — TELEPHONE ENCOUNTER
Caller: Devorah Dc    Relationship: Emergency Contact    Best call back number: [phone]    What is the best time to reach you: ANY    Who are you requesting to speak with (clinical staff, provider,  specific staff member): CLINICAL    Do you know the name of the person who called: JINNY PATIENTS WIFE    What was the call regarding: PATIENT IS NEEDING TO BE CLEARED FOR HERNIA SURGERY.  PATIENT SILAS MUSTAFA HAS CLEARED HIM BUT NOW HE IS NEEDING TO HAVE CLEARANCE FOR HIS HIGH SUGAR AND LOW IRON.    ONCE CLEARED FOR THOSE TWO THEY CAN MOVE FORWARD AND SCHEDULE THE SURGERY.    JINNY CALLING TO ASK IF LABS WILL BE DRAWN.    Is it okay if the provider responds through MyChart: NO

## 2023-07-03 NOTE — TELEPHONE ENCOUNTER
Hub to read: He needs to keep his scheduled follow up with Dr Ibarra and he will order labs and whatever else that is needed for clearance at that appt.

## 2023-07-13 ENCOUNTER — TELEPHONE (OUTPATIENT)
Dept: FAMILY MEDICINE CLINIC | Facility: CLINIC | Age: 68
End: 2023-07-13

## 2023-07-13 NOTE — TELEPHONE ENCOUNTER
PATIENT'S WIFE CALLED NEEDING TO KNOW THE LAB RESULTS FOR HIS PRE OP FOR DOUBLE HERNIA. SURGERY CANNOT BE SCHEDULED UNTIL THEY KNOW RESULTS    PLEASE CALL AND ADVISE [phone]

## 2023-07-13 NOTE — TELEPHONE ENCOUNTER
Spoke with pt. Wife, informed her that the most recent labs have not been resulted yet but we will return her call once they have been reviewed.

## 2023-07-18 NOTE — TELEPHONE ENCOUNTER
PATIENT'S WIFE STATED THAT THE PATIENT IS UNABLE TO RECEIVE HIS DOUBLE HERNIA SURGERY UNTIL DR. BENAVIDES HAS SIGNED OFF ON THIS. PLEASE CONTACT PATIENT'S WIFE ASAP

## 2023-07-19 NOTE — TELEPHONE ENCOUNTER
The reason this surgery was cancelled was because the pt's A1C was 12.9 in May.  He has not had another A1C drawn.  He cannot be cleared for surgery until the A1C is below 9 per his surgeon's office.  The blood work he had done recently did not include an A1C.  I will call his wife.

## 2023-07-19 NOTE — TELEPHONE ENCOUNTER
I called pt's wife and let her know he can repeat his A1C on 8/24/23.  She verbalized understanding.

## 2023-07-28 DIAGNOSIS — E11.9 TYPE 2 DIABETES MELLITUS WITHOUT COMPLICATION, WITHOUT LONG-TERM CURRENT USE OF INSULIN: ICD-10-CM

## 2023-07-28 RX ORDER — SITAGLIPTIN AND METFORMIN HYDROCHLORIDE 1000; 50 MG/1; MG/1
TABLET, FILM COATED, EXTENDED RELEASE ORAL
Qty: 90 TABLET | Refills: 5 | Status: SHIPPED | OUTPATIENT
Start: 2023-07-28

## 2023-08-08 ENCOUNTER — TELEPHONE (OUTPATIENT)
Dept: SURGERY | Facility: CLINIC | Age: 68
End: 2023-08-08
Payer: COMMERCIAL

## 2023-08-08 NOTE — TELEPHONE ENCOUNTER
Cardio clr letter rec'd.  Phone call to pt to discuss.  Spoke with pt's wife, Mayelin, and she states pt is still awaiting medical clr from PCP.  Wife further states pt has lab work in the near future and if his A1c is 9 or below, PCP will clear him.  Pt will call this office when med clr is obtained.

## 2023-08-24 ENCOUNTER — OFFICE VISIT (OUTPATIENT)
Dept: INTERNAL MEDICINE | Facility: CLINIC | Age: 68
End: 2023-08-24
Payer: COMMERCIAL

## 2023-08-24 VITALS
DIASTOLIC BLOOD PRESSURE: 78 MMHG | WEIGHT: 228 LBS | OXYGEN SATURATION: 94 % | BODY MASS INDEX: 33.77 KG/M2 | SYSTOLIC BLOOD PRESSURE: 140 MMHG | RESPIRATION RATE: 16 BRPM | HEART RATE: 77 BPM | HEIGHT: 69 IN | TEMPERATURE: 98.7 F

## 2023-08-24 DIAGNOSIS — Z51.81 THERAPEUTIC DRUG MONITORING: ICD-10-CM

## 2023-08-24 DIAGNOSIS — I25.10 OCCLUSIVE CORONARY ARTERY DISEASE: Chronic | ICD-10-CM

## 2023-08-24 DIAGNOSIS — E11.29 TYPE 2 DIABETES MELLITUS WITH MICROALBUMINURIA, WITHOUT LONG-TERM CURRENT USE OF INSULIN: Primary | ICD-10-CM

## 2023-08-24 DIAGNOSIS — E55.9 VITAMIN D DEFICIENCY: Chronic | ICD-10-CM

## 2023-08-24 DIAGNOSIS — K42.9 UMBILICAL HERNIA WITHOUT OBSTRUCTION AND WITHOUT GANGRENE: ICD-10-CM

## 2023-08-24 DIAGNOSIS — K75.81 NASH (NONALCOHOLIC STEATOHEPATITIS): Chronic | ICD-10-CM

## 2023-08-24 DIAGNOSIS — E53.8 FOLIC ACID DEFICIENCY: Chronic | ICD-10-CM

## 2023-08-24 DIAGNOSIS — R80.9 TYPE 2 DIABETES MELLITUS WITH MICROALBUMINURIA, WITHOUT LONG-TERM CURRENT USE OF INSULIN: Primary | ICD-10-CM

## 2023-08-24 DIAGNOSIS — Z86.16 HISTORY OF COVID-19: Chronic | ICD-10-CM

## 2023-08-24 DIAGNOSIS — D69.3 CHRONIC ITP (IDIOPATHIC THROMBOCYTOPENIA): Chronic | ICD-10-CM

## 2023-08-24 DIAGNOSIS — I25.2 HISTORY OF MYOCARDIAL INFARCTION: Chronic | ICD-10-CM

## 2023-08-24 DIAGNOSIS — R80.9 MICROALBUMINURIA: Chronic | ICD-10-CM

## 2023-08-24 DIAGNOSIS — Z00.00 ROUTINE PHYSICAL EXAMINATION: ICD-10-CM

## 2023-08-24 DIAGNOSIS — E78.2 MIXED HYPERLIPIDEMIA: Chronic | ICD-10-CM

## 2023-08-24 PROBLEM — R94.31 ABNORMAL EKG: Status: RESOLVED | Noted: 2023-06-16 | Resolved: 2023-08-24

## 2023-08-24 PROBLEM — D64.9 ANEMIA: Status: RESOLVED | Noted: 2021-06-02 | Resolved: 2023-08-24

## 2023-08-24 RX ORDER — ASPIRIN 81 MG/1
TABLET ORAL
Start: 2023-08-24

## 2023-08-24 RX ORDER — ATORVASTATIN CALCIUM 80 MG/1
TABLET, FILM COATED ORAL
Qty: 90 TABLET | Refills: 2
Start: 2023-08-24

## 2023-08-24 NOTE — PROGRESS NOTES
08/24/2023    Patient Information  Rajan Dc                                                                                          [address] [place] [id number]      1955  [unfilled]  [phone] (work)    Chief Complaint:     Follow-up diabetes.  Discuss medical clearance for umbilical hernia surgery.    History of Present Illness:    Patient with a history of multiple medical problems including type 2 diabetes is been less than optimally controlled, known coronary artery disease, hyperlipidemia, hypertension.  He presents today to follow-up on his diabetes which has not been controlled.  We made some adjustments and patient has been working on his diet.  He has an umbilical hernia that they will not operate on until we get his hemoglobin A1c under better control.    Review of Systems   Constitutional: Negative.   HENT: Negative.     Eyes: Negative.    Cardiovascular: Negative.    Respiratory: Negative.     Endocrine: Negative.    Hematologic/Lymphatic: Negative.    Skin: Negative.    Musculoskeletal: Negative.    Gastrointestinal: Negative.    Genitourinary: Negative.    Neurological: Negative.    Psychiatric/Behavioral: Negative.     Allergic/Immunologic: Negative.      Active Problems:    Patient Active Problem List   Diagnosis    Occlusive coronary artery disease, 01/01/2005--status post MI.  January 2005--CABG x4.  Details not known.    Folic acid deficiency    Hyperlipidemia    Hypogonadism male, TRT ineffective    Microalbuminuria    RUIZ (nonalcoholic steatohepatitis)    Obstructive sleep apnea, 11/13/2012--mild to moderate NIKI.  Unable to tolerate CPAP.  Cannot use oral appliance.    Sleep related hypoxia    Type 2 diabetes mellitus with microalbuminuria, without long-term current use of insulin    Vitamin D deficiency    Therapeutic drug monitoring    Routine physical examination    Family history of premature coronary artery disease    Diabetic eye exam    History  of myocardial infarction, 2005.    Chronic ITP (idiopathic thrombocytopenia)    Elevated liver enzymes    Male erectile disorder    Non morbid obesity    History of COVID-19    History of colon polyps, 8/18/2021--tubular adenoma x12.    Esophageal varices determined by endoscopy    Liver cirrhosis secondary to RUIZ    Diverticulosis of colon    Chronic migraine w/o aura w/o status migrainosus, not intractable    Umbilical hernia without obstruction and without gangrene         Past Medical History:   Diagnosis Date    Chronic ITP (idiopathic thrombocytopenia) 09/19/2018    Chronic migraine w/o aura w/o status migrainosus, not intractable 08/18/2022    Diabetic eye exam 05/22/2017 05/22/2017--routine diabetic eye exam reveals no evidence of diabetic retinopathy.  Astigmatism, presbyopia, hypermetropia noted.  Very early cataracts noted bilaterally.  October 2015--patient reports a routine diabetic eye exam without evidence of diabetic retinopathy.    Diabetic foot exam 03/12/2017 03/14/2017--diabetic foot exam reveals no evidence of diabetic foot ulcer or pre-ulcerative callus.  Distal pulses palpable.  No signs of ischemia.  Sensation subjectively intact per monofilament.    Diverticulosis of colon 10/28/2021    August 18, 2021--colonoscopy reveals a 6 mm polyp in the transverse colon.  There was an 8 mm polyp in the descending colon.  A 20 mm polyp was found at 50 cm proximal to the anus.  Resection was incomplete.  The resected tissue was retrieved and coagulation for destruction was performed.  #4, sessile polyps in the sigmoid colon that were 5 to 6 mm in size.  Removed.  #2, sessile polyps in the sig    Elevated liver enzymes 06/23/2019 October 17, 2019--AST is normal at 27, ALT normal at 2 9.  Alkaline phosphatase mildly elevated at 137.  Bilirubin is normal.  October 2, 2019--ultrasound liver ordered by the gastroenterologist reveals increased hepatic echogenicity consistent with steatosis.  Previous  cholecystectomy.  No biliary ductal dilatation.  2019--patient was apparently referred to the gastroenterologist by    Esophageal varices determined by endoscopy 10/28/2021    2021--EGD reveals grade 2 varices in the lower third of the esophagus.  A varix with no bleeding was found in the cardia.  No stigmata of recent bleeding.  Mild portal hypertensive gastropathy was found in the gastric fundus.    Family history of premature coronary artery disease 2016    Father  from a myocardial infarction age 61.    Folic acid deficiency 2016    History of Abnormal pulse oximetry 10/07/2012    10/07/2012--overnight oximetry test revealed significant nocturnal hypoxemia. Oxygen saturations were greater than 90% for only 50.5% of the study. Oxygen saturation less than 90% for three hours 47 minutes which was 49.5% of the study. Oxygen saturation less than 88% for one hour and 36 minutes and 48 seconds, 21.1% of the study.    History of CABG 2005 status post four-vessel CABG.    History of colon polyps, 2021--tubular adenoma x12. 10/28/2021    2021--colonoscopy reveals a 6 mm polyp in the transverse colon.  There was an 8 mm polyp in the descending colon.  A 20 mm polyp was found at 50 cm proximal to the anus.  Resection was incomplete.  The resected tissue was retrieved and coagulation for destruction was performed.  #4, sessile polyps in the sigmoid colon that were 5 to 6 mm in size.  Removed.  #2, sessile polyps in the sig    History of COVID-19 2021    History of myocardial infarction, . 2016--status post myocardial infarction.   2005--status post four-vessel CABG    Hyperlipidemia 2016    Hypogonadism male, TRT ineffective 2012--treatment for symptomatic hypogonadism begun. This was later discontinued due to lack of efficacy and cost.    Liver cirrhosis secondary to RUIZ 10/28/2021     August 18, 2021--EGD reveals grade 2 varices in the lower third of the esophagus.  A varix with no bleeding was found in the cardia.  No stigmata of recent bleeding.  Mild portal hypertensive gastropathy was found in the gastric fundus.  October 2, 2019--ultrasound liver ordered by the gastroenterologist reveals increased hepatic echogenicity consistent with steatosis.  Previous cholecystectomy.      Male erectile disorder 08/27/2020    Microalbuminuria 04/25/2014 04/25/2014--urine microalbumin elevated 28.7. Normal range 0.0--17.0.    Myocardial infarction     RUIZ (nonalcoholic steatohepatitis) 04/28/2016    Non morbid obesity 06/02/2021    Obstructive sleep apnea, 11/13/2012--mild to moderate NIKI.  Unable to tolerate CPAP.  Cannot use oral appliance. 10/07/2012    05/02/2014--nocturnal oxygen 2 L per nasal cannula ordered.   12/03/2013--patient was referred for an oral appliance but this could not be done because patient wears dentures.   11/13/2012--diagnosed with mild to moderate obstructive sleep apnea. Patient unable to tolerate CPAP.   10/07/2012--overnight oximetry test revealed significant nocturnal hypoxemia. Oxygen saturations were greater than 90% for only 50.5% of the study. Oxygen saturation less than 90% for three hours 47 minutes which was 49.5% of the study. Oxygen saturation less than 88% for one hour and 36 minutes and 48 seconds, 21.1% of the study.    Occlusive coronary artery disease, 01/01/2005--status post MI.  January 2005--CABG x4.  Details not known. 01/01/2005 01/01/2005--status post myocardial infarction.   January 2005--status post four-vessel CABG    Sleep related hypoxia 10/07/2012    05/02/2014--nocturnal oxygen 2 L per nasal cannula ordered.  12/03/2013--patient was referred for an oral appliance but this could not be done because patient wears dentures.   11/13/2012--diagnosed with mild to moderate obstructive sleep apnea. Patient unable to tolerate CPAP.    10/07/2012--overnight oximetry test revealed significant nocturnal hypoxemia. Oxygen saturations were greater than 90% for only 50.5% of the study. Oxygen saturation less than 90% for three hours 47 minutes which was 49.5% of the study. Oxygen saturation less than 88% for one hour and 36 minutes and 48 seconds, 21.1% of the study.    Type 2 diabetes mellitus with microalbuminuria, without long-term current use of insulin 01/01/2005    Diagnosed in 2005.    Type 2 diabetes mellitus with microalbuminuria, without long-term current use of insulin 01/01/2005    Diagnosed in 2005.    Vitamin D deficiency 04/28/2016         Past Surgical History:   Procedure Laterality Date    CARDIAC CATHETERIZATION      CHOLECYSTECTOMY WITH INTRAOPERATIVE CHOLANGIOGRAM N/A 06/24/2019    Procedure: laparoscopic cholecystectomy with intraoperative cholangiogram;  Surgeon: Vida Avilez MD;  Location: [facility];  Service: General    COLONOSCOPY N/A 08/18/2021 August 18, 2021--colonoscopy reveals a 6 mm polyp in the transverse colon.  There was an 8 mm polyp in the descending colon.  A 20 mm polyp was found at 50 cm proximal to the anus.  Resection was incomplete.  The resected tissue was retrieved and coagulation for destruction was performed.  #4, sessile polyps in the sigmoid colon that were 5 to 6 mm in size.  Removed.  #2, sessile polyps in the sig    CORONARY ARTERY BYPASS GRAFT  01/2005 January 2005 status post four-vessel CABG.    ENDOSCOPY N/A 08/18/2021 August 18, 2021--EGD reveals grade 2 varices in the lower third of the esophagus.  A varix with no bleeding was found in the cardia.  No stigmata of recent bleeding.  Mild portal hypertensive gastropathy was found in the gastric fundus.         No Known Allergies        Current Outpatient Medications:     aspirin 81 MG EC tablet, Take 1 p.o. daily for blood thinner/heart, Disp: , Rfl:     atorvastatin (LIPITOR) 80 MG tablet, Take 1 p.o. daily for high cholesterol,  Disp: 90 tablet, Rfl: 2    carvedilol (Coreg) 3.125 MG tablet, Take 1 tablet by mouth 2 (Two) Times a Day With Meals., Disp: 60 tablet, Rfl: 3    empagliflozin (Jardiance) 25 MG tablet tablet, Take 1 tablet by mouth Every Morning Before Breakfast. For diabetes, Disp: 90 tablet, Rfl: 1    ferrous gluconate (FERGON) 324 MG tablet, Take 1 tablet by mouth Daily With Breakfast., Disp: 90 tablet, Rfl: 1    glimepiride (AMARYL) 4 MG tablet, Take 1 p.o. twice daily at breakfast and supper for diabetes, Disp: 180 tablet, Rfl: 3    loratadine (CLARITIN) 10 MG tablet, TAKE 1 TABLET BY MOUTH ONCE DAILY, Disp: 90 tablet, Rfl: 0    Semaglutide (Rybelsus) 7 MG tablet, Take 7 mg by mouth Daily., Disp: 30 tablet, Rfl: 6    SITagliptin-metFORMIN HCl ER (Janumet XR)  MG tablet, TAKE 1 TABLET BY MOUTH TWICE DAILY FOR DIABETES, Disp: 90 tablet, Rfl: 5    vitamin D3 125 MCG (5000 UT) capsule capsule, 1 by mouth daily as directed, Disp: 30 capsule, Rfl: 11      Family History   Problem Relation Age of Onset    Other Mother         Ventricular Septal Defect    Heart disease Mother     Hypertension Mother     Cancer Mother     Diabetes Mother     Heart attack Father         Prior Myocardial Infarction.  Dad  from a myocardial infarction at age 59.    Heart disease Father     Heart disease Sister     Heart disease Brother     Cancer Daughter     Heart disease Other         Congenital Heart Disease. Multiple family members with septal defects that required surgery.    Malig Hyperthermia Neg Hx          Social History     Socioeconomic History    Marital status:      Spouse name: Dasha    Number of children: 8    Highest education level: 9th grade   Tobacco Use    Smoking status: Former     Packs/day: 0.50     Years: 40.00     Pack years: 20.00     Types: Cigarettes     Quit date: 2005     Years since quittin.6    Smokeless tobacco: Never    Tobacco comments:     Former smoker quit 13 years ago with a 64.5 pack  "year history.  Smoked 1 ppd for 32 years and 4.5 ppd for 5 years and quit when he had his open heart surgery.   Vaping Use    Vaping Use: Never used   Substance and Sexual Activity    Alcohol use: No    Drug use: No    Sexual activity: Yes     Partners: Female         Vitals:    08/24/23 0924   BP: 140/78   Pulse: 77   Resp: 16   Temp: 98.7 øF (37.1 øC)   TempSrc: Temporal   SpO2: 94%   Weight: 103 kg (228 lb)   Height: 175.3 cm (69.02\")        Body mass index is 33.65 kg/mý.      Physical Exam:    General: Alert and oriented x 3.  No acute distress.  Obese.  Normal affect.  HEENT: Pupils equal, round, reactive to light; extraocular movements intact; sclerae nonicteric; pharynx, ear canals and TMs normal.  Neck: Without JVD, thyromegaly, bruit, or adenopathy.  Lungs: Clear to auscultation in all fields.  Heart: Regular rate and rhythm without murmur, rub, gallop, or click.  Abdomen: Soft, nontender, without hepatosplenomegaly or hernia.  Bowel sounds normal.  : Deferred.  Rectal: Deferred.  Extremities: Without clubbing, cyanosis, edema, or pulse deficit.  Neurologic: Intact without focal deficit.  Normal station and gait observed during ingress and egress from the examination room.  Skin: Without significant lesion.  Musculoskeletal: Unremarkable.    Lab/other results:      Assessment/Plan:     Diagnosis Plan   1. Type 2 diabetes mellitus with microalbuminuria, without long-term current use of insulin  Comprehensive Metabolic Panel    Hemoglobin A1c    Urinalysis With Microscopic If Indicated (No Culture) - Urine, Clean Catch      2. Umbilical hernia without obstruction and without gangrene        3. Hyperlipidemia  atorvastatin (LIPITOR) 80 MG tablet    CK    Comprehensive Metabolic Panel    NMR LipoProfile    Homocysteine    TSH    T4, Free    T3, Free      4. History of myocardial infarction, 2005.  aspirin 81 MG EC tablet      5. Vitamin D deficiency  vitamin D3 125 MCG (5000 UT) capsule capsule    Vitamin " D,25-Hydroxy      6. Occlusive coronary artery disease, 01/01/2005--status post MI.  January 2005--CABG x4.  Details not known.        7. Chronic ITP (idiopathic thrombocytopenia)        8. Folic acid deficiency  Homocysteine      9. History of COVID-19  SARS-CoV-2 Antibodies, Nucleocapsid (Natural Immunity)      10. Microalbuminuria  Microalbumin / Creatinine Urine Ratio - Urine, Clean Catch      11. RUIZ (nonalcoholic steatohepatitis)        12. Routine physical examination  SARS-CoV-2 Antibodies, Nucleocapsid (Natural Immunity)    CBC (No Diff)    CK    Comprehensive Metabolic Panel    Hemoglobin A1c    NMR LipoProfile    Microalbumin / Creatinine Urine Ratio - Urine, Clean Catch    Homocysteine    TSH    T4, Free    T3, Free    PSA DIAGNOSTIC    Urinalysis With Microscopic If Indicated (No Culture) - Urine, Clean Catch    Vitamin D,25-Hydroxy      13. Therapeutic drug monitoring  CBC (No Diff)    PSA DIAGNOSTIC        Patient with type 2 diabetes has not been under good enough control to proceed with surgery.  Patient needs lab work but unfortunately he is not fasting.  He is overdue for his annual physical and we will arrange for that.    Plan is as follows: Set up fasting lab and annual physical soon as possible.        Procedures

## 2023-08-29 ENCOUNTER — TELEPHONE (OUTPATIENT)
Dept: SURGERY | Facility: CLINIC | Age: 68
End: 2023-08-29
Payer: COMMERCIAL

## 2023-08-29 NOTE — TELEPHONE ENCOUNTER
Spoke with pt's wife.  She states pt has appt with PCP tomorrow and will obtain med clr letter and call this office.

## 2023-08-29 NOTE — TELEPHONE ENCOUNTER
Caller: ISRRAEL GREER    Relationship: [employer]    Best call back number: [phone]     What is the best time to reach you: ANY    Who are you requesting to speak with (clinical staff, provider,  specific staff member): ROBERT STRANGE    Do you know the name of the person who called: ISRRAEL    What was the call regarding: THEY ARE CALLING TO SCHEDULE THE SURGERY AS PT HAS NOW BEEN CLEARED FOR SURGEY BY PCP AS AIC NOW DOWN    Is it okay if the provider responds through MyChart: NO

## 2023-08-30 ENCOUNTER — OFFICE VISIT (OUTPATIENT)
Dept: INTERNAL MEDICINE | Facility: CLINIC | Age: 68
End: 2023-08-30
Payer: COMMERCIAL

## 2023-08-30 VITALS
RESPIRATION RATE: 16 BRPM | WEIGHT: 229.2 LBS | HEIGHT: 69 IN | HEART RATE: 85 BPM | BODY MASS INDEX: 33.95 KG/M2 | OXYGEN SATURATION: 96 % | DIASTOLIC BLOOD PRESSURE: 66 MMHG | SYSTOLIC BLOOD PRESSURE: 124 MMHG

## 2023-08-30 DIAGNOSIS — G47.34 SLEEP RELATED HYPOXIA: Chronic | ICD-10-CM

## 2023-08-30 DIAGNOSIS — Z51.81 THERAPEUTIC DRUG MONITORING: ICD-10-CM

## 2023-08-30 DIAGNOSIS — D69.3 CHRONIC ITP (IDIOPATHIC THROMBOCYTOPENIA): Chronic | ICD-10-CM

## 2023-08-30 DIAGNOSIS — E53.8 FOLIC ACID DEFICIENCY: Chronic | ICD-10-CM

## 2023-08-30 DIAGNOSIS — K75.81 NASH (NONALCOHOLIC STEATOHEPATITIS): Chronic | ICD-10-CM

## 2023-08-30 DIAGNOSIS — K74.60 LIVER CIRRHOSIS SECONDARY TO NASH: Chronic | ICD-10-CM

## 2023-08-30 DIAGNOSIS — I25.10 OCCLUSIVE CORONARY ARTERY DISEASE: Chronic | ICD-10-CM

## 2023-08-30 DIAGNOSIS — R80.9 TYPE 2 DIABETES MELLITUS WITH MICROALBUMINURIA, WITHOUT LONG-TERM CURRENT USE OF INSULIN: Chronic | ICD-10-CM

## 2023-08-30 DIAGNOSIS — E55.9 VITAMIN D DEFICIENCY: Chronic | ICD-10-CM

## 2023-08-30 DIAGNOSIS — E11.29 TYPE 2 DIABETES MELLITUS WITH MICROALBUMINURIA, WITHOUT LONG-TERM CURRENT USE OF INSULIN: Chronic | ICD-10-CM

## 2023-08-30 DIAGNOSIS — R74.8 ELEVATED LIVER ENZYMES: Chronic | ICD-10-CM

## 2023-08-30 DIAGNOSIS — R80.9 MICROALBUMINURIA: Chronic | ICD-10-CM

## 2023-08-30 DIAGNOSIS — G43.709 CHRONIC MIGRAINE W/O AURA W/O STATUS MIGRAINOSUS, NOT INTRACTABLE: Chronic | ICD-10-CM

## 2023-08-30 DIAGNOSIS — Z86.16 HISTORY OF COVID-19: Chronic | ICD-10-CM

## 2023-08-30 DIAGNOSIS — Z00.00 ROUTINE PHYSICAL EXAMINATION: Primary | ICD-10-CM

## 2023-08-30 DIAGNOSIS — G47.33 OBSTRUCTIVE SLEEP APNEA: Chronic | ICD-10-CM

## 2023-08-30 DIAGNOSIS — Z86.010 HISTORY OF COLON POLYPS: Chronic | ICD-10-CM

## 2023-08-30 DIAGNOSIS — E78.2 MIXED HYPERLIPIDEMIA: Chronic | ICD-10-CM

## 2023-08-30 DIAGNOSIS — E11.9 ENCOUNTER FOR DIABETIC FOOT EXAM: ICD-10-CM

## 2023-08-30 DIAGNOSIS — I85.00 ESOPHAGEAL VARICES DETERMINED BY ENDOSCOPY: Chronic | ICD-10-CM

## 2023-08-30 DIAGNOSIS — K75.81 LIVER CIRRHOSIS SECONDARY TO NASH: Chronic | ICD-10-CM

## 2023-08-30 DIAGNOSIS — I25.2 HISTORY OF MYOCARDIAL INFARCTION: Chronic | ICD-10-CM

## 2023-08-30 DIAGNOSIS — E66.9 NON MORBID OBESITY: Chronic | ICD-10-CM

## 2023-09-08 ENCOUNTER — OFFICE VISIT (OUTPATIENT)
Dept: SURGERY | Facility: CLINIC | Age: 68
End: 2023-09-08
Payer: COMMERCIAL

## 2023-09-08 DIAGNOSIS — K42.9 UMBILICAL HERNIA WITHOUT OBSTRUCTION AND WITHOUT GANGRENE: Primary | ICD-10-CM

## 2023-09-08 PROCEDURE — 99214 OFFICE O/P EST MOD 30 MIN: CPT | Performed by: SURGERY

## 2023-09-08 NOTE — PROGRESS NOTES
Rajan Dc 67 y.o. male presents @ the req of Dr. Eaton for eval of   Chief Complaint   Patient presents with    Follow-up     Pt present today to discuss scheduling [facility] hernia surgery. Pt has received medical clearance from PCP and is ready to proceed.             HPI   Rajan is doing well.  He has gotten his sugars under control and is medically cleared to have his hernia repaired.  He is tolerating a regular diet without nausea or vomiting and moving his bowels without difficulty.  He has no other complaints aside from a large umbilical hernia.      Review of Systems   All other systems reviewed and are negative.          Current Outpatient Medications:     aspirin 81 MG EC tablet, Take 1 p.o. daily for blood thinner/heart, Disp: , Rfl:     atorvastatin (LIPITOR) 80 MG tablet, Take 1 p.o. daily for high cholesterol, Disp: 90 tablet, Rfl: 2    carvedilol (Coreg) 3.125 MG tablet, Take 1 tablet by mouth 2 (Two) Times a Day With Meals., Disp: 60 tablet, Rfl: 3    empagliflozin (Jardiance) 25 MG tablet tablet, Take 1 tablet by mouth Every Morning Before Breakfast. For diabetes, Disp: 90 tablet, Rfl: 1    glimepiride (AMARYL) 4 MG tablet, Take 1 p.o. twice daily at breakfast and supper for diabetes, Disp: 180 tablet, Rfl: 3    loratadine (CLARITIN) 10 MG tablet, TAKE 1 TABLET BY MOUTH ONCE DAILY, Disp: 90 tablet, Rfl: 0    Semaglutide (Rybelsus) 7 MG tablet, Take 7 mg by mouth Daily., Disp: 30 tablet, Rfl: 6    SITagliptin-metFORMIN HCl ER (Janumet XR)  MG tablet, TAKE 1 TABLET BY MOUTH TWICE DAILY FOR DIABETES, Disp: 90 tablet, Rfl: 5    vitamin D3 125 MCG (5000 UT) capsule capsule, 1 by mouth daily as directed, Disp: 30 capsule, Rfl: 11        No Known Allergies        Past Medical History:   Diagnosis Date    Chronic ITP (idiopathic thrombocytopenia) 09/19/2018    Chronic migraine w/o aura w/o status migrainosus, not intractable 08/18/2022    Diabetic eye exam 05/22/2017 05/22/2017--routine diabetic  eye exam reveals no evidence of diabetic retinopathy.  Astigmatism, presbyopia, hypermetropia noted.  Very early cataracts noted bilaterally.  2015--patient reports a routine diabetic eye exam without evidence of diabetic retinopathy.    Diabetic foot exam 2017--diabetic foot exam reveals no evidence of diabetic foot ulcer or pre-ulcerative callus.  Distal pulses palpable.  No signs of ischemia.  Sensation subjectively intact per monofilament.    Diverticulosis of colon 10/28/2021    2021--colonoscopy reveals a 6 mm polyp in the transverse colon.  There was an 8 mm polyp in the descending colon.  A 20 mm polyp was found at 50 cm proximal to the anus.  Resection was incomplete.  The resected tissue was retrieved and coagulation for destruction was performed.  #4, sessile polyps in the sigmoid colon that were 5 to 6 mm in size.  Removed.  #2, sessile polyps in the sig    Elevated liver enzymes 2019--AST is normal at 27, ALT normal at 2 9.  Alkaline phosphatase mildly elevated at 137.  Bilirubin is normal.  2019--ultrasound liver ordered by the gastroenterologist reveals increased hepatic echogenicity consistent with steatosis.  Previous cholecystectomy.  No biliary ductal dilatation.  2019--patient was apparently referred to the gastroenterologist by    Esophageal varices determined by endoscopy 10/28/2021    2021--EGD reveals grade 2 varices in the lower third of the esophagus.  A varix with no bleeding was found in the cardia.  No stigmata of recent bleeding.  Mild portal hypertensive gastropathy was found in the gastric fundus.    Family history of premature coronary artery disease 2016    Father  from a myocardial infarction age 61.    Folic acid deficiency 2016    History of Abnormal pulse oximetry 10/07/2012    10/07/2012--overnight oximetry test revealed significant nocturnal hypoxemia. Oxygen  saturations were greater than 90% for only 50.5% of the study. Oxygen saturation less than 90% for three hours 47 minutes which was 49.5% of the study. Oxygen saturation less than 88% for one hour and 36 minutes and 48 seconds, 21.1% of the study.    History of CABG 01/2005 January 2005 status post four-vessel CABG.    History of colon polyps, 8/18/2021--tubular adenoma x12. 10/28/2021    August 18, 2021--colonoscopy reveals a 6 mm polyp in the transverse colon.  There was an 8 mm polyp in the descending colon.  A 20 mm polyp was found at 50 cm proximal to the anus.  Resection was incomplete.  The resected tissue was retrieved and coagulation for destruction was performed.  #4, sessile polyps in the sigmoid colon that were 5 to 6 mm in size.  Removed.  #2, sessile polyps in the sig    History of COVID-19 06/02/2021    History of myocardial infarction, 2005. 04/28/2016 01/01/2005--status post myocardial infarction.   January 2005--status post four-vessel CABG    Hyperlipidemia 04/28/2016    Hypogonadism male, TRT ineffective 09/13/2012 09/13/2012--treatment for symptomatic hypogonadism begun. This was later discontinued due to lack of efficacy and cost.    Liver cirrhosis secondary to RUIZ 10/28/2021    August 18, 2021--EGD reveals grade 2 varices in the lower third of the esophagus.  A varix with no bleeding was found in the cardia.  No stigmata of recent bleeding.  Mild portal hypertensive gastropathy was found in the gastric fundus.  October 2, 2019--ultrasound liver ordered by the gastroenterologist reveals increased hepatic echogenicity consistent with steatosis.  Previous cholecystectomy.      Male erectile disorder 08/27/2020    Microalbuminuria 04/25/2014 04/25/2014--urine microalbumin elevated 28.7. Normal range 0.0--17.0.    Myocardial infarction     RUIZ (nonalcoholic steatohepatitis) 04/28/2016    Non morbid obesity 06/02/2021    Obstructive sleep apnea, 11/13/2012--mild to moderate NIKI.   Unable to tolerate CPAP.  Cannot use oral appliance. 10/07/2012    05/02/2014--nocturnal oxygen 2 L per nasal cannula ordered.   12/03/2013--patient was referred for an oral appliance but this could not be done because patient wears dentures.   11/13/2012--diagnosed with mild to moderate obstructive sleep apnea. Patient unable to tolerate CPAP.   10/07/2012--overnight oximetry test revealed significant nocturnal hypoxemia. Oxygen saturations were greater than 90% for only 50.5% of the study. Oxygen saturation less than 90% for three hours 47 minutes which was 49.5% of the study. Oxygen saturation less than 88% for one hour and 36 minutes and 48 seconds, 21.1% of the study.    Occlusive coronary artery disease, 01/01/2005--status post MI.  January 2005--CABG ×4.  Details not known. 01/01/2005 01/01/2005--status post myocardial infarction.   January 2005--status post four-vessel CABG    Sleep related hypoxia 10/07/2012    05/02/2014--nocturnal oxygen 2 L per nasal cannula ordered.  12/03/2013--patient was referred for an oral appliance but this could not be done because patient wears dentures.   11/13/2012--diagnosed with mild to moderate obstructive sleep apnea. Patient unable to tolerate CPAP.   10/07/2012--overnight oximetry test revealed significant nocturnal hypoxemia. Oxygen saturations were greater than 90% for only 50.5% of the study. Oxygen saturation less than 90% for three hours 47 minutes which was 49.5% of the study. Oxygen saturation less than 88% for one hour and 36 minutes and 48 seconds, 21.1% of the study.    Type 2 diabetes mellitus with microalbuminuria, without long-term current use of insulin 01/01/2005    Diagnosed in 2005.    Type 2 diabetes mellitus with microalbuminuria, without long-term current use of insulin 01/01/2005    Diagnosed in 2005.    Vitamin D deficiency 04/28/2016           Past Surgical History:   Procedure Laterality Date    CARDIAC CATHETERIZATION      CHOLECYSTECTOMY WITH  INTRAOPERATIVE CHOLANGIOGRAM N/A 2019    Procedure: laparoscopic cholecystectomy with intraoperative cholangiogram;  Surgeon: Vida Avilez MD;  Location: [facility];  Service: General    COLONOSCOPY N/A 2021--colonoscopy reveals a 6 mm polyp in the transverse colon.  There was an 8 mm polyp in the descending colon.  A 20 mm polyp was found at 50 cm proximal to the anus.  Resection was incomplete.  The resected tissue was retrieved and coagulation for destruction was performed.  #4, sessile polyps in the sigmoid colon that were 5 to 6 mm in size.  Removed.  #2, sessile polyps in the sig    CORONARY ARTERY BYPASS GRAFT  2005 status post four-vessel CABG.    ENDOSCOPY N/A 2021--EGD reveals grade 2 varices in the lower third of the esophagus.  A varix with no bleeding was found in the cardia.  No stigmata of recent bleeding.  Mild portal hypertensive gastropathy was found in the gastric fundus.           Social History     Tobacco Use    Smoking status: Former     Packs/day: 0.50     Years: 40.00     Pack years: 20.00     Types: Cigarettes     Quit date: 2005     Years since quittin.6    Smokeless tobacco: Never    Tobacco comments:     Former smoker quit 13 years ago with a 64.5 pack year history.  Smoked 1 ppd for 32 years and 4.5 ppd for 5 years and quit when he had his open heart surgery.   Vaping Use    Vaping Use: Never used   Substance Use Topics    Alcohol use: No    Drug use: No           Immunization History   Administered Date(s) Administered    Pneumococcal Conjugate 20-Valent (PCV20) 2022    Pneumococcal Polysaccharide (PPSV23) 2017    Tdap 2015           Physical Exam  Vitals and nursing note reviewed.   Constitutional:       Appearance: Normal appearance.   HENT:      Head: Normocephalic and atraumatic.   Cardiovascular:      Rate and Rhythm: Normal rate and regular rhythm.   Pulmonary:       Effort: Pulmonary effort is normal.      Breath sounds: Normal breath sounds.   Abdominal:      General: Bowel sounds are normal.      Palpations: Abdomen is soft.      Comments: Large umbilical hernia   Musculoskeletal:         General: No swelling or tenderness.   Skin:     General: Skin is warm and dry.   Neurological:      General: No focal deficit present.      Mental Status: He is alert and oriented to person, place, and time.   Psychiatric:         Mood and Affect: Mood normal.         Behavior: Behavior normal.       Debilities/Disabilities Identified: None    Emotional Behavior: Appropriate      There were no vitals taken for this visit.        Diagnoses and all orders for this visit:    1. Umbilical hernia without obstruction and without gangrene (Primary)       The risks and benefits of repairing this hernia were discussed with Rajan and his wife.  Benefits and risks, not limited to but including:  Bleeding, infection, recurrence, formation of a hematoma or seroma, injury to intra-abdominal structures, DVT, PE, atelectasis, pneumonia, anesthesia complications.  They appeared to understand and are willing to proceed.    Thank you for allowing me to participate in the care of this interesting patient.

## 2023-09-08 NOTE — LETTER
September 8, 2023     Scott Eaton MD  [id number] [facility] [facility]  [address]  [place] [address]    Patient: Rajan Dc   YOB: 1955   Date of Visit: 9/8/2023     Dear Scott Eaton MD:       Thank you for referring Rajan Dc to me for evaluation. Below are the relevant portions of my assessment and plan of care.    If you have questions, please do not hesitate to call me. I look forward to following Rajan along with you.         Sincerely,        Kari Alejandro DO        CC: No Recipients    Kari Alejandro DO  09/08/23 0833  Sign when Signing Visit  Rajan Dc 67 y.o. male presents @ the req of Dr. Eaton for eval of   Chief Complaint   Patient presents with   • Follow-up     Pt present today to discuss scheduling [facility] hernia surgery. Pt has received medical clearance from PCP and is ready to proceed.             HPI   Rajan is doing well.  He has gotten his sugars under control and is medically cleared to have his hernia repaired.  He is tolerating a regular diet without nausea or vomiting and moving his bowels without difficulty.  He has no other complaints aside from a large umbilical hernia.      Review of Systems   All other systems reviewed and are negative.          Current Outpatient Medications:   •  aspirin 81 MG EC tablet, Take 1 p.o. daily for blood thinner/heart, Disp: , Rfl:   •  atorvastatin (LIPITOR) 80 MG tablet, Take 1 p.o. daily for high cholesterol, Disp: 90 tablet, Rfl: 2  •  carvedilol (Coreg) 3.125 MG tablet, Take 1 tablet by mouth 2 (Two) Times a Day With Meals., Disp: 60 tablet, Rfl: 3  •  empagliflozin (Jardiance) 25 MG tablet tablet, Take 1 tablet by mouth Every Morning Before Breakfast. For diabetes, Disp: 90 tablet, Rfl: 1  •  glimepiride (AMARYL) 4 MG tablet, Take 1 p.o. twice daily at breakfast and supper for diabetes, Disp: 180 tablet, Rfl: 3  •  loratadine (CLARITIN) 10 MG tablet, TAKE 1 TABLET BY MOUTH ONCE DAILY, Disp: 90 tablet, Rfl: 0  •   Semaglutide (Rybelsus) 7 MG tablet, Take 7 mg by mouth Daily., Disp: 30 tablet, Rfl: 6  •  SITagliptin-metFORMIN HCl ER (Janumet XR)  MG tablet, TAKE 1 TABLET BY MOUTH TWICE DAILY FOR DIABETES, Disp: 90 tablet, Rfl: 5  •  vitamin D3 125 MCG (5000 UT) capsule capsule, 1 by mouth daily as directed, Disp: 30 capsule, Rfl: 11        No Known Allergies        Past Medical History:   Diagnosis Date   • Chronic ITP (idiopathic thrombocytopenia) 09/19/2018   • Chronic migraine w/o aura w/o status migrainosus, not intractable 08/18/2022   • Diabetic eye exam 05/22/2017 05/22/2017--routine diabetic eye exam reveals no evidence of diabetic retinopathy.  Astigmatism, presbyopia, hypermetropia noted.  Very early cataracts noted bilaterally.  October 2015--patient reports a routine diabetic eye exam without evidence of diabetic retinopathy.   • Diabetic foot exam 03/12/2017 03/14/2017--diabetic foot exam reveals no evidence of diabetic foot ulcer or pre-ulcerative callus.  Distal pulses palpable.  No signs of ischemia.  Sensation subjectively intact per monofilament.   • Diverticulosis of colon 10/28/2021    August 18, 2021--colonoscopy reveals a 6 mm polyp in the transverse colon.  There was an 8 mm polyp in the descending colon.  A 20 mm polyp was found at 50 cm proximal to the anus.  Resection was incomplete.  The resected tissue was retrieved and coagulation for destruction was performed.  #4, sessile polyps in the sigmoid colon that were 5 to 6 mm in size.  Removed.  #2, sessile polyps in the sig   • Elevated liver enzymes 06/23/2019 October 17, 2019--AST is normal at 27, ALT normal at 2 9.  Alkaline phosphatase mildly elevated at 137.  Bilirubin is normal.  October 2, 2019--ultrasound liver ordered by the gastroenterologist reveals increased hepatic echogenicity consistent with steatosis.  Previous cholecystectomy.  No biliary ductal dilatation.  September 24, 2019--patient was apparently referred to the  gastroenterologist by   • Esophageal varices determined by endoscopy 10/28/2021    2021--EGD reveals grade 2 varices in the lower third of the esophagus.  A varix with no bleeding was found in the cardia.  No stigmata of recent bleeding.  Mild portal hypertensive gastropathy was found in the gastric fundus.   • Family history of premature coronary artery disease 2016    Father  from a myocardial infarction age 61.   • Folic acid deficiency 2016   • History of Abnormal pulse oximetry 10/07/2012    10/07/2012--overnight oximetry test revealed significant nocturnal hypoxemia. Oxygen saturations were greater than 90% for only 50.5% of the study. Oxygen saturation less than 90% for three hours 47 minutes which was 49.5% of the study. Oxygen saturation less than 88% for one hour and 36 minutes and 48 seconds, 21.1% of the study.   • History of CABG 2005 status post four-vessel CABG.   • History of colon polyps, 2021--tubular adenoma x12. 10/28/2021    2021--colonoscopy reveals a 6 mm polyp in the transverse colon.  There was an 8 mm polyp in the descending colon.  A 20 mm polyp was found at 50 cm proximal to the anus.  Resection was incomplete.  The resected tissue was retrieved and coagulation for destruction was performed.  #4, sessile polyps in the sigmoid colon that were 5 to 6 mm in size.  Removed.  #2, sessile polyps in the sig   • History of COVID-19 2021   • History of myocardial infarction, . 2016--status post myocardial infarction.   2005--status post four-vessel CABG   • Hyperlipidemia 2016   • Hypogonadism male, TRT ineffective 2012--treatment for symptomatic hypogonadism begun. This was later discontinued due to lack of efficacy and cost.   • Liver cirrhosis secondary to RUIZ 10/28/2021    2021--EGD reveals grade 2 varices in the lower third of the esophagus.  A varix with  no bleeding was found in the cardia.  No stigmata of recent bleeding.  Mild portal hypertensive gastropathy was found in the gastric fundus.  October 2, 2019--ultrasound liver ordered by the gastroenterologist reveals increased hepatic echogenicity consistent with steatosis.  Previous cholecystectomy.     • Male erectile disorder 08/27/2020   • Microalbuminuria 04/25/2014 04/25/2014--urine microalbumin elevated 28.7. Normal range 0.0--17.0.   • Myocardial infarction    • RUIZ (nonalcoholic steatohepatitis) 04/28/2016   • Non morbid obesity 06/02/2021   • Obstructive sleep apnea, 11/13/2012--mild to moderate NIKI.  Unable to tolerate CPAP.  Cannot use oral appliance. 10/07/2012    05/02/2014--nocturnal oxygen 2 L per nasal cannula ordered.   12/03/2013--patient was referred for an oral appliance but this could not be done because patient wears dentures.   11/13/2012--diagnosed with mild to moderate obstructive sleep apnea. Patient unable to tolerate CPAP.   10/07/2012--overnight oximetry test revealed significant nocturnal hypoxemia. Oxygen saturations were greater than 90% for only 50.5% of the study. Oxygen saturation less than 90% for three hours 47 minutes which was 49.5% of the study. Oxygen saturation less than 88% for one hour and 36 minutes and 48 seconds, 21.1% of the study.   • Occlusive coronary artery disease, 01/01/2005--status post MI.  January 2005--CABG ×4.  Details not known. 01/01/2005 01/01/2005--status post myocardial infarction.   January 2005--status post four-vessel CABG   • Sleep related hypoxia 10/07/2012    05/02/2014--nocturnal oxygen 2 L per nasal cannula ordered.  12/03/2013--patient was referred for an oral appliance but this could not be done because patient wears dentures.   11/13/2012--diagnosed with mild to moderate obstructive sleep apnea. Patient unable to tolerate CPAP.   10/07/2012--overnight oximetry test revealed significant nocturnal hypoxemia. Oxygen saturations were  greater than 90% for only 50.5% of the study. Oxygen saturation less than 90% for three hours 47 minutes which was 49.5% of the study. Oxygen saturation less than 88% for one hour and 36 minutes and 48 seconds, 21.1% of the study.   • Type 2 diabetes mellitus with microalbuminuria, without long-term current use of insulin 2005    Diagnosed in .   • Type 2 diabetes mellitus with microalbuminuria, without long-term current use of insulin 2005    Diagnosed in .   • Vitamin D deficiency 2016           Past Surgical History:   Procedure Laterality Date   • CARDIAC CATHETERIZATION     • CHOLECYSTECTOMY WITH INTRAOPERATIVE CHOLANGIOGRAM N/A 2019    Procedure: laparoscopic cholecystectomy with intraoperative cholangiogram;  Surgeon: Vida Avilez MD;  Location: [facility];  Service: General   • COLONOSCOPY N/A 2021--colonoscopy reveals a 6 mm polyp in the transverse colon.  There was an 8 mm polyp in the descending colon.  A 20 mm polyp was found at 50 cm proximal to the anus.  Resection was incomplete.  The resected tissue was retrieved and coagulation for destruction was performed.  #4, sessile polyps in the sigmoid colon that were 5 to 6 mm in size.  Removed.  #2, sessile polyps in the sig   • CORONARY ARTERY BYPASS GRAFT  2005 status post four-vessel CABG.   • ENDOSCOPY N/A 2021--EGD reveals grade 2 varices in the lower third of the esophagus.  A varix with no bleeding was found in the cardia.  No stigmata of recent bleeding.  Mild portal hypertensive gastropathy was found in the gastric fundus.           Social History     Tobacco Use   • Smoking status: Former     Packs/day: 0.50     Years: 40.00     Pack years: 20.00     Types: Cigarettes     Quit date: 2005     Years since quittin.6   • Smokeless tobacco: Never   • Tobacco comments:     Former smoker quit 13 years ago with a 64.5 pack year history.   Smoked 1 ppd for 32 years and 4.5 ppd for 5 years and quit when he had his open heart surgery.   Vaping Use   • Vaping Use: Never used   Substance Use Topics   • Alcohol use: No   • Drug use: No           Immunization History   Administered Date(s) Administered   • Pneumococcal Conjugate 20-Valent (PCV20) 07/21/2022   • Pneumococcal Polysaccharide (PPSV23) 03/14/2017   • Tdap 02/05/2015           Physical Exam  Vitals and nursing note reviewed.   Constitutional:       Appearance: Normal appearance.   HENT:      Head: Normocephalic and atraumatic.   Cardiovascular:      Rate and Rhythm: Normal rate and regular rhythm.   Pulmonary:      Effort: Pulmonary effort is normal.      Breath sounds: Normal breath sounds.   Abdominal:      General: Bowel sounds are normal.      Palpations: Abdomen is soft.      Comments: Large umbilical hernia   Musculoskeletal:         General: No swelling or tenderness.   Skin:     General: Skin is warm and dry.   Neurological:      General: No focal deficit present.      Mental Status: He is alert and oriented to person, place, and time.   Psychiatric:         Mood and Affect: Mood normal.         Behavior: Behavior normal.       Debilities/Disabilities Identified: None    Emotional Behavior: Appropriate      There were no vitals taken for this visit.        Diagnoses and all orders for this visit:    1. Umbilical hernia without obstruction and without gangrene (Primary)       The risks and benefits of repairing this hernia were discussed with Rajan and his wife.  Benefits and risks, not limited to but including:  Bleeding, infection, recurrence, formation of a hematoma or seroma, injury to intra-abdominal structures, DVT, PE, atelectasis, pneumonia, anesthesia complications.  They appeared to understand and are willing to proceed.    Thank you for allowing me to participate in the care of this interesting patient.

## 2023-09-19 RX ORDER — DOXYCYCLINE HYCLATE 50 MG/1
324 CAPSULE, GELATIN COATED ORAL
Qty: 90 TABLET | Refills: 1 | OUTPATIENT
Start: 2023-09-19

## 2023-09-22 ENCOUNTER — PRE-ADMISSION TESTING (OUTPATIENT)
Dept: PREADMISSION TESTING | Facility: HOSPITAL | Age: 68
End: 2023-09-22
Payer: COMMERCIAL

## 2023-09-22 ENCOUNTER — TELEPHONE (OUTPATIENT)
Dept: INTERNAL MEDICINE | Facility: CLINIC | Age: 68
End: 2023-09-22

## 2023-09-22 VITALS
HEIGHT: 69 IN | SYSTOLIC BLOOD PRESSURE: 127 MMHG | DIASTOLIC BLOOD PRESSURE: 71 MMHG | RESPIRATION RATE: 18 BRPM | WEIGHT: 225.31 LBS | OXYGEN SATURATION: 93 % | HEART RATE: 80 BPM | BODY MASS INDEX: 33.37 KG/M2

## 2023-09-22 LAB
ANION GAP SERPL CALCULATED.3IONS-SCNC: 10.7 MMOL/L (ref 5–15)
BUN SERPL-MCNC: 12 MG/DL (ref 8–23)
BUN/CREAT SERPL: 19.7 (ref 7–25)
CALCIUM SPEC-SCNC: 8.9 MG/DL (ref 8.6–10.5)
CHLORIDE SERPL-SCNC: 103 MMOL/L (ref 98–107)
CO2 SERPL-SCNC: 23.3 MMOL/L (ref 22–29)
CREAT SERPL-MCNC: 0.61 MG/DL (ref 0.76–1.27)
DEPRECATED RDW RBC AUTO: 61.3 FL (ref 37–54)
EGFRCR SERPLBLD CKD-EPI 2021: 105.3 ML/MIN/1.73
ERYTHROCYTE [DISTWIDTH] IN BLOOD BY AUTOMATED COUNT: 19.9 % (ref 12.3–15.4)
GLUCOSE SERPL-MCNC: 142 MG/DL (ref 65–99)
HCT VFR BLD AUTO: 47.2 % (ref 37.5–51)
HGB BLD-MCNC: 15 G/DL (ref 13–17.7)
MCH RBC QN AUTO: 27.3 PG (ref 26.6–33)
MCHC RBC AUTO-ENTMCNC: 31.8 G/DL (ref 31.5–35.7)
MCV RBC AUTO: 86 FL (ref 79–97)
PLATELET # BLD AUTO: 93 10*3/MM3 (ref 140–450)
PMV BLD AUTO: ABNORMAL FL
POTASSIUM SERPL-SCNC: 3.7 MMOL/L (ref 3.5–5.2)
RBC # BLD AUTO: 5.49 10*6/MM3 (ref 4.14–5.8)
SODIUM SERPL-SCNC: 137 MMOL/L (ref 136–145)
WBC NRBC COR # BLD: 5.19 10*3/MM3 (ref 3.4–10.8)

## 2023-09-22 PROCEDURE — 80048 BASIC METABOLIC PNL TOTAL CA: CPT | Performed by: SURGERY

## 2023-09-22 PROCEDURE — 85027 COMPLETE CBC AUTOMATED: CPT | Performed by: SURGERY

## 2023-09-22 PROCEDURE — 36415 COLL VENOUS BLD VENIPUNCTURE: CPT

## 2023-09-22 RX ORDER — DOXYCYCLINE HYCLATE 50 MG/1
324 CAPSULE, GELATIN COATED ORAL
COMMUNITY

## 2023-09-22 NOTE — DISCHARGE INSTRUCTIONS
PRE-ADMISSION TESTING INSTRUCTIONS FOR ADULTS    Take these medications the morning of surgery with a small sip of water: Carvedilol    STOP TAKING SEMAGLUTIDE NOW FOR SURGERY ON 9-28      Do not take any insulin or diabetes medications the morning of surgery.      No aspirin, advil, aleve, ibuprofen, naproxen, diet pills, decongestants, or herbal/vitamins for a week prior to surgery.       Tylenol/Acetaminophen is okay to take if needed.    General Instructions:    DO NOT EAT SOLID FOOD AFTER MIDNIGHT THE NIGHT BEFORE SURGERY. No gum, mints, or hard candy after midnight the night before surgery.  You may drink clear liquids the day of surgery up until 2 hours before your arrival time.  Clear liquids are liquids you can see through. Nothing RED in color.    Plain water    Sports drinks      Gelatin (Jell-O)  Fruit juices without pulp such as white grape juice and apple juice  Popsicles that contain no fruit or yogurt  Tea or coffee (no cream or milk added)    It is beneficial for you to have a clear drink that contains carbohydrates 2 hours before your arrival time.  We suggest a 20 ounce bottle of Gatorade or Powerade for non-diabetic patients or a 20 ounce bottle of Gatorade Zero or Powerade Zero for diabetic patients.     Patients who avoid smoking, chewing tobacco and alcohol for 4 weeks prior to surgery have a reduced risk of post-operative complications.  If at all possible, quit smoking as many days before surgery as you can.    Do not smoke, use chewing tobacco or drink alcohol the day of surgery    Bring your C-PAP/ BI-PAP machine if you use one.  Wear clean comfortable clothes.  Do not wear contact lenses, lotion, deodorant, or make-up.  Bring a case for your glasses if applicable. You may brush your teeth the morning of surgery.  You may wear dentures/partials, do not put adhesive/glue on them.  Leave all other jewelry and valuables at home.      Preventing a Surgical Site Infection:    Shower the night  before and on the morning of surgery using the chlorhexidine soap you were given.  Use a clean washcloth with the soap.  Place clean sheets on your bed after showering the night before surgery. Do not use the CHG soap on your hair, face, or private areas. Wash your body gently for five (5) minutes. Do not scrub your skin.  Dry with a clean towel and dress in clean clothing.  Do not shave the surgical area for 10 days-2 weeks prior to surgery  because the razor can irritate skin and make it easier to develop an infection.  Make sure you, your family, and all healthcare providers clean their hands with soap and water or an alcohol based hand  before caring for you or your wound.      Day of surgery:    Your surgeon’s office will advise you of your arrival time for the day of surgery.    Upon arrival, a Pre-op nurse and Anesthesia provider will review your health history, obtain vital signs, and answer questions you may have. The anesthesia provider will also discuss the type of anesthesia that will be needed for your procedure, which may include general anesthesia. The only belongings needed at this time will be your home medications and if applicable your C-PAP/BI-PAP machine.  If you are staying overnight your family can leave the rest of your belongings in the car and bring them to your room later.  A Pre-op nurse will start an IV and you may receive medication in preparation for surgery, including something to help you relax.  Your family will be able to see you in the Pre-op area.  While you are in surgery your family should notify the waiting room  if they leave the waiting room area and provide a contact phone number.    IF you have any questions, you can call the Pre-Admission Department at [phone] or your surgeon's office.  Notify your surgeon if  you become sick, have a fever, productive cough, or cannot be here the day of surgery    Please be aware that surgery does come with  discomfort.  We want to make every effort to control your discomfort so please discuss any uncontrolled symptoms with your nurse.   Your doctor will most likely have prescribed pain medications.      If you are going home after surgery, you will receive individualized written care instructions before being discharged.  A responsible adult (over the age of 18) must drive you to and from the hospital on the day of your surgery and stay with you for 24 hours after anesthesia.    If you are staying overnight following surgery, you will be transported to your hospital room following the recovery period.  [facility] has all private rooms.    You may receive a survey regarding the care you received. Your feedback is very important and will be used to collect the necessary data to help us to continue to provide excellent care.     Deductibles and co-payments are collected on the day of service. Please be prepared to pay the required co-pay, deductible or deposit on the day of service as defined by your plan.

## 2023-09-22 NOTE — PAT
Pt here for PAT visit.  Pre-op tests completed, chg soap given, and instructions reviewed.  Instructed clears until 2 hrs prior to arrival time, voiced understanding.  Pt not sure if he is still on Coreg, pt to call back when he gets home.Instructed to stop Semaglutide now before surgery on 9-28.

## 2023-09-27 ENCOUNTER — ANESTHESIA EVENT (OUTPATIENT)
Dept: PERIOP | Facility: HOSPITAL | Age: 68
End: 2023-09-27
Payer: COMMERCIAL

## 2023-09-28 ENCOUNTER — HOSPITAL ENCOUNTER (OUTPATIENT)
Facility: HOSPITAL | Age: 68
Setting detail: HOSPITAL OUTPATIENT SURGERY
Discharge: HOME | End: 2023-09-28
Attending: SURGERY | Admitting: SURGERY
Payer: COMMERCIAL

## 2023-09-28 ENCOUNTER — ANESTHESIA (OUTPATIENT)
Dept: PERIOP | Facility: HOSPITAL | Age: 68
End: 2023-09-28
Payer: COMMERCIAL

## 2023-09-28 VITALS
RESPIRATION RATE: 18 BRPM | OXYGEN SATURATION: 91 % | WEIGHT: 229 LBS | SYSTOLIC BLOOD PRESSURE: 131 MMHG | HEART RATE: 76 BPM | DIASTOLIC BLOOD PRESSURE: 75 MMHG | TEMPERATURE: 97.8 F | BODY MASS INDEX: 33.82 KG/M2

## 2023-09-28 DIAGNOSIS — K42.9 UMBILICAL HERNIA WITHOUT OBSTRUCTION AND WITHOUT GANGRENE: ICD-10-CM

## 2023-09-28 LAB — GLUCOSE BLDC GLUCOMTR-MCNC: 252 MG/DL (ref 70–130)

## 2023-09-28 PROCEDURE — C1781 MESH (IMPLANTABLE): HCPCS | Performed by: SURGERY

## 2023-09-28 PROCEDURE — 25010000002 FENTANYL CITRATE (PF) 50 MCG/ML SOLUTION: Performed by: ANESTHESIOLOGY

## 2023-09-28 PROCEDURE — 25010000002 MIDAZOLAM PER 1MG: Performed by: NURSE ANESTHETIST, CERTIFIED REGISTERED

## 2023-09-28 PROCEDURE — 49594 RPR AA HRN 1ST 3-10 NCR/STRN: CPT | Performed by: SURGERY

## 2023-09-28 PROCEDURE — 25010000002 KETOROLAC TROMETHAMINE PER 15 MG: Performed by: ANESTHESIOLOGY

## 2023-09-28 PROCEDURE — 0 CEFAZOLIN SODIUM-DEXTROSE 2-3 GM-%(50ML) RECONSTITUTED SOLUTION: Performed by: SURGERY

## 2023-09-28 PROCEDURE — 25010000002 SUCCINYLCHOLINE PER 20 MG: Performed by: ANESTHESIOLOGY

## 2023-09-28 PROCEDURE — 49594 RPR AA HRN 1ST 3-10 NCR/STRN: CPT | Performed by: SPECIALIST/TECHNOLOGIST, OTHER

## 2023-09-28 PROCEDURE — 82948 REAGENT STRIP/BLOOD GLUCOSE: CPT

## 2023-09-28 PROCEDURE — 25010000002 DEXAMETHASONE PER 1 MG: Performed by: NURSE ANESTHETIST, CERTIFIED REGISTERED

## 2023-09-28 PROCEDURE — 25010000002 PROPOFOL 200 MG/20ML EMULSION: Performed by: ANESTHESIOLOGY

## 2023-09-28 PROCEDURE — 25010000002 BUPIVACAINE (PF) 0.5 % SOLUTION 30 ML VIAL: Performed by: SURGERY

## 2023-09-28 PROCEDURE — 25010000002 ONDANSETRON PER 1 MG: Performed by: NURSE ANESTHETIST, CERTIFIED REGISTERED

## 2023-09-28 DEVICE — VENTRALEX ST HERNIA PATCH
Type: IMPLANTABLE DEVICE | Site: ABDOMEN | Status: FUNCTIONAL
Brand: VENTRALEX ST HERNIA PATCH

## 2023-09-28 DEVICE — BARD SOFT MESH
Type: IMPLANTABLE DEVICE | Site: ABDOMEN | Status: FUNCTIONAL
Brand: BARD SOFT MESH

## 2023-09-28 RX ORDER — OXYCODONE HYDROCHLORIDE AND ACETAMINOPHEN 5; 325 MG/1; MG/1
1 TABLET ORAL ONCE AS NEEDED
Status: DISCONTINUED | OUTPATIENT
Start: 2023-09-28 | End: 2023-09-28 | Stop reason: HOSPADM

## 2023-09-28 RX ORDER — SODIUM CHLORIDE, SODIUM LACTATE, POTASSIUM CHLORIDE, CALCIUM CHLORIDE 600; 310; 30; 20 MG/100ML; MG/100ML; MG/100ML; MG/100ML
9 INJECTION, SOLUTION INTRAVENOUS CONTINUOUS PRN
Status: DISCONTINUED | OUTPATIENT
Start: 2023-09-28 | End: 2023-09-28 | Stop reason: HOSPADM

## 2023-09-28 RX ORDER — SODIUM CHLORIDE 0.9 % (FLUSH) 0.9 %
10 SYRINGE (ML) INJECTION AS NEEDED
Status: DISCONTINUED | OUTPATIENT
Start: 2023-09-28 | End: 2023-09-28 | Stop reason: HOSPADM

## 2023-09-28 RX ORDER — LIDOCAINE HYDROCHLORIDE 20 MG/ML
INJECTION, SOLUTION INFILTRATION; PERINEURAL AS NEEDED
Status: DISCONTINUED | OUTPATIENT
Start: 2023-09-28 | End: 2023-09-28 | Stop reason: SURG

## 2023-09-28 RX ORDER — PROPOFOL 10 MG/ML
INJECTION, EMULSION INTRAVENOUS AS NEEDED
Status: DISCONTINUED | OUTPATIENT
Start: 2023-09-28 | End: 2023-09-28 | Stop reason: SURG

## 2023-09-28 RX ORDER — SODIUM CHLORIDE, SODIUM LACTATE, POTASSIUM CHLORIDE, CALCIUM CHLORIDE 600; 310; 30; 20 MG/100ML; MG/100ML; MG/100ML; MG/100ML
100 INJECTION, SOLUTION INTRAVENOUS CONTINUOUS
Status: DISCONTINUED | OUTPATIENT
Start: 2023-09-28 | End: 2023-09-28 | Stop reason: HOSPADM

## 2023-09-28 RX ORDER — CEFAZOLIN SODIUM 2 G/50ML
2 SOLUTION INTRAVENOUS ONCE
Status: COMPLETED | OUTPATIENT
Start: 2023-09-28 | End: 2023-09-28

## 2023-09-28 RX ORDER — FENTANYL CITRATE 50 UG/ML
INJECTION, SOLUTION INTRAMUSCULAR; INTRAVENOUS AS NEEDED
Status: DISCONTINUED | OUTPATIENT
Start: 2023-09-28 | End: 2023-09-28 | Stop reason: SURG

## 2023-09-28 RX ORDER — ONDANSETRON 2 MG/ML
4 INJECTION INTRAMUSCULAR; INTRAVENOUS ONCE AS NEEDED
Status: COMPLETED | OUTPATIENT
Start: 2023-09-28 | End: 2023-09-28

## 2023-09-28 RX ORDER — FAMOTIDINE 10 MG/ML
20 INJECTION, SOLUTION INTRAVENOUS
Status: COMPLETED | OUTPATIENT
Start: 2023-09-28 | End: 2023-09-28

## 2023-09-28 RX ORDER — SODIUM CHLORIDE 0.9 % (FLUSH) 0.9 %
10 SYRINGE (ML) INJECTION EVERY 12 HOURS SCHEDULED
Status: DISCONTINUED | OUTPATIENT
Start: 2023-09-28 | End: 2023-09-28 | Stop reason: HOSPADM

## 2023-09-28 RX ORDER — SUCCINYLCHOLINE CHLORIDE 20 MG/ML
INJECTION INTRAMUSCULAR; INTRAVENOUS AS NEEDED
Status: DISCONTINUED | OUTPATIENT
Start: 2023-09-28 | End: 2023-09-28 | Stop reason: SURG

## 2023-09-28 RX ORDER — KETOROLAC TROMETHAMINE 30 MG/ML
INJECTION, SOLUTION INTRAMUSCULAR; INTRAVENOUS AS NEEDED
Status: DISCONTINUED | OUTPATIENT
Start: 2023-09-28 | End: 2023-09-28 | Stop reason: SURG

## 2023-09-28 RX ORDER — MIDAZOLAM HYDROCHLORIDE 2 MG/2ML
2 INJECTION, SOLUTION INTRAMUSCULAR; INTRAVENOUS ONCE
Status: COMPLETED | OUTPATIENT
Start: 2023-09-28 | End: 2023-09-28

## 2023-09-28 RX ORDER — SODIUM CHLORIDE 9 MG/ML
40 INJECTION, SOLUTION INTRAVENOUS AS NEEDED
Status: DISCONTINUED | OUTPATIENT
Start: 2023-09-28 | End: 2023-09-28 | Stop reason: HOSPADM

## 2023-09-28 RX ORDER — ROCURONIUM BROMIDE 10 MG/ML
INJECTION, SOLUTION INTRAVENOUS AS NEEDED
Status: DISCONTINUED | OUTPATIENT
Start: 2023-09-28 | End: 2023-09-28 | Stop reason: SURG

## 2023-09-28 RX ORDER — ONDANSETRON 2 MG/ML
4 INJECTION INTRAMUSCULAR; INTRAVENOUS ONCE AS NEEDED
Status: DISCONTINUED | OUTPATIENT
Start: 2023-09-28 | End: 2023-09-28 | Stop reason: HOSPADM

## 2023-09-28 RX ORDER — HYDROCODONE BITARTRATE AND ACETAMINOPHEN 5; 325 MG/1; MG/1
1 TABLET ORAL EVERY 4 HOURS PRN
Qty: 30 TABLET | Refills: 0 | Status: SHIPPED | OUTPATIENT
Start: 2023-09-28

## 2023-09-28 RX ORDER — DEXAMETHASONE SODIUM PHOSPHATE 4 MG/ML
4 INJECTION, SOLUTION INTRA-ARTICULAR; INTRALESIONAL; INTRAMUSCULAR; INTRAVENOUS; SOFT TISSUE ONCE AS NEEDED
Status: COMPLETED | OUTPATIENT
Start: 2023-09-28 | End: 2023-09-28

## 2023-09-28 RX ADMIN — PROPOFOL INJECTABLE EMULSION 50 MG: 10 INJECTION, EMULSION INTRAVENOUS at 11:00

## 2023-09-28 RX ADMIN — ROCURONIUM BROMIDE 10 MG: 10 INJECTION INTRAVENOUS at 11:01

## 2023-09-28 RX ADMIN — SUCCINYLCHOLINE CHLORIDE 160 MG: 20 INJECTION, SOLUTION INTRAMUSCULAR; INTRAVENOUS at 10:41

## 2023-09-28 RX ADMIN — CEFAZOLIN SODIUM 2 G: 2 SOLUTION INTRAVENOUS at 10:45

## 2023-09-28 RX ADMIN — LIDOCAINE HYDROCHLORIDE 100 MG: 20 INJECTION, SOLUTION INFILTRATION; PERINEURAL at 10:41

## 2023-09-28 RX ADMIN — ROCURONIUM BROMIDE 5 MG: 10 INJECTION INTRAVENOUS at 10:40

## 2023-09-28 RX ADMIN — DEXAMETHASONE SODIUM PHOSPHATE 4 MG: 4 INJECTION, SOLUTION INTRAMUSCULAR; INTRAVENOUS at 08:28

## 2023-09-28 RX ADMIN — ONDANSETRON 4 MG: 2 INJECTION INTRAMUSCULAR; INTRAVENOUS at 08:28

## 2023-09-28 RX ADMIN — PROPOFOL INJECTABLE EMULSION 150 MG: 10 INJECTION, EMULSION INTRAVENOUS at 10:41

## 2023-09-28 RX ADMIN — MIDAZOLAM HYDROCHLORIDE 1 MG: 1 INJECTION, SOLUTION INTRAMUSCULAR; INTRAVENOUS at 09:43

## 2023-09-28 RX ADMIN — ROCURONIUM BROMIDE 15 MG: 10 INJECTION INTRAVENOUS at 10:46

## 2023-09-28 RX ADMIN — KETOROLAC TROMETHAMINE 15 MG: 30 INJECTION, SOLUTION INTRAMUSCULAR; INTRAVENOUS at 11:20

## 2023-09-28 RX ADMIN — SODIUM CHLORIDE, POTASSIUM CHLORIDE, SODIUM LACTATE AND CALCIUM CHLORIDE 9 ML/HR: 600; 310; 30; 20 INJECTION, SOLUTION INTRAVENOUS at 08:18

## 2023-09-28 RX ADMIN — FENTANYL CITRATE 25 MCG: 50 INJECTION INTRAMUSCULAR; INTRAVENOUS at 10:39

## 2023-09-28 RX ADMIN — FENTANYL CITRATE 25 MCG: 50 INJECTION INTRAMUSCULAR; INTRAVENOUS at 11:01

## 2023-09-28 RX ADMIN — FAMOTIDINE 20 MG: 10 INJECTION, SOLUTION INTRAVENOUS at 08:28

## 2023-09-28 NOTE — H&P
Rajan Dc 67 y.o. male presents @ the req of [facility] for eval of umb hernia.  Pt states he first noticed it about 1 yr ago and it has increased in size.   Chief Complaint   Patient presents with    Hernia     UMB HERNIA             HPI   Above note and agree.  This very pleasant 67-year-old male is here with an umbilical hernia.  He has had an umbilical hernia for a long time but over the past year and another 1 appeared next to it.  It has been growing.  It does not hurt.  He tolerates a regular diet without nausea or vomiting.  He has no chest pain or shortness of breath.  He has no fevers or chills.  He has no other complaints.      Review of Systems   All other systems reviewed and are negative.            Current Outpatient Medications:     aspirin 81 MG tablet, Take 1 tablet by mouth Daily., Disp: , Rfl:     atorvastatin (LIPITOR) 80 MG tablet, TAKE 1 TABLET BY MOUTH DAILY FOR HIGH CHOLESTEROL, Disp: 90 tablet, Rfl: 2    carvedilol (Coreg) 3.125 MG tablet, Take 1 tablet by mouth 2 (Two) Times a Day With Meals., Disp: 60 tablet, Rfl: 3    Coenzyme Q10 (Co Q-10) 400 MG capsule, Take 1 p.o. daily with food for muscle cramps and heart, Disp: 30 capsule, Rfl:     Empagliflozin (Jardiance) 25 MG tablet, Take 25 mg by mouth Every Morning Before Breakfast. For diabetes, Disp: 90 tablet, Rfl: 1    folic acid (FOLVITE) 1 MG tablet, TAKE 1 TABLET BY MOUTH DAILY, Disp: 30 tablet, Rfl: 2    glimepiride (AMARYL) 4 MG tablet, Take 1 p.o. twice daily at breakfast and supper for diabetes, Disp: 180 tablet, Rfl: 3    Janumet XR  MG tablet, TAKE 1 TABLET BY MOUTH TWICE DAILY FOR DIABETES, Disp: 60 tablet, Rfl: 5    loratadine (Claritin) 10 MG tablet, Take 1 tablet by mouth Daily., Disp: 90 tablet, Rfl: 0    methylPREDNISolone (MEDROL) 4 MG dose pack, Take as directed on package instructions., Disp: 1 each, Rfl: 0    Semaglutide (Rybelsus) 7 MG tablet, Take 7 mg by mouth Daily., Disp: 30 tablet, Rfl: 6     vitamin D3 125 MCG (5000 UT) capsule capsule, 1 by mouth daily as directed, Disp: 30 capsule, Rfl: 11        No Known Allergies        Past Medical History:   Diagnosis Date    Chronic ITP (idiopathic thrombocytopenia) 09/19/2018    Chronic migraine w/o aura w/o status migrainosus, not intractable 8/18/2022    Diabetic eye exam 05/22/2017 05/22/2017--routine diabetic eye exam reveals no evidence of diabetic retinopathy.  Astigmatism, presbyopia, hypermetropia noted.  Very early cataracts noted bilaterally.  October 2015--patient reports a routine diabetic eye exam without evidence of diabetic retinopathy.    Diabetic foot exam 03/12/2017 03/14/2017--diabetic foot exam reveals no evidence of diabetic foot ulcer or pre-ulcerative callus.  Distal pulses palpable.  No signs of ischemia.  Sensation subjectively intact per monofilament.    Diverticulosis of colon 10/28/2021    August 18, 2021--colonoscopy reveals a 6 mm polyp in the transverse colon.  There was an 8 mm polyp in the descending colon.  A 20 mm polyp was found at 50 cm proximal to the anus.  Resection was incomplete.  The resected tissue was retrieved and coagulation for destruction was performed.  #4, sessile polyps in the sigmoid colon that were 5 to 6 mm in size.  Removed.  #2, sessile polyps in the sig    Elevated liver enzymes 06/23/2019 October 17, 2019--AST is normal at 27, ALT normal at 2 9.  Alkaline phosphatase mildly elevated at 137.  Bilirubin is normal.  October 2, 2019--ultrasound liver ordered by the gastroenterologist reveals increased hepatic echogenicity consistent with steatosis.  Previous cholecystectomy.  No biliary ductal dilatation.  September 24, 2019--patient was apparently referred to the gastroenterologist by    Esophageal varices determined by endoscopy 10/28/2021    August 18, 2021--EGD reveals grade 2 varices in the lower third of the esophagus.  A varix with no bleeding was found in the cardia.  No stigmata of recent  bleeding.  Mild portal hypertensive gastropathy was found in the gastric fundus.    Family history of premature coronary artery disease 2016    Father  from a myocardial infarction age 61.    Folic acid deficiency 2016    History of Abnormal pulse oximetry 10/07/2012    10/07/2012--overnight oximetry test revealed significant nocturnal hypoxemia. Oxygen saturations were greater than 90% for only 50.5% of the study. Oxygen saturation less than 90% for three hours 47 minutes which was 49.5% of the study. Oxygen saturation less than 88% for one hour and 36 minutes and 48 seconds, 21.1% of the study.    History of CABG 2005 status post four-vessel CABG.    History of colon polyps, 2021--tubular adenoma x12. 10/28/2021    2021--colonoscopy reveals a 6 mm polyp in the transverse colon.  There was an 8 mm polyp in the descending colon.  A 20 mm polyp was found at 50 cm proximal to the anus.  Resection was incomplete.  The resected tissue was retrieved and coagulation for destruction was performed.  #4, sessile polyps in the sigmoid colon that were 5 to 6 mm in size.  Removed.  #2, sessile polyps in the sig    History of COVID-19 2021    History of myocardial infarction, . 2016--status post myocardial infarction.   2005--status post four-vessel CABG    Hyperlipidemia 2016    Hypogonadism male, TRT ineffective 2012--treatment for symptomatic hypogonadism begun. This was later discontinued due to lack of efficacy and cost.    Liver cirrhosis secondary to RUIZ 10/28/2021    2021--EGD reveals grade 2 varices in the lower third of the esophagus.  A varix with no bleeding was found in the cardia.  No stigmata of recent bleeding.  Mild portal hypertensive gastropathy was found in the gastric fundus.  2019--ultrasound liver ordered by the gastroenterologist reveals increased hepatic echogenicity  consistent with steatosis.  Previous cholecystectomy.      Male erectile disorder 08/27/2020    Microalbuminuria 04/25/2014 04/25/2014--urine microalbumin elevated 28.7. Normal range 0.0--17.0.    RUIZ (nonalcoholic steatohepatitis) 04/28/2016    Non morbid obesity 06/02/2021    Obstructive sleep apnea, 11/13/2012--mild to moderate NIKI.  Unable to tolerate CPAP.  Cannot use oral appliance. 10/07/2012    05/02/2014--nocturnal oxygen 2 L per nasal cannula ordered.   12/03/2013--patient was referred for an oral appliance but this could not be done because patient wears dentures.   11/13/2012--diagnosed with mild to moderate obstructive sleep apnea. Patient unable to tolerate CPAP.   10/07/2012--overnight oximetry test revealed significant nocturnal hypoxemia. Oxygen saturations were greater than 90% for only 50.5% of the study. Oxygen saturation less than 90% for three hours 47 minutes which was 49.5% of the study. Oxygen saturation less than 88% for one hour and 36 minutes and 48 seconds, 21.1% of the study.    Occlusive coronary artery disease, 01/01/2005--status post MI.  January 2005--CABG ×4.  Details not known. 01/01/2005 01/01/2005--status post myocardial infarction.   January 2005--status post four-vessel CABG    Sleep related hypoxia 10/07/2012    05/02/2014--nocturnal oxygen 2 L per nasal cannula ordered.  12/03/2013--patient was referred for an oral appliance but this could not be done because patient wears dentures.   11/13/2012--diagnosed with mild to moderate obstructive sleep apnea. Patient unable to tolerate CPAP.   10/07/2012--overnight oximetry test revealed significant nocturnal hypoxemia. Oxygen saturations were greater than 90% for only 50.5% of the study. Oxygen saturation less than 90% for three hours 47 minutes which was 49.5% of the study. Oxygen saturation less than 88% for one hour and 36 minutes and 48 seconds, 21.1% of the study.    Type 2 diabetes mellitus with microalbuminuria,  without long-term current use of insulin 2005    Diagnosed in .    Vitamin D deficiency 2016           Past Surgical History:   Procedure Laterality Date    CHOLECYSTECTOMY WITH INTRAOPERATIVE CHOLANGIOGRAM N/A 2019    Procedure: laparoscopic cholecystectomy with intraoperative cholangiogram;  Surgeon: Vida Avilez MD;  Location: [facility];  Service: General    COLONOSCOPY N/A 2021--colonoscopy reveals a 6 mm polyp in the transverse colon.  There was an 8 mm polyp in the descending colon.  A 20 mm polyp was found at 50 cm proximal to the anus.  Resection was incomplete.  The resected tissue was retrieved and coagulation for destruction was performed.  #4, sessile polyps in the sigmoid colon that were 5 to 6 mm in size.  Removed.  #2, sessile polyps in the sig    CORONARY ARTERY BYPASS GRAFT  2005 status post four-vessel CABG.    ENDOSCOPY N/A 2021--EGD reveals grade 2 varices in the lower third of the esophagus.  A varix with no bleeding was found in the cardia.  No stigmata of recent bleeding.  Mild portal hypertensive gastropathy was found in the gastric fundus.           Social History     Tobacco Use    Smoking status: Former     Types: Cigarettes     Quit date:      Years since quittin.3    Smokeless tobacco: Never    Tobacco comments:     Former smoker quit 13 years ago with a 64.5 pack year history.  Smoked 1 ppd for 32 years and 4.5 ppd for 5 years and quit when he had his open heart surgery.   Vaping Use    Vaping Use: Never used   Substance Use Topics    Alcohol use: No    Drug use: No           Immunization History   Administered Date(s) Administered    Pneumococcal Conjugate 20-Valent (PCV20) 2022    Pneumococcal Polysaccharide (PPSV23) 2017    Tdap 2015           Physical Exam  Vitals and nursing note reviewed.   Constitutional:       Appearance: Normal appearance.   HENT:       "Head: Normocephalic and atraumatic.   Cardiovascular:      Rate and Rhythm: Normal rate and regular rhythm.   Pulmonary:      Effort: Pulmonary effort is normal.      Breath sounds: Normal breath sounds.   Abdominal:      General: Bowel sounds are normal.      Palpations: Abdomen is soft.      Comments: Large umbilical hernia noted   Musculoskeletal:         General: No swelling or tenderness.   Skin:     General: Skin is warm and dry.   Neurological:      General: No focal deficit present.      Mental Status: He is alert and oriented to person, place, and time.   Psychiatric:         Mood and Affect: Mood normal.         Behavior: Behavior normal.         Debilities/Disabilities Identified: None    Emotional Behavior: Appropriate      /78   Pulse 88   Resp 16   Ht 175.3 cm (69\")   Wt 111 kg (245 lb)   BMI 36.18 kg/m²         Diagnoses and all orders for this visit:    1. Umbilical hernia without obstruction and without gangrene (Primary)      The risks and benefits of repairing this hernia were discussed with this patient.  Benefits and risks, not limited to but including:  Bleeding, infection, recurrence, formation of a hematoma or seroma, injury to intra-abdominal structures, DVT, PE, atelectasis, pneumonia, anesthesia complications.  The patient appeared to understand and is willing to proceed.    Thank you for allowing me to participate in the care of this interesting patient.            "

## 2023-09-28 NOTE — ANESTHESIA POSTPROCEDURE EVALUATION
Patient: Rajan Dc    Procedure Summary       Date: 09/28/23 Room / Location: [facility] OR 4 /  [facility] OR    Anesthesia Start: 1035 Anesthesia Stop: 1144    Procedure: UMBILICAL HERNIA REPAIR (Abdomen) Diagnosis:       Umbilical hernia without obstruction and without gangrene      (Umbilical hernia without obstruction and without gangrene [K42.9])    Surgeons: Kari Alejandro DO Provider: Nevaeh Bhagat MD    Anesthesia Type: general with block ASA Status: 4            Anesthesia Type: general with block    Vitals  Vitals Value Taken Time   /71 09/28/23 1215   Temp 98.1 °F (36.7 °C) 09/28/23 1144   Pulse 74 09/28/23 1216   Resp 16 09/28/23 1210   SpO2 91 % 09/28/23 1216   Vitals shown include unvalidated device data.        Post Anesthesia Care and Evaluation    Patient location during evaluation: PACU  Patient participation: complete - patient participated  Level of consciousness: awake  Pain score: 0  Pain management: adequate    Airway patency: patent  Anesthetic complications: No anesthetic complications  PONV Status: none  Cardiovascular status: acceptable  Respiratory status: acceptable  Hydration status: acceptable     Pt here for CADD d/c  Elstomeric pump disconnected  Port flushed and de-accessed without complication  Pt denies any questions or concerns  Declines AVS  Pt left ambulatory in stable condition

## 2023-09-28 NOTE — ANESTHESIA PREPROCEDURE EVALUATION
Anesthesia Evaluation     Patient summary reviewed and Nursing notes reviewed   NPO Solid Status: > 8 hours  NPO Liquid Status: > 8 hours           Airway   Mallampati: II  Neck ROM: full  No difficulty expected  Dental - normal exam   (+) poor dentition    Pulmonary     breath sounds clear to auscultation  (+) a smoker Former,home oxygen (no longer uses. hasn't used since 2005), sleep apnea  Cardiovascular   Exercise tolerance: good (4-7 METS)    ECG reviewed  PT is on anticoagulation therapy  Rhythm: regular    (+) past MI (2005.  Had 4 vessel cabg.  No since cp/sob since.) , CAD, CABG >6 Months, hyperlipidemia    ROS comment: Progress Note    HEART RATE= 77  bpm  RR Interval= 784  ms  WV Interval= 166  ms  P Horizontal Axis= 47  deg  P Front Axis= 57  deg  QRSD Interval= 153  ms  QT Interval= 457  ms  QRS Axis= -177  deg  T Wave Axis= 26  deg  - ABNORMAL ECG -  Sinus rhythm  Right bundle branch block, new c/w prior  Electronically Signed By: Sim Gan ([facility]) 19-May-2023 15:26:19  Date and Time of Study: 2023-05-19 10:01:06        Neuro/Psych- negative ROS  GI/Hepatic/Renal/Endo    (+) obesity, hepatitis (RUIZ), liver disease (RUIZ), diabetes mellitus (A!C down from 12.9 to 6.5) poorly controlled    Musculoskeletal     Abdominal   (+) obese   Substance History      OB/GYN          Other   blood dyscrasia (polycythemia. anemia),     ROS/Med Hx Other: ·  Findings consistent with an equivocal ECG stress test.  ·  Left ventricular ejection fraction is normal (Calculated EF = 55%).  ·  Myocardial perfusion imaging indicates a normal myocardial perfusion study with no evidence of ischemia.  ·  Impressions are consistent with a low risk study.    1. Cardiac clearance: ischemic work up as above. Clear for surgery     has been seen and examined with no clinical signs of angina or CHF , patient is cleared for surgery with non-modifiable risk factors. Patient has been advised to take cardiac meds with sip of water on the  day of surgery.     Please use beta blocker for tachycardia preoperatively. Anticoagulation to be managed appropriately     Watch for chest pain, shortness of breath, palpitations, arrhythmias, and significant change in the blood pressure preoperatively. Please check EKG pre-op and postop if any questions, notify us if any change in patient's cardiovascular conditions.     Stress testing carries 85% specificity/sensitivity/cardiac workup has been explained, and does not rule out coronary artery disease or future events, continue to emphasize on risk reductions for coronary artery disease.  Explained if symptoms continue please go to ER, and further w/p will be required.     2. Coronary artery disease: no chest pain. Ischemic work up as above. Continue aspirin, statin, coreg.      3. Hyperlipidemia: He reports his pcp manages his cholesterol and labs. Goals LDL <70. Continue statin therapy       August 30, 2023 addendum: Patient needs to have umbilical hernia surgery and the surgeon has requested surgical clearance.  Given the above information and the fact the patient has no cardiovascular or respiratory symptoms of any significance      Last asa 10 day ago      Phys Exam Other: Very poor dentition. ONly has 4 chips of the tooth left on the central bottom.  In poor condition.                Anesthesia Plan    ASA 4     general with block     intravenous induction     Anesthetic plan, risks, benefits, and alternatives have been provided, discussed and informed consent has been obtained with: patient and spouse/significant other.    Use of blood products discussed with patient and spouse/significant other .    Plan discussed with CRNA.

## 2023-09-28 NOTE — ANESTHESIA PROCEDURE NOTES
Airway  Urgency: elective    Date/Time: 9/28/2023 10:43 AM  Airway not difficult    General Information and Staff    Patient location during procedure: OR  Anesthesiologist: Nevaeh Bhagat MD    Indications and Patient Condition  Indications for airway management: airway protection    Preoxygenated: yes  MILS maintained throughout  Mask difficulty assessment: 1 - vent by mask    Final Airway Details  Final airway type: endotracheal airway      Successful airway: ETT  Cuffed: yes   Successful intubation technique: direct laryngoscopy  Facilitating devices/methods: intubating stylet  Endotracheal tube insertion site: oral  Blade: Abad  Blade size: 3  ETT size (mm): 7.5  Cormack-Lehane Classification: grade IIa - partial view of glottis  Placement verified by: chest auscultation and capnometry   Cuff volume (mL): 8  Measured from: lips  ETT/EBT  to lips (cm): 22  Number of attempts at approach: 1  Assessment: lips, teeth, and gum same as pre-op and atraumatic intubation

## 2023-09-28 NOTE — OP NOTE
GENERAL SURGERY :  Flavia  Rajan CARIAS Dao  1955    Procedure Date: 09/28/23    Pre-op Diagnosis: Incarcerated umbilical hernia    Post-op Diagnosis: Incarcerated umbilical hernia    Procedure: Repair of incarcerated umbilical hernia    Surgeon: Flavia    Assistant: Jossy Daniels CSA was responsible for performing the following activities: Retraction, Closing, and Placing Dressing and their skilled assistance was necessary for the success of this case.      Estimated Blood Loss:  5 ml    Complications: None    Specimen: Hernia sac    Findings: Rajan had a 4.5 cm umbilical hernia that was incarcerated with omentum    Clinical Note: Rajan presented to my office with an incarcerated umbilical hernia.  We discussed the benefits risks of repairing this hernia.  He appeared to understand and was willing to proceed.    Procedure: Rajan was taken the operative suite and placed in supine position.  He was placed under general anesthetic.  He was prepped and draped in the use sterile fashion.  After appropriate timeout was performed local was injected and incision was made below the umbilicus.  Using Bovie cautery dissection was carried down until reached the fascia and hernia defect.  Using Bovie cautery the hernia sac was  from the hernia and resected and passed off.  The hernia was reduced.  Due to the large size we opted to do a Ventralex mesh as well as an onlay mesh.  Using Bovie cautery the fat was  from the fascia circumferentially around the defect.  A large Ventralex ST mesh was then placed into the defect and the defect was closed using 0 Nurolon simple sutures.  More local was injected.  The wings of the mesh were sutured to the fascia using 3-0 Vicryl suture.  Copious irrigation was carried out.  An onlay mesh was then placed onto the anterior fascia and sutured down in several places with 0 Vicryl suture.  We then placed a seal on the mesh.  The wound was then closed in layered  fashion.  Exofin glue and sterile dressings were applied.  Rajan then had his anesthesia reversed and he was extubated and brought to the recovery room in stable postoperative condition having tolerated his procedure well.        Kari Alejandro DO  11:23 EDT

## 2023-10-04 NOTE — PROGRESS NOTES
08/30/2023    Patient Information  Rajan Dc                                                                                          [address] [place] [id number]      1955  [unfilled]  [phone] (work)    Chief Complaint:     Routine annual physical examination and follow-up blood work.  No new acute complaints.    History of Present Illness:    Patient with multiple chronic medical problems as noted below in the assessment and plan presents today for his routine annual physical exam/preventative visit.  Patient reports he is feeling fairly well and has no new acute complaints.  His past medical history reviewed and updated were necessary including health maintenance parameters.  This reveals he needs Shingrix vaccine and diabetic eye exam and I encouraged him to get both.    Review of Systems   Constitutional: Negative.   HENT: Negative.     Eyes: Negative.    Cardiovascular: Negative.    Respiratory: Negative.     Endocrine: Negative.    Hematologic/Lymphatic: Negative.    Skin: Negative.    Musculoskeletal: Negative.    Gastrointestinal: Negative.    Genitourinary: Negative.    Neurological: Negative.    Psychiatric/Behavioral: Negative.     Allergic/Immunologic: Negative.      Active Problems:    Patient Active Problem List   Diagnosis    Occlusive coronary artery disease, 01/01/2005--status post MI.  January 2005--CABG x4.  Details not known.    Folic acid deficiency    Hyperlipidemia    Hypogonadism male, TRT ineffective    Microalbuminuria    RUIZ (nonalcoholic steatohepatitis)    Obstructive sleep apnea, 11/13/2012--mild to moderate NIKI.  Unable to tolerate CPAP.  Cannot use oral appliance.    Sleep related hypoxia    Type 2 diabetes mellitus with microalbuminuria, without long-term current use of insulin    Vitamin D deficiency    Therapeutic drug monitoring    Routine physical examination    Family history of premature coronary artery disease    Diabetic eye exam     Medication refill requested for HYDROcodone-acetaminophen ([facility])  MG per tablet  Last Refill/Prescribed: Date 10/04/2023, # of tablets: 180, # of refills approved:0     No protocol for requested medication     Medication: HYDROcodone-acetaminophen ([address] [facility] [address])  MG per tablet  Last office visit date: 06/03/2023  Pharmacy: [address] [place] [address] [address], [place]  [address]    Order pended, routed to clinician for review. Change requested: Patient stated he was told by the pharmacy they have HYDROcodone-acetaminophen 5mg-325 mg tablet. Please send a new order. Patient complains of having pain. Pain level is 8. Pain is located all over body, right shoulder, left knee, and pain due to hip replacement. Patient declined to speak to a Nurse at this time. History of myocardial infarction, 2005.    Chronic ITP (idiopathic thrombocytopenia)    Elevated liver enzymes    Male erectile disorder    Non morbid obesity    History of COVID-19    History of colon polyps, 8/18/2021--tubular adenoma x12.    Esophageal varices determined by endoscopy    Liver cirrhosis secondary to RUIZ    Diverticulosis of colon    Chronic migraine w/o aura w/o status migrainosus, not intractable    Umbilical hernia without obstruction and without gangrene    Encounter for diabetic foot exam         Past Medical History:   Diagnosis Date    Chronic ITP (idiopathic thrombocytopenia) 09/19/2018    Chronic migraine w/o aura w/o status migrainosus, not intractable 08/18/2022    Diabetic eye exam 05/22/2017 05/22/2017--routine diabetic eye exam reveals no evidence of diabetic retinopathy.  Astigmatism, presbyopia, hypermetropia noted.  Very early cataracts noted bilaterally.  October 2015--patient reports a routine diabetic eye exam without evidence of diabetic retinopathy.    Diabetic foot exam 03/12/2017 03/14/2017--diabetic foot exam reveals no evidence of diabetic foot ulcer or pre-ulcerative callus.  Distal pulses palpable.  No signs of ischemia.  Sensation subjectively intact per monofilament.    Diverticulosis of colon 10/28/2021    August 18, 2021--colonoscopy reveals a 6 mm polyp in the transverse colon.  There was an 8 mm polyp in the descending colon.  A 20 mm polyp was found at 50 cm proximal to the anus.  Resection was incomplete.  The resected tissue was retrieved and coagulation for destruction was performed.  #4, sessile polyps in the sigmoid colon that were 5 to 6 mm in size.  Removed.  #2, sessile polyps in the sig    Elevated liver enzymes 06/23/2019 October 17, 2019--AST is normal at 27, ALT normal at 2 9.  Alkaline phosphatase mildly elevated at 137.  Bilirubin is normal.  October 2, 2019--ultrasound liver ordered by the gastroenterologist reveals increased hepatic  echogenicity consistent with steatosis.  Previous cholecystectomy.  No biliary ductal dilatation.  2019--patient was apparently referred to the gastroenterologist by    Esophageal varices determined by endoscopy 10/28/2021    2021--EGD reveals grade 2 varices in the lower third of the esophagus.  A varix with no bleeding was found in the cardia.  No stigmata of recent bleeding.  Mild portal hypertensive gastropathy was found in the gastric fundus.    Family history of premature coronary artery disease 2016    Father  from a myocardial infarction age 61.    Folic acid deficiency 2016    History of Abnormal pulse oximetry 10/07/2012    10/07/2012--overnight oximetry test revealed significant nocturnal hypoxemia. Oxygen saturations were greater than 90% for only 50.5% of the study. Oxygen saturation less than 90% for three hours 47 minutes which was 49.5% of the study. Oxygen saturation less than 88% for one hour and 36 minutes and 48 seconds, 21.1% of the study.    History of CABG 2005 status post four-vessel CABG.    History of colon polyps, 2021--tubular adenoma x12. 10/28/2021    2021--colonoscopy reveals a 6 mm polyp in the transverse colon.  There was an 8 mm polyp in the descending colon.  A 20 mm polyp was found at 50 cm proximal to the anus.  Resection was incomplete.  The resected tissue was retrieved and coagulation for destruction was performed.  #4, sessile polyps in the sigmoid colon that were 5 to 6 mm in size.  Removed.  #2, sessile polyps in the sig    History of COVID-19 2021    History of myocardial infarction, . 2016--status post myocardial infarction.   2005--status post four-vessel CABG    Hyperlipidemia 2016    Hypogonadism male, TRT ineffective 2012--treatment for symptomatic hypogonadism begun. This was later discontinued due to lack of efficacy and cost.     Liver cirrhosis secondary to RUIZ 10/28/2021    August 18, 2021--EGD reveals grade 2 varices in the lower third of the esophagus.  A varix with no bleeding was found in the cardia.  No stigmata of recent bleeding.  Mild portal hypertensive gastropathy was found in the gastric fundus.  October 2, 2019--ultrasound liver ordered by the gastroenterologist reveals increased hepatic echogenicity consistent with steatosis.  Previous cholecystectomy.      Male erectile disorder 08/27/2020    Microalbuminuria 04/25/2014 04/25/2014--urine microalbumin elevated 28.7. Normal range 0.0--17.0.    Myocardial infarction     RUIZ (nonalcoholic steatohepatitis) 04/28/2016    Non morbid obesity 06/02/2021    Obstructive sleep apnea, 11/13/2012--mild to moderate NIKI.  Unable to tolerate CPAP.  Cannot use oral appliance. 10/07/2012    05/02/2014--nocturnal oxygen 2 L per nasal cannula ordered.   12/03/2013--patient was referred for an oral appliance but this could not be done because patient wears dentures.   11/13/2012--diagnosed with mild to moderate obstructive sleep apnea. Patient unable to tolerate CPAP.   10/07/2012--overnight oximetry test revealed significant nocturnal hypoxemia. Oxygen saturations were greater than 90% for only 50.5% of the study. Oxygen saturation less than 90% for three hours 47 minutes which was 49.5% of the study. Oxygen saturation less than 88% for one hour and 36 minutes and 48 seconds, 21.1% of the study.    Occlusive coronary artery disease, 01/01/2005--status post MI.  January 2005--CABG x4.  Details not known. 01/01/2005 01/01/2005--status post myocardial infarction.   January 2005--status post four-vessel CABG    Sleep related hypoxia 10/07/2012    05/02/2014--nocturnal oxygen 2 L per nasal cannula ordered.  12/03/2013--patient was referred for an oral appliance but this could not be done because patient wears dentures.   11/13/2012--diagnosed with mild to moderate obstructive sleep apnea.  Patient unable to tolerate CPAP.   10/07/2012--overnight oximetry test revealed significant nocturnal hypoxemia. Oxygen saturations were greater than 90% for only 50.5% of the study. Oxygen saturation less than 90% for three hours 47 minutes which was 49.5% of the study. Oxygen saturation less than 88% for one hour and 36 minutes and 48 seconds, 21.1% of the study.    Type 2 diabetes mellitus with microalbuminuria, without long-term current use of insulin 01/01/2005    Diagnosed in 2005.    Type 2 diabetes mellitus with microalbuminuria, without long-term current use of insulin 01/01/2005    Diagnosed in 2005.    Vitamin D deficiency 04/28/2016         Past Surgical History:   Procedure Laterality Date    CARDIAC CATHETERIZATION      CHOLECYSTECTOMY WITH INTRAOPERATIVE CHOLANGIOGRAM N/A 06/24/2019    Procedure: laparoscopic cholecystectomy with intraoperative cholangiogram;  Surgeon: Vida Avilez MD;  Location: [facility];  Service: General    COLONOSCOPY N/A 08/18/2021 August 18, 2021--colonoscopy reveals a 6 mm polyp in the transverse colon.  There was an 8 mm polyp in the descending colon.  A 20 mm polyp was found at 50 cm proximal to the anus.  Resection was incomplete.  The resected tissue was retrieved and coagulation for destruction was performed.  #4, sessile polyps in the sigmoid colon that were 5 to 6 mm in size.  Removed.  #2, sessile polyps in the sig    CORONARY ARTERY BYPASS GRAFT  01/2005 January 2005 status post four-vessel CABG.    ENDOSCOPY N/A 08/18/2021 August 18, 2021--EGD reveals grade 2 varices in the lower third of the esophagus.  A varix with no bleeding was found in the cardia.  No stigmata of recent bleeding.  Mild portal hypertensive gastropathy was found in the gastric fundus.         No Known Allergies        Current Outpatient Medications:     aspirin 81 MG EC tablet, Take 1 p.o. daily for blood thinner/heart, Disp: , Rfl:     atorvastatin (LIPITOR) 80 MG tablet, Take 1  p.o. daily for high cholesterol, Disp: 90 tablet, Rfl: 2    carvedilol (Coreg) 3.125 MG tablet, Take 1 tablet by mouth 2 (Two) Times a Day With Meals., Disp: 60 tablet, Rfl: 3    empagliflozin (Jardiance) 25 MG tablet tablet, Take 1 tablet by mouth Every Morning Before Breakfast. For diabetes, Disp: 90 tablet, Rfl: 1    glimepiride (AMARYL) 4 MG tablet, Take 1 p.o. twice daily at breakfast and supper for diabetes, Disp: 180 tablet, Rfl: 3    loratadine (CLARITIN) 10 MG tablet, TAKE 1 TABLET BY MOUTH ONCE DAILY, Disp: 90 tablet, Rfl: 0    Semaglutide (Rybelsus) 7 MG tablet, Take 7 mg by mouth Daily., Disp: 30 tablet, Rfl: 6    SITagliptin-metFORMIN HCl ER (Janumet XR)  MG tablet, TAKE 1 TABLET BY MOUTH TWICE DAILY FOR DIABETES, Disp: 90 tablet, Rfl: 5    vitamin D3 125 MCG (5000 UT) capsule capsule, 1 by mouth daily as directed, Disp: 30 capsule, Rfl: 11      Family History   Problem Relation Age of Onset    Other Mother         Ventricular Septal Defect    Heart disease Mother     Hypertension Mother     Cancer Mother     Diabetes Mother     Heart attack Father         Prior Myocardial Infarction.  Dad  from a myocardial infarction at age 59.    Heart disease Father     Heart disease Sister     Heart disease Brother     Cancer Daughter     Heart disease Other         Congenital Heart Disease. Multiple family members with septal defects that required surgery.    Malig Hyperthermia Neg Hx          Social History     Socioeconomic History    Marital status:      Spouse name: Dasha    Number of children: 8    Highest education level: 9th grade   Tobacco Use    Smoking status: Former     Packs/day: 0.50     Years: 40.00     Pack years: 20.00     Types: Cigarettes     Quit date: 2005     Years since quittin.6    Smokeless tobacco: Never    Tobacco comments:     Former smoker quit 13 years ago with a 64.5 pack year history.  Smoked 1 ppd for 32 years and 4.5 ppd for 5 years and quit when he  "had his open heart surgery.   Vaping Use    Vaping Use: Never used   Substance and Sexual Activity    Alcohol use: No    Drug use: No    Sexual activity: Yes     Partners: Female         Vitals:    08/30/23 0731   BP: 124/66   BP Location: Left arm   Patient Position: Sitting   Cuff Size: Adult   Pulse: 85   Resp: 16   SpO2: 96%   Weight: 104 kg (229 lb 3.2 oz)   Height: 175.3 cm (69.02\")        Body mass index is 33.83 kg/mý.      Physical Exam:    General: Alert and oriented x 3.  No acute distress.  Obese.  Normal affect.  HEENT: Pupils equal, round, reactive to light; extraocular movements intact; sclerae nonicteric; pharynx, ear canals and TMs normal.  Neck: Without JVD, thyromegaly, bruit, or adenopathy.  Lungs: Clear to auscultation in all fields.  Heart: Regular rate and rhythm without murmur, rub, gallop, or click.  Abdomen: Soft, nontender, without hepatosplenomegaly or hernia.  Bowel sounds normal.  : Deferred.  Rectal: Deferred.  Extremities: Without clubbing, cyanosis, edema, or pulse deficit.  Neurologic: Intact without focal deficit.  Normal station and gait observed during ingress and egress from the examination room.  Skin: Without significant lesion.  Musculoskeletal: Unremarkable.    August 30, 2023--routine diabetic foot exam reveals no evidence of diabetic foot ulcer or preulcerative callus.  Distal pulses are palpable and sensation seems intact.    Lab/other results:    SARS-CoV-2 antibodies are positive.  CBC essentially normal except platelets are low at 90.  CPK normal at 58.  CMP normal except glucose 108, alkaline phosphatase 123.  Hemoglobin A1c 6.5.  Total cholesterol 133, triglycerides 69, LDL particle #652, HDL particle number low at 23.4.  Microalbumin/creatinine ratio 11.  Homocystine normal 11.9.  Thyroid function tests are normal.  PSA normal at 0.134.  Urinalysis positive for glucose as expected.  Vitamin D normal at 53.1.    Assessment/Plan:     Diagnosis Plan   1. Routine " physical examination        2. Type 2 diabetes mellitus with microalbuminuria, without long-term current use of insulin  Comprehensive Metabolic Panel    Hemoglobin A1c      3. Microalbuminuria        4. History of COVID-19  SARS-CoV-2 Antibodies, Nucleocapsid (Natural Immunity)      5. Hyperlipidemia  CK    Comprehensive Metabolic Panel    NMR LipoProfile    TSH    T4, Free    T3, Free      6. Elevated liver enzymes  Comprehensive Metabolic Panel      7. RUIZ (nonalcoholic steatohepatitis)  Comprehensive Metabolic Panel      8. Liver cirrhosis secondary to RUIZ  Comprehensive Metabolic Panel      9. Vitamin D deficiency        10. Folic acid deficiency        11. Chronic ITP (idiopathic thrombocytopenia)  CBC (No Diff)      12. Chronic migraine w/o aura w/o status migrainosus, not intractable        13. Esophageal varices determined by endoscopy        14. History of colon polyps, 8/18/2021--tubular adenoma x12.        15. Occlusive coronary artery disease, 01/01/2005--status post MI.  January 2005--CABG x4.  Details not known.        16. History of myocardial infarction, 2005.        17. Non morbid obesity        18. Obstructive sleep apnea, 11/13/2012--mild to moderate NIKI.  Unable to tolerate CPAP.  Cannot use oral appliance.        19. Sleep related hypoxia        20. Therapeutic drug monitoring        21. Encounter for diabetic foot exam          Patient presents for his routine annual physical/preventative visit and seems to be doing fairly well.  He has type 2 diabetes that is under good control.  Hyperlipidemia is under as good control as we can get it.  Patient does have Non morbid obesity have strongly encouraged low carbohydrate diet, exercise, and weight loss.  He is up-to-date on his colonoscopy.  Patient does have cirrhosis of the liver believed to be related to RUIZ and this has been evaluated and followed by the hepatology service.  He has stable coronary artery disease and a remote MI.  Patient  also has sleep apnea but is unable to tolerate CPAP or an oral appliance.    Several preventative health issues discussed including review of vaccinations and recommendations, including dietary issues, exercise and weight loss.  Safe sex practices discussed.  Patient advised to wear seatbelt whenever driving and avoid texting and driving.  Also advised to look both ways before crossing the street.  Colon cancer prevention discussed and is up-to-date with colonoscopy.  Advised to avoid tobacco products and minimize alcohol consumption.    Plan is as follows: Diet and weight loss as discussed.  No changes in current medical regimen except discontinuation of iron supplementation..  Patient will follow-up in 6 months with lab prior or follow-up as needed.  Encouraged patient to get diabetic eye exam and also check with his insurance regarding coverage of Shingrix vaccine.  Also encouraged him to get influenza vaccine when it becomes available later next month.    August 30, 2023 addendum: Patient needs to have umbilical hernia surgery and the surgeon has requested surgical clearance.  Given the above information and the fact the patient has no cardiovascular or respiratory symptoms of any significance, he is cleared for umbilical hernia repair as planned under general anesthesia.    BMI is >= 30 and <35. (Class 1 Obesity). The following options were offered after discussion;: weight loss educational material (shared in after visit summary), exercise counseling/recommendations, and nutrition counseling/recommendations    Is Rajan ARETHA Dc ready to make a healthy lifestyle change today? Yes, readiness to change = 4    The priority health goal(s) that the family would like to work on are: decrease soda or juice intake, increase water intake, increase physical activity, reduce screen time, reduce portion size, cut out extra servings, reduce fast food intake, family to eat at dinner table more often, keep TV off during  meals, plan meals, eat breakfast, and have 3 meals a day    The family and Rajan Dc are at a Yes, readiness to change = 4 on the Confidence scale.         Procedures

## 2023-10-13 ENCOUNTER — OFFICE VISIT (OUTPATIENT)
Dept: SURGERY | Facility: CLINIC | Age: 68
End: 2023-10-13
Payer: COMMERCIAL

## 2023-10-13 DIAGNOSIS — Z09 STATUS POST UMBILICAL HERNIA REPAIR, FOLLOW-UP EXAM: Primary | ICD-10-CM

## 2023-10-13 NOTE — PROGRESS NOTES
Rajan Dc 68 y.o. male presents for PO FU UHR.  Pt states he feels well.        HPI   Above noted and agree.  Rajan is doing well.      Review of Systems        Past Medical History:   Diagnosis Date    Chronic ITP (idiopathic thrombocytopenia) 09/19/2018    Chronic migraine w/o aura w/o status migrainosus, not intractable 08/18/2022    Diabetic eye exam 05/22/2017 05/22/2017--routine diabetic eye exam reveals no evidence of diabetic retinopathy.  Astigmatism, presbyopia, hypermetropia noted.  Very early cataracts noted bilaterally.  October 2015--patient reports a routine diabetic eye exam without evidence of diabetic retinopathy.    Diabetic foot exam 03/12/2017 03/14/2017--diabetic foot exam reveals no evidence of diabetic foot ulcer or pre-ulcerative callus.  Distal pulses palpable.  No signs of ischemia.  Sensation subjectively intact per monofilament.    Diverticulosis of colon 10/28/2021    August 18, 2021--colonoscopy reveals a 6 mm polyp in the transverse colon.  There was an 8 mm polyp in the descending colon.  A 20 mm polyp was found at 50 cm proximal to the anus.  Resection was incomplete.  The resected tissue was retrieved and coagulation for destruction was performed.  #4, sessile polyps in the sigmoid colon that were 5 to 6 mm in size.  Removed.  #2, sessile polyps in the sig    Elevated liver enzymes 06/23/2019 October 17, 2019--AST is normal at 27, ALT normal at 2 9.  Alkaline phosphatase mildly elevated at 137.  Bilirubin is normal.  October 2, 2019--ultrasound liver ordered by the gastroenterologist reveals increased hepatic echogenicity consistent with steatosis.  Previous cholecystectomy.  No biliary ductal dilatation.  September 24, 2019--patient was apparently referred to the gastroenterologist by    Esophageal varices determined by endoscopy 10/28/2021    August 18, 2021--EGD reveals grade 2 varices in the lower third of the esophagus.  A varix with no bleeding was found in the  cardia.  No stigmata of recent bleeding.  Mild portal hypertensive gastropathy was found in the gastric fundus.    Family history of premature coronary artery disease 2016    Father  from a myocardial infarction age 61.    Folic acid deficiency 2016    History of Abnormal pulse oximetry 10/07/2012    10/07/2012--overnight oximetry test revealed significant nocturnal hypoxemia. Oxygen saturations were greater than 90% for only 50.5% of the study. Oxygen saturation less than 90% for three hours 47 minutes which was 49.5% of the study. Oxygen saturation less than 88% for one hour and 36 minutes and 48 seconds, 21.1% of the study.    History of CABG 2005 status post four-vessel CABG.    History of colon polyps, 2021--tubular adenoma x12. 10/28/2021    2021--colonoscopy reveals a 6 mm polyp in the transverse colon.  There was an 8 mm polyp in the descending colon.  A 20 mm polyp was found at 50 cm proximal to the anus.  Resection was incomplete.  The resected tissue was retrieved and coagulation for destruction was performed.  #4, sessile polyps in the sigmoid colon that were 5 to 6 mm in size.  Removed.  #2, sessile polyps in the sig    History of COVID-19 2021    History of myocardial infarction, . 2016--status post myocardial infarction.   2005--status post four-vessel CABG    Hyperlipidemia 2016    Hypertension     Hypogonadism male, TRT ineffective 2012--treatment for symptomatic hypogonadism begun. This was later discontinued due to lack of efficacy and cost.    Liver cirrhosis secondary to RUIZ 10/28/2021    2021--EGD reveals grade 2 varices in the lower third of the esophagus.  A varix with no bleeding was found in the cardia.  No stigmata of recent bleeding.  Mild portal hypertensive gastropathy was found in the gastric fundus.  2019--ultrasound liver ordered by the  gastroenterologist reveals increased hepatic echogenicity consistent with steatosis.  Previous cholecystectomy.      Male erectile disorder 08/27/2020    Microalbuminuria 04/25/2014 04/25/2014--urine microalbumin elevated 28.7. Normal range 0.0--17.0.    Myocardial infarction     RUIZ (nonalcoholic steatohepatitis) 04/28/2016    Non morbid obesity 06/02/2021    Obstructive sleep apnea, 11/13/2012--mild to moderate NIKI.  Unable to tolerate CPAP.  Cannot use oral appliance. 10/07/2012    05/02/2014--nocturnal oxygen 2 L per nasal cannula ordered.   12/03/2013--patient was referred for an oral appliance but this could not be done because patient wears dentures.   11/13/2012--diagnosed with mild to moderate obstructive sleep apnea. Patient unable to tolerate CPAP.   10/07/2012--overnight oximetry test revealed significant nocturnal hypoxemia. Oxygen saturations were greater than 90% for only 50.5% of the study. Oxygen saturation less than 90% for three hours 47 minutes which was 49.5% of the study. Oxygen saturation less than 88% for one hour and 36 minutes and 48 seconds, 21.1% of the study.    Occlusive coronary artery disease, 01/01/2005--status post MI.  January 2005--CABG x4.  Details not known. 01/01/2005 01/01/2005--status post myocardial infarction.   January 2005--status post four-vessel CABG    Sleep related hypoxia 10/07/2012    05/02/2014--nocturnal oxygen 2 L per nasal cannula ordered.  12/03/2013--patient was referred for an oral appliance but this could not be done because patient wears dentures.   11/13/2012--diagnosed with mild to moderate obstructive sleep apnea. Patient unable to tolerate CPAP.   10/07/2012--overnight oximetry test revealed significant nocturnal hypoxemia. Oxygen saturations were greater than 90% for only 50.5% of the study. Oxygen saturation less than 90% for three hours 47 minutes which was 49.5% of the study. Oxygen saturation less than 88% for one hour and 36 minutes and 48  seconds, 21.1% of the study.    Type 2 diabetes mellitus with microalbuminuria, without long-term current use of insulin 01/01/2005    Diagnosed in 2005.    Type 2 diabetes mellitus with microalbuminuria, without long-term current use of insulin 01/01/2005    Diagnosed in 2005.    Vitamin D deficiency 04/28/2016           Past Surgical History:   Procedure Laterality Date    CARDIAC CATHETERIZATION      CHOLECYSTECTOMY WITH INTRAOPERATIVE CHOLANGIOGRAM N/A 06/24/2019    Procedure: laparoscopic cholecystectomy with intraoperative cholangiogram;  Surgeon: Vida Avilez MD;  Location: [facility] OR;  Service: General    COLONOSCOPY N/A 08/18/2021 August 18, 2021--colonoscopy reveals a 6 mm polyp in the transverse colon.  There was an 8 mm polyp in the descending colon.  A 20 mm polyp was found at 50 cm proximal to the anus.  Resection was incomplete.  The resected tissue was retrieved and coagulation for destruction was performed.  #4, sessile polyps in the sigmoid colon that were 5 to 6 mm in size.  Removed.  #2, sessile polyps in the sig    CORONARY ARTERY BYPASS GRAFT  01/2005 January 2005 status post four-vessel CABG.    ENDOSCOPY N/A 08/18/2021 August 18, 2021--EGD reveals grade 2 varices in the lower third of the esophagus.  A varix with no bleeding was found in the cardia.  No stigmata of recent bleeding.  Mild portal hypertensive gastropathy was found in the gastric fundus.    UMBILICAL HERNIA REPAIR N/A 9/28/2023    Procedure: UMBILICAL HERNIA REPAIR;  Surgeon: Kari Alejandro DO;  Location: [facility] OR;  Service: General;  Laterality: N/A;           Physical Exam    General:  Awake and alert with no acute distress  Eyes:  No icterus  Abdomen:  Soft, non-tender  Incision:  Clean, dry, intact     There were no vitals taken for this visit.        Diagnoses and all orders for this visit:    1. Status post umbilical hernia repair, follow-up exam (Primary)    Rajan may return anytime as  needed.    Thank you for allowing me to participate in the care of this interesting patient.

## 2023-10-13 NOTE — LETTER
October 13, 2023     Scott Eaton MD  [id number] [facility]  [address]  [place] [id number]    Patient: Rajan Dc   YOB: 1955   Date of Visit: 10/13/2023     Dear Scott Eaton MD:       Thank you for referring Rajan Dc to me for evaluation. Below are the relevant portions of my assessment and plan of care.    If you have questions, please do not hesitate to call me. I look forward to following Rajan along with you.         Sincerely,        Kari Alejandro DO        CC: No Recipients    Kari Alejandro DO  10/13/23 0915  Sign when Signing Visit  Rajan Dc 68 y.o. male presents for PO FU UHR.  Pt states he feels well.        HPI   Above noted and agree.  Rajan is doing well.      Review of Systems        Past Medical History:   Diagnosis Date    Chronic ITP (idiopathic thrombocytopenia) 09/19/2018    Chronic migraine w/o aura w/o status migrainosus, not intractable 08/18/2022    Diabetic eye exam 05/22/2017 05/22/2017--routine diabetic eye exam reveals no evidence of diabetic retinopathy.  Astigmatism, presbyopia, hypermetropia noted.  Very early cataracts noted bilaterally.  October 2015--patient reports a routine diabetic eye exam without evidence of diabetic retinopathy.    Diabetic foot exam 03/12/2017 03/14/2017--diabetic foot exam reveals no evidence of diabetic foot ulcer or pre-ulcerative callus.  Distal pulses palpable.  No signs of ischemia.  Sensation subjectively intact per monofilament.    Diverticulosis of colon 10/28/2021    August 18, 2021--colonoscopy reveals a 6 mm polyp in the transverse colon.  There was an 8 mm polyp in the descending colon.  A 20 mm polyp was found at 50 cm proximal to the anus.  Resection was incomplete.  The resected tissue was retrieved and coagulation for destruction was performed.  #4, sessile polyps in the sigmoid colon that were 5 to 6 mm in size.  Removed.  #2, sessile polyps in the sig    Elevated liver enzymes  2019--AST is normal at 27, ALT normal at 2 9.  Alkaline phosphatase mildly elevated at 137.  Bilirubin is normal.  2019--ultrasound liver ordered by the gastroenterologist reveals increased hepatic echogenicity consistent with steatosis.  Previous cholecystectomy.  No biliary ductal dilatation.  2019--patient was apparently referred to the gastroenterologist by    Esophageal varices determined by endoscopy 10/28/2021    2021--EGD reveals grade 2 varices in the lower third of the esophagus.  A varix with no bleeding was found in the cardia.  No stigmata of recent bleeding.  Mild portal hypertensive gastropathy was found in the gastric fundus.    Family history of premature coronary artery disease 2016    Father  from a myocardial infarction age 61.    Folic acid deficiency 2016    History of Abnormal pulse oximetry 10/07/2012    10/07/2012--overnight oximetry test revealed significant nocturnal hypoxemia. Oxygen saturations were greater than 90% for only 50.5% of the study. Oxygen saturation less than 90% for three hours 47 minutes which was 49.5% of the study. Oxygen saturation less than 88% for one hour and 36 minutes and 48 seconds, 21.1% of the study.    History of CABG 2005 status post four-vessel CABG.    History of colon polyps, 2021--tubular adenoma x12. 10/28/2021    2021--colonoscopy reveals a 6 mm polyp in the transverse colon.  There was an 8 mm polyp in the descending colon.  A 20 mm polyp was found at 50 cm proximal to the anus.  Resection was incomplete.  The resected tissue was retrieved and coagulation for destruction was performed.  #4, sessile polyps in the sigmoid colon that were 5 to 6 mm in size.  Removed.  #2, sessile polyps in the sig    History of COVID-19 2021    History of myocardial infarction, . 2016--status post myocardial infarction.    January 2005--status post four-vessel CABG    Hyperlipidemia 04/28/2016    Hypertension     Hypogonadism male, TRT ineffective 09/13/2012 09/13/2012--treatment for symptomatic hypogonadism begun. This was later discontinued due to lack of efficacy and cost.    Liver cirrhosis secondary to RUIZ 10/28/2021    August 18, 2021--EGD reveals grade 2 varices in the lower third of the esophagus.  A varix with no bleeding was found in the cardia.  No stigmata of recent bleeding.  Mild portal hypertensive gastropathy was found in the gastric fundus.  October 2, 2019--ultrasound liver ordered by the gastroenterologist reveals increased hepatic echogenicity consistent with steatosis.  Previous cholecystectomy.      Male erectile disorder 08/27/2020    Microalbuminuria 04/25/2014 04/25/2014--urine microalbumin elevated 28.7. Normal range 0.0--17.0.    Myocardial infarction     RUIZ (nonalcoholic steatohepatitis) 04/28/2016    Non morbid obesity 06/02/2021    Obstructive sleep apnea, 11/13/2012--mild to moderate NIKI.  Unable to tolerate CPAP.  Cannot use oral appliance. 10/07/2012    05/02/2014--nocturnal oxygen 2 L per nasal cannula ordered.   12/03/2013--patient was referred for an oral appliance but this could not be done because patient wears dentures.   11/13/2012--diagnosed with mild to moderate obstructive sleep apnea. Patient unable to tolerate CPAP.   10/07/2012--overnight oximetry test revealed significant nocturnal hypoxemia. Oxygen saturations were greater than 90% for only 50.5% of the study. Oxygen saturation less than 90% for three hours 47 minutes which was 49.5% of the study. Oxygen saturation less than 88% for one hour and 36 minutes and 48 seconds, 21.1% of the study.    Occlusive coronary artery disease, 01/01/2005--status post MI.  January 2005--CABG x4.  Details not known. 01/01/2005 01/01/2005--status post myocardial infarction.   January 2005--status post four-vessel CABG    Sleep  related hypoxia 10/07/2012    05/02/2014--nocturnal oxygen 2 L per nasal cannula ordered.  12/03/2013--patient was referred for an oral appliance but this could not be done because patient wears dentures.   11/13/2012--diagnosed with mild to moderate obstructive sleep apnea. Patient unable to tolerate CPAP.   10/07/2012--overnight oximetry test revealed significant nocturnal hypoxemia. Oxygen saturations were greater than 90% for only 50.5% of the study. Oxygen saturation less than 90% for three hours 47 minutes which was 49.5% of the study. Oxygen saturation less than 88% for one hour and 36 minutes and 48 seconds, 21.1% of the study.    Type 2 diabetes mellitus with microalbuminuria, without long-term current use of insulin 01/01/2005    Diagnosed in 2005.    Type 2 diabetes mellitus with microalbuminuria, without long-term current use of insulin 01/01/2005    Diagnosed in 2005.    Vitamin D deficiency 04/28/2016           Past Surgical History:   Procedure Laterality Date    CARDIAC CATHETERIZATION      CHOLECYSTECTOMY WITH INTRAOPERATIVE CHOLANGIOGRAM N/A 06/24/2019    Procedure: laparoscopic cholecystectomy with intraoperative cholangiogram;  Surgeon: Vida Avilez MD;  Location: [facility];  Service: General    COLONOSCOPY N/A 08/18/2021 August 18, 2021--colonoscopy reveals a 6 mm polyp in the transverse colon.  There was an 8 mm polyp in the descending colon.  A 20 mm polyp was found at 50 cm proximal to the anus.  Resection was incomplete.  The resected tissue was retrieved and coagulation for destruction was performed.  #4, sessile polyps in the sigmoid colon that were 5 to 6 mm in size.  Removed.  #2, sessile polyps in the sig    CORONARY ARTERY BYPASS GRAFT  01/2005 January 2005 status post four-vessel CABG.    ENDOSCOPY N/A 08/18/2021 August 18, 2021--EGD reveals grade 2 varices in the lower third of the esophagus.  A varix with no bleeding was found in the cardia.  No stigmata of  recent bleeding.  Mild portal hypertensive gastropathy was found in the gastric fundus.    UMBILICAL HERNIA REPAIR N/A 9/28/2023    Procedure: UMBILICAL HERNIA REPAIR;  Surgeon: Kari Alejandro DO;  Location: [facility];  Service: General;  Laterality: N/A;           Physical Exam    General:  Awake and alert with no acute distress  Eyes:  No icterus  Abdomen:  Soft, non-tender  Incision:  Clean, dry, intact     There were no vitals taken for this visit.        Diagnoses and all orders for this visit:    1. Status post umbilical hernia repair, follow-up exam (Primary)    Rajan may return anytime as needed.    Thank you for allowing me to participate in the care of this interesting patient.

## 2023-11-25 DIAGNOSIS — E78.2 MIXED HYPERLIPIDEMIA: Chronic | ICD-10-CM

## 2023-11-27 RX ORDER — ATORVASTATIN CALCIUM 80 MG/1
TABLET, FILM COATED ORAL
Qty: 90 TABLET | Refills: 2 | Status: SHIPPED | OUTPATIENT
Start: 2023-11-27

## 2023-12-02 DIAGNOSIS — E11.9 TYPE 2 DIABETES MELLITUS WITHOUT COMPLICATION, WITHOUT LONG-TERM CURRENT USE OF INSULIN: ICD-10-CM

## 2023-12-02 RX ORDER — EMPAGLIFLOZIN 25 MG/1
TABLET, FILM COATED ORAL
Qty: 90 TABLET | Refills: 1 | Status: SHIPPED | OUTPATIENT
Start: 2023-12-02

## 2024-01-31 DIAGNOSIS — E78.2 MIXED HYPERLIPIDEMIA: Chronic | ICD-10-CM

## 2024-02-01 RX ORDER — ATORVASTATIN CALCIUM 80 MG/1
TABLET, FILM COATED ORAL
Qty: 90 TABLET | Refills: 2 | Status: SHIPPED | OUTPATIENT
Start: 2024-02-01

## 2024-03-05 ENCOUNTER — TELEPHONE (OUTPATIENT)
Dept: INTERNAL MEDICINE | Facility: CLINIC | Age: 69
End: 2024-03-05

## 2024-03-05 NOTE — TELEPHONE ENCOUNTER
Hub staff attempted to follow warm transfer process and was unsuccessful     Caller: SHEILA    Relationship to patient: Other    Patient is needing: SHEILA WITH [facility] CALLING TO PROVIDE ALERT LAB RESULT FOR THIS PATIENT.  SHE STATES IT IS THE PLATELET COUNT (PLT) 90.     SHE STATES SHE WILL FAX THESE RESULTS NOW TO THE OFFICE AS HUB WAS UNABLE TO WARM TRANSFER CALL.

## 2024-03-07 ENCOUNTER — OFFICE VISIT (OUTPATIENT)
Dept: INTERNAL MEDICINE | Facility: CLINIC | Age: 69
End: 2024-03-07
Payer: COMMERCIAL

## 2024-03-07 VITALS
HEART RATE: 81 BPM | SYSTOLIC BLOOD PRESSURE: 134 MMHG | TEMPERATURE: 97.9 F | RESPIRATION RATE: 16 BRPM | WEIGHT: 230 LBS | BODY MASS INDEX: 34.07 KG/M2 | HEIGHT: 69 IN | OXYGEN SATURATION: 93 % | DIASTOLIC BLOOD PRESSURE: 66 MMHG

## 2024-03-07 DIAGNOSIS — E53.8 FOLIC ACID DEFICIENCY: Chronic | ICD-10-CM

## 2024-03-07 DIAGNOSIS — G43.709 CHRONIC MIGRAINE W/O AURA W/O STATUS MIGRAINOSUS, NOT INTRACTABLE: Chronic | ICD-10-CM

## 2024-03-07 DIAGNOSIS — K74.60 LIVER CIRRHOSIS SECONDARY TO NASH: Chronic | ICD-10-CM

## 2024-03-07 DIAGNOSIS — E11.29 TYPE 2 DIABETES MELLITUS WITH MICROALBUMINURIA, WITHOUT LONG-TERM CURRENT USE OF INSULIN: Primary | Chronic | ICD-10-CM

## 2024-03-07 DIAGNOSIS — Z00.00 ROUTINE PHYSICAL EXAMINATION: ICD-10-CM

## 2024-03-07 DIAGNOSIS — G47.33 OBSTRUCTIVE SLEEP APNEA: Chronic | ICD-10-CM

## 2024-03-07 DIAGNOSIS — E66.9 NON MORBID OBESITY: Chronic | ICD-10-CM

## 2024-03-07 DIAGNOSIS — E78.2 MIXED HYPERLIPIDEMIA: Chronic | ICD-10-CM

## 2024-03-07 DIAGNOSIS — Z51.81 THERAPEUTIC DRUG MONITORING: ICD-10-CM

## 2024-03-07 DIAGNOSIS — Z86.010 HISTORY OF COLON POLYPS: Chronic | ICD-10-CM

## 2024-03-07 DIAGNOSIS — Z86.16 HISTORY OF COVID-19: Chronic | ICD-10-CM

## 2024-03-07 DIAGNOSIS — K75.81 LIVER CIRRHOSIS SECONDARY TO NASH: Chronic | ICD-10-CM

## 2024-03-07 DIAGNOSIS — I25.10 OCCLUSIVE CORONARY ARTERY DISEASE: Chronic | ICD-10-CM

## 2024-03-07 DIAGNOSIS — E55.9 VITAMIN D DEFICIENCY: Chronic | ICD-10-CM

## 2024-03-07 DIAGNOSIS — I25.2 HISTORY OF MYOCARDIAL INFARCTION: Chronic | ICD-10-CM

## 2024-03-07 DIAGNOSIS — R80.9 MICROALBUMINURIA: Chronic | ICD-10-CM

## 2024-03-07 DIAGNOSIS — I85.00 ESOPHAGEAL VARICES DETERMINED BY ENDOSCOPY: Chronic | ICD-10-CM

## 2024-03-07 DIAGNOSIS — K75.81 NASH (NONALCOHOLIC STEATOHEPATITIS): Chronic | ICD-10-CM

## 2024-03-07 DIAGNOSIS — D69.3 CHRONIC ITP (IDIOPATHIC THROMBOCYTOPENIA): Chronic | ICD-10-CM

## 2024-03-07 DIAGNOSIS — R80.9 TYPE 2 DIABETES MELLITUS WITH MICROALBUMINURIA, WITHOUT LONG-TERM CURRENT USE OF INSULIN: Primary | Chronic | ICD-10-CM

## 2024-03-07 DIAGNOSIS — R74.8 ELEVATED LIVER ENZYMES: Chronic | ICD-10-CM

## 2024-03-07 DIAGNOSIS — G47.34 SLEEP RELATED HYPOXIA: Chronic | ICD-10-CM

## 2024-03-07 RX ORDER — ORAL SEMAGLUTIDE 7 MG/1
7 TABLET ORAL DAILY
Start: 2024-03-07 | End: 2024-03-07

## 2024-03-07 RX ORDER — ASPIRIN 81 MG/1
TABLET ORAL
Start: 2024-03-07

## 2024-03-07 RX ORDER — ORAL SEMAGLUTIDE 7 MG/1
7 TABLET ORAL DAILY
Start: 2024-03-07 | End: 2024-03-07 | Stop reason: SDUPTHER

## 2024-03-07 NOTE — PROGRESS NOTES
03/07/2024    Patient Information  Rajan Dc                                                                                          [address] [place] [id number]      1955  [unfilled]  [phone] (work)    Chief Complaint:     Follow-up multiple medical problems.  Recent lab work.  No new acute complaints.    History of Present Illness:    Patient with a multitude of chronic medical problems including type 2 diabetes and coronary artery disease presents today for follow-up with lab prior in order to monitor these chronic medical issues.  His past medical history reviewed and updated were necessary including health maintenance parameters.  This reveals he needs RSV vaccine which I encouraged him to get at the local drugstore.  Also encouraged patient to check with his insurance regarding coverage of Shingrix vaccine.  I have highly recommended he obtain this vaccine.    Review of Systems   Constitutional: Negative.   HENT: Negative.     Eyes: Negative.    Cardiovascular:  Positive for dyspnea on exertion. Negative for chest pain, orthopnea, palpitations, paroxysmal nocturnal dyspnea and syncope.   Respiratory: Negative.     Endocrine: Negative.    Hematologic/Lymphatic: Negative.    Skin: Negative.    Musculoskeletal:  Positive for arthritis.   Gastrointestinal: Negative.    Genitourinary: Negative.    Neurological: Negative.    Psychiatric/Behavioral: Negative.     Allergic/Immunologic: Negative.        Active Problems:    Patient Active Problem List   Diagnosis    Occlusive coronary artery disease, 01/01/2005--status post MI.  January 2005--CABG ×4.  Details not known.    Folic acid deficiency    Hyperlipidemia    Hypogonadism male, TRT ineffective    Microalbuminuria    RUIZ (nonalcoholic steatohepatitis)    Obstructive sleep apnea, 11/13/2012--mild to moderate NIKI.  Unable to tolerate CPAP.  Cannot use oral appliance.    Sleep related hypoxia    Type 2 diabetes mellitus  with microalbuminuria, without long-term current use of insulin    Vitamin D deficiency    Therapeutic drug monitoring    Routine physical examination    Family history of premature coronary artery disease    Diabetic eye exam    History of myocardial infarction, 2005.    Chronic ITP (idiopathic thrombocytopenia)    Elevated liver enzymes    Male erectile disorder    Non morbid obesity    History of COVID-19    History of colon polyps, 8/18/2021--tubular adenoma x12.    Esophageal varices determined by endoscopy    Liver cirrhosis secondary to RUIZ    Diverticulosis of colon    Chronic migraine w/o aura w/o status migrainosus, not intractable    Encounter for diabetic foot exam         Past Medical History:   Diagnosis Date    Chronic ITP (idiopathic thrombocytopenia) 09/19/2018    Chronic migraine w/o aura w/o status migrainosus, not intractable 08/18/2022    Diabetic eye exam 05/22/2017 05/22/2017--routine diabetic eye exam reveals no evidence of diabetic retinopathy.  Astigmatism, presbyopia, hypermetropia noted.  Very early cataracts noted bilaterally.  October 2015--patient reports a routine diabetic eye exam without evidence of diabetic retinopathy.    Diabetic foot exam 03/12/2017 03/14/2017--diabetic foot exam reveals no evidence of diabetic foot ulcer or pre-ulcerative callus.  Distal pulses palpable.  No signs of ischemia.  Sensation subjectively intact per monofilament.    Diverticulosis of colon 10/28/2021    August 18, 2021--colonoscopy reveals a 6 mm polyp in the transverse colon.  There was an 8 mm polyp in the descending colon.  A 20 mm polyp was found at 50 cm proximal to the anus.  Resection was incomplete.  The resected tissue was retrieved and coagulation for destruction was performed.  #4, sessile polyps in the sigmoid colon that were 5 to 6 mm in size.  Removed.  #2, sessile polyps in the sig    Elevated liver enzymes 06/23/2019 October 17, 2019--AST is normal at 27, ALT normal at 2 9.   Alkaline phosphatase mildly elevated at 137.  Bilirubin is normal.  2019--ultrasound liver ordered by the gastroenterologist reveals increased hepatic echogenicity consistent with steatosis.  Previous cholecystectomy.  No biliary ductal dilatation.  2019--patient was apparently referred to the gastroenterologist by    Esophageal varices determined by endoscopy 10/28/2021    2021--EGD reveals grade 2 varices in the lower third of the esophagus.  A varix with no bleeding was found in the cardia.  No stigmata of recent bleeding.  Mild portal hypertensive gastropathy was found in the gastric fundus.    Family history of premature coronary artery disease 2016    Father  from a myocardial infarction age 61.    Folic acid deficiency 2016    History of Abnormal pulse oximetry 10/07/2012    10/07/2012--overnight oximetry test revealed significant nocturnal hypoxemia. Oxygen saturations were greater than 90% for only 50.5% of the study. Oxygen saturation less than 90% for three hours 47 minutes which was 49.5% of the study. Oxygen saturation less than 88% for one hour and 36 minutes and 48 seconds, 21.1% of the study.    History of CABG 2005 status post four-vessel CABG.    History of colon polyps, 2021--tubular adenoma x12. 10/28/2021    2021--colonoscopy reveals a 6 mm polyp in the transverse colon.  There was an 8 mm polyp in the descending colon.  A 20 mm polyp was found at 50 cm proximal to the anus.  Resection was incomplete.  The resected tissue was retrieved and coagulation for destruction was performed.  #4, sessile polyps in the sigmoid colon that were 5 to 6 mm in size.  Removed.  #2, sessile polyps in the sig    History of COVID-19 2021    History of myocardial infarction, . 2016--status post myocardial infarction.   2005--status post four-vessel CABG    Hyperlipidemia 2016     Hypogonadism male, TRT ineffective 09/13/2012 09/13/2012--treatment for symptomatic hypogonadism begun. This was later discontinued due to lack of efficacy and cost.    Liver cirrhosis secondary to RUIZ 10/28/2021    August 18, 2021--EGD reveals grade 2 varices in the lower third of the esophagus.  A varix with no bleeding was found in the cardia.  No stigmata of recent bleeding.  Mild portal hypertensive gastropathy was found in the gastric fundus.  October 2, 2019--ultrasound liver ordered by the gastroenterologist reveals increased hepatic echogenicity consistent with steatosis.  Previous cholecystectomy.      Male erectile disorder 08/27/2020    Microalbuminuria 04/25/2014 04/25/2014--urine microalbumin elevated 28.7. Normal range 0.0--17.0.    RUIZ (nonalcoholic steatohepatitis) 04/28/2016    Non morbid obesity 06/02/2021    Obstructive sleep apnea, 11/13/2012--mild to moderate NIKI.  Unable to tolerate CPAP.  Cannot use oral appliance. 10/07/2012    05/02/2014--nocturnal oxygen 2 L per nasal cannula ordered.   12/03/2013--patient was referred for an oral appliance but this could not be done because patient wears dentures.   11/13/2012--diagnosed with mild to moderate obstructive sleep apnea. Patient unable to tolerate CPAP.   10/07/2012--overnight oximetry test revealed significant nocturnal hypoxemia. Oxygen saturations were greater than 90% for only 50.5% of the study. Oxygen saturation less than 90% for three hours 47 minutes which was 49.5% of the study. Oxygen saturation less than 88% for one hour and 36 minutes and 48 seconds, 21.1% of the study.    Occlusive coronary artery disease, 01/01/2005--status post MI.  January 2005--CABG ×4.  Details not known. 01/01/2005 01/01/2005--status post myocardial infarction.   January 2005--status post four-vessel CABG    Sleep related hypoxia 10/07/2012    05/02/2014--nocturnal oxygen 2 L per nasal cannula ordered.  12/03/2013--patient was referred for an  oral appliance but this could not be done because patient wears dentures.   11/13/2012--diagnosed with mild to moderate obstructive sleep apnea. Patient unable to tolerate CPAP.   10/07/2012--overnight oximetry test revealed significant nocturnal hypoxemia. Oxygen saturations were greater than 90% for only 50.5% of the study. Oxygen saturation less than 90% for three hours 47 minutes which was 49.5% of the study. Oxygen saturation less than 88% for one hour and 36 minutes and 48 seconds, 21.1% of the study.    Type 2 diabetes mellitus with microalbuminuria, without long-term current use of insulin 01/01/2005    Diagnosed in 2005.    Type 2 diabetes mellitus with microalbuminuria, without long-term current use of insulin 01/01/2005    Diagnosed in 2005.    Vitamin D deficiency 04/28/2016         Past Surgical History:   Procedure Laterality Date    CARDIAC CATHETERIZATION      CHOLECYSTECTOMY WITH INTRAOPERATIVE CHOLANGIOGRAM N/A 06/24/2019    Procedure: laparoscopic cholecystectomy with intraoperative cholangiogram;  Surgeon: Vida Avilez MD;  Location: [facility];  Service: General    COLONOSCOPY N/A 08/18/2021 August 18, 2021--colonoscopy reveals a 6 mm polyp in the transverse colon.  There was an 8 mm polyp in the descending colon.  A 20 mm polyp was found at 50 cm proximal to the anus.  Resection was incomplete.  The resected tissue was retrieved and coagulation for destruction was performed.  #4, sessile polyps in the sigmoid colon that were 5 to 6 mm in size.  Removed.  #2, sessile polyps in the sig    CORONARY ARTERY BYPASS GRAFT  01/2005 January 2005 status post four-vessel CABG.    ENDOSCOPY N/A 08/18/2021 August 18, 2021--EGD reveals grade 2 varices in the lower third of the esophagus.  A varix with no bleeding was found in the cardia.  No stigmata of recent bleeding.  Mild portal hypertensive gastropathy was found in the gastric fundus.    UMBILICAL HERNIA REPAIR N/A 9/28/2023     Procedure: UMBILICAL HERNIA REPAIR;  Surgeon: Kari Alejandro DO;  Location: [facility];  Service: General;  Laterality: N/A;         No Known Allergies        Current Outpatient Medications:     aspirin 81 MG EC tablet, Take 1 p.o. daily for blood thinner/heart, Disp: , Rfl:     atorvastatin (LIPITOR) 80 MG tablet, TAKE 1 TABLET BY MOUTH DAILY FOR HIGH CHOLESTEROL, Disp: 90 tablet, Rfl: 2    ferrous gluconate (FERGON) 324 MG tablet, Take 1 tablet by mouth Daily With Breakfast., Disp: , Rfl:     glimepiride (AMARYL) 4 MG tablet, Take 1 p.o. twice daily at breakfast and supper for diabetes, Disp: 180 tablet, Rfl: 3    Jardiance 25 MG tablet tablet, TAKE 1 TABLET BY MOUTH EVERY MORNING BEFORE BREAKFAST FOR DIABETES, Disp: 90 tablet, Rfl: 1    SITagliptin-metFORMIN HCl ER (Janumet XR)  MG tablet, TAKE 1 TABLET BY MOUTH TWICE DAILY FOR DIABETES, Disp: 90 tablet, Rfl: 5    vitamin D3 125 MCG (5000 UT) capsule capsule, 1 by mouth daily as directed, Disp: 30 capsule, Rfl: 11    Semaglutide 14 MG tablet, Take 1 tablet by mouth Daily., Disp: 30 tablet, Rfl: 11      Family History   Problem Relation Age of Onset    Other Mother         Ventricular Septal Defect    Heart disease Mother     Hypertension Mother     Cancer Mother     Diabetes Mother     Heart attack Father         Prior Myocardial Infarction.  Dad  from a myocardial infarction at age 59.    Heart disease Father     Heart disease Sister     Heart disease Brother     Cancer Daughter     Heart disease Other         Congenital Heart Disease. Multiple family members with septal defects that required surgery.    Malig Hyperthermia Neg Hx          Social History     Socioeconomic History    Marital status:      Spouse name: Dasha    Number of children: 8    Highest education level: 9th grade   Tobacco Use    Smoking status: Former     Current packs/day: 0.00     Average packs/day: 0.5 packs/day for 40.0 years (20.0 ttl pk-yrs)     Types:  "Cigarettes     Start date: 1965     Quit date: 2005     Years since quittin.1    Smokeless tobacco: Never    Tobacco comments:     Former smoker quit 13 years ago with a 64.5 pack year history.  Smoked 1 ppd for 32 years and 4.5 ppd for 5 years and quit when he had his open heart surgery.   Vaping Use    Vaping status: Never Used   Substance and Sexual Activity    Alcohol use: No    Drug use: No    Sexual activity: Yes     Partners: Female         Vitals:    24 0809   BP: 134/66   Pulse: 81   Resp: 16   Temp: 97.9 °F (36.6 °C)   TempSrc: Temporal   SpO2: 93%   Weight: 104 kg (230 lb)   Height: 175.3 cm (69.02\")        Body mass index is 33.95 kg/m².      Physical Exam:    General: Alert and oriented x 3.  No acute distress.  Obese.  Normal affect.  HEENT: Pupils equal, round, reactive to light; extraocular movements intact; sclerae nonicteric; pharynx, ear canals and TMs normal.  Neck: Without JVD, thyromegaly, bruit, or adenopathy.  Lungs: Clear to auscultation in all fields.  Heart: Regular rate and rhythm without murmur, rub, gallop, or click.  Abdomen: Soft, nontender, without hepatosplenomegaly or hernia.  Bowel sounds normal.  : Deferred.  Rectal: Deferred.  Extremities: Without clubbing, cyanosis, edema, or pulse deficit.  Neurologic: Intact without focal deficit.  Normal station and gait observed during ingress and egress from the examination room.  Skin: Without significant lesion.  Musculoskeletal: Unremarkable.    Lab/other results:    CBC normal except platelets low at 90.  CPK normal at 93.  CMP normal except glucose 177.  Alkaline phosphatase mildly elevated 137 but AST and ALT are normal.  Hemoglobin A1c 7.6.  Total cholesterol 138, triglycerides 79, LDL particle #587, small LDL particle number less than 90.  HDL particle number low at 22.5.  Thyroid function tests are normal.  SARS antibodies are positive.    Assessment/Plan:     Diagnosis Plan   1. Type 2 diabetes mellitus with " microalbuminuria, without long-term current use of insulin  Comprehensive Metabolic Panel    Hemoglobin A1c    Urinalysis With Microscopic If Indicated (No Culture) - Urine, Clean Catch    Microalbumin / Creatinine Urine Ratio - Urine, Clean Catch    Semaglutide 14 MG tablet      2. Microalbuminuria  Microalbumin / Creatinine Urine Ratio - Urine, Clean Catch      3. RUIZ (nonalcoholic steatohepatitis)  Comprehensive Metabolic Panel      4. Hyperlipidemia  CK    Comprehensive Metabolic Panel    Homocysteine    NMR LipoProfile    TSH    T4, Free    T3, Free      5. Liver cirrhosis secondary to RUIZ  Comprehensive Metabolic Panel      6. Vitamin D deficiency  Vitamin D,25-Hydroxy      7. History of COVID-19  SARS-CoV-2 Antibodies, Nucleocapsid (Natural Immunity)      8. Folic acid deficiency  CBC (No Diff)      9. Elevated liver enzymes        10. Chronic ITP (idiopathic thrombocytopenia)        11. Esophageal varices determined by endoscopy        12. History of colon polyps, 8/18/2021--tubular adenoma x12.        13. Occlusive coronary artery disease, 01/01/2005--status post MI.  January 2005--CABG ×4.  Details not known.        14. History of myocardial infarction, 2005.  aspirin 81 MG EC tablet      15. Obstructive sleep apnea, 11/13/2012--mild to moderate NIKI.  Unable to tolerate CPAP.  Cannot use oral appliance.        16. Sleep related hypoxia        17. Non morbid obesity        18. Chronic migraine w/o aura w/o status migrainosus, not intractable        19. Therapeutic drug monitoring  CBC (No Diff)    PSA DIAGNOSTIC      20. Routine physical examination  SARS-CoV-2 Antibodies, Nucleocapsid (Natural Immunity)    CBC (No Diff)    CK    Comprehensive Metabolic Panel    Homocysteine    Hemoglobin A1c    NMR LipoProfile    TSH    T4, Free    T3, Free    PSA DIAGNOSTIC    Urinalysis With Microscopic If Indicated (No Culture) - Urine, Clean Catch    Microalbumin / Creatinine Urine Ratio - Urine, Clean Catch     Vitamin D,25-Hydroxy        Patient has type 2 diabetes that is under fairly good control on the current regimen.  I would rather him be on Ozempic instead of the Rybelsus because we could push the dose and perhaps provide more protection against further coronary artery disease and also help with weight loss but is not covered by his insurance apparently.  Microalbuminuria will be assessed with appropriate lab work at the next visit.  Patient has Ruiz but also has cirrhosis of the liver related to the RUIZ and that is another reason that Ozempic at a high dose could potentially be beneficial.  His vitamin D is in normal range with supplementation.  Hyperlipidemia is under good control.  Will check a homocystine at the next visit regarding folic acid deficiency.  Elevated liver enzymes are currently normal.  Patient has chronic ITP which is stable.  He also has esophageal varices related to the cirrhosis.  He is followed by the gastroenterology/hepatology service.  He has a history of colon polyps and is up-to-date on his colonoscopy.  He is followed by cardiology service for his coronary artery disease which seems stable.  He has Non morbid obesity and the best thing that he can do would be to follow a low carbohydrate diet and obtain ideal body weight.  Strongly encouraged him to do this.  Patient has sleep apnea and cannot tolerate CPAP or an oral appliance and has sleep-related hypoxia.  Once again, weight loss would be helpful.  His chronic migraines are stable.    Plan is as follows: Will not make any changes to his current medical regimen.  Strongly encouraged weight loss and low-carb diet.  Recommend RSV vaccine and Shingrix vaccine.  Patient will follow-up on or after August 30, 2024 for his annual physical with lab prior.  Otherwise he will follow-up as needed.    Addendum: After further consideration and since Ozempic is not available to the patient due to insurance reasons, we will increase the  semaglutide tablets to 14 mg/day.  Patient should contact me if he has any problems with the increased dose.      Procedures

## 2024-03-20 ENCOUNTER — OFFICE VISIT (OUTPATIENT)
Dept: INTERNAL MEDICINE | Facility: CLINIC | Age: 69
End: 2024-03-20
Payer: COMMERCIAL

## 2024-03-20 VITALS
DIASTOLIC BLOOD PRESSURE: 66 MMHG | BODY MASS INDEX: 34.26 KG/M2 | HEIGHT: 69 IN | RESPIRATION RATE: 14 BRPM | SYSTOLIC BLOOD PRESSURE: 120 MMHG | HEART RATE: 91 BPM | OXYGEN SATURATION: 94 % | WEIGHT: 231.3 LBS

## 2024-03-20 DIAGNOSIS — E11.29 TYPE 2 DIABETES MELLITUS WITH MICROALBUMINURIA, WITHOUT LONG-TERM CURRENT USE OF INSULIN: Primary | ICD-10-CM

## 2024-03-20 DIAGNOSIS — T78.40XA ALLERGIC REACTION, INITIAL ENCOUNTER: ICD-10-CM

## 2024-03-20 DIAGNOSIS — K75.81 NASH (NONALCOHOLIC STEATOHEPATITIS): ICD-10-CM

## 2024-03-20 DIAGNOSIS — R80.9 TYPE 2 DIABETES MELLITUS WITH MICROALBUMINURIA, WITHOUT LONG-TERM CURRENT USE OF INSULIN: Primary | ICD-10-CM

## 2024-03-20 NOTE — PROGRESS NOTES
"Chief Complaint  Rash    Subjective          Rajan Dc presents to [facility]  History of Present Illness  The patient presented today's office visit due to some rash.    He did not have the rash until Dr. Eaton doubled the dosage of semaglutide to 14 for his fatty liver. He started itching on his back and it came up on his arms where he has been scratching. Last night, he was sitting at the table eating his supper and his shoulders got itching really bad. He was fine when he was on the 7 mg dose. His wife changed his laundry soap. He has not changed his soap, bed sheets, or foods. He has not been out in the yard. Poison ivy does not bother him.    Objective   Vital Signs:   /66 (BP Location: Left arm, Patient Position: Sitting, Cuff Size: Adult)   Pulse 91   Resp 14   Ht 175.3 cm (69.02\")   Wt 105 kg (231 lb 4.8 oz)   SpO2 94%   BMI 34.14 kg/m²     Physical Exam  Vitals and nursing note reviewed.   Constitutional:       Appearance: He is well-developed.   HENT:      Head: Normocephalic and atraumatic.   Musculoskeletal:      Cervical back: Normal range of motion and neck supple.   Neurological:      Mental Status: He is alert and oriented to person, place, and time.   Psychiatric:         Behavior: Behavior normal.         Physical Exam       Result Review :                 Assessment and Plan    Diagnoses and all orders for this visit:    1. Type 2 diabetes mellitus with microalbuminuria, without long-term current use of insulin (Primary)    2. RUIZ (nonalcoholic steatohepatitis)    3. Allergic reaction, initial encounter      Assessment & Plan  1. Pruritic rash.    2. DM2      He will start taking 7 mg of semaglutide (Rybelsus) again and see if it gets better. He will stop the 14 mg. He will take Zyrtec or Claritin every day. He will see Dr. Eaton in about 7 to 10 days. We will hold off on putting him on any steroids. If it worsens, he will let us know.    Follow " Up   No follow-ups on file.  Patient was given instructions and counseling regarding his condition or for health maintenance advice. Please see specific information pulled into the AVS if appropriate.           Patient or patient representative verbalized consent for the use of Ambient Listening during the visit with  Az Brannon MD for chart documentation. 3/20/2024  14:32 EDT

## 2024-03-27 ENCOUNTER — OFFICE VISIT (OUTPATIENT)
Dept: INTERNAL MEDICINE | Facility: CLINIC | Age: 69
End: 2024-03-27
Payer: COMMERCIAL

## 2024-03-27 VITALS
BODY MASS INDEX: 34.07 KG/M2 | DIASTOLIC BLOOD PRESSURE: 62 MMHG | TEMPERATURE: 97.9 F | WEIGHT: 230 LBS | HEIGHT: 69 IN | HEART RATE: 85 BPM | RESPIRATION RATE: 16 BRPM | SYSTOLIC BLOOD PRESSURE: 132 MMHG | OXYGEN SATURATION: 95 %

## 2024-03-27 DIAGNOSIS — E11.29 TYPE 2 DIABETES MELLITUS WITH MICROALBUMINURIA, WITHOUT LONG-TERM CURRENT USE OF INSULIN: Chronic | ICD-10-CM

## 2024-03-27 DIAGNOSIS — R80.9 TYPE 2 DIABETES MELLITUS WITH MICROALBUMINURIA, WITHOUT LONG-TERM CURRENT USE OF INSULIN: Chronic | ICD-10-CM

## 2024-03-27 DIAGNOSIS — T50.905A URTICARIA DUE TO DRUG ALLERGY: Primary | ICD-10-CM

## 2024-03-27 DIAGNOSIS — L50.0 URTICARIA DUE TO DRUG ALLERGY: Primary | ICD-10-CM

## 2024-03-27 RX ORDER — PREDNISONE 10 MG/1
TABLET ORAL
Qty: 46 TABLET | Refills: 0 | Status: SHIPPED | OUTPATIENT
Start: 2024-03-27

## 2024-03-27 RX ORDER — SEMAGLUTIDE 0.68 MG/ML
INJECTION, SOLUTION SUBCUTANEOUS
Start: 2024-03-27

## 2024-03-27 NOTE — PROGRESS NOTES
03/27/2024    Patient Information  Rajan Dc                                                                                          [address] [place] [id number]      1955  [unfilled]  [phone] (work)    Chief Complaint:     Follow-up on rash.    History of Present Illness:    Patient recently evaluated by one of my partners for complaints of a diffuse rash that began after his semaglutide tablets were increased from 7 mg/day to 14 mg/day.  Dr. Brannon had the patient decrease the dose of the Rybelsus back down to 7 mg/day and patient is here for follow-up.  He was not started on any steroids.  Patient has actually stopped the semaglutide tablets altogether and he has not been on it since he was evaluated a week ago.  Rash has not totally resolved.    Review of Systems   Constitutional: Negative.   HENT: Negative.     Eyes: Negative.    Cardiovascular: Negative.    Respiratory: Negative.     Endocrine: Negative.    Hematologic/Lymphatic: Negative.    Skin: Negative.         Rash has resolved   Musculoskeletal: Negative.    Gastrointestinal: Negative.    Genitourinary: Negative.    Neurological: Negative.    Psychiatric/Behavioral: Negative.     Allergic/Immunologic: Negative.        Active Problems:    Patient Active Problem List   Diagnosis    Occlusive coronary artery disease, 01/01/2005--status post MI.  January 2005--CABG ×4.  Details not known.    Folic acid deficiency    Hyperlipidemia    Hypogonadism male, TRT ineffective    Microalbuminuria    RUIZ (nonalcoholic steatohepatitis)    Obstructive sleep apnea, 11/13/2012--mild to moderate NIKI.  Unable to tolerate CPAP.  Cannot use oral appliance.    Sleep related hypoxia    Type 2 diabetes mellitus with microalbuminuria, without long-term current use of insulin    Vitamin D deficiency    Therapeutic drug monitoring    Routine physical examination    Family history of premature coronary artery disease    Diabetic eye exam     History of myocardial infarction, 2005.    Chronic ITP (idiopathic thrombocytopenia)    Elevated liver enzymes    Male erectile disorder    Non morbid obesity    History of COVID-19    History of colon polyps, 8/18/2021--tubular adenoma x12.    Esophageal varices determined by endoscopy    Liver cirrhosis secondary to RUIZ    Diverticulosis of colon    Chronic migraine w/o aura w/o status migrainosus, not intractable    Encounter for diabetic foot exam    Urticaria due to drug allergy, March 2024--urticaria due to Rybelsus         Past Medical History:   Diagnosis Date    Cholelithiasis removed gallbladder    Chronic ITP (idiopathic thrombocytopenia) 09/19/2018    Chronic migraine w/o aura w/o status migrainosus, not intractable 08/18/2022    Diabetic eye exam 05/22/2017 05/22/2017--routine diabetic eye exam reveals no evidence of diabetic retinopathy.  Astigmatism, presbyopia, hypermetropia noted.  Very early cataracts noted bilaterally.  October 2015--patient reports a routine diabetic eye exam without evidence of diabetic retinopathy.    Diabetic foot exam 03/12/2017 03/14/2017--diabetic foot exam reveals no evidence of diabetic foot ulcer or pre-ulcerative callus.  Distal pulses palpable.  No signs of ischemia.  Sensation subjectively intact per monofilament.    Diverticulosis of colon 10/28/2021    August 18, 2021--colonoscopy reveals a 6 mm polyp in the transverse colon.  There was an 8 mm polyp in the descending colon.  A 20 mm polyp was found at 50 cm proximal to the anus.  Resection was incomplete.  The resected tissue was retrieved and coagulation for destruction was performed.  #4, sessile polyps in the sigmoid colon that were 5 to 6 mm in size.  Removed.  #2, sessile polyps in the sig    Elevated liver enzymes 06/23/2019 October 17, 2019--AST is normal at 27, ALT normal at 2 9.  Alkaline phosphatase mildly elevated at 137.  Bilirubin is normal.  October 2, 2019--ultrasound liver ordered by  the gastroenterologist reveals increased hepatic echogenicity consistent with steatosis.  Previous cholecystectomy.  No biliary ductal dilatation.  2019--patient was apparently referred to the gastroenterologist by    Esophageal varices determined by endoscopy 10/28/2021    2021--EGD reveals grade 2 varices in the lower third of the esophagus.  A varix with no bleeding was found in the cardia.  No stigmata of recent bleeding.  Mild portal hypertensive gastropathy was found in the gastric fundus.    Family history of premature coronary artery disease 2016    Father  from a myocardial infarction age 61.    Folic acid deficiency 2016    History of Abnormal pulse oximetry 10/07/2012    10/07/2012--overnight oximetry test revealed significant nocturnal hypoxemia. Oxygen saturations were greater than 90% for only 50.5% of the study. Oxygen saturation less than 90% for three hours 47 minutes which was 49.5% of the study. Oxygen saturation less than 88% for one hour and 36 minutes and 48 seconds, 21.1% of the study.    History of CABG 2005 status post four-vessel CABG.    History of colon polyps, 2021--tubular adenoma x12. 10/28/2021    2021--colonoscopy reveals a 6 mm polyp in the transverse colon.  There was an 8 mm polyp in the descending colon.  A 20 mm polyp was found at 50 cm proximal to the anus.  Resection was incomplete.  The resected tissue was retrieved and coagulation for destruction was performed.  #4, sessile polyps in the sigmoid colon that were 5 to 6 mm in size.  Removed.  #2, sessile polyps in the sig    History of COVID-19 2021    History of myocardial infarction, . 2016--status post myocardial infarction.   2005--status post four-vessel CABG    Hyperlipidemia 2016    Hypogonadism male, TRT ineffective 2012--treatment for symptomatic hypogonadism begun. This was later  discontinued due to lack of efficacy and cost.    Liver cirrhosis secondary to RUIZ 10/28/2021    August 18, 2021--EGD reveals grade 2 varices in the lower third of the esophagus.  A varix with no bleeding was found in the cardia.  No stigmata of recent bleeding.  Mild portal hypertensive gastropathy was found in the gastric fundus.  October 2, 2019--ultrasound liver ordered by the gastroenterologist reveals increased hepatic echogenicity consistent with steatosis.  Previous cholecystectomy.      Male erectile disorder 08/27/2020    Microalbuminuria 04/25/2014 04/25/2014--urine microalbumin elevated 28.7. Normal range 0.0--17.0.    RUIZ (nonalcoholic steatohepatitis) 04/28/2016    Non morbid obesity 06/02/2021    Obstructive sleep apnea, 11/13/2012--mild to moderate NIKI.  Unable to tolerate CPAP.  Cannot use oral appliance. 10/07/2012    05/02/2014--nocturnal oxygen 2 L per nasal cannula ordered.   12/03/2013--patient was referred for an oral appliance but this could not be done because patient wears dentures.   11/13/2012--diagnosed with mild to moderate obstructive sleep apnea. Patient unable to tolerate CPAP.   10/07/2012--overnight oximetry test revealed significant nocturnal hypoxemia. Oxygen saturations were greater than 90% for only 50.5% of the study. Oxygen saturation less than 90% for three hours 47 minutes which was 49.5% of the study. Oxygen saturation less than 88% for one hour and 36 minutes and 48 seconds, 21.1% of the study.    Occlusive coronary artery disease, 01/01/2005--status post MI.  January 2005--CABG ×4.  Details not known. 01/01/2005 01/01/2005--status post myocardial infarction.   January 2005--status post four-vessel CABG    Sleep related hypoxia 10/07/2012    05/02/2014--nocturnal oxygen 2 L per nasal cannula ordered.  12/03/2013--patient was referred for an oral appliance but this could not be done because patient wears dentures.   11/13/2012--diagnosed with mild to moderate  obstructive sleep apnea. Patient unable to tolerate CPAP.   10/07/2012--overnight oximetry test revealed significant nocturnal hypoxemia. Oxygen saturations were greater than 90% for only 50.5% of the study. Oxygen saturation less than 90% for three hours 47 minutes which was 49.5% of the study. Oxygen saturation less than 88% for one hour and 36 minutes and 48 seconds, 21.1% of the study.    Type 2 diabetes mellitus with microalbuminuria, without long-term current use of insulin 01/01/2005    Diagnosed in 2005.    Vitamin D deficiency 04/28/2016         Past Surgical History:   Procedure Laterality Date    CARDIAC CATHETERIZATION      CHOLECYSTECTOMY      CHOLECYSTECTOMY WITH INTRAOPERATIVE CHOLANGIOGRAM N/A 06/24/2019    Procedure: laparoscopic cholecystectomy with intraoperative cholangiogram;  Surgeon: Vida Avilez MD;  Location: [facility];  Service: General    COLONOSCOPY N/A 08/18/2021 August 18, 2021--colonoscopy reveals a 6 mm polyp in the transverse colon.  There was an 8 mm polyp in the descending colon.  A 20 mm polyp was found at 50 cm proximal to the anus.  Resection was incomplete.  The resected tissue was retrieved and coagulation for destruction was performed.  #4, sessile polyps in the sigmoid colon that were 5 to 6 mm in size.  Removed.  #2, sessile polyps in the sig    CORONARY ARTERY BYPASS GRAFT  01/2005 January 2005 status post four-vessel CABG.    ENDOSCOPY N/A 08/18/2021 August 18, 2021--EGD reveals grade 2 varices in the lower third of the esophagus.  A varix with no bleeding was found in the cardia.  No stigmata of recent bleeding.  Mild portal hypertensive gastropathy was found in the gastric fundus.    UMBILICAL HERNIA REPAIR N/A 09/28/2023    Procedure: UMBILICAL HERNIA REPAIR;  Surgeon: Kari Alejandro DO;  Location[facility] OR;  Service: General;  Laterality: N/A;         Allergies   Allergen Reactions    Semaglutide Itching and Rash           Current Outpatient  Medications:     aspirin 81 MG EC tablet, Take 1 p.o. daily for blood thinner/heart, Disp: , Rfl:     atorvastatin (LIPITOR) 80 MG tablet, TAKE 1 TABLET BY MOUTH DAILY FOR HIGH CHOLESTEROL, Disp: 90 tablet, Rfl: 2    ferrous gluconate (FERGON) 324 MG tablet, Take 1 tablet by mouth Daily With Breakfast., Disp: , Rfl:     glimepiride (AMARYL) 4 MG tablet, Take 1 p.o. twice daily at breakfast and supper for diabetes, Disp: 180 tablet, Rfl: 3    Jardiance 25 MG tablet tablet, TAKE 1 TABLET BY MOUTH EVERY MORNING BEFORE BREAKFAST FOR DIABETES, Disp: 90 tablet, Rfl: 1    SITagliptin-metFORMIN HCl ER (Janumet XR)  MG tablet, TAKE 1 TABLET BY MOUTH TWICE DAILY FOR DIABETES, Disp: 90 tablet, Rfl: 5    vitamin D3 125 MCG (5000 UT) capsule capsule, 1 by mouth daily as directed, Disp: 30 capsule, Rfl: 11    predniSONE (DELTASONE) 10 MG tablet, 5 by mouth daily 5 days, then 4 daily 2 days, 3 daily 2 days, 2 daily 2 days, 1 daily 2 days, one half daily 2 days and then discontinue., Disp: 46 tablet, Rfl: 0    Semaglutide,0.25 or 0.5MG/DOS, (Ozempic, 0.25 or 0.5 MG/DOSE,) 2 MG/3ML solution pen-injector, Inject 0.25 mg subcutaneously weekly for 4 weeks and then increase to 0.5 mg subcutaneously weekly until further notice., Disp: , Rfl:       Family History   Problem Relation Age of Onset    Other Mother         Ventricular Septal Defect    Heart disease Mother     Hypertension Mother     Cancer Mother     Diabetes Mother     Arthritis Mother     Heart attack Father         Prior Myocardial Infarction.  Dad  from a myocardial infarction at age 59.    Heart disease Father     Heart disease Sister     Heart disease Brother     Cancer Daughter     Heart disease Other         Congenital Heart Disease. Multiple family members with septal defects that required surgery.    Malig Hyperthermia Neg Hx          Social History     Socioeconomic History    Marital status:      Spouse name: Dasha    Number of children: 8     "Highest education level: 9th grade   Tobacco Use    Smoking status: Former     Current packs/day: 0.00     Average packs/day: 0.5 packs/day for 40.0 years (20.0 ttl pk-yrs)     Types: Cigarettes     Start date: 1965     Quit date: 2005     Years since quittin.2    Smokeless tobacco: Never    Tobacco comments:     Former smoker quit 13 years ago with a 64.5 pack year history.  Smoked 1 ppd for 32 years and 4.5 ppd for 5 years and quit when he had his open heart surgery.   Vaping Use    Vaping status: Never Used   Substance and Sexual Activity    Alcohol use: No    Drug use: No    Sexual activity: Yes     Partners: Female         Vitals:    24 0931   BP: 132/62   Pulse: 85   Resp: 16   Temp: 97.9 °F (36.6 °C)   TempSrc: Temporal   SpO2: 95%   Weight: 104 kg (230 lb)   Height: 175.3 cm (69.02\")        Body mass index is 33.95 kg/m².      Physical Exam:    General: Alert and oriented x 3.  No acute distress.  Normal affect.  HEENT: Pupils equal, round, reactive to light; extraocular movements intact; sclerae nonicteric; pharynx, ear canals and TMs normal.  Neck: Without JVD, thyromegaly, bruit, or adenopathy.  Lungs: Clear to auscultation in all fields.  Heart: Regular rate and rhythm without murmur, rub, gallop, or click.  Abdomen: Soft, nontender, without hepatosplenomegaly or hernia.  Bowel sounds normal.  : Deferred.  Rectal: Deferred.  Extremities: Without clubbing, cyanosis, edema, or pulse deficit.  Neurologic: Intact without focal deficit.  Normal station and gait observed during ingress and egress from the examination room.  Skin: Without significant lesion.  Musculoskeletal: Unremarkable.    Lab/other results:      Asessment/Plan:     Diagnosis Plan   1. Urticaria due to drug allergy  predniSONE (DELTASONE) 10 MG tablet      2. Type 2 diabetes mellitus with microalbuminuria, without long-term current use of insulin  Semaglutide,0.25 or 0.5MG/DOS, (Ozempic, 0.25 or 0.5 MG/DOSE,) 2 MG/3ML " solution pen-injector        It appears the patient has allergic reaction to Rybelsus/semaglutide.  Is not known if this would occur with the injectable form of semaglutide which I do not think is covered by his insurance anyway.    Addendum: As it turns out patient was previously on Victoza about a year ago which was discontinued likely due to insurance reasons.  It looks like the medication in the form of Ozempic is covered now but will need a prior authorization.  Patient seemed to tolerate the Victoza without rash but allergic reaction is still possible.  Patient is aware.    Plan is as follows: Start Ozempic 0.25 mg subcutaneously once a week for 4 weeks and then increase to 0.5 mg thereafter.  I will have patient follow-up in 5 weeks to reassess.  I gave him a sample which will last him until he comes back and we will worry about prior authorization once we know he is tolerating it.  Also gave patient a prescription for prednisone which he should start immediately if he redevelops a rash and he also needs to contact me if this occurs.    Procedures

## 2024-05-07 ENCOUNTER — OFFICE VISIT (OUTPATIENT)
Dept: INTERNAL MEDICINE | Facility: CLINIC | Age: 69
End: 2024-05-07
Payer: COMMERCIAL

## 2024-05-07 VITALS
WEIGHT: 230 LBS | BODY MASS INDEX: 34.07 KG/M2 | HEIGHT: 69 IN | HEART RATE: 79 BPM | RESPIRATION RATE: 16 BRPM | TEMPERATURE: 95.7 F | SYSTOLIC BLOOD PRESSURE: 128 MMHG | DIASTOLIC BLOOD PRESSURE: 68 MMHG | OXYGEN SATURATION: 92 %

## 2024-05-07 DIAGNOSIS — Z86.16 HISTORY OF COVID-19: Chronic | ICD-10-CM

## 2024-05-07 DIAGNOSIS — R80.9 MICROALBUMINURIA: Chronic | ICD-10-CM

## 2024-05-07 DIAGNOSIS — E66.9 NON MORBID OBESITY: Chronic | ICD-10-CM

## 2024-05-07 DIAGNOSIS — E11.29 TYPE 2 DIABETES MELLITUS WITH MICROALBUMINURIA, WITHOUT LONG-TERM CURRENT USE OF INSULIN: Primary | Chronic | ICD-10-CM

## 2024-05-07 DIAGNOSIS — E55.9 VITAMIN D DEFICIENCY: Chronic | ICD-10-CM

## 2024-05-07 DIAGNOSIS — G47.34 SLEEP RELATED HYPOXIA: Chronic | ICD-10-CM

## 2024-05-07 DIAGNOSIS — K74.60 LIVER CIRRHOSIS SECONDARY TO NASH: Chronic | ICD-10-CM

## 2024-05-07 DIAGNOSIS — T50.905A URTICARIA DUE TO DRUG ALLERGY: ICD-10-CM

## 2024-05-07 DIAGNOSIS — E78.2 MIXED HYPERLIPIDEMIA: Chronic | ICD-10-CM

## 2024-05-07 DIAGNOSIS — K75.81 LIVER CIRRHOSIS SECONDARY TO NASH: Chronic | ICD-10-CM

## 2024-05-07 DIAGNOSIS — R80.9 TYPE 2 DIABETES MELLITUS WITH MICROALBUMINURIA, WITHOUT LONG-TERM CURRENT USE OF INSULIN: Primary | Chronic | ICD-10-CM

## 2024-05-07 DIAGNOSIS — L50.0 URTICARIA DUE TO DRUG ALLERGY: ICD-10-CM

## 2024-05-07 DIAGNOSIS — Z51.81 THERAPEUTIC DRUG MONITORING: ICD-10-CM

## 2024-05-07 DIAGNOSIS — I25.10 OCCLUSIVE CORONARY ARTERY DISEASE: Chronic | ICD-10-CM

## 2024-05-07 DIAGNOSIS — R74.8 ELEVATED LIVER ENZYMES: Chronic | ICD-10-CM

## 2024-05-07 DIAGNOSIS — K75.81 NASH (NONALCOHOLIC STEATOHEPATITIS): Chronic | ICD-10-CM

## 2024-05-07 DIAGNOSIS — E53.8 FOLIC ACID DEFICIENCY: Chronic | ICD-10-CM

## 2024-05-07 PROCEDURE — 99214 OFFICE O/P EST MOD 30 MIN: CPT | Performed by: INTERNAL MEDICINE

## 2024-05-07 RX ORDER — SEMAGLUTIDE 1.34 MG/ML
INJECTION, SOLUTION SUBCUTANEOUS
Qty: 90 ML | Refills: 1 | Status: SHIPPED | OUTPATIENT
Start: 2024-05-07

## 2024-06-19 DIAGNOSIS — E11.9 TYPE 2 DIABETES MELLITUS WITHOUT COMPLICATION, WITHOUT LONG-TERM CURRENT USE OF INSULIN: ICD-10-CM

## 2024-06-20 RX ORDER — EMPAGLIFLOZIN 25 MG/1
TABLET, FILM COATED ORAL
Qty: 90 TABLET | Refills: 1 | Status: SHIPPED | OUTPATIENT
Start: 2024-06-20

## 2024-06-25 DIAGNOSIS — E11.9 TYPE 2 DIABETES MELLITUS WITHOUT COMPLICATION, WITHOUT LONG-TERM CURRENT USE OF INSULIN: ICD-10-CM

## 2024-06-25 RX ORDER — SITAGLIPTIN AND METFORMIN HYDROCHLORIDE 1000; 50 MG/1; MG/1
TABLET, FILM COATED, EXTENDED RELEASE ORAL
Qty: 90 TABLET | Refills: 5 | Status: SHIPPED | OUTPATIENT
Start: 2024-06-25

## 2024-09-04 ENCOUNTER — LAB (OUTPATIENT)
Dept: LAB | Facility: HOSPITAL | Age: 69
End: 2024-09-04
Payer: COMMERCIAL

## 2024-09-04 PROCEDURE — 83036 HEMOGLOBIN GLYCOSYLATED A1C: CPT | Performed by: INTERNAL MEDICINE

## 2024-09-04 PROCEDURE — 81003 URINALYSIS AUTO W/O SCOPE: CPT | Performed by: INTERNAL MEDICINE

## 2024-09-04 PROCEDURE — 82570 ASSAY OF URINE CREATININE: CPT | Performed by: INTERNAL MEDICINE

## 2024-09-04 PROCEDURE — 80061 LIPID PANEL: CPT | Performed by: INTERNAL MEDICINE

## 2024-09-04 PROCEDURE — 82550 ASSAY OF CK (CPK): CPT | Performed by: INTERNAL MEDICINE

## 2024-09-04 PROCEDURE — 84481 FREE ASSAY (FT-3): CPT | Performed by: INTERNAL MEDICINE

## 2024-09-04 PROCEDURE — 82306 VITAMIN D 25 HYDROXY: CPT | Performed by: INTERNAL MEDICINE

## 2024-09-04 PROCEDURE — 80050 GENERAL HEALTH PANEL: CPT | Performed by: INTERNAL MEDICINE

## 2024-09-04 PROCEDURE — 84153 ASSAY OF PSA TOTAL: CPT | Performed by: INTERNAL MEDICINE

## 2024-09-04 PROCEDURE — 83090 ASSAY OF HOMOCYSTEINE: CPT | Performed by: INTERNAL MEDICINE

## 2024-09-04 PROCEDURE — 82043 UR ALBUMIN QUANTITATIVE: CPT | Performed by: INTERNAL MEDICINE

## 2024-09-04 PROCEDURE — 83704 LIPOPROTEIN BLD QUAN PART: CPT | Performed by: INTERNAL MEDICINE

## 2024-09-04 PROCEDURE — 86769 SARS-COV-2 COVID-19 ANTIBODY: CPT | Performed by: INTERNAL MEDICINE

## 2024-09-04 PROCEDURE — 84439 ASSAY OF FREE THYROXINE: CPT | Performed by: INTERNAL MEDICINE

## 2024-09-12 ENCOUNTER — OFFICE VISIT (OUTPATIENT)
Dept: INTERNAL MEDICINE | Facility: CLINIC | Age: 69
End: 2024-09-12
Payer: COMMERCIAL

## 2024-09-12 VITALS
HEIGHT: 69 IN | BODY MASS INDEX: 34.07 KG/M2 | TEMPERATURE: 96.8 F | OXYGEN SATURATION: 92 % | RESPIRATION RATE: 16 BRPM | HEART RATE: 81 BPM | WEIGHT: 230 LBS | SYSTOLIC BLOOD PRESSURE: 132 MMHG | DIASTOLIC BLOOD PRESSURE: 68 MMHG

## 2024-09-12 DIAGNOSIS — Z86.16 HISTORY OF COVID-19: Chronic | ICD-10-CM

## 2024-09-12 DIAGNOSIS — E53.8 FOLIC ACID DEFICIENCY: Chronic | ICD-10-CM

## 2024-09-12 DIAGNOSIS — R74.8 ELEVATED LIVER ENZYMES: Chronic | ICD-10-CM

## 2024-09-12 DIAGNOSIS — E66.9 NON MORBID OBESITY: Chronic | ICD-10-CM

## 2024-09-12 DIAGNOSIS — E78.2 MIXED HYPERLIPIDEMIA: Chronic | ICD-10-CM

## 2024-09-12 DIAGNOSIS — D50.9 MICROCYTIC ANEMIA: ICD-10-CM

## 2024-09-12 DIAGNOSIS — E55.9 VITAMIN D DEFICIENCY: Chronic | ICD-10-CM

## 2024-09-12 DIAGNOSIS — E11.29 TYPE 2 DIABETES MELLITUS WITH MICROALBUMINURIA, WITHOUT LONG-TERM CURRENT USE OF INSULIN: Chronic | ICD-10-CM

## 2024-09-12 DIAGNOSIS — D69.3 CHRONIC ITP (IDIOPATHIC THROMBOCYTOPENIA): Chronic | ICD-10-CM

## 2024-09-12 DIAGNOSIS — Z00.00 ROUTINE PHYSICAL EXAMINATION: Primary | ICD-10-CM

## 2024-09-12 DIAGNOSIS — G47.34 SLEEP RELATED HYPOXIA: Chronic | ICD-10-CM

## 2024-09-12 DIAGNOSIS — T50.905A URTICARIA DUE TO DRUG ALLERGY: Chronic | ICD-10-CM

## 2024-09-12 DIAGNOSIS — G47.33 OBSTRUCTIVE SLEEP APNEA: Chronic | ICD-10-CM

## 2024-09-12 DIAGNOSIS — Z86.010 HISTORY OF COLON POLYPS: Chronic | ICD-10-CM

## 2024-09-12 DIAGNOSIS — Z51.81 THERAPEUTIC DRUG MONITORING: ICD-10-CM

## 2024-09-12 DIAGNOSIS — I85.00 ESOPHAGEAL VARICES DETERMINED BY ENDOSCOPY: Chronic | ICD-10-CM

## 2024-09-12 DIAGNOSIS — E11.9 ENCOUNTER FOR DIABETIC FOOT EXAM: Chronic | ICD-10-CM

## 2024-09-12 DIAGNOSIS — I25.10 OCCLUSIVE CORONARY ARTERY DISEASE: Chronic | ICD-10-CM

## 2024-09-12 DIAGNOSIS — R80.9 TYPE 2 DIABETES MELLITUS WITH MICROALBUMINURIA, WITHOUT LONG-TERM CURRENT USE OF INSULIN: Chronic | ICD-10-CM

## 2024-09-12 DIAGNOSIS — K75.81 NASH (NONALCOHOLIC STEATOHEPATITIS): Chronic | ICD-10-CM

## 2024-09-12 DIAGNOSIS — K74.60 LIVER CIRRHOSIS SECONDARY TO NASH: Chronic | ICD-10-CM

## 2024-09-12 DIAGNOSIS — L50.0 URTICARIA DUE TO DRUG ALLERGY: Chronic | ICD-10-CM

## 2024-09-12 DIAGNOSIS — G43.709 CHRONIC MIGRAINE W/O AURA W/O STATUS MIGRAINOSUS, NOT INTRACTABLE: Chronic | ICD-10-CM

## 2024-09-12 DIAGNOSIS — R80.9 MICROALBUMINURIA: Chronic | ICD-10-CM

## 2024-09-12 DIAGNOSIS — I25.2 HISTORY OF MYOCARDIAL INFARCTION: Chronic | ICD-10-CM

## 2024-09-12 DIAGNOSIS — K75.81 LIVER CIRRHOSIS SECONDARY TO NASH: Chronic | ICD-10-CM

## 2024-09-12 NOTE — PROGRESS NOTES
09/12/2024    Patient Information  Rajan Dc                                                                                          [address] [place] [id number]      1955  [unfilled]  [phone] (work)    Chief Complaint:     Routine annual physical exam/preventative visit.  No new acute complaints.    History of Present Illness:    Patient with multiple chronic medical problems as noted below in assessment and plan presents today for his routine annual physical/preventative visit.  Patient has no new acute complaints.  His past medical history reviewed and updated were necessary including health maintenance parameters.  This reveals he is overdue for his colonoscopy given his history of colon polyps and therefore I placed an order for this.  He also needs a diabetic eye exam which I encouraged him to get.  I have also recommended he get the shingles vaccination but he needs to check with his insurance regarding coverage.    Review of Systems   Constitutional: Negative.   HENT: Negative.     Eyes: Negative.    Cardiovascular: Negative.    Respiratory: Negative.     Endocrine: Negative.    Hematologic/Lymphatic: Negative.    Skin: Negative.    Musculoskeletal: Negative.    Gastrointestinal: Negative.    Genitourinary: Negative.    Neurological: Negative.    Psychiatric/Behavioral: Negative.     Allergic/Immunologic: Negative.        Active Problems:    Patient Active Problem List   Diagnosis    Occlusive coronary artery disease, 01/01/2005--status post MI.  January 2005--CABG ×4.  Details not known.    Folic acid deficiency    Hyperlipidemia    Hypogonadism male, TRT ineffective    Microalbuminuria    RUIZ (nonalcoholic steatohepatitis)    Obstructive sleep apnea, 11/13/2012--mild to moderate NIKI.  Unable to tolerate CPAP.  Cannot use oral appliance.    Sleep related hypoxia    Type 2 diabetes mellitus with microalbuminuria, without long-term current use of insulin    Vitamin  D deficiency    Therapeutic drug monitoring    Routine physical examination    Family history of premature coronary artery disease    Diabetic eye exam    History of myocardial infarction, 2005.    Chronic ITP (idiopathic thrombocytopenia)    Elevated liver enzymes    Male erectile disorder    Microcytic anemia    Non morbid obesity    History of COVID-19    History of colon polyps, 8/18/2021--tubular adenoma x12.    Esophageal varices determined by endoscopy    Liver cirrhosis secondary to RUIZ    Diverticulosis of colon    Chronic migraine w/o aura w/o status migrainosus, not intractable    Encounter for diabetic foot exam    Urticaria due to drug allergy, March 2024--urticaria due to Rybelsus         Past Medical History:   Diagnosis Date    Chronic ITP (idiopathic thrombocytopenia) 09/19/2018    Chronic migraine w/o aura w/o status migrainosus, not intractable 08/18/2022    Diabetic eye exam 05/22/2017 05/22/2017--routine diabetic eye exam reveals no evidence of diabetic retinopathy.  Astigmatism, presbyopia, hypermetropia noted.  Very early cataracts noted bilaterally.  October 2015--patient reports a routine diabetic eye exam without evidence of diabetic retinopathy.    Diverticulosis of colon 10/28/2021    August 18, 2021--colonoscopy reveals a 6 mm polyp in the transverse colon.  There was an 8 mm polyp in the descending colon.  A 20 mm polyp was found at 50 cm proximal to the anus.  Resection was incomplete.  The resected tissue was retrieved and coagulation for destruction was performed.  #4, sessile polyps in the sigmoid colon that were 5 to 6 mm in size.  Removed.  #2, sessile polyps in the sig    Elevated liver enzymes 06/23/2019 October 17, 2019--AST is normal at 27, ALT normal at 2 9.  Alkaline phosphatase mildly elevated at 137.  Bilirubin is normal.  October 2, 2019--ultrasound liver ordered by the gastroenterologist reveals increased hepatic echogenicity consistent with steatosis.  Previous  cholecystectomy.  No biliary ductal dilatation.  2019--patient was apparently referred to the gastroenterologist by    Encounter for diabetic foot exam 2023--routine diabetic foot exam reveals no evidence of diabetic foot ulcer or preulcerative callus.  Distal pulses are palpable.  No signs of ischemia.  Sensation subjectively intact.     2023--routine diabetic foot exam reveals no evidence of diabetic foot ulcer or preulcerative callus.  Distal pulses are palpable and sensation seems intact.      Esophageal varices determined by endoscopy 10/28/2021    2021--EGD reveals grade 2 varices in the lower third of the esophagus.  A varix with no bleeding was found in the cardia.  No stigmata of recent bleeding.  Mild portal hypertensive gastropathy was found in the gastric fundus.    Family history of premature coronary artery disease 2016    Father  from a myocardial infarction age 61.    Folic acid deficiency 2016    History of Abnormal pulse oximetry 10/07/2012    10/07/2012--overnight oximetry test revealed significant nocturnal hypoxemia. Oxygen saturations were greater than 90% for only 50.5% of the study. Oxygen saturation less than 90% for three hours 47 minutes which was 49.5% of the study. Oxygen saturation less than 88% for one hour and 36 minutes and 48 seconds, 21.1% of the study.    History of CABG 2005 status post four-vessel CABG.    History of colon polyps, 2021--tubular adenoma x12. 10/28/2021    2021--colonoscopy reveals a 6 mm polyp in the transverse colon.  There was an 8 mm polyp in the descending colon.  A 20 mm polyp was found at 50 cm proximal to the anus.  Resection was incomplete.  The resected tissue was retrieved and coagulation for destruction was performed.  #4, sessile polyps in the sigmoid colon that were 5 to 6 mm in size.  Removed.  #2, sessile polyps in the sig    History  of COVID-19 06/02/2021    History of myocardial infarction, 2005. 04/28/2016 01/01/2005--status post myocardial infarction.   January 2005--status post four-vessel CABG    Hyperlipidemia 04/28/2016    Hypogonadism male, TRT ineffective 09/13/2012 09/13/2012--treatment for symptomatic hypogonadism begun. This was later discontinued due to lack of efficacy and cost.    Liver cirrhosis secondary to RUIZ 10/28/2021    August 18, 2021--EGD reveals grade 2 varices in the lower third of the esophagus.  A varix with no bleeding was found in the cardia.  No stigmata of recent bleeding.  Mild portal hypertensive gastropathy was found in the gastric fundus.  October 2, 2019--ultrasound liver ordered by the gastroenterologist reveals increased hepatic echogenicity consistent with steatosis.  Previous cholecystectomy.      Male erectile disorder 08/27/2020    Microalbuminuria 04/25/2014 04/25/2014--urine microalbumin elevated 28.7. Normal range 0.0--17.0.    RUIZ (nonalcoholic steatohepatitis) 04/28/2016    Non morbid obesity 06/02/2021    Obstructive sleep apnea, 11/13/2012--mild to moderate NIKI.  Unable to tolerate CPAP.  Cannot use oral appliance. 10/07/2012    05/02/2014--nocturnal oxygen 2 L per nasal cannula ordered.   12/03/2013--patient was referred for an oral appliance but this could not be done because patient wears dentures.   11/13/2012--diagnosed with mild to moderate obstructive sleep apnea. Patient unable to tolerate CPAP.   10/07/2012--overnight oximetry test revealed significant nocturnal hypoxemia. Oxygen saturations were greater than 90% for only 50.5% of the study. Oxygen saturation less than 90% for three hours 47 minutes which was 49.5% of the study. Oxygen saturation less than 88% for one hour and 36 minutes and 48 seconds, 21.1% of the study.    Occlusive coronary artery disease, 01/01/2005--status post MI.  January 2005--CABG ×4.  Details not known. 01/01/2005 01/01/2005--status post  myocardial infarction.   January 2005--status post four-vessel CABG    Sleep related hypoxia 10/07/2012    05/02/2014--nocturnal oxygen 2 L per nasal cannula ordered.  12/03/2013--patient was referred for an oral appliance but this could not be done because patient wears dentures.   11/13/2012--diagnosed with mild to moderate obstructive sleep apnea. Patient unable to tolerate CPAP.   10/07/2012--overnight oximetry test revealed significant nocturnal hypoxemia. Oxygen saturations were greater than 90% for only 50.5% of the study. Oxygen saturation less than 90% for three hours 47 minutes which was 49.5% of the study. Oxygen saturation less than 88% for one hour and 36 minutes and 48 seconds, 21.1% of the study.    Type 2 diabetes mellitus with microalbuminuria, without long-term current use of insulin 01/01/2005    Diagnosed in 2005.    Urticaria due to drug allergy, March 2024--urticaria due to Rybelsus 03/27/2024    Vitamin D deficiency 04/28/2016         Past Surgical History:   Procedure Laterality Date    CARDIAC CATHETERIZATION      CHOLECYSTECTOMY      CHOLECYSTECTOMY WITH INTRAOPERATIVE CHOLANGIOGRAM N/A 06/24/2019    Procedure: laparoscopic cholecystectomy with intraoperative cholangiogram;  Surgeon: Vida Avilez MD;  Location: [facility];  Service: General    COLONOSCOPY N/A 08/18/2021 August 18, 2021--colonoscopy reveals a 6 mm polyp in the transverse colon.  There was an 8 mm polyp in the descending colon.  A 20 mm polyp was found at 50 cm proximal to the anus.  Resection was incomplete.  The resected tissue was retrieved and coagulation for destruction was performed.  #4, sessile polyps in the sigmoid colon that were 5 to 6 mm in size.  Removed.  #2, sessile polyps in the sig    CORONARY ARTERY BYPASS GRAFT  01/2005 January 2005 status post four-vessel CABG.    ENDOSCOPY N/A 08/18/2021 August 18, 2021--EGD reveals grade 2 varices in the lower third of the esophagus.  A varix with no  bleeding was found in the cardia.  No stigmata of recent bleeding.  Mild portal hypertensive gastropathy was found in the gastric fundus.    UMBILICAL HERNIA REPAIR N/A 2023    Procedure: UMBILICAL HERNIA REPAIR;  Surgeon: Kari Alejandro DO;  Location: [facility];  Service: General;  Laterality: N/A;         Allergies   Allergen Reactions    Semaglutide Itching and Rash     Rybelsus only.  Tolerates Ozempic.           Current Outpatient Medications:     aspirin 81 MG EC tablet, Take 1 p.o. daily for blood thinner/heart, Disp: , Rfl:     atorvastatin (LIPITOR) 80 MG tablet, TAKE 1 TABLET BY MOUTH DAILY FOR HIGH CHOLESTEROL, Disp: 90 tablet, Rfl: 2    ferrous gluconate (FERGON) 324 MG tablet, Take 1 tablet by mouth Daily With Breakfast., Disp: , Rfl:     glimepiride (AMARYL) 4 MG tablet, Take 1 p.o. twice daily at breakfast and supper for diabetes, Disp: 180 tablet, Rfl: 3    Janumet XR  MG tablet, TAKE 1 TABLET BY MOUTH TWICE DAILY FOR DIABETES, Disp: 90 tablet, Rfl: 5    Jardiance 25 MG tablet tablet, TAKE 1 TABLET BY MOUTH EVERY MORNING BEFORE BREAKFAST FOR DIABETES, Disp: 90 tablet, Rfl: 1    vitamin D3 125 MCG (5000 UT) capsule capsule, 1 by mouth daily as directed, Disp: 30 capsule, Rfl: 11    Semaglutide, 2 MG/DOSE, (OZEMPIC) 8 MG/3ML solution pen-injector, Inject 2 mg subcutaneously weekly as directed for diabetes., Disp: 3 mL, Rfl: 6      Family History   Problem Relation Age of Onset    Other Mother         Ventricular Septal Defect    Heart disease Mother     Hypertension Mother     Cancer Mother     Diabetes Mother     Arthritis Mother     Heart attack Father         Prior Myocardial Infarction.  Dad  from a myocardial infarction at age 59.    Heart disease Father     Heart disease Sister     Heart disease Brother     Cancer Daughter     Heart disease Other         Congenital Heart Disease. Multiple family members with septal defects that required surgery.    Malig Hyperthermia Neg Hx  "         Social History     Socioeconomic History    Marital status:      Spouse name: Dasha    Number of children: 8    Highest education level: 9th grade   Tobacco Use    Smoking status: Former     Current packs/day: 0.00     Average packs/day: 0.5 packs/day for 40.0 years (20.0 ttl pk-yrs)     Types: Cigarettes     Start date: 1965     Quit date: 2005     Years since quittin.7    Smokeless tobacco: Never    Tobacco comments:     Former smoker quit 13 years ago with a 64.5 pack year history.  Smoked 1 ppd for 32 years and 4.5 ppd for 5 years and quit when he had his open heart surgery.   Vaping Use    Vaping status: Never Used   Substance and Sexual Activity    Alcohol use: No    Drug use: No    Sexual activity: Yes     Partners: Female         Vitals:    24 0830   BP: 132/68   Pulse: 81   Resp: 16   Temp: 96.8 °F (36 °C)   TempSrc: Temporal   SpO2: 92%   Weight: 104 kg (230 lb)   Height: 175.3 cm (69.02\")        Body mass index is 33.95 kg/m².      Physical Exam:    General: Alert and oriented x 3.  No acute distress.  Obese.  Normal affect.  HEENT: Pupils equal, round, reactive to light; extraocular movements intact; sclerae nonicteric; pharynx, ear canals and TMs normal.  Neck: Without JVD, thyromegaly, bruit, or adenopathy.  Lungs: Clear to auscultation in all fields.  Heart: Regular rate and rhythm without murmur, rub, gallop, or click.  Abdomen: Soft, nontender, without hepatosplenomegaly or hernia.  Bowel sounds normal.  : Deferred.  Rectal: Deferred.  Extremities: Without clubbing, cyanosis, edema, or pulse deficit.  Neurologic: Intact without focal deficit.  Normal station and gait observed during ingress and egress from the examination room.  Skin: Without significant lesion.  Musculoskeletal: Unremarkable.    2024--routine diabetic foot exam reveals no evidence of diabetic foot ulcer or preulcerative callus.  Distal pulses are palpable.  No signs of ischemia. "  Sensation subjectively intact.    Lab/other results:    SARS antibodies are positive.  CBC reveals a low hemoglobin of 12.2.  MCV is low at 77.6.  Platelets low at 105.  CPK normal at 116.  CMP normal except glucose 158, alkaline phosphatase elevated 153.  Homocystine is normal at 9.5.  Hemoglobin A1c 7.6.  Total cholesterol 127, triglycerides 59, LDL particle #682, HDL particle number low at 20.4.  Thyroid function tests are normal.  PSA normal at 0.1-2.  Vitamin D is a little low at 29.9.  Urinalysis reveals 3+ glucose.  Microalbumin/creatinine ratio 22.4.    Assessment/Plan:     Diagnosis Plan   1. Routine physical examination        2. Type 2 diabetes mellitus with microalbuminuria, without long-term current use of insulin  Comprehensive Metabolic Panel    Hemoglobin A1c    Semaglutide, 2 MG/DOSE, (OZEMPIC) 8 MG/3ML solution pen-injector      3. Microalbuminuria        4. Hyperlipidemia  CK    Comprehensive Metabolic Panel    NMR LipoProfile      5. Liver cirrhosis secondary to RUIZ        6. RUIZ (nonalcoholic steatohepatitis)  Comprehensive Metabolic Panel      7. Chronic ITP (idiopathic thrombocytopenia)        8. Elevated liver enzymes        9. Esophageal varices determined by endoscopy        10. Vitamin D deficiency  Vitamin D,25-Hydroxy      11. Folic acid deficiency        12. History of COVID-19        13. Occlusive coronary artery disease, 01/01/2005--status post MI.  January 2005--CABG ×4.  Details not known.        14. History of myocardial infarction, 2005.        15. Obstructive sleep apnea, 11/13/2012--mild to moderate NIKI.  Unable to tolerate CPAP.  Cannot use oral appliance.        16. Sleep related hypoxia        17. Non morbid obesity        18. History of colon polyps, 8/18/2021--tubular adenoma x12.  Ambulatory Referral For Screening Colonoscopy      19. Encounter for diabetic foot exam        20. Chronic migraine w/o aura w/o status migrainosus, not intractable        21. Urticaria due to  drug allergy, March 2024--urticaria due to Rybelsus        22. Therapeutic drug monitoring        23. Microcytic anemia  CBC (No Diff)    Iron Profile        Patient presents for his routine annual physical exam/preventative visit and he has multiple chronic medical problems as noted above that seem to be fairly stable.  He has type 2 diabetes with microalbuminuria that is under fairly reasonable control.  Given his cirrhosis of the liver believed to be related to RUIZ, I think he would benefit from increased dose of semaglutide.  Microalbuminuria is well-controlled at the present time without specific treatment other than addressing his diabetes.  Hyperlipidemia is under fairly good control.  I think patient would be a good candidate for bempedoic acid but it appears we have to get a prior authorization.  See below.  Patient is followed by the gastroenterologist for his cirrhosis but he also needs a colonoscopy given his history of colon polyps.  This was ordered.  Also patient needs diabetic eye exam which I encouraged him to get.  Also he should check regarding coverage of shingles vaccine, specifically Shingrix, and I have recommended that he obtain this if financially feasible.  Folic acid is in the normal range as evidenced by a normal homocystine.  Patient has microcytic anemia and is on iron daily.  He also has low platelets and this is related to his hypersplenism.  Vitamin D is a little bit low and I have encouraged him to take vitamin D supplementation 5000 units/day.  He has a history of COVID-19 and still has SARS antibodies.  His coronary artery disease seems to be stable.  Patient has sleep apnea and sleep-related hypoxia but cannot tolerate CPAP.  Weight loss would be helpful and I strongly encouraged low carbohydrate diet and attainment of ideal body weight.    Plan is as follows: Diet as discussed above with weight loss.  Low-carb.  Encourage patient to get diabetic eye exam and to check on  coverage of Shingrix vaccine.  Colonoscopy ordered.  Increase semaglutide to 2 mg subcutaneously weekly.  Patient will obtain fasting lab work and follow-up in about 4 months.        Procedures

## 2024-11-08 DIAGNOSIS — E11.29 TYPE 2 DIABETES MELLITUS WITH MICROALBUMINURIA, WITHOUT LONG-TERM CURRENT USE OF INSULIN: Chronic | ICD-10-CM

## 2024-11-08 DIAGNOSIS — R80.9 TYPE 2 DIABETES MELLITUS WITH MICROALBUMINURIA, WITHOUT LONG-TERM CURRENT USE OF INSULIN: Chronic | ICD-10-CM

## 2024-11-11 RX ORDER — GLIMEPIRIDE 4 MG/1
TABLET ORAL
Qty: 180 TABLET | Refills: 3 | Status: SHIPPED | OUTPATIENT
Start: 2024-11-11

## 2024-12-31 ENCOUNTER — TELEPHONE (OUTPATIENT)
Dept: INTERNAL MEDICINE | Facility: CLINIC | Age: 69
End: 2024-12-31

## 2024-12-31 NOTE — TELEPHONE ENCOUNTER
Caller: Devorah Dc    Relationship: Emergency Contact    Best call back number: [phone]     What is the medical concern/diagnosis: HERNIA,  POSSIBLY 2 HERNIA'S     What specialty or service is being requested: PATIENT HAD SURGERY TO HAVE HERNIA REMOVED AND THEY WOULD LIKE ANOTHER REFERRAL TO HAVE THESE REMOVED. THIS WAS WITHIN THE LAST YEAR.     What is the provider, practice or medical service name: PATIENT'S SPOUSE WOULD LIKE TO BE REFERRED TO THE PLACE PATIENT WENT THE FIRST TIME. PROVIDER IS JATIN CUADRA

## 2025-01-02 DIAGNOSIS — K42.9 UMBILICAL HERNIA WITHOUT OBSTRUCTION AND WITHOUT GANGRENE: Primary | ICD-10-CM

## 2025-01-14 DIAGNOSIS — E78.2 MIXED HYPERLIPIDEMIA: Chronic | ICD-10-CM

## 2025-01-15 RX ORDER — ATORVASTATIN CALCIUM 80 MG/1
TABLET, FILM COATED ORAL
Qty: 90 TABLET | Refills: 2 | Status: SHIPPED | OUTPATIENT
Start: 2025-01-15

## 2025-01-17 ENCOUNTER — OFFICE VISIT (OUTPATIENT)
Dept: SURGERY | Facility: CLINIC | Age: 70
End: 2025-01-17
Payer: COMMERCIAL

## 2025-01-17 VITALS
WEIGHT: 230 LBS | HEART RATE: 72 BPM | DIASTOLIC BLOOD PRESSURE: 78 MMHG | SYSTOLIC BLOOD PRESSURE: 136 MMHG | BODY MASS INDEX: 34.07 KG/M2 | HEIGHT: 69 IN | RESPIRATION RATE: 16 BRPM

## 2025-01-17 DIAGNOSIS — Z86.0100 HISTORY OF COLON POLYPS: ICD-10-CM

## 2025-01-17 DIAGNOSIS — K40.90 RIGHT INGUINAL HERNIA: Primary | ICD-10-CM

## 2025-01-17 PROCEDURE — 99214 OFFICE O/P EST MOD 30 MIN: CPT | Performed by: SURGERY

## 2025-01-17 RX ORDER — SEMAGLUTIDE 2.68 MG/ML
INJECTION, SOLUTION SUBCUTANEOUS WEEKLY
COMMUNITY
End: 2025-01-20

## 2025-01-17 NOTE — PROGRESS NOTES
Rajan Dc 69 y.o. male presents @ the req of Dr. Eaton for eval of [facility].   Chief Complaint   Patient presents with    Hernia     [facility]             HPI   Above noted and reviewed.  Rajan is known to my service.  I repaired an umbilical hernia on him several years ago.  He is here with a right inguinal hernia.  On further discussion with Rajan he has had polyps removed in the past and is due for a colonoscopy.      Review of Systems   All other systems reviewed and are negative.            Current Outpatient Medications:     aspirin 81 MG EC tablet, Take 1 p.o. daily for blood thinner/heart, Disp: , Rfl:     atorvastatin (LIPITOR) 80 MG tablet, TAKE 1 TABLET BY MOUTH DAILY FOR HIGH CHOLESTEROL, Disp: 90 tablet, Rfl: 2    glimepiride (AMARYL) 4 MG tablet, TAKE 1 TABLET BY MOUTH TWICE DAILY WITH BREAKFAST AND SUPPER FOR DIABETES, Disp: 180 tablet, Rfl: 3    Janumet XR  MG tablet, TAKE 1 TABLET BY MOUTH TWICE DAILY FOR DIABETES, Disp: 90 tablet, Rfl: 5    Jardiance 25 MG tablet tablet, TAKE 1 TABLET BY MOUTH EVERY MORNING BEFORE BREAKFAST FOR DIABETES, Disp: 90 tablet, Rfl: 1    Semaglutide, 2 MG/DOSE, (Ozempic, 2 MG/DOSE,) 8 MG/3ML solution pen-injector, Inject  under the skin into the appropriate area as directed 1 (One) Time Per Week. PT CURRENTLY NOT TAKING X 1 YR, Disp: , Rfl:     vitamin D3 125 MCG (5000 UT) capsule capsule, 1 by mouth daily as directed, Disp: 30 capsule, Rfl: 11        Allergies   Allergen Reactions    Semaglutide Itching and Rash     Rybelsus only.  Tolerates Ozempic.           Past Medical History:   Diagnosis Date    Chronic ITP (idiopathic thrombocytopenia) 09/19/2018    Chronic migraine w/o aura w/o status migrainosus, not intractable 08/18/2022    Diabetic eye exam 05/22/2017 05/22/2017--routine diabetic eye exam reveals no evidence of diabetic retinopathy.  Astigmatism, presbyopia, hypermetropia noted.  Very early cataracts noted bilaterally.  October 2015--patient reports a  routine diabetic eye exam without evidence of diabetic retinopathy.    Diverticulosis of colon 10/28/2021    2021--colonoscopy reveals a 6 mm polyp in the transverse colon.  There was an 8 mm polyp in the descending colon.  A 20 mm polyp was found at 50 cm proximal to the anus.  Resection was incomplete.  The resected tissue was retrieved and coagulation for destruction was performed.  #4, sessile polyps in the sigmoid colon that were 5 to 6 mm in size.  Removed.  #2, sessile polyps in the sig    Elevated liver enzymes 2019--AST is normal at 27, ALT normal at 2 9.  Alkaline phosphatase mildly elevated at 137.  Bilirubin is normal.  2019--ultrasound liver ordered by the gastroenterologist reveals increased hepatic echogenicity consistent with steatosis.  Previous cholecystectomy.  No biliary ductal dilatation.  2019--patient was apparently referred to the gastroenterologist by    Encounter for diabetic foot exam 2023--routine diabetic foot exam reveals no evidence of diabetic foot ulcer or preulcerative callus.  Distal pulses are palpable.  No signs of ischemia.  Sensation subjectively intact.     2023--routine diabetic foot exam reveals no evidence of diabetic foot ulcer or preulcerative callus.  Distal pulses are palpable and sensation seems intact.      Esophageal varices determined by endoscopy 10/28/2021    2021--EGD reveals grade 2 varices in the lower third of the esophagus.  A varix with no bleeding was found in the cardia.  No stigmata of recent bleeding.  Mild portal hypertensive gastropathy was found in the gastric fundus.    Family history of premature coronary artery disease 2016    Father  from a myocardial infarction age 61.    Folic acid deficiency 2016    History of Abnormal pulse oximetry 10/07/2012    10/07/2012--overnight oximetry test revealed significant nocturnal  hypoxemia. Oxygen saturations were greater than 90% for only 50.5% of the study. Oxygen saturation less than 90% for three hours 47 minutes which was 49.5% of the study. Oxygen saturation less than 88% for one hour and 36 minutes and 48 seconds, 21.1% of the study.    History of CABG 01/2005 January 2005 status post four-vessel CABG.    History of colon polyps, 8/18/2021--tubular adenoma x12. 10/28/2021    August 18, 2021--colonoscopy reveals a 6 mm polyp in the transverse colon.  There was an 8 mm polyp in the descending colon.  A 20 mm polyp was found at 50 cm proximal to the anus.  Resection was incomplete.  The resected tissue was retrieved and coagulation for destruction was performed.  #4, sessile polyps in the sigmoid colon that were 5 to 6 mm in size.  Removed.  #2, sessile polyps in the sig    History of COVID-19 06/02/2021    History of myocardial infarction, 2005. 04/28/2016 01/01/2005--status post myocardial infarction.   January 2005--status post four-vessel CABG    Hyperlipidemia 04/28/2016    Hypogonadism male, TRT ineffective 09/13/2012 09/13/2012--treatment for symptomatic hypogonadism begun. This was later discontinued due to lack of efficacy and cost.    Liver cirrhosis secondary to RUIZ 10/28/2021    August 18, 2021--EGD reveals grade 2 varices in the lower third of the esophagus.  A varix with no bleeding was found in the cardia.  No stigmata of recent bleeding.  Mild portal hypertensive gastropathy was found in the gastric fundus.  October 2, 2019--ultrasound liver ordered by the gastroenterologist reveals increased hepatic echogenicity consistent with steatosis.  Previous cholecystectomy.      Male erectile disorder 08/27/2020    Microalbuminuria 04/25/2014 04/25/2014--urine microalbumin elevated 28.7. Normal range 0.0--17.0.    RUIZ (nonalcoholic steatohepatitis) 04/28/2016    Non morbid obesity 06/02/2021    Obstructive sleep apnea, 11/13/2012--mild to moderate NIKI.  Unable to  tolerate CPAP.  Cannot use oral appliance. 10/07/2012    05/02/2014--nocturnal oxygen 2 L per nasal cannula ordered.   12/03/2013--patient was referred for an oral appliance but this could not be done because patient wears dentures.   11/13/2012--diagnosed with mild to moderate obstructive sleep apnea. Patient unable to tolerate CPAP.   10/07/2012--overnight oximetry test revealed significant nocturnal hypoxemia. Oxygen saturations were greater than 90% for only 50.5% of the study. Oxygen saturation less than 90% for three hours 47 minutes which was 49.5% of the study. Oxygen saturation less than 88% for one hour and 36 minutes and 48 seconds, 21.1% of the study.    Occlusive coronary artery disease, 01/01/2005--status post MI.  January 2005--CABG ×4.  Details not known. 01/01/2005 01/01/2005--status post myocardial infarction.   January 2005--status post four-vessel CABG    Sleep related hypoxia 10/07/2012    05/02/2014--nocturnal oxygen 2 L per nasal cannula ordered.  12/03/2013--patient was referred for an oral appliance but this could not be done because patient wears dentures.   11/13/2012--diagnosed with mild to moderate obstructive sleep apnea. Patient unable to tolerate CPAP.   10/07/2012--overnight oximetry test revealed significant nocturnal hypoxemia. Oxygen saturations were greater than 90% for only 50.5% of the study. Oxygen saturation less than 90% for three hours 47 minutes which was 49.5% of the study. Oxygen saturation less than 88% for one hour and 36 minutes and 48 seconds, 21.1% of the study.    Type 2 diabetes mellitus with microalbuminuria, without long-term current use of insulin 01/01/2005    Diagnosed in 2005.    Urticaria due to drug allergy, March 2024--urticaria due to Rybelsus 03/27/2024    Vitamin D deficiency 04/28/2016           Past Surgical History:   Procedure Laterality Date    CARDIAC CATHETERIZATION      CHOLECYSTECTOMY      CHOLECYSTECTOMY WITH INTRAOPERATIVE CHOLANGIOGRAM  N/A 2019    Procedure: laparoscopic cholecystectomy with intraoperative cholangiogram;  Surgeon: iVda Avilez MD;  Location: [facility] MAIN OR;  Service: General    COLONOSCOPY N/A 2021--colonoscopy reveals a 6 mm polyp in the transverse colon.  There was an 8 mm polyp in the descending colon.  A 20 mm polyp was found at 50 cm proximal to the anus.  Resection was incomplete.  The resected tissue was retrieved and coagulation for destruction was performed.  #4, sessile polyps in the sigmoid colon that were 5 to 6 mm in size.  Removed.  #2, sessile polyps in the sig    CORONARY ARTERY BYPASS GRAFT  2005 status post four-vessel CABG.    ENDOSCOPY N/A 2021--EGD reveals grade 2 varices in the lower third of the esophagus.  A varix with no bleeding was found in the cardia.  No stigmata of recent bleeding.  Mild portal hypertensive gastropathy was found in the gastric fundus.    UMBILICAL HERNIA REPAIR N/A 2023    Procedure: UMBILICAL HERNIA REPAIR;  Surgeon: Kari Alejandro DO;  Location: [facility] OR;  Service: General;  Laterality: N/A;           Social History     Tobacco Use    Smoking status: Former     Current packs/day: 0.00     Average packs/day: 0.5 packs/day for 40.0 years (20.0 ttl pk-yrs)     Types: Cigarettes     Start date: 1965     Quit date: 2005     Years since quittin.0    Smokeless tobacco: Never    Tobacco comments:     Former smoker quit 13 years ago with a 64.5 pack year history.  Smoked 1 ppd for 32 years and 4.5 ppd for 5 years and quit when he had his open heart surgery.   Vaping Use    Vaping status: Never Used   Substance Use Topics    Alcohol use: No    Drug use: No           Immunization History   Administered Date(s) Administered    Pneumococcal Conjugate 20-Valent (PCV20) 2022    Pneumococcal Polysaccharide (PPSV23) 2017    Tdap 2015           Physical Exam  Vitals and nursing  "note reviewed.   Constitutional:       Appearance: Normal appearance.   HENT:      Head: Normocephalic and atraumatic.   Cardiovascular:      Rate and Rhythm: Normal rate and regular rhythm.   Pulmonary:      Effort: Pulmonary effort is normal.      Breath sounds: Normal breath sounds.   Abdominal:      General: Bowel sounds are normal.      Palpations: Abdomen is soft.   Genitourinary:     Comments: Right inguinal hernia noted  Neurological:      General: No focal deficit present.      Mental Status: He is alert and oriented to person, place, and time.   Psychiatric:         Mood and Affect: Mood normal.         Behavior: Behavior normal.         Debilities/Disabilities Identified: None    Emotional Behavior: Appropriate      /78   Pulse 72   Resp 16   Ht 175.3 cm (69\")   Wt 104 kg (230 lb)   BMI 33.97 kg/m²         Diagnoses and all orders for this visit:    1. Right inguinal hernia (Primary)    2. History of colon polyps    We will get Rajan scheduled for a colonoscopy and laparoscopic right inguinal hernia repair.  I discussed with Rajan and his wife the benefits and risks of both procedures.  All of their questions were answered and he is willing to proceed.    Thank you for allowing me to participate in the care of this interesting patient.         "

## 2025-01-17 NOTE — LETTER
January 17, 2025     Scott Eaton MD  [address] [address]  [address]  [place] [address]    Patient: Rajan Dc   YOB: 1955   Date of Visit: 1/17/2025     Dear Scott Eaton MD:       Thank you for referring Rajan Dc to me for evaluation. Below are the relevant portions of my assessment and plan of care.    If you have questions, please do not hesitate to call me. I look forward to following Rajan along with you.         Sincerely,        Kari Alejandro DO        CC: No Recipients    Kari Alejandro DO  01/17/25 1046  Sign when Signing Visit  Rajan Dc 69 y.o. male presents @ the req of Dr. Eaton for eval of [facility].   Chief Complaint   Patient presents with   • Hernia     [facility]             HPI   Above noted and reviewed.  Rajan is known to my service.  I repaired an umbilical hernia on him several years ago.  He is here with a right inguinal hernia.  On further discussion with Rajan he has had polyps removed in the past and is due for a colonoscopy.      Review of Systems   All other systems reviewed and are negative.            Current Outpatient Medications:   •  aspirin 81 MG EC tablet, Take 1 p.o. daily for blood thinner/heart, Disp: , Rfl:   •  atorvastatin (LIPITOR) 80 MG tablet, TAKE 1 TABLET BY MOUTH DAILY FOR HIGH CHOLESTEROL, Disp: 90 tablet, Rfl: 2  •  glimepiride (AMARYL) 4 MG tablet, TAKE 1 TABLET BY MOUTH TWICE DAILY WITH BREAKFAST AND SUPPER FOR DIABETES, Disp: 180 tablet, Rfl: 3  •  Janumet XR  MG tablet, TAKE 1 TABLET BY MOUTH TWICE DAILY FOR DIABETES, Disp: 90 tablet, Rfl: 5  •  Jardiance 25 MG tablet tablet, TAKE 1 TABLET BY MOUTH EVERY MORNING BEFORE BREAKFAST FOR DIABETES, Disp: 90 tablet, Rfl: 1  •  Semaglutide, 2 MG/DOSE, (Ozempic, 2 MG/DOSE,) 8 MG/3ML solution pen-injector, Inject  under the skin into the appropriate area as directed 1 (One) Time Per Week. PT CURRENTLY NOT TAKING X 1 YR, Disp: , Rfl:   •  vitamin D3 125 MCG (5000 UT) capsule  capsule, 1 by mouth daily as directed, Disp: 30 capsule, Rfl: 11        Allergies   Allergen Reactions   • Semaglutide Itching and Rash     Rybelsus only.  Tolerates Ozempic.           Past Medical History:   Diagnosis Date   • Chronic ITP (idiopathic thrombocytopenia) 09/19/2018   • Chronic migraine w/o aura w/o status migrainosus, not intractable 08/18/2022   • Diabetic eye exam 05/22/2017 05/22/2017--routine diabetic eye exam reveals no evidence of diabetic retinopathy.  Astigmatism, presbyopia, hypermetropia noted.  Very early cataracts noted bilaterally.  October 2015--patient reports a routine diabetic eye exam without evidence of diabetic retinopathy.   • Diverticulosis of colon 10/28/2021    August 18, 2021--colonoscopy reveals a 6 mm polyp in the transverse colon.  There was an 8 mm polyp in the descending colon.  A 20 mm polyp was found at 50 cm proximal to the anus.  Resection was incomplete.  The resected tissue was retrieved and coagulation for destruction was performed.  #4, sessile polyps in the sigmoid colon that were 5 to 6 mm in size.  Removed.  #2, sessile polyps in the sig   • Elevated liver enzymes 06/23/2019 October 17, 2019--AST is normal at 27, ALT normal at 2 9.  Alkaline phosphatase mildly elevated at 137.  Bilirubin is normal.  October 2, 2019--ultrasound liver ordered by the gastroenterologist reveals increased hepatic echogenicity consistent with steatosis.  Previous cholecystectomy.  No biliary ductal dilatation.  September 24, 2019--patient was apparently referred to the gastroenterologist by   • Encounter for diabetic foot exam 08/30/2023 September 12, 2024--routine diabetic foot exam reveals no evidence of diabetic foot ulcer or preulcerative callus.  Distal pulses are palpable.  No signs of ischemia.  Sensation subjectively intact.     August 30, 2023--routine diabetic foot exam reveals no evidence of diabetic foot ulcer or preulcerative callus.  Distal pulses are palpable  and sensation seems intact.     • Esophageal varices determined by endoscopy 10/28/2021    2021--EGD reveals grade 2 varices in the lower third of the esophagus.  A varix with no bleeding was found in the cardia.  No stigmata of recent bleeding.  Mild portal hypertensive gastropathy was found in the gastric fundus.   • Family history of premature coronary artery disease 2016    Father  from a myocardial infarction age 61.   • Folic acid deficiency 2016   • History of Abnormal pulse oximetry 10/07/2012    10/07/2012--overnight oximetry test revealed significant nocturnal hypoxemia. Oxygen saturations were greater than 90% for only 50.5% of the study. Oxygen saturation less than 90% for three hours 47 minutes which was 49.5% of the study. Oxygen saturation less than 88% for one hour and 36 minutes and 48 seconds, 21.1% of the study.   • History of CABG 2005 status post four-vessel CABG.   • History of colon polyps, 2021--tubular adenoma x12. 10/28/2021    2021--colonoscopy reveals a 6 mm polyp in the transverse colon.  There was an 8 mm polyp in the descending colon.  A 20 mm polyp was found at 50 cm proximal to the anus.  Resection was incomplete.  The resected tissue was retrieved and coagulation for destruction was performed.  #4, sessile polyps in the sigmoid colon that were 5 to 6 mm in size.  Removed.  #2, sessile polyps in the sig   • History of COVID-19 2021   • History of myocardial infarction, . 2016--status post myocardial infarction.   2005--status post four-vessel CABG   • Hyperlipidemia 2016   • Hypogonadism male, TRT ineffective 2012--treatment for symptomatic hypogonadism begun. This was later discontinued due to lack of efficacy and cost.   • Liver cirrhosis secondary to RUIZ 10/28/2021    2021--EGD reveals grade 2 varices in the lower third of the esophagus.  A  varix with no bleeding was found in the cardia.  No stigmata of recent bleeding.  Mild portal hypertensive gastropathy was found in the gastric fundus.  October 2, 2019--ultrasound liver ordered by the gastroenterologist reveals increased hepatic echogenicity consistent with steatosis.  Previous cholecystectomy.     • Male erectile disorder 08/27/2020   • Microalbuminuria 04/25/2014 04/25/2014--urine microalbumin elevated 28.7. Normal range 0.0--17.0.   • RUIZ (nonalcoholic steatohepatitis) 04/28/2016   • Non morbid obesity 06/02/2021   • Obstructive sleep apnea, 11/13/2012--mild to moderate NIKI.  Unable to tolerate CPAP.  Cannot use oral appliance. 10/07/2012    05/02/2014--nocturnal oxygen 2 L per nasal cannula ordered.   12/03/2013--patient was referred for an oral appliance but this could not be done because patient wears dentures.   11/13/2012--diagnosed with mild to moderate obstructive sleep apnea. Patient unable to tolerate CPAP.   10/07/2012--overnight oximetry test revealed significant nocturnal hypoxemia. Oxygen saturations were greater than 90% for only 50.5% of the study. Oxygen saturation less than 90% for three hours 47 minutes which was 49.5% of the study. Oxygen saturation less than 88% for one hour and 36 minutes and 48 seconds, 21.1% of the study.   • Occlusive coronary artery disease, 01/01/2005--status post MI.  January 2005--CABG ×4.  Details not known. 01/01/2005 01/01/2005--status post myocardial infarction.   January 2005--status post four-vessel CABG   • Sleep related hypoxia 10/07/2012    05/02/2014--nocturnal oxygen 2 L per nasal cannula ordered.  12/03/2013--patient was referred for an oral appliance but this could not be done because patient wears dentures.   11/13/2012--diagnosed with mild to moderate obstructive sleep apnea. Patient unable to tolerate CPAP.   10/07/2012--overnight oximetry test revealed significant nocturnal hypoxemia. Oxygen saturations were greater than 90%  for only 50.5% of the study. Oxygen saturation less than 90% for three hours 47 minutes which was 49.5% of the study. Oxygen saturation less than 88% for one hour and 36 minutes and 48 seconds, 21.1% of the study.   • Type 2 diabetes mellitus with microalbuminuria, without long-term current use of insulin 01/01/2005    Diagnosed in 2005.   • Urticaria due to drug allergy, March 2024--urticaria due to Rybelsus 03/27/2024   • Vitamin D deficiency 04/28/2016           Past Surgical History:   Procedure Laterality Date   • CARDIAC CATHETERIZATION     • CHOLECYSTECTOMY     • CHOLECYSTECTOMY WITH INTRAOPERATIVE CHOLANGIOGRAM N/A 06/24/2019    Procedure: laparoscopic cholecystectomy with intraoperative cholangiogram;  Surgeon: Vida Avilez MD;  Location: [facility];  Service: General   • COLONOSCOPY N/A 08/18/2021 August 18, 2021--colonoscopy reveals a 6 mm polyp in the transverse colon.  There was an 8 mm polyp in the descending colon.  A 20 mm polyp was found at 50 cm proximal to the anus.  Resection was incomplete.  The resected tissue was retrieved and coagulation for destruction was performed.  #4, sessile polyps in the sigmoid colon that were 5 to 6 mm in size.  Removed.  #2, sessile polyps in the sig   • CORONARY ARTERY BYPASS GRAFT  01/2005 January 2005 status post four-vessel CABG.   • ENDOSCOPY N/A 08/18/2021 August 18, 2021--EGD reveals grade 2 varices in the lower third of the esophagus.  A varix with no bleeding was found in the cardia.  No stigmata of recent bleeding.  Mild portal hypertensive gastropathy was found in the gastric fundus.   • UMBILICAL HERNIA REPAIR N/A 09/28/2023    Procedure: UMBILICAL HERNIA REPAIR;  Surgeon: Kari Alejandro DO;  Location: [facility];  Service: General;  Laterality: N/A;           Social History     Tobacco Use   • Smoking status: Former     Current packs/day: 0.00     Average packs/day: 0.5 packs/day for 40.0 years (20.0 ttl pk-yrs)     Types:  "Cigarettes     Start date: 1965     Quit date: 2005     Years since quittin.0   • Smokeless tobacco: Never   • Tobacco comments:     Former smoker quit 13 years ago with a 64.5 pack year history.  Smoked 1 ppd for 32 years and 4.5 ppd for 5 years and quit when he had his open heart surgery.   Vaping Use   • Vaping status: Never Used   Substance Use Topics   • Alcohol use: No   • Drug use: No           Immunization History   Administered Date(s) Administered   • Pneumococcal Conjugate 20-Valent (PCV20) 2022   • Pneumococcal Polysaccharide (PPSV23) 2017   • Tdap 2015           Physical Exam  Vitals and nursing note reviewed.   Constitutional:       Appearance: Normal appearance.   HENT:      Head: Normocephalic and atraumatic.   Cardiovascular:      Rate and Rhythm: Normal rate and regular rhythm.   Pulmonary:      Effort: Pulmonary effort is normal.      Breath sounds: Normal breath sounds.   Abdominal:      General: Bowel sounds are normal.      Palpations: Abdomen is soft.   Genitourinary:     Comments: Right inguinal hernia noted  Neurological:      General: No focal deficit present.      Mental Status: He is alert and oriented to person, place, and time.   Psychiatric:         Mood and Affect: Mood normal.         Behavior: Behavior normal.         Debilities/Disabilities Identified: None    Emotional Behavior: Appropriate      /78   Pulse 72   Resp 16   Ht 175.3 cm (69\")   Wt 104 kg (230 lb)   BMI 33.97 kg/m²         Diagnoses and all orders for this visit:    1. Right inguinal hernia (Primary)    2. History of colon polyps    We will get Rajan scheduled for a colonoscopy and laparoscopic right inguinal hernia repair.  I discussed with Rajan and his wife the benefits and risks of both procedures.  All of their questions were answered and he is willing to proceed.    Thank you for allowing me to participate in the care of this interesting patient.         "

## 2025-01-20 ENCOUNTER — OFFICE VISIT (OUTPATIENT)
Dept: INTERNAL MEDICINE | Facility: CLINIC | Age: 70
End: 2025-01-20
Payer: COMMERCIAL

## 2025-01-20 VITALS
SYSTOLIC BLOOD PRESSURE: 132 MMHG | DIASTOLIC BLOOD PRESSURE: 74 MMHG | HEIGHT: 69 IN | WEIGHT: 241 LBS | HEART RATE: 83 BPM | OXYGEN SATURATION: 98 % | RESPIRATION RATE: 16 BRPM | BODY MASS INDEX: 35.7 KG/M2 | TEMPERATURE: 97.7 F

## 2025-01-20 DIAGNOSIS — D69.3 CHRONIC ITP (IDIOPATHIC THROMBOCYTOPENIA): Chronic | ICD-10-CM

## 2025-01-20 DIAGNOSIS — R74.8 ELEVATED LIVER ENZYMES: Chronic | ICD-10-CM

## 2025-01-20 DIAGNOSIS — Z86.0100 HISTORY OF COLON POLYPS: Chronic | ICD-10-CM

## 2025-01-20 DIAGNOSIS — E66.9 NON MORBID OBESITY: Chronic | ICD-10-CM

## 2025-01-20 DIAGNOSIS — R80.9 TYPE 2 DIABETES MELLITUS WITH MICROALBUMINURIA, WITHOUT LONG-TERM CURRENT USE OF INSULIN: Primary | Chronic | ICD-10-CM

## 2025-01-20 DIAGNOSIS — K75.81 LIVER CIRRHOSIS SECONDARY TO NASH: Chronic | ICD-10-CM

## 2025-01-20 DIAGNOSIS — K40.90 RIGHT INGUINAL HERNIA: ICD-10-CM

## 2025-01-20 DIAGNOSIS — E11.29 TYPE 2 DIABETES MELLITUS WITH MICROALBUMINURIA, WITHOUT LONG-TERM CURRENT USE OF INSULIN: Primary | Chronic | ICD-10-CM

## 2025-01-20 DIAGNOSIS — E55.9 VITAMIN D DEFICIENCY: Chronic | ICD-10-CM

## 2025-01-20 DIAGNOSIS — I85.00 ESOPHAGEAL VARICES DETERMINED BY ENDOSCOPY: Chronic | ICD-10-CM

## 2025-01-20 DIAGNOSIS — T50.905A URTICARIA DUE TO DRUG ALLERGY: Chronic | ICD-10-CM

## 2025-01-20 DIAGNOSIS — D50.0 IRON DEFICIENCY ANEMIA DUE TO CHRONIC BLOOD LOSS: ICD-10-CM

## 2025-01-20 DIAGNOSIS — Z86.16 HISTORY OF COVID-19: Chronic | ICD-10-CM

## 2025-01-20 DIAGNOSIS — E53.8 FOLIC ACID DEFICIENCY: Chronic | ICD-10-CM

## 2025-01-20 DIAGNOSIS — L50.0 URTICARIA DUE TO DRUG ALLERGY: Chronic | ICD-10-CM

## 2025-01-20 DIAGNOSIS — G47.33 OBSTRUCTIVE SLEEP APNEA: Chronic | ICD-10-CM

## 2025-01-20 DIAGNOSIS — G47.34 SLEEP RELATED HYPOXIA: Chronic | ICD-10-CM

## 2025-01-20 DIAGNOSIS — K75.81 NASH (NONALCOHOLIC STEATOHEPATITIS): Chronic | ICD-10-CM

## 2025-01-20 DIAGNOSIS — K74.60 LIVER CIRRHOSIS SECONDARY TO NASH: Chronic | ICD-10-CM

## 2025-01-20 DIAGNOSIS — E78.2 MIXED HYPERLIPIDEMIA: Chronic | ICD-10-CM

## 2025-01-20 DIAGNOSIS — I25.10 OCCLUSIVE CORONARY ARTERY DISEASE: Chronic | ICD-10-CM

## 2025-01-20 DIAGNOSIS — R80.9 MICROALBUMINURIA: Chronic | ICD-10-CM

## 2025-01-20 PROCEDURE — 99214 OFFICE O/P EST MOD 30 MIN: CPT | Performed by: INTERNAL MEDICINE

## 2025-01-20 NOTE — PROGRESS NOTES
01/20/2025    Patient Information  Rajan Dc                                                                                          [address] [place] [id number]      1955  [unfilled]  [phone] (work)    Chief Complaint:     Follow-up chronic medical problems and recent lab work.    History of Present Illness:    Patient with multiple chronic medical problems as noted below in assessment and plan presents today for routine follow-up with lab prior in order to monitor his chronic medical issues.  He has no new acute complaints.  Patient was recently evaluated by the general surgeon for hernia and plans are for repair.  Patient also needs a colonoscopy.  His past medical history reviewed and updated were necessary including health maintenance parameters.    Review of Systems   Constitutional: Negative.   HENT: Negative.     Eyes: Negative.    Cardiovascular: Negative.    Respiratory: Negative.     Endocrine: Negative.    Hematologic/Lymphatic: Negative.    Skin: Negative.    Musculoskeletal:  Positive for arthritis and back pain.   Gastrointestinal: Negative.    Genitourinary: Negative.    Neurological: Negative.    Psychiatric/Behavioral: Negative.     Allergic/Immunologic: Negative.        Active Problems:    Patient Active Problem List   Diagnosis    Occlusive coronary artery disease, 01/01/2005--status post MI.  January 2005--CABG ×4.  Details not known.    Folic acid deficiency    Hyperlipidemia    Hypogonadism male, TRT ineffective    Microalbuminuria    RUIZ (nonalcoholic steatohepatitis)    Obstructive sleep apnea, 11/13/2012--mild to moderate NIKI.  Unable to tolerate CPAP.  Cannot use oral appliance.    Sleep related hypoxia    Type 2 diabetes mellitus with microalbuminuria, without long-term current use of insulin    Vitamin D deficiency    Therapeutic drug monitoring    Routine physical examination    Family history of premature coronary artery disease    Diabetic  eye exam    History of myocardial infarction, 2005.    Chronic ITP (idiopathic thrombocytopenia)    Elevated liver enzymes    Male erectile disorder    Iron deficiency anemia due to chronic blood loss    Non morbid obesity    History of COVID-19    History of colon polyps, 8/18/2021--tubular adenoma x12.    Esophageal varices determined by endoscopy    Liver cirrhosis secondary to RUIZ    Diverticulosis of colon    Chronic migraine w/o aura w/o status migrainosus, not intractable    Encounter for diabetic foot exam    Urticaria due to drug allergy, March 2024--urticaria due to Rybelsus    Right inguinal hernia         Past Medical History:   Diagnosis Date    Chronic ITP (idiopathic thrombocytopenia) 09/19/2018    Chronic migraine w/o aura w/o status migrainosus, not intractable 08/18/2022    Diabetic eye exam 05/22/2017 05/22/2017--routine diabetic eye exam reveals no evidence of diabetic retinopathy.  Astigmatism, presbyopia, hypermetropia noted.  Very early cataracts noted bilaterally.  October 2015--patient reports a routine diabetic eye exam without evidence of diabetic retinopathy.    Diverticulosis of colon 10/28/2021    August 18, 2021--colonoscopy reveals a 6 mm polyp in the transverse colon.  There was an 8 mm polyp in the descending colon.  A 20 mm polyp was found at 50 cm proximal to the anus.  Resection was incomplete.  The resected tissue was retrieved and coagulation for destruction was performed.  #4, sessile polyps in the sigmoid colon that were 5 to 6 mm in size.  Removed.  #2, sessile polyps in the sig    Elevated liver enzymes 06/23/2019 October 17, 2019--AST is normal at 27, ALT normal at 2 9.  Alkaline phosphatase mildly elevated at 137.  Bilirubin is normal.  October 2, 2019--ultrasound liver ordered by the gastroenterologist reveals increased hepatic echogenicity consistent with steatosis.  Previous cholecystectomy.  No biliary ductal dilatation.  September 24, 2019--patient was  apparently referred to the gastroenterologist by    Encounter for diabetic foot exam 2023--routine diabetic foot exam reveals no evidence of diabetic foot ulcer or preulcerative callus.  Distal pulses are palpable.  No signs of ischemia.  Sensation subjectively intact.     2023--routine diabetic foot exam reveals no evidence of diabetic foot ulcer or preulcerative callus.  Distal pulses are palpable and sensation seems intact.      Esophageal varices determined by endoscopy 10/28/2021    2021--EGD reveals grade 2 varices in the lower third of the esophagus.  A varix with no bleeding was found in the cardia.  No stigmata of recent bleeding.  Mild portal hypertensive gastropathy was found in the gastric fundus.    Family history of premature coronary artery disease 2016    Father  from a myocardial infarction age 61.    Folic acid deficiency 2016    History of Abnormal pulse oximetry 10/07/2012    10/07/2012--overnight oximetry test revealed significant nocturnal hypoxemia. Oxygen saturations were greater than 90% for only 50.5% of the study. Oxygen saturation less than 90% for three hours 47 minutes which was 49.5% of the study. Oxygen saturation less than 88% for one hour and 36 minutes and 48 seconds, 21.1% of the study.    History of CABG 2005 status post four-vessel CABG.    History of colon polyps, 2021--tubular adenoma x12. 10/28/2021    2021--colonoscopy reveals a 6 mm polyp in the transverse colon.  There was an 8 mm polyp in the descending colon.  A 20 mm polyp was found at 50 cm proximal to the anus.  Resection was incomplete.  The resected tissue was retrieved and coagulation for destruction was performed.  #4, sessile polyps in the sigmoid colon that were 5 to 6 mm in size.  Removed.  #2, sessile polyps in the sig    History of COVID-19 2021    History of myocardial infarction, . 2016     01/01/2005--status post myocardial infarction.   January 2005--status post four-vessel CABG    Hyperlipidemia 04/28/2016    Hypogonadism male, TRT ineffective 09/13/2012 09/13/2012--treatment for symptomatic hypogonadism begun. This was later discontinued due to lack of efficacy and cost.    Iron deficiency anemia due to chronic blood loss 06/02/2021    Liver cirrhosis secondary to RUIZ 10/28/2021    August 18, 2021--EGD reveals grade 2 varices in the lower third of the esophagus.  A varix with no bleeding was found in the cardia.  No stigmata of recent bleeding.  Mild portal hypertensive gastropathy was found in the gastric fundus.  October 2, 2019--ultrasound liver ordered by the gastroenterologist reveals increased hepatic echogenicity consistent with steatosis.  Previous cholecystectomy.      Male erectile disorder 08/27/2020    Microalbuminuria 04/25/2014 04/25/2014--urine microalbumin elevated 28.7. Normal range 0.0--17.0.    RUIZ (nonalcoholic steatohepatitis) 04/28/2016    Non morbid obesity 06/02/2021    Obstructive sleep apnea, 11/13/2012--mild to moderate NIKI.  Unable to tolerate CPAP.  Cannot use oral appliance. 10/07/2012    05/02/2014--nocturnal oxygen 2 L per nasal cannula ordered.   12/03/2013--patient was referred for an oral appliance but this could not be done because patient wears dentures.   11/13/2012--diagnosed with mild to moderate obstructive sleep apnea. Patient unable to tolerate CPAP.   10/07/2012--overnight oximetry test revealed significant nocturnal hypoxemia. Oxygen saturations were greater than 90% for only 50.5% of the study. Oxygen saturation less than 90% for three hours 47 minutes which was 49.5% of the study. Oxygen saturation less than 88% for one hour and 36 minutes and 48 seconds, 21.1% of the study.    Occlusive coronary artery disease, 01/01/2005--status post MI.  January 2005--CABG ×4.  Details not known. 01/01/2005 01/01/2005--status post myocardial infarction.    January 2005--status post four-vessel CABG    Sleep related hypoxia 10/07/2012    05/02/2014--nocturnal oxygen 2 L per nasal cannula ordered.  12/03/2013--patient was referred for an oral appliance but this could not be done because patient wears dentures.   11/13/2012--diagnosed with mild to moderate obstructive sleep apnea. Patient unable to tolerate CPAP.   10/07/2012--overnight oximetry test revealed significant nocturnal hypoxemia. Oxygen saturations were greater than 90% for only 50.5% of the study. Oxygen saturation less than 90% for three hours 47 minutes which was 49.5% of the study. Oxygen saturation less than 88% for one hour and 36 minutes and 48 seconds, 21.1% of the study.    Type 2 diabetes mellitus with microalbuminuria, without long-term current use of insulin 01/01/2005    Diagnosed in 2005.    Urticaria due to drug allergy, March 2024--urticaria due to Rybelsus 03/27/2024    Vitamin D deficiency 04/28/2016         Past Surgical History:   Procedure Laterality Date    CARDIAC CATHETERIZATION      CHOLECYSTECTOMY      CHOLECYSTECTOMY WITH INTRAOPERATIVE CHOLANGIOGRAM N/A 06/24/2019    Procedure: laparoscopic cholecystectomy with intraoperative cholangiogram;  Surgeon: Vida Avilez MD;  Location: [facility];  Service: General    COLONOSCOPY N/A 08/18/2021 August 18, 2021--colonoscopy reveals a 6 mm polyp in the transverse colon.  There was an 8 mm polyp in the descending colon.  A 20 mm polyp was found at 50 cm proximal to the anus.  Resection was incomplete.  The resected tissue was retrieved and coagulation for destruction was performed.  #4, sessile polyps in the sigmoid colon that were 5 to 6 mm in size.  Removed.  #2, sessile polyps in the sig    CORONARY ARTERY BYPASS GRAFT  01/2005 January 2005 status post four-vessel CABG.    ENDOSCOPY N/A 08/18/2021 August 18, 2021--EGD reveals grade 2 varices in the lower third of the esophagus.  A varix with no bleeding was found in the  cardia.  No stigmata of recent bleeding.  Mild portal hypertensive gastropathy was found in the gastric fundus.    UMBILICAL HERNIA REPAIR N/A 2023    Procedure: UMBILICAL HERNIA REPAIR;  Surgeon: Kari Alejandro DO;  Location: [facility];  Service: General;  Laterality: N/A;         Allergies   Allergen Reactions    Semaglutide Itching and Rash     Rybelsus only.  Tolerates Ozempic.           Current Outpatient Medications:     aspirin 81 MG EC tablet, Take 1 p.o. daily for blood thinner/heart, Disp: , Rfl:     atorvastatin (LIPITOR) 80 MG tablet, TAKE 1 TABLET BY MOUTH DAILY FOR HIGH CHOLESTEROL, Disp: 90 tablet, Rfl: 2    glimepiride (AMARYL) 4 MG tablet, TAKE 1 TABLET BY MOUTH TWICE DAILY WITH BREAKFAST AND SUPPER FOR DIABETES, Disp: 180 tablet, Rfl: 3    Janumet XR  MG tablet, TAKE 1 TABLET BY MOUTH TWICE DAILY FOR DIABETES, Disp: 90 tablet, Rfl: 5    Jardiance 25 MG tablet tablet, TAKE 1 TABLET BY MOUTH EVERY MORNING BEFORE BREAKFAST FOR DIABETES, Disp: 90 tablet, Rfl: 1    vitamin D3 125 MCG (5000 UT) capsule capsule, 1 by mouth daily as directed, Disp: 30 capsule, Rfl: 11    Semaglutide, 1 MG/DOSE, (OZEMPIC) 4 MG/3ML solution pen-injector, Inject 1 mg subcutaneously every week as directed.  This is a decreased dosage from 2 mg down to 1 mg.  Prior authorization has already been obtained., Disp: 3 mL, Rfl: 6      Family History   Problem Relation Age of Onset    Other Mother         Ventricular Septal Defect    Heart disease Mother     Hypertension Mother     Cancer Mother     Diabetes Mother     Arthritis Mother     Heart attack Father         Prior Myocardial Infarction.  Dad  from a myocardial infarction at age 59.    Heart disease Father     Heart disease Sister     Heart disease Brother     Cancer Daughter     Heart disease Other         Congenital Heart Disease. Multiple family members with septal defects that required surgery.    Malig Hyperthermia Neg Hx          Social History  "    Socioeconomic History    Marital status:      Spouse name: Dasha    Number of children: 8    Highest education level: 9th grade   Tobacco Use    Smoking status: Former     Current packs/day: 0.00     Average packs/day: 0.5 packs/day for 40.0 years (20.0 ttl pk-yrs)     Types: Cigarettes     Start date: 1965     Quit date: 2005     Years since quittin.0    Smokeless tobacco: Never    Tobacco comments:     Former smoker quit 13 years ago with a 64.5 pack year history.  Smoked 1 ppd for 32 years and 4.5 ppd for 5 years and quit when he had his open heart surgery.   Vaping Use    Vaping status: Never Used   Substance and Sexual Activity    Alcohol use: No    Drug use: No    Sexual activity: Yes     Partners: Female         Vitals:    25 0758   BP: 132/74   Pulse: 83   Resp: 16   Temp: 97.7 °F (36.5 °C)   TempSrc: Oral   SpO2: 98%   Weight: 109 kg (241 lb)   Height: 175.3 cm (69.02\")        Body mass index is 35.57 kg/m².      Physical Exam:    General: Alert and oriented x 3.  No acute distress.  Normal affect.  Obese.  HEENT: Pupils equal, round, reactive to light; extraocular movements intact; sclerae nonicteric; pharynx, ear canals and TMs normal.  Neck: Without JVD, thyromegaly, bruit, or adenopathy.  Lungs: Clear to auscultation in all fields.  Heart: Regular rate and rhythm without murmur, rub, gallop, or click.  Abdomen: Soft, nontender, without hepatosplenomegaly or hernia.  Bowel sounds normal.  : Deferred.  Rectal: Deferred.  Extremities: Without clubbing, cyanosis, edema, or pulse deficit.  Neurologic: Intact without focal deficit.  Normal station and gait observed during ingress and egress from the examination room.  Skin: Without significant lesion.  Musculoskeletal: Unremarkable.    Lab/other results:    CBC reveals a hemoglobin of 10.4 and MCV of 74.  Platelets low 109.  CPK normal at 118.  CMP normal except glucose 137, albumin low at 3.8.  Hemoglobin A1c 8.2.  Total " cholesterol is 123, triglycerides 78, LDL particle #361, HDL particle number low at 22.2.  Vitamin D normal at 33.3.  Serum iron is low at 16 and iron saturation low at 4%.    Assessment/Plan:     Diagnosis Plan   1. Type 2 diabetes mellitus with microalbuminuria, without long-term current use of insulin  Semaglutide, 1 MG/DOSE, (OZEMPIC) 4 MG/3ML solution pen-injector      2. Microalbuminuria        3. Hyperlipidemia        4. Folic acid deficiency        5. RUIZ (nonalcoholic steatohepatitis)        6. Vitamin D deficiency        7. Chronic ITP (idiopathic thrombocytopenia)        8. Elevated liver enzymes        9. History of COVID-19        10. Liver cirrhosis secondary to RUIZ        11. Non morbid obesity        12. Esophageal varices determined by endoscopy        13. History of colon polyps, 8/18/2021--tubular adenoma x12.  Ambulatory Referral For Screening Colonoscopy      14. Urticaria due to drug allergy, March 2024--urticaria due to Rybelsus        15. Iron deficiency anemia due to chronic blood loss        16. Right inguinal hernia        17. Obstructive sleep apnea, 11/13/2012--mild to moderate NIKI.  Unable to tolerate CPAP.  Cannot use oral appliance.        18. Sleep related hypoxia        19. Occlusive coronary artery disease, 01/01/2005--status post MI.  January 2005--CABG ×4.  Details not known.          Patient has type 2 diabetes that is not at goal.  He is on near maximum medical therapy including glimepiride 4 mg twice a day, Janumet 50/1000 twice a day, Jardiance 25 mg/day, and semaglutide 2 mg subcutaneously injection once weekly.  He has obesity and I strongly encouraged low carbohydrate diet and attainment of ideal body weight.  Exercise will be important as well.  He has an iron deficiency that I assume is from chronic blood loss and he needs a colonoscopy in the near future.  He saw general surgeon regarding the right inguinal hernia and hopefully this will be scheduled soon.  He has  sleep apnea but cannot tolerate CPAP or an oral appliance.  This will certainly most likely hinder his weight loss.  He has occlusive coronary artery disease and strict correction of risk factors is very imperative.    Plan is as follows: I have strongly encouraged patient to follow a low carbohydrate diet and to attain ideal body weight.  His weight is up since the last visit.        Procedures

## 2025-01-24 NOTE — PAT
Pt had PAT appointment scheduled on same day as Colonoscopy, called pt and reviewed his health history and meds via phone - , pt has already stopped Ozempic, reviewed instructions for surgery and pt indicated understanding.

## 2025-01-27 ENCOUNTER — ANESTHESIA EVENT (OUTPATIENT)
Dept: PERIOP | Facility: HOSPITAL | Age: 70
End: 2025-01-27
Payer: COMMERCIAL

## 2025-01-28 ENCOUNTER — APPOINTMENT (OUTPATIENT)
Dept: PREADMISSION TESTING | Facility: HOSPITAL | Age: 70
End: 2025-01-28
Payer: COMMERCIAL

## 2025-01-28 ENCOUNTER — ANESTHESIA (OUTPATIENT)
Dept: PERIOP | Facility: HOSPITAL | Age: 70
End: 2025-01-28
Payer: COMMERCIAL

## 2025-02-05 ENCOUNTER — PRE-ADMISSION TESTING (OUTPATIENT)
Dept: PREADMISSION TESTING | Facility: HOSPITAL | Age: 70
End: 2025-02-05
Payer: COMMERCIAL

## 2025-02-05 VITALS
HEART RATE: 88 BPM | DIASTOLIC BLOOD PRESSURE: 60 MMHG | HEIGHT: 69 IN | WEIGHT: 248 LBS | SYSTOLIC BLOOD PRESSURE: 142 MMHG | OXYGEN SATURATION: 96 % | BODY MASS INDEX: 36.73 KG/M2 | RESPIRATION RATE: 18 BRPM

## 2025-02-05 LAB
QT INTERVAL: 473 MS
QTC INTERVAL: 550 MS

## 2025-02-05 PROCEDURE — 36415 COLL VENOUS BLD VENIPUNCTURE: CPT

## 2025-02-05 PROCEDURE — 93005 ELECTROCARDIOGRAM TRACING: CPT

## 2025-02-05 NOTE — PAT
Pt and daughter here for PAT visit.  Pre-op tests completed, chg soap given, and instructions reviewed.  Instructed clears until 2 hrs prior to arrival time, voiced understanding. Pt c/o new shortness of air for about a month, states is similar to before he had a heart attack years ago. Will have anesthesia review chart.   Alert-The patient is alert, awake and responds to voice. The patient is oriented to time, place, and person. The triage nurse is able to obtain subjective information.

## 2025-02-05 NOTE — DISCHARGE INSTRUCTIONS
PRE-ADMISSION TESTING INSTRUCTIONS FOR ADULTS    Take these medications the morning of surgery with a small sip of water:  atorvastatin      Do not take any insulin or diabetes medications the morning of surgery.      No aspirin, advil, aleve, ibuprofen, naproxen, diet pills, decongestants, or herbal/vitamins for a week prior to surgery.       Tylenol/Acetaminophen is okay to take if needed.    General Instructions:    DO NOT EAT SOLID FOOD AFTER MIDNIGHT THE NIGHT BEFORE SURGERY. No gum, mints, or hard candy after midnight the night before surgery.  You may drink clear liquids the day of surgery up until 2 hours before your arrival time.  Clear liquids are liquids you can see through. Nothing RED in color.    Plain water    Sports drinks      Gelatin (Jell-O)  Fruit juices without pulp such as white grape juice and apple juice  Popsicles that contain no fruit or yogurt  Tea or coffee (no cream or milk added)    It is beneficial for you to have a clear drink that contains carbohydrates 2 hours before your arrival time.  We suggest a 20 ounce bottle of Gatorade or Powerade for non-diabetic patients or a 20 ounce bottle of Gatorade Zero or Powerade Zero for diabetic patients.     Patients who avoid smoking, chewing tobacco and alcohol for 4 weeks prior to surgery have a reduced risk of post-operative complications.  If at all possible, quit smoking as many days before surgery as you can.    Do not smoke, use chewing tobacco or drink alcohol the day of surgery    Bring your C-PAP/ BI-PAP machine if you use one.  Wear clean comfortable clothes.  Do not wear contact lenses, lotion, deodorant, or make-up.  Bring a case for your glasses if applicable. You may brush your teeth the morning of surgery.  You may wear dentures/partials, do not put adhesive/glue on them.  Leave all other jewelry and valuables at home.      Preventing a Surgical Site Infection:    Shower the night before and on the morning of surgery using the  chlorhexidine soap you were given.  Use a clean washcloth with the soap.  Place clean sheets on your bed after showering the night before surgery. Do not use the CHG soap on your hair, face, or private areas. Wash your body gently for five (5) minutes. Do not scrub your skin.  Dry with a clean towel and dress in clean clothing.  Do not shave the surgical area for 10 days-2 weeks prior to surgery  because the razor can irritate skin and make it easier to develop an infection.  Make sure you, your family, and all healthcare providers clean their hands with soap and water or an alcohol based hand  before caring for you or your wound.      Day of surgery:    Your surgeon’s office will advise you of your arrival time for the day of surgery.    Upon arrival, a Pre-op nurse and Anesthesia provider will review your health history, obtain vital signs, and answer questions you may have. The anesthesia provider will also discuss the type of anesthesia that will be needed for your procedure, which may include general anesthesia. The only belongings needed at this time will be your home medications and if applicable your C-PAP/BI-PAP machine.  If you are staying overnight your family can leave the rest of your belongings in the car and bring them to your room later.  A Pre-op nurse will start an IV and you may receive medication in preparation for surgery, including something to help you relax.  Your family will be able to see you in the Pre-op area.  While you are in surgery your family should notify the waiting room  if they leave the waiting room area and provide a contact phone number.    IF you have any questions, you can call the Pre-Admission Department at [phone] or your surgeon's office.  Notify your surgeon if  you become sick, have a fever, productive cough, or cannot be here the day of surgery    Please be aware that surgery does come with discomfort.  We want to make every effort to  control your discomfort so please discuss any uncontrolled symptoms with your nurse.   Your doctor will most likely have prescribed pain medications.      If you are going home after surgery, you will receive individualized written care instructions before being discharged.  A responsible adult (over the age of 18) must drive you to and from the hospital on the day of your surgery and stay with you for 24 hours after anesthesia.    If you are staying overnight following surgery, you will be transported to your hospital room following the recovery period.  [facility] has all private rooms.    You may receive a survey regarding the care you received. Your feedback is very important and will be used to collect the necessary data to help us to continue to provide excellent care.     Deductibles and co-payments are collected on the day of service. Please be prepared to pay the required co-pay, deductible or deposit on the day of service as defined by your plan.

## 2025-02-06 ENCOUNTER — OFFICE VISIT (OUTPATIENT)
Dept: CARDIOLOGY | Facility: CLINIC | Age: 70
End: 2025-02-06
Payer: COMMERCIAL

## 2025-02-06 ENCOUNTER — ANESTHESIA EVENT (OUTPATIENT)
Dept: PERIOP | Facility: HOSPITAL | Age: 70
End: 2025-02-06
Payer: COMMERCIAL

## 2025-02-06 VITALS
SYSTOLIC BLOOD PRESSURE: 157 MMHG | HEART RATE: 86 BPM | WEIGHT: 247 LBS | HEIGHT: 69 IN | BODY MASS INDEX: 36.58 KG/M2 | DIASTOLIC BLOOD PRESSURE: 86 MMHG

## 2025-02-06 DIAGNOSIS — I25.10 CORONARY ARTERY DISEASE INVOLVING NATIVE CORONARY ARTERY OF NATIVE HEART WITHOUT ANGINA PECTORIS: ICD-10-CM

## 2025-02-06 DIAGNOSIS — R94.31 ABNORMAL EKG: Primary | ICD-10-CM

## 2025-02-06 DIAGNOSIS — R06.02 SOB (SHORTNESS OF BREATH): ICD-10-CM

## 2025-02-06 DIAGNOSIS — Z01.810 PRE-OPERATIVE CARDIOVASCULAR EXAMINATION: ICD-10-CM

## 2025-02-06 NOTE — PRE-PROCEDURE NOTE
PAT sent note to anesthesia to review chart on this patient prior to inguinal hernia repair on 2/13/25. To note, the patient also is scheduled for a colonoscopy on 2/11/25.  Patient apparently complained in PAT of new onset shortness of breath x 1 month that mimics the symptoms he had prior to an MI 20 years ago. There is no mention of this in his recent office visit with his primary care provider.  Reviewed this with Dr. Alejandro.She agrees the patient should see cardiology and get clearance prior to these procedures. Nataliia in her office will try to get the patient scheduled for cardiac clearance with an outpatient cardiology clinic asap.

## 2025-02-06 NOTE — H&P (VIEW-ONLY)
2YRS -NEEDS CLR FOR HERNIA SURGERY   Subjective:        Rajan Dc is a 69 y.o. male who here for follow up    CC  SOB, SOME TIMES HAS TO SIT UP IN BED TO BEATH  SURG CLEARANCE  HPI  69-year-old male with coronary artery disease complaining of the significant shortness of breath needs a hernia surgery clearance denies any chest pain     Problems Addressed this Visit          Cardiac and Vasculature    Coronary artery disease involving native coronary artery of native heart without angina pectoris    Abnormal EKG - Primary    Relevant Orders    Stress Test With Myocardial Perfusion One Day    Adult Transthoracic Echo Complete W/ Cont if Necessary Per Protocol       Pulmonary and Pneumonias    SOB (shortness of breath)    Relevant Orders    Stress Test With Myocardial Perfusion One Day    Adult Transthoracic Echo Complete W/ Cont if Necessary Per Protocol     Other Visit Diagnoses       Pre-operative cardiovascular examination              Diagnoses         Codes Comments    Abnormal EKG    -  Primary ICD-10-CM: R94.31  ICD-9-CM: 794.31     SOB (shortness of breath)     ICD-10-CM: R06.02  ICD-9-CM: 786.05     Coronary artery disease involving native coronary artery of native heart without angina pectoris     ICD-10-CM: I25.10  ICD-9-CM: 414.01     Pre-operative cardiovascular examination     ICD-10-CM: Z01.810  ICD-9-CM: V72.81           .    The following portions of the patient's history were reviewed and updated as appropriate: allergies, current medications, past family history, past medical history, past social history, past surgical history and problem list.    Past Medical History:   Diagnosis Date    Chronic ITP (idiopathic thrombocytopenia) 09/19/2018    Chronic migraine w/o aura w/o status migrainosus, not intractable 08/18/2022    Diabetic eye exam 05/22/2017 05/22/2017--routine diabetic eye exam reveals no evidence of diabetic retinopathy.  Astigmatism, presbyopia, hypermetropia noted.  Very early  cataracts noted bilaterally.  2015--patient reports a routine diabetic eye exam without evidence of diabetic retinopathy.    Diverticulosis of colon 10/28/2021    2021--colonoscopy reveals a 6 mm polyp in the transverse colon.  There was an 8 mm polyp in the descending colon.  A 20 mm polyp was found at 50 cm proximal to the anus.  Resection was incomplete.  The resected tissue was retrieved and coagulation for destruction was performed.  #4, sessile polyps in the sigmoid colon that were 5 to 6 mm in size.  Removed.  #2, sessile polyps in the sig    Dyspnea     Elevated liver enzymes 2019--AST is normal at 27, ALT normal at 2 9.  Alkaline phosphatase mildly elevated at 137.  Bilirubin is normal.  2019--ultrasound liver ordered by the gastroenterologist reveals increased hepatic echogenicity consistent with steatosis.  Previous cholecystectomy.  No biliary ductal dilatation.  2019--patient was apparently referred to the gastroenterologist by    Encounter for diabetic foot exam 2023--routine diabetic foot exam reveals no evidence of diabetic foot ulcer or preulcerative callus.  Distal pulses are palpable.  No signs of ischemia.  Sensation subjectively intact.     2023--routine diabetic foot exam reveals no evidence of diabetic foot ulcer or preulcerative callus.  Distal pulses are palpable and sensation seems intact.      Esophageal varices determined by endoscopy 10/28/2021    2021--EGD reveals grade 2 varices in the lower third of the esophagus.  A varix with no bleeding was found in the cardia.  No stigmata of recent bleeding.  Mild portal hypertensive gastropathy was found in the gastric fundus.    Family history of premature coronary artery disease 2016    Father  from a myocardial infarction age 61.    Folic acid deficiency 2016    History of Abnormal pulse oximetry 10/07/2012     10/07/2012--overnight oximetry test revealed significant nocturnal hypoxemia. Oxygen saturations were greater than 90% for only 50.5% of the study. Oxygen saturation less than 90% for three hours 47 minutes which was 49.5% of the study. Oxygen saturation less than 88% for one hour and 36 minutes and 48 seconds, 21.1% of the study.    History of CABG 01/2005 January 2005 status post four-vessel CABG.    History of colon polyps, 8/18/2021--tubular adenoma x12. 10/28/2021    August 18, 2021--colonoscopy reveals a 6 mm polyp in the transverse colon.  There was an 8 mm polyp in the descending colon.  A 20 mm polyp was found at 50 cm proximal to the anus.  Resection was incomplete.  The resected tissue was retrieved and coagulation for destruction was performed.  #4, sessile polyps in the sigmoid colon that were 5 to 6 mm in size.  Removed.  #2, sessile polyps in the sig    History of COVID-19 06/02/2021    History of myocardial infarction, 2005. 04/28/2016 01/01/2005--status post myocardial infarction.   January 2005--status post four-vessel CABG    Hyperlipidemia 04/28/2016    Hypogonadism male, TRT ineffective 09/13/2012 09/13/2012--treatment for symptomatic hypogonadism begun. This was later discontinued due to lack of efficacy and cost.    Iron deficiency anemia due to chronic blood loss 06/02/2021    Liver cirrhosis secondary to RUIZ 10/28/2021    August 18, 2021--EGD reveals grade 2 varices in the lower third of the esophagus.  A varix with no bleeding was found in the cardia.  No stigmata of recent bleeding.  Mild portal hypertensive gastropathy was found in the gastric fundus.  October 2, 2019--ultrasound liver ordered by the gastroenterologist reveals increased hepatic echogenicity consistent with steatosis.  Previous cholecystectomy.      Male erectile disorder 08/27/2020    Microalbuminuria 04/25/2014 04/25/2014--urine microalbumin elevated 28.7. Normal range 0.0--17.0.    RUIZ (nonalcoholic  steatohepatitis) 04/28/2016    Non morbid obesity 06/02/2021    Obstructive sleep apnea, 11/13/2012--mild to moderate NIKI.  Unable to tolerate CPAP.  Cannot use oral appliance. 10/07/2012    05/02/2014--nocturnal oxygen 2 L per nasal cannula ordered.   12/03/2013--patient was referred for an oral appliance but this could not be done because patient wears dentures.   11/13/2012--diagnosed with mild to moderate obstructive sleep apnea. Patient unable to tolerate CPAP.   10/07/2012--overnight oximetry test revealed significant nocturnal hypoxemia. Oxygen saturations were greater than 90% for only 50.5% of the study. Oxygen saturation less than 90% for three hours 47 minutes which was 49.5% of the study. Oxygen saturation less than 88% for one hour and 36 minutes and 48 seconds, 21.1% of the study.    Occlusive coronary artery disease, 01/01/2005--status post MI.  January 2005--CABG ×4.  Details not known. 01/01/2005 01/01/2005--status post myocardial infarction.   January 2005--status post four-vessel CABG    Sleep related hypoxia 10/07/2012    05/02/2014--nocturnal oxygen 2 L per nasal cannula ordered.  12/03/2013--patient was referred for an oral appliance but this could not be done because patient wears dentures.   11/13/2012--diagnosed with mild to moderate obstructive sleep apnea. Patient unable to tolerate CPAP.   10/07/2012--overnight oximetry test revealed significant nocturnal hypoxemia. Oxygen saturations were greater than 90% for only 50.5% of the study. Oxygen saturation less than 90% for three hours 47 minutes which was 49.5% of the study. Oxygen saturation less than 88% for one hour and 36 minutes and 48 seconds, 21.1% of the study.    Type 2 diabetes mellitus with microalbuminuria, without long-term current use of insulin 01/01/2005    Diagnosed in 2005.    Urticaria due to drug allergy, March 2024--urticaria due to Rybelsus 03/27/2024    Vitamin D deficiency 04/28/2016     reports that he quit  "smoking about 20 years ago. His smoking use included cigarettes. He started smoking about 60 years ago. He has a 20 pack-year smoking history. He has never used smokeless tobacco. He reports that he does not drink alcohol and does not use drugs.   Family History   Problem Relation Age of Onset    Other Mother         Ventricular Septal Defect    Heart disease Mother     Hypertension Mother     Cancer Mother     Diabetes Mother     Arthritis Mother     Heart attack Father         Prior Myocardial Infarction.  Dad  from a myocardial infarction at age 59.    Heart disease Father     Heart disease Sister     Heart disease Brother     Cancer Daughter     Heart disease Other         Congenital Heart Disease. Multiple family members with septal defects that required surgery.    Malig Hyperthermia Neg Hx        Review of Systems  Constitutional: No wt loss, fever, fatigue  Gastrointestinal: No nausea, abdominal pain  Behavioral/Psych: No insomnia or anxiety   Cardiovascular shortness of breath  Objective:       Physical Exam  /86   Pulse 86   Ht 175.3 cm (69\")   Wt 112 kg (247 lb)   BMI 36.48 kg/m²   General appearance: No acute changes   Neck: Trachea midline; NECK, supple, no thyromegaly or lymphadenopathy   Lungs: Normal size and shape, normal breath sounds, equal distribution of air, no rales and rhonchi   CV: S1-S2 regular, no murmurs, no rub, no gallop   Abdomen: Soft, nontender; no masses , no abnormal abdominal sounds   Extremities: No deformity , normal color , no peripheral edema   Skin: Normal temperature, turgor and texture; no rash, ulcers          Procedures      Echocardiogram:    Results for orders placed in visit on 23    Adult Transthoracic Echo Complete W/ Cont if Necessary Per Protocol    Interpretation Summary    Left ventricular ejection fraction appears to be 46 - 50%.    The left ventricular cavity is mildly dilated.    Left ventricular diastolic function was normal.    Mild " aortic valve stenosis is present.    Estimated right ventricular systolic pressure from tricuspid regurgitation is normal (<35 mmHg).          Current Outpatient Medications:     aspirin 81 MG EC tablet, Take 1 p.o. daily for blood thinner/heart (Patient taking differently: Take 1 tablet by mouth Daily. Take 1 p.o. daily for blood thinner/heart), Disp: , Rfl:     atorvastatin (LIPITOR) 80 MG tablet, TAKE 1 TABLET BY MOUTH DAILY FOR HIGH CHOLESTEROL (Patient taking differently: Take 1 tablet by mouth Daily. TAKE 1 TABLET BY MOUTH DAILY FOR HIGH CHOLESTEROL Take dos), Disp: 90 tablet, Rfl: 2    Janumet XR  MG tablet, TAKE 1 TABLET BY MOUTH TWICE DAILY FOR DIABETES (Patient taking differently: Take 2 tablets by mouth Daily. TAKE 1 TABLET BY MOUTH TWICE DAILY FOR DIABETES), Disp: 90 tablet, Rfl: 5    Jardiance 25 MG tablet tablet, TAKE 1 TABLET BY MOUTH EVERY MORNING BEFORE BREAKFAST FOR DIABETES (Patient taking differently: Take 1 tablet by mouth Daily.), Disp: 90 tablet, Rfl: 1    Semaglutide, 1 MG/DOSE, (OZEMPIC) 4 MG/3ML solution pen-injector, Inject 1 mg subcutaneously every week as directed.  This is a decreased dosage from 2 mg down to 1 mg.  Prior authorization has already been obtained. (Patient taking differently: Inject 1 mg under the skin into the appropriate area as directed 1 (One) Time Per Week. Inject 1 mg subcutaneously every week as directed.  This is a decreased dosage from 2 mg down to 1 mg.  Prior authorization has already been obtained.), Disp: 3 mL, Rfl: 6    vitamin D3 125 MCG (5000 UT) capsule capsule, 1 by mouth daily as directed (Patient taking differently: Take 1 capsule by mouth Daily. 1 by mouth daily as directed), Disp: 30 capsule, Rfl: 11    glimepiride (AMARYL) 4 MG tablet, TAKE 1 TABLET BY MOUTH TWICE DAILY WITH BREAKFAST AND SUPPER FOR DIABETES (Patient taking differently: Take 12 tablets by mouth Every Morning Before Breakfast. TAKE 1 TABLET BY MOUTH TWICE DAILY WITH BREAKFAST  AND SUPPER FOR DIABETES), Disp: 180 tablet, Rfl: 3   Assessment:                Plan:          ICD-10-CM ICD-9-CM   1. Abnormal EKG  R94.31 794.31   2. SOB (shortness of breath)  R06.02 786.05   3. Coronary artery disease involving native coronary artery of native heart without angina pectoris  I25.10 414.01   4. Pre-operative cardiovascular examination  Z01.810 V72.81     1. Abnormal EKG  Considering the patient's symptoms as well as clinical situation and  EKG findings, along with cardiac risk factors, ischemic workup is necessary to rule out ischemic cardiomyopathy, stress induced arrhythmias, and functional capacity for diagnosis as well as prognostic consideration    - Stress Test With Myocardial Perfusion One Day; Future  - Adult Transthoracic Echo Complete W/ Cont if Necessary Per Protocol; Future    2. SOB (shortness of breath)  Considering patient's medical condition as well as the risk factors, patient will require echocardiogram for further evaluation for the LV function, four-chamber evaluation, including the pressures, valvular function and  pericardial disease and pericardial effusion    - Stress Test With Myocardial Perfusion One Day; Future  - Adult Transthoracic Echo Complete W/ Cont if Necessary Per Protocol; Future    3. Coronary artery disease involving native coronary artery of native heart without angina pectoris  Considering the patient's symptoms as well as clinical situation and  EKG findings, along with cardiac risk factors, ischemic workup is necessary to rule out ischemic cardiomyopathy, stress induced arrhythmias, and functional capacity for diagnosis as well as prognostic consideration      4. Pre-operative cardiovascular examination  Considering patient's medical condition as well as the risk factors, patient will require echocardiogram for further evaluation for the LV function, four-chamber evaluation, including the pressures, valvular function and  pericardial disease and  pericardial effusion        CAN NOT WALK DUE TO SOB AND HIP    WALKING FELIX, ECHO    FOLLOW UP  COUNSELING:    Rajan Betts was given to patient for the following topics: diagnostic results, risk factor reductions, impressions, risks and benefits of treatment options and importance of treatment compliance .       SMOKING COUNSELING:        Dictated using Dragon dictation

## 2025-02-12 PROBLEM — R06.02 SOB (SHORTNESS OF BREATH): Status: ACTIVE | Noted: 2025-02-12

## 2025-02-12 PROBLEM — R94.31 ABNORMAL EKG: Status: ACTIVE | Noted: 2025-02-12

## 2025-02-13 ENCOUNTER — HOSPITAL ENCOUNTER (OUTPATIENT)
Dept: CARDIOLOGY | Facility: HOSPITAL | Age: 70
Discharge: HOME | End: 2025-02-13
Payer: COMMERCIAL

## 2025-02-13 ENCOUNTER — ANESTHESIA (OUTPATIENT)
Dept: PERIOP | Facility: HOSPITAL | Age: 70
End: 2025-02-13
Payer: COMMERCIAL

## 2025-02-13 ENCOUNTER — HOSPITAL ENCOUNTER (OUTPATIENT)
Dept: CARDIOLOGY | Facility: HOSPITAL | Age: 70
Discharge: HOME | End: 2025-02-13
Admitting: INTERNAL MEDICINE
Payer: COMMERCIAL

## 2025-02-13 VITALS
HEIGHT: 69 IN | DIASTOLIC BLOOD PRESSURE: 79 MMHG | BODY MASS INDEX: 36.73 KG/M2 | WEIGHT: 248 LBS | HEART RATE: 84 BPM | SYSTOLIC BLOOD PRESSURE: 150 MMHG

## 2025-02-13 DIAGNOSIS — R06.02 SOB (SHORTNESS OF BREATH): ICD-10-CM

## 2025-02-13 DIAGNOSIS — R94.31 ABNORMAL EKG: ICD-10-CM

## 2025-02-13 LAB
BH CV REST NUCLEAR ISOTOPE DOSE: 10.9 MCI
BH CV STRESS BP STAGE 1: NORMAL
BH CV STRESS COMMENTS STAGE 1: NORMAL
BH CV STRESS DOSE REGADENOSON STAGE 1: 0.4
BH CV STRESS DURATION MIN STAGE 1: 3
BH CV STRESS DURATION SEC STAGE 1: 0
BH CV STRESS HR STAGE 1: 99
BH CV STRESS NUCLEAR ISOTOPE DOSE: 32.9 MCI
BH CV STRESS PROTOCOL 1: NORMAL
BH CV STRESS RECOVERY BP: NORMAL MMHG
BH CV STRESS RECOVERY HR: 83 BPM
BH CV STRESS STAGE 1: 1
MAXIMAL PREDICTED HEART RATE: 152 BPM
PERCENT MAX PREDICTED HR: 67.11 %
SPECT HRT GATED+EF W RNC IV: 60 %
STRESS BASELINE BP: NORMAL MMHG
STRESS BASELINE HR: 80 BPM
STRESS O2 SAT REST: 95 %
STRESS PERCENT HR: 79 %
STRESS POST ESTIMATED WORKLOAD: 1.8 METS
STRESS POST EXERCISE DUR MIN: 3 MIN
STRESS POST PEAK BP: NORMAL MMHG
STRESS POST PEAK HR: 102 BPM
STRESS TARGET HR: 129 BPM

## 2025-02-13 PROCEDURE — 93306 TTE W/DOPPLER COMPLETE: CPT | Performed by: INTERNAL MEDICINE

## 2025-02-13 PROCEDURE — 25010000002 REGADENOSON 0.4 MG/5ML SOLUTION: Performed by: INTERNAL MEDICINE

## 2025-02-13 PROCEDURE — 78452 HT MUSCLE IMAGE SPECT MULT: CPT

## 2025-02-13 PROCEDURE — 93017 CV STRESS TEST TRACING ONLY: CPT

## 2025-02-13 PROCEDURE — 93306 TTE W/DOPPLER COMPLETE: CPT

## 2025-02-13 PROCEDURE — 34310000005 TECHNETIUM SESTAMIBI: Performed by: INTERNAL MEDICINE

## 2025-02-13 PROCEDURE — 25510000001 PERFLUTREN 6.52 MG/ML SUSPENSION 2 ML VIAL: Performed by: INTERNAL MEDICINE

## 2025-02-13 PROCEDURE — A9500 TC99M SESTAMIBI: HCPCS | Performed by: INTERNAL MEDICINE

## 2025-02-13 RX ORDER — REGADENOSON 0.08 MG/ML
0.4 INJECTION, SOLUTION INTRAVENOUS
Status: COMPLETED | OUTPATIENT
Start: 2025-02-13 | End: 2025-02-13

## 2025-02-13 RX ADMIN — REGADENOSON 0.4 MG: 0.08 INJECTION, SOLUTION INTRAVENOUS at 09:10

## 2025-02-13 RX ADMIN — TECHNETIUM TC 99M SESTAMIBI 1 DOSE: 1 INJECTION INTRAVENOUS at 07:08

## 2025-02-13 RX ADMIN — TECHNETIUM TC 99M SESTAMIBI 1 DOSE: 1 INJECTION INTRAVENOUS at 09:10

## 2025-02-13 RX ADMIN — SODIUM CHLORIDE 2 ML: 9 INJECTION INTRAMUSCULAR; INTRAVENOUS; SUBCUTANEOUS at 07:44

## 2025-02-14 LAB
AORTIC DIMENSIONLESS INDEX: 0.5 (DI)
ASCENDING AORTA: 3.5 CM
AV MEAN PRESS GRAD SYS DOP V1V2: 5.2 MMHG
AV VMAX SYS DOP: 160 CM/SEC
BH CV ECHO MEAS - ACS: 2 CM
BH CV ECHO MEAS - AO MAX PG: 10.2 MMHG
BH CV ECHO MEAS - AO ROOT DIAM: 3.7 CM
BH CV ECHO MEAS - AO V2 VTI: 35.2 CM
BH CV ECHO MEAS - AVA(I,D): 1.76 CM2
BH CV ECHO MEAS - EDV(CUBED): 137 ML
BH CV ECHO MEAS - EDV(MOD-SP2): 169 ML
BH CV ECHO MEAS - EDV(MOD-SP4): 106 ML
BH CV ECHO MEAS - EF(MOD-SP2): 58.6 %
BH CV ECHO MEAS - EF(MOD-SP4): 55.7 %
BH CV ECHO MEAS - ESV(CUBED): 35.8 ML
BH CV ECHO MEAS - ESV(MOD-SP2): 70 ML
BH CV ECHO MEAS - ESV(MOD-SP4): 47 ML
BH CV ECHO MEAS - FS: 36.1 %
BH CV ECHO MEAS - IVS/LVPW: 1.19 CM
BH CV ECHO MEAS - IVSD: 1.11 CM
BH CV ECHO MEAS - LA DIMENSION: 3.6 CM
BH CV ECHO MEAS - LAT PEAK E' VEL: 8.3 CM/SEC
BH CV ECHO MEAS - LV DIASTOLIC VOL/BSA (35-75): 46.9 CM2
BH CV ECHO MEAS - LV MASS(C)D: 196.4 GRAMS
BH CV ECHO MEAS - LV MAX PG: 2.17 MMHG
BH CV ECHO MEAS - LV MEAN PG: 1.28 MMHG
BH CV ECHO MEAS - LV SYSTOLIC VOL/BSA (12-30): 20.8 CM2
BH CV ECHO MEAS - LV V1 MAX: 73.7 CM/SEC
BH CV ECHO MEAS - LV V1 VTI: 16.6 CM
BH CV ECHO MEAS - LVIDD: 5.2 CM
BH CV ECHO MEAS - LVIDS: 3.3 CM
BH CV ECHO MEAS - LVOT AREA: 3.7 CM2
BH CV ECHO MEAS - LVOT DIAM: 2.18 CM
BH CV ECHO MEAS - LVPWD: 0.93 CM
BH CV ECHO MEAS - MED PEAK E' VEL: 7.8 CM/SEC
BH CV ECHO MEAS - MV A DUR: 0.15 SEC
BH CV ECHO MEAS - MV A MAX VEL: 97.7 CM/SEC
BH CV ECHO MEAS - MV DEC SLOPE: 472.8 CM/SEC2
BH CV ECHO MEAS - MV DEC TIME: 214 SEC
BH CV ECHO MEAS - MV E MAX VEL: 117 CM/SEC
BH CV ECHO MEAS - MV E/A: 1.2
BH CV ECHO MEAS - MV MAX PG: 5.8 MMHG
BH CV ECHO MEAS - MV MEAN PG: 3.2 MMHG
BH CV ECHO MEAS - MV P1/2T: 76.3 MSEC
BH CV ECHO MEAS - MV V2 VTI: 33.6 CM
BH CV ECHO MEAS - MVA(P1/2T): 2.9 CM2
BH CV ECHO MEAS - MVA(VTI): 1.84 CM2
BH CV ECHO MEAS - PA V2 MAX: 142.5 CM/SEC
BH CV ECHO MEAS - PI END-D VEL: 95 CM/SEC
BH CV ECHO MEAS - PULM A REVS DUR: 0.13 SEC
BH CV ECHO MEAS - PULM A REVS VEL: 27.5 CM/SEC
BH CV ECHO MEAS - PULM DIAS VEL: 29.4 CM/SEC
BH CV ECHO MEAS - PULM S/D: 1.38
BH CV ECHO MEAS - PULM SYS VEL: 40.4 CM/SEC
BH CV ECHO MEAS - RAP SYSTOLE: 3 MMHG
BH CV ECHO MEAS - RVDD: 1.69 CM
BH CV ECHO MEAS - RVSP: 14.4 MMHG
BH CV ECHO MEAS - SV(LVOT): 61.8 ML
BH CV ECHO MEAS - SV(MOD-SP2): 99 ML
BH CV ECHO MEAS - SV(MOD-SP4): 59 ML
BH CV ECHO MEAS - SVI(LVOT): 27.3 ML/M2
BH CV ECHO MEAS - SVI(MOD-SP2): 43.8 ML/M2
BH CV ECHO MEAS - SVI(MOD-SP4): 26.1 ML/M2
BH CV ECHO MEAS - TAPSE (>1.6): 2.09 CM
BH CV ECHO MEAS - TR MAX PG: 11.4 MMHG
BH CV ECHO MEAS - TR MAX VEL: 168.7 CM/SEC
BH CV ECHO MEASUREMENTS AVERAGE E/E' RATIO: 14.53
BH CV XLRA - RV BASE: 4.2 CM
BH CV XLRA - RV LENGTH: 7.4 CM
BH CV XLRA - RV MID: 3.5 CM
BH CV XLRA - TDI S': 11.2 CM/SEC
LEFT ATRIUM VOLUME INDEX: 36.9 ML/M2
LV EF BIPLANE MOD: 59.2 %
SINUS: 2.9 CM
STJ: 2.6 CM

## 2025-02-20 ENCOUNTER — OFFICE VISIT (OUTPATIENT)
Dept: CARDIOLOGY | Facility: CLINIC | Age: 70
End: 2025-02-20
Payer: COMMERCIAL

## 2025-02-20 ENCOUNTER — LAB (OUTPATIENT)
Dept: LAB | Facility: HOSPITAL | Age: 70
End: 2025-02-20
Payer: COMMERCIAL

## 2025-02-20 VITALS
WEIGHT: 230 LBS | DIASTOLIC BLOOD PRESSURE: 71 MMHG | BODY MASS INDEX: 34.07 KG/M2 | HEART RATE: 82 BPM | SYSTOLIC BLOOD PRESSURE: 159 MMHG | HEIGHT: 69 IN

## 2025-02-20 DIAGNOSIS — R94.39 ABNORMAL CARDIOVASCULAR STRESS TEST: ICD-10-CM

## 2025-02-20 DIAGNOSIS — I25.10 CORONARY ARTERY DISEASE INVOLVING NATIVE CORONARY ARTERY OF NATIVE HEART WITHOUT ANGINA PECTORIS: Primary | ICD-10-CM

## 2025-02-20 DIAGNOSIS — Z01.810 PRE-OPERATIVE CARDIOVASCULAR EXAMINATION: ICD-10-CM

## 2025-02-20 DIAGNOSIS — R06.02 SHORTNESS OF BREATH: ICD-10-CM

## 2025-02-20 LAB
ANION GAP SERPL CALCULATED.3IONS-SCNC: 8.9 MMOL/L (ref 5–15)
APTT PPP: 33.9 SECONDS (ref 22.7–35.4)
BASOPHILS # BLD MANUAL: 0.05 10*3/MM3 (ref 0–0.2)
BASOPHILS NFR BLD MANUAL: 1 % (ref 0–1.5)
BUN SERPL-MCNC: 12 MG/DL (ref 8–23)
BUN/CREAT SERPL: 15.8 (ref 7–25)
CALCIUM SPEC-SCNC: 9 MG/DL (ref 8.6–10.5)
CHLORIDE SERPL-SCNC: 107 MMOL/L (ref 98–107)
CO2 SERPL-SCNC: 24.1 MMOL/L (ref 22–29)
CREAT SERPL-MCNC: 0.76 MG/DL (ref 0.76–1.27)
DACRYOCYTES BLD QL SMEAR: ABNORMAL
DEPRECATED RDW RBC AUTO: 36.4 FL (ref 37–54)
EGFRCR SERPLBLD CKD-EPI 2021: 97.9 ML/MIN/1.73
EOSINOPHIL # BLD MANUAL: 0.52 10*3/MM3 (ref 0–0.4)
EOSINOPHIL NFR BLD MANUAL: 10.2 % (ref 0.3–6.2)
ERYTHROCYTE [DISTWIDTH] IN BLOOD BY AUTOMATED COUNT: 14.8 % (ref 12.3–15.4)
GLUCOSE SERPL-MCNC: 104 MG/DL (ref 65–99)
HCT VFR BLD AUTO: 38.7 % (ref 37.5–51)
HGB BLD-MCNC: 10.9 G/DL (ref 13–17.7)
HYPOCHROMIA BLD QL: ABNORMAL
INR PPP: 1.24 (ref 0.9–1.1)
LYMPHOCYTES # BLD MANUAL: 0.57 10*3/MM3 (ref 0.7–3.1)
LYMPHOCYTES NFR BLD MANUAL: 8.2 % (ref 5–12)
MCH RBC QN AUTO: 19.8 PG (ref 26.6–33)
MCHC RBC AUTO-ENTMCNC: 28.2 G/DL (ref 31.5–35.7)
MCV RBC AUTO: 70.2 FL (ref 79–97)
MONOCYTES # BLD: 0.42 10*3/MM3 (ref 0.1–0.9)
NEUTROPHILS # BLD AUTO: 3.55 10*3/MM3 (ref 1.7–7)
NEUTROPHILS NFR BLD MANUAL: 69.4 % (ref 42.7–76)
PLAT MORPH BLD: NORMAL
PLATELET # BLD AUTO: 120 10*3/MM3 (ref 140–450)
POTASSIUM SERPL-SCNC: 4.3 MMOL/L (ref 3.5–5.2)
PROTHROMBIN TIME: 15.6 SECONDS (ref 11.7–14.2)
RBC # BLD AUTO: 5.51 10*6/MM3 (ref 4.14–5.8)
SMUDGE CELLS BLD QL SMEAR: ABNORMAL
SODIUM SERPL-SCNC: 140 MMOL/L (ref 136–145)
VARIANT LYMPHS NFR BLD MANUAL: 11.2 % (ref 19.6–45.3)
WBC NRBC COR # BLD AUTO: 5.12 10*3/MM3 (ref 3.4–10.8)

## 2025-02-20 PROCEDURE — 36415 COLL VENOUS BLD VENIPUNCTURE: CPT | Performed by: INTERNAL MEDICINE

## 2025-02-20 PROCEDURE — 85025 COMPLETE CBC W/AUTO DIFF WBC: CPT | Performed by: INTERNAL MEDICINE

## 2025-02-20 PROCEDURE — 85007 BL SMEAR W/DIFF WBC COUNT: CPT | Performed by: INTERNAL MEDICINE

## 2025-02-20 PROCEDURE — 80048 BASIC METABOLIC PNL TOTAL CA: CPT | Performed by: INTERNAL MEDICINE

## 2025-02-20 PROCEDURE — 85610 PROTHROMBIN TIME: CPT | Performed by: INTERNAL MEDICINE

## 2025-02-20 PROCEDURE — 85730 THROMBOPLASTIN TIME PARTIAL: CPT | Performed by: INTERNAL MEDICINE

## 2025-02-20 RX ORDER — METOPROLOL SUCCINATE 25 MG/1
25 TABLET, EXTENDED RELEASE ORAL DAILY
Qty: 30 TABLET | Refills: 11 | Status: SHIPPED | OUTPATIENT
Start: 2025-02-20

## 2025-02-20 NOTE — PROGRESS NOTES
TEST RESULTS, CLEARANCE FOR HERNIA SURGERY, DR. CUADRA.,   Subjective:        Rajan Dc is a 69 y.o. male who here for follow up    CC  Follow-up test results and preop clearance  HPI  69-year-old male with coronary artery disease needs a preop clearance denies any chest pains or tightness in the chest     Problems Addressed this Visit          Cardiac and Vasculature    Coronary artery disease involving native coronary artery of native heart without angina pectoris - Primary    Relevant Medications    metoprolol succinate XL (TOPROL-XL) 25 MG 24 hr tablet    Other Relevant Orders    Case Request Cath Lab: Left Heart Cath (Completed)    CBC & Differential (Completed)    Basic Metabolic Panel (Completed)    aPTT (Completed)    Protime-INR (Completed)    Manual Differential (Completed)    Abnormal cardiovascular stress test    Relevant Orders    Case Request Cath Lab: Left Heart Cath (Completed)    CBC & Differential (Completed)    Basic Metabolic Panel (Completed)    aPTT (Completed)    Protime-INR (Completed)    Manual Differential (Completed)       Pulmonary and Pneumonias    Shortness of breath    Relevant Orders    Case Request Cath Lab: Left Heart Cath (Completed)    CBC & Differential (Completed)    Basic Metabolic Panel (Completed)    aPTT (Completed)    Protime-INR (Completed)    Manual Differential (Completed)     Other Visit Diagnoses       Pre-operative cardiovascular examination              Diagnoses         Codes Comments    Coronary artery disease involving native coronary artery of native heart without angina pectoris    -  Primary ICD-10-CM: I25.10  ICD-9-CM: 414.01     Abnormal cardiovascular stress test     ICD-10-CM: R94.39  ICD-9-CM: 794.39     Shortness of breath     ICD-10-CM: R06.02  ICD-9-CM: 786.05     Pre-operative cardiovascular examination     ICD-10-CM: Z01.810  ICD-9-CM: V72.81           .    The following portions of the patient's history were reviewed and updated as  appropriate: allergies, current medications, past family history, past medical history, past social history, past surgical history and problem list.    Past Medical History:   Diagnosis Date    Chronic ITP (idiopathic thrombocytopenia) 09/19/2018    Chronic migraine w/o aura w/o status migrainosus, not intractable 08/18/2022    Diabetic eye exam 05/22/2017 05/22/2017--routine diabetic eye exam reveals no evidence of diabetic retinopathy.  Astigmatism, presbyopia, hypermetropia noted.  Very early cataracts noted bilaterally.  October 2015--patient reports a routine diabetic eye exam without evidence of diabetic retinopathy.    Diverticulosis of colon 10/28/2021    August 18, 2021--colonoscopy reveals a 6 mm polyp in the transverse colon.  There was an 8 mm polyp in the descending colon.  A 20 mm polyp was found at 50 cm proximal to the anus.  Resection was incomplete.  The resected tissue was retrieved and coagulation for destruction was performed.  #4, sessile polyps in the sigmoid colon that were 5 to 6 mm in size.  Removed.  #2, sessile polyps in the sig    Dyspnea     Elevated liver enzymes 06/23/2019 October 17, 2019--AST is normal at 27, ALT normal at 2 9.  Alkaline phosphatase mildly elevated at 137.  Bilirubin is normal.  October 2, 2019--ultrasound liver ordered by the gastroenterologist reveals increased hepatic echogenicity consistent with steatosis.  Previous cholecystectomy.  No biliary ductal dilatation.  September 24, 2019--patient was apparently referred to the gastroenterologist by    Encounter for diabetic foot exam 08/30/2023 September 12, 2024--routine diabetic foot exam reveals no evidence of diabetic foot ulcer or preulcerative callus.  Distal pulses are palpable.  No signs of ischemia.  Sensation subjectively intact.     August 30, 2023--routine diabetic foot exam reveals no evidence of diabetic foot ulcer or preulcerative callus.  Distal pulses are palpable and sensation seems intact.       Esophageal varices determined by endoscopy 10/28/2021    2021--EGD reveals grade 2 varices in the lower third of the esophagus.  A varix with no bleeding was found in the cardia.  No stigmata of recent bleeding.  Mild portal hypertensive gastropathy was found in the gastric fundus.    Family history of premature coronary artery disease 2016    Father  from a myocardial infarction age 61.    Folic acid deficiency 2016    History of Abnormal pulse oximetry 10/07/2012    10/07/2012--overnight oximetry test revealed significant nocturnal hypoxemia. Oxygen saturations were greater than 90% for only 50.5% of the study. Oxygen saturation less than 90% for three hours 47 minutes which was 49.5% of the study. Oxygen saturation less than 88% for one hour and 36 minutes and 48 seconds, 21.1% of the study.    History of CABG 2005 status post four-vessel CABG.    History of colon polyps, 2021--tubular adenoma x12. 10/28/2021    2021--colonoscopy reveals a 6 mm polyp in the transverse colon.  There was an 8 mm polyp in the descending colon.  A 20 mm polyp was found at 50 cm proximal to the anus.  Resection was incomplete.  The resected tissue was retrieved and coagulation for destruction was performed.  #4, sessile polyps in the sigmoid colon that were 5 to 6 mm in size.  Removed.  #2, sessile polyps in the sig    History of COVID-19 2021    History of myocardial infarction, . 2016--status post myocardial infarction.   2005--status post four-vessel CABG    Hyperlipidemia 2016    Hypogonadism male, TRT ineffective 2012--treatment for symptomatic hypogonadism begun. This was later discontinued due to lack of efficacy and cost.    Iron deficiency anemia due to chronic blood loss 2021    Liver cirrhosis secondary to RUIZ 10/28/2021    2021--EGD reveals grade 2 varices in the lower third  of the esophagus.  A varix with no bleeding was found in the cardia.  No stigmata of recent bleeding.  Mild portal hypertensive gastropathy was found in the gastric fundus.  October 2, 2019--ultrasound liver ordered by the gastroenterologist reveals increased hepatic echogenicity consistent with steatosis.  Previous cholecystectomy.      Male erectile disorder 08/27/2020    Microalbuminuria 04/25/2014 04/25/2014--urine microalbumin elevated 28.7. Normal range 0.0--17.0.    RUIZ (nonalcoholic steatohepatitis) 04/28/2016    Non morbid obesity 06/02/2021    Obstructive sleep apnea, 11/13/2012--mild to moderate NIKI.  Unable to tolerate CPAP.  Cannot use oral appliance. 10/07/2012    05/02/2014--nocturnal oxygen 2 L per nasal cannula ordered.   12/03/2013--patient was referred for an oral appliance but this could not be done because patient wears dentures.   11/13/2012--diagnosed with mild to moderate obstructive sleep apnea. Patient unable to tolerate CPAP.   10/07/2012--overnight oximetry test revealed significant nocturnal hypoxemia. Oxygen saturations were greater than 90% for only 50.5% of the study. Oxygen saturation less than 90% for three hours 47 minutes which was 49.5% of the study. Oxygen saturation less than 88% for one hour and 36 minutes and 48 seconds, 21.1% of the study.    Occlusive coronary artery disease, 01/01/2005--status post MI.  January 2005--CABG ×4.  Details not known. 01/01/2005 01/01/2005--status post myocardial infarction.   January 2005--status post four-vessel CABG    Sleep related hypoxia 10/07/2012    05/02/2014--nocturnal oxygen 2 L per nasal cannula ordered.  12/03/2013--patient was referred for an oral appliance but this could not be done because patient wears dentures.   11/13/2012--diagnosed with mild to moderate obstructive sleep apnea. Patient unable to tolerate CPAP.   10/07/2012--overnight oximetry test revealed significant nocturnal hypoxemia. Oxygen saturations were  "greater than 90% for only 50.5% of the study. Oxygen saturation less than 90% for three hours 47 minutes which was 49.5% of the study. Oxygen saturation less than 88% for one hour and 36 minutes and 48 seconds, 21.1% of the study.    Type 2 diabetes mellitus with microalbuminuria, without long-term current use of insulin 2005    Diagnosed in .    Urticaria due to drug allergy, 2024--urticaria due to Rybelsus 2024    Vitamin D deficiency 2016     reports that he quit smoking about 20 years ago. His smoking use included cigarettes. He started smoking about 60 years ago. He has a 20 pack-year smoking history. He has never used smokeless tobacco. He reports that he does not drink alcohol and does not use drugs.   Family History   Problem Relation Age of Onset    Other Mother         Ventricular Septal Defect    Heart disease Mother     Hypertension Mother     Cancer Mother     Diabetes Mother     Arthritis Mother     Heart attack Father         Prior Myocardial Infarction.  Dad  from a myocardial infarction at age 59.    Heart disease Father     Heart disease Sister     Heart disease Brother     Cancer Daughter     Heart disease Other         Congenital Heart Disease. Multiple family members with septal defects that required surgery.    Malig Hyperthermia Neg Hx        Review of Systems  Constitutional: No wt loss, fever, fatigue  Gastrointestinal: No nausea, abdominal pain  Behavioral/Psych: No insomnia or anxiety   Cardiovascular no chest pains or tightness in the chest  Objective:       Physical Exam  /71   Pulse 82   Ht 175.3 cm (69\")   Wt 104 kg (230 lb)   BMI 33.97 kg/m²   General appearance: No acute changes   Neck: Trachea midline; NECK, supple, no thyromegaly or lymphadenopathy   Lungs: Normal size and shape, normal breath sounds, equal distribution of air, no rales and rhonchi   CV: S1-S2 regular, no murmurs, no rub, no gallop   Abdomen: Soft, nontender; no masses , no " abnormal abdominal sounds   Extremities: No deformity , normal color , no peripheral edema   Skin: Normal temperature, turgor and texture; no rash, ulcers          Procedures      Echocardiogram:    Results for orders placed during the hospital encounter of 02/13/25    Adult Transthoracic Echo Complete W/ Cont if Necessary Per Protocol    Interpretation Summary    Left ventricular ejection fraction appears to be 56 - 60%.    Left ventricular diastolic function was normal.    The left atrial cavity is mildly dilated.    The right atrial cavity is mildly  dilated.    There is calcification of the aortic valve.    Estimated right ventricular systolic pressure from tricuspid regurgitation is normal (<35 mmHg).      Interpretation Summary         Findings consistent with an indeterminate ECG stress test.    Impressions are consistent with an intermediate risk study.    Left ventricular ejection fraction is normal (Calculated EF = 60%).    Myocardial perfusion imaging indicates a small-sized, mildly severe area of ischemia located in the inferior wall.    Area of ischemia is very small      Current Outpatient Medications:     aspirin 81 MG EC tablet, Take 1 p.o. daily for blood thinner/heart (Patient taking differently: Take 1 tablet by mouth Daily. Take 1 p.o. daily for blood thinner/heart), Disp: , Rfl:     atorvastatin (LIPITOR) 80 MG tablet, TAKE 1 TABLET BY MOUTH DAILY FOR HIGH CHOLESTEROL (Patient taking differently: Take 1 tablet by mouth Daily. TAKE 1 TABLET BY MOUTH DAILY FOR HIGH CHOLESTEROL Take dos), Disp: 90 tablet, Rfl: 2    glimepiride (AMARYL) 4 MG tablet, TAKE 1 TABLET BY MOUTH TWICE DAILY WITH BREAKFAST AND SUPPER FOR DIABETES (Patient taking differently: Take 12 tablets by mouth Every Morning Before Breakfast. TAKE 1 TABLET BY MOUTH TWICE DAILY WITH BREAKFAST AND SUPPER FOR DIABETES), Disp: 180 tablet, Rfl: 3    Janumet XR  MG tablet, TAKE 1 TABLET BY MOUTH TWICE DAILY FOR DIABETES (Patient  taking differently: Take 2 tablets by mouth Daily. TAKE 1 TABLET BY MOUTH TWICE DAILY FOR DIABETES), Disp: 90 tablet, Rfl: 5    Jardiance 25 MG tablet tablet, TAKE 1 TABLET BY MOUTH EVERY MORNING BEFORE BREAKFAST FOR DIABETES (Patient taking differently: Take 1 tablet by mouth Daily.), Disp: 90 tablet, Rfl: 1    Semaglutide, 1 MG/DOSE, (OZEMPIC) 4 MG/3ML solution pen-injector, Inject 1 mg subcutaneously every week as directed.  This is a decreased dosage from 2 mg down to 1 mg.  Prior authorization has already been obtained. (Patient taking differently: Inject 1 mg under the skin into the appropriate area as directed 1 (One) Time Per Week. Inject 1 mg subcutaneously every week as directed.  This is a decreased dosage from 2 mg down to 1 mg.  Prior authorization has already been obtained.), Disp: 3 mL, Rfl: 6    vitamin D3 125 MCG (5000 UT) capsule capsule, 1 by mouth daily as directed (Patient taking differently: Take 1 capsule by mouth Daily. 1 by mouth daily as directed), Disp: 30 capsule, Rfl: 11    metoprolol succinate XL (TOPROL-XL) 25 MG 24 hr tablet, Take 1 tablet by mouth Daily., Disp: 30 tablet, Rfl: 11  No current facility-administered medications for this visit.    Facility-Administered Medications Ordered in Other Visits:     acetaminophen (TYLENOL) tablet 650 mg, 650 mg, Oral, Q4H PRN, Neptali Oconnor MD    atropine sulfate injection 0.5 mg, 0.5 mg, Intravenous, Q5 Min PRN, Neptali Oconnor MD    nitroglycerin (NITROSTAT) SL tablet 0.4 mg, 0.4 mg, Sublingual, Q5 Min PRN, Neptali Oconnor MD    sodium chloride 0.9 % flush 10 mL, 10 mL, Intravenous, Q12H, Neptali Oconnor MD    sodium chloride 0.9 % infusion, 75 mL/hr, Intravenous, Continuous, Neptali Oconnor MD, Last Rate: 75 mL/hr at 02/21/25 0831, 75 mL/hr at 02/21/25 0831   Assessment:                Plan:          ICD-10-CM ICD-9-CM   1. Coronary artery disease involving native coronary artery of native heart without  angina pectoris  I25.10 414.01   2. Abnormal cardiovascular stress test  R94.39 794.39   3. Shortness of breath  R06.02 786.05   4. Pre-operative cardiovascular examination  Z01.810 V72.81     1. Coronary artery disease involving native coronary artery of native heart without angina pectoris  No angina pectoris  - Case Request Cath Lab: Left Heart Cath  - CBC & Differential  - Basic Metabolic Panel  - aPTT  - Protime-INR  - Manual Differential    2. Abnormal cardiovascular stress test    - Case Request Cath Lab: Left Heart Cath  - CBC & Differential  - Basic Metabolic Panel  - aPTT  - Protime-INR  - Manual Differential    3. Shortness of breath  Needs further evaluation  - Case Request Cath Lab: Left Heart Cath  - CBC & Differential  - Basic Metabolic Panel  - aPTT  - Protime-INR  - Manual Differential    4. Pre-operative cardiovascular examination  Needs heart cath prior to the surgery      Pt diabetic   Cabg 18 yrs ago  Abnormal stress test  Needs cath before hernia surgery    Procedure, risks and options of cardiac cath explained to pt INCLUDING BUT NOT LIMITED TO MI, STROKE, DEATH, INFECTION HAEMORRHAGE, . Pt understands well and agrees with no further questions.    COUNSELING:    Rajan Betts was given to patient for the following topics: diagnostic results, risk factor reductions, impressions, risks and benefits of treatment options and importance of treatment compliance .       SMOKING COUNSELING:        Dictated using Dragon dictation

## 2025-02-21 ENCOUNTER — HOSPITAL ENCOUNTER (OUTPATIENT)
Facility: HOSPITAL | Age: 70
Setting detail: HOSPITAL OUTPATIENT SURGERY
Discharge: HOME | End: 2025-02-21
Attending: INTERNAL MEDICINE | Admitting: INTERNAL MEDICINE
Payer: COMMERCIAL

## 2025-02-21 VITALS
HEIGHT: 69 IN | DIASTOLIC BLOOD PRESSURE: 95 MMHG | HEART RATE: 76 BPM | SYSTOLIC BLOOD PRESSURE: 122 MMHG | WEIGHT: 234 LBS | OXYGEN SATURATION: 98 % | BODY MASS INDEX: 34.66 KG/M2 | TEMPERATURE: 98.1 F | RESPIRATION RATE: 16 BRPM

## 2025-02-21 DIAGNOSIS — R06.02 SOB (SHORTNESS OF BREATH): Primary | ICD-10-CM

## 2025-02-21 DIAGNOSIS — I25.10 CORONARY ARTERY DISEASE INVOLVING NATIVE CORONARY ARTERY OF NATIVE HEART WITHOUT ANGINA PECTORIS: ICD-10-CM

## 2025-02-21 DIAGNOSIS — R06.02 SHORTNESS OF BREATH: ICD-10-CM

## 2025-02-21 DIAGNOSIS — R94.39 ABNORMAL CARDIOVASCULAR STRESS TEST: ICD-10-CM

## 2025-02-21 LAB
GLUCOSE BLDC GLUCOMTR-MCNC: 139 MG/DL (ref 70–130)
GLUCOSE BLDC GLUCOMTR-MCNC: 144 MG/DL (ref 70–130)

## 2025-02-21 PROCEDURE — 25010000002 LIDOCAINE 2% SOLUTION: Performed by: INTERNAL MEDICINE

## 2025-02-21 PROCEDURE — 25010000002 FENTANYL CITRATE (PF) 50 MCG/ML SOLUTION: Performed by: INTERNAL MEDICINE

## 2025-02-21 PROCEDURE — 93459 L HRT ART/GRFT ANGIO: CPT | Performed by: INTERNAL MEDICINE

## 2025-02-21 PROCEDURE — 82948 REAGENT STRIP/BLOOD GLUCOSE: CPT

## 2025-02-21 PROCEDURE — C1894 INTRO/SHEATH, NON-LASER: HCPCS | Performed by: INTERNAL MEDICINE

## 2025-02-21 PROCEDURE — 25510000001 IOPAMIDOL PER 1 ML: Performed by: INTERNAL MEDICINE

## 2025-02-21 PROCEDURE — C1769 GUIDE WIRE: HCPCS | Performed by: INTERNAL MEDICINE

## 2025-02-21 PROCEDURE — 25010000002 MIDAZOLAM PER 1 MG: Performed by: INTERNAL MEDICINE

## 2025-02-21 PROCEDURE — C1760 CLOSURE DEV, VASC: HCPCS | Performed by: INTERNAL MEDICINE

## 2025-02-21 PROCEDURE — 25810000003 SODIUM CHLORIDE 0.9 % SOLUTION: Performed by: INTERNAL MEDICINE

## 2025-02-21 RX ORDER — NITROGLYCERIN 0.4 MG/1
0.4 TABLET SUBLINGUAL
Status: DISCONTINUED | OUTPATIENT
Start: 2025-02-21 | End: 2025-02-21 | Stop reason: HOSPADM

## 2025-02-21 RX ORDER — FENTANYL CITRATE 50 UG/ML
INJECTION, SOLUTION INTRAMUSCULAR; INTRAVENOUS
Status: DISCONTINUED | OUTPATIENT
Start: 2025-02-21 | End: 2025-02-21 | Stop reason: HOSPADM

## 2025-02-21 RX ORDER — SODIUM CHLORIDE 0.9 % (FLUSH) 0.9 %
10 SYRINGE (ML) INJECTION EVERY 12 HOURS SCHEDULED
Status: DISCONTINUED | OUTPATIENT
Start: 2025-02-21 | End: 2025-02-21 | Stop reason: HOSPADM

## 2025-02-21 RX ORDER — IOPAMIDOL 755 MG/ML
INJECTION, SOLUTION INTRAVASCULAR
Status: DISCONTINUED | OUTPATIENT
Start: 2025-02-21 | End: 2025-02-21 | Stop reason: HOSPADM

## 2025-02-21 RX ORDER — MIDAZOLAM HYDROCHLORIDE 1 MG/ML
INJECTION, SOLUTION INTRAMUSCULAR; INTRAVENOUS
Status: DISCONTINUED | OUTPATIENT
Start: 2025-02-21 | End: 2025-02-21 | Stop reason: HOSPADM

## 2025-02-21 RX ORDER — LIDOCAINE HYDROCHLORIDE 20 MG/ML
INJECTION, SOLUTION INFILTRATION; PERINEURAL
Status: DISCONTINUED | OUTPATIENT
Start: 2025-02-21 | End: 2025-02-21 | Stop reason: HOSPADM

## 2025-02-21 RX ORDER — ACETAMINOPHEN 325 MG/1
650 TABLET ORAL EVERY 4 HOURS PRN
Status: DISCONTINUED | OUTPATIENT
Start: 2025-02-21 | End: 2025-02-21 | Stop reason: HOSPADM

## 2025-02-21 RX ORDER — SODIUM CHLORIDE 9 MG/ML
75 INJECTION, SOLUTION INTRAVENOUS CONTINUOUS
Status: DISCONTINUED | OUTPATIENT
Start: 2025-02-21 | End: 2025-02-21 | Stop reason: HOSPADM

## 2025-02-21 RX ADMIN — SODIUM CHLORIDE 75 ML/HR: 9 INJECTION, SOLUTION INTRAVENOUS at 08:31

## 2025-02-21 NOTE — DISCHARGE INSTRUCTIONS
[facility]  [address] [place] [address]      Coronary Angiogram (Femoral Approach) After Care     Refer to this sheet in the next few weeks. These instructions provide you with information on caring for yourself after your procedure. Your health care provider may also give you more specific instructions. Your treatment has been planned according to current medical practices, but problems sometimes occur. Call your health care provider if you have any problems or questions after your procedure.      What to Expect After the Procedure:  After your procedure, it is typical to have the following sensations:  Minor discomfort or tenderness and a small bump at the catheter insertion site. The bump should usually decrease in size and tenderness within 1 to 2 weeks.  Any bruising will usually fade within 2 to 4 weeks.    Home Care Instructions:  Do not apply powder or lotion to the site.  Do not take baths, swim, or use a hot tub until your health care provider approves and the site is completely healed.  Do not bend, squat, or lift anything over 20 lb (9 kg) or as directed by your health care provider. However, we recommend lifting nothing heavier than a gallon of milk.    You may shower 24 hours after the procedure. Remove the bandage (dressing) and gently wash the site with plain soap and water. Gently pat the site dry. You may apply a band aid daily for 2 days if desired.    Inspect the site at least twice daily.  Increase your fluid intake for the next 2 days.    Limit your activity for the first 48 hours. .    Avoid strenuous activity for 1 week or as advised by your physician.    Follow instructions about when you can drive or return to work as directed by your physician.    Hold direct pressure over the site when you cough, sneeze, laugh or change positions.  Do this for the next 2 days.    Do not operate machinery or power tools for 24 hours.  A responsible adult should be with you for the  first 24 hours after you arrive home. Do not make any important legal decisions or sign legal papers for 24 hours.  Do not drink alcohol for 24 hours.  Metformin or any medications containing Metformin should not be taken for 48 hours after your procedure.      Call Your Doctor If:  You have drainage (other than a small amount of blood on the dressing).  You have chills or a fever > 101.  You have redness, warmth, swelling(larger than a walnut), or pain at the insertion site  You develop chest pain or shortness of breath, feel faint, or pass out.  You develop pain, discoloration, coldness, numbness, tingling, or severe bruising in the leg that held the catheter.  You develop bleeding from any other place, such as the bowels.  You have heavy bleeding from the site, hold pressure on the site for 20 minutes.  If the bleeding stops, apply a fresh bandage and call your cardiologist.  However, if you continue to have bleeding, call 911.  You have any symptoms of a stroke.  Remember BE FAST  B-balance. Sudden trouble walking or loss of balance.  E-eyes.  Sudden changes in how you see or a sudden onset of a very bad headache.   F-face. Sudden weakness or loss of feeling of the face or facial droop on one side.   A-arms Sudden weakness or numbness in one arm.  One arm drifts down if they are both held out in front of you. This happens suddenly and usually on one side of the body.  S-speech.  Sudden trouble speaking, slurred speech or trouble understanding what people are saying.   T-time  Time to call emergency services.  Write down the symptoms and the time they started.        Make Sure You:  Understand these instructions.  Will watch your condition.  Will get help right away if you are not doing well or get worse.

## 2025-02-21 NOTE — Clinical Note
Hemostasis started on the right femoral artery. Perclose was used in achieving hemostasis. Closure device deployed in the vessel. Hemostasis achieved successfully. Closure device additional comment: 4x4 and tegaderm

## 2025-02-21 NOTE — Clinical Note
A 5 fr sheath was successfully inserted using micropuncture technique into the right femoral artery. Sheath insertion not delayed.

## 2025-02-25 ENCOUNTER — HOSPITAL ENCOUNTER (OUTPATIENT)
Facility: HOSPITAL | Age: 70
Setting detail: HOSPITAL OUTPATIENT SURGERY
Discharge: HOME | End: 2025-02-25
Attending: SURGERY | Admitting: SURGERY
Payer: COMMERCIAL

## 2025-02-25 VITALS
SYSTOLIC BLOOD PRESSURE: 133 MMHG | HEART RATE: 73 BPM | BODY MASS INDEX: 33.46 KG/M2 | TEMPERATURE: 98.2 F | OXYGEN SATURATION: 96 % | RESPIRATION RATE: 15 BRPM | DIASTOLIC BLOOD PRESSURE: 83 MMHG | WEIGHT: 226.6 LBS

## 2025-02-25 DIAGNOSIS — Z86.0100 HISTORY OF COLON POLYPS: ICD-10-CM

## 2025-02-25 LAB — GLUCOSE BLDC GLUCOMTR-MCNC: 235 MG/DL (ref 70–130)

## 2025-02-25 PROCEDURE — 25010000002 LIDOCAINE PF 1% 1 % SOLUTION

## 2025-02-25 PROCEDURE — 82948 REAGENT STRIP/BLOOD GLUCOSE: CPT

## 2025-02-25 PROCEDURE — 88305 TISSUE EXAM BY PATHOLOGIST: CPT | Performed by: SURGERY

## 2025-02-25 PROCEDURE — 25010000002 PROPOFOL 200 MG/20ML EMULSION: Performed by: NURSE ANESTHETIST, CERTIFIED REGISTERED

## 2025-02-25 RX ORDER — SODIUM CHLORIDE 9 MG/ML
40 INJECTION, SOLUTION INTRAVENOUS AS NEEDED
Status: DISCONTINUED | OUTPATIENT
Start: 2025-02-25 | End: 2025-02-25 | Stop reason: HOSPADM

## 2025-02-25 RX ORDER — PROPOFOL 10 MG/ML
INJECTION, EMULSION INTRAVENOUS AS NEEDED
Status: DISCONTINUED | OUTPATIENT
Start: 2025-02-25 | End: 2025-02-25 | Stop reason: SURG

## 2025-02-25 RX ORDER — LIDOCAINE HYDROCHLORIDE 10 MG/ML
INJECTION, SOLUTION EPIDURAL; INFILTRATION; INTRACAUDAL; PERINEURAL AS NEEDED
Status: DISCONTINUED | OUTPATIENT
Start: 2025-02-25 | End: 2025-02-25 | Stop reason: SURG

## 2025-02-25 RX ORDER — SODIUM CHLORIDE 0.9 % (FLUSH) 0.9 %
10 SYRINGE (ML) INJECTION AS NEEDED
Status: DISCONTINUED | OUTPATIENT
Start: 2025-02-25 | End: 2025-02-25 | Stop reason: HOSPADM

## 2025-02-25 RX ORDER — SODIUM CHLORIDE 0.9 % (FLUSH) 0.9 %
10 SYRINGE (ML) INJECTION EVERY 12 HOURS SCHEDULED
Status: DISCONTINUED | OUTPATIENT
Start: 2025-02-25 | End: 2025-02-25 | Stop reason: HOSPADM

## 2025-02-25 RX ORDER — LIDOCAINE HYDROCHLORIDE 10 MG/ML
0.5 INJECTION, SOLUTION EPIDURAL; INFILTRATION; INTRACAUDAL; PERINEURAL ONCE AS NEEDED
Status: DISCONTINUED | OUTPATIENT
Start: 2025-02-25 | End: 2025-02-25 | Stop reason: HOSPADM

## 2025-02-25 RX ORDER — ONDANSETRON 2 MG/ML
4 INJECTION INTRAMUSCULAR; INTRAVENOUS ONCE AS NEEDED
Status: DISCONTINUED | OUTPATIENT
Start: 2025-02-25 | End: 2025-02-25 | Stop reason: HOSPADM

## 2025-02-25 RX ORDER — SODIUM CHLORIDE, SODIUM LACTATE, POTASSIUM CHLORIDE, CALCIUM CHLORIDE 600; 310; 30; 20 MG/100ML; MG/100ML; MG/100ML; MG/100ML
100 INJECTION, SOLUTION INTRAVENOUS ONCE
Status: DISCONTINUED | OUTPATIENT
Start: 2025-02-25 | End: 2025-02-25 | Stop reason: HOSPADM

## 2025-02-25 RX ADMIN — PROPOFOL 100 MG: 10 INJECTION, EMULSION INTRAVENOUS at 11:11

## 2025-02-25 RX ADMIN — PROPOFOL 100 MG: 10 INJECTION, EMULSION INTRAVENOUS at 11:50

## 2025-02-25 RX ADMIN — PROPOFOL 100 MG: 10 INJECTION, EMULSION INTRAVENOUS at 11:20

## 2025-02-25 RX ADMIN — PROPOFOL 50 MG: 10 INJECTION, EMULSION INTRAVENOUS at 11:15

## 2025-02-25 RX ADMIN — PROPOFOL 100 MG: 10 INJECTION, EMULSION INTRAVENOUS at 11:45

## 2025-02-25 RX ADMIN — PROPOFOL 100 MG: 10 INJECTION, EMULSION INTRAVENOUS at 11:35

## 2025-02-25 RX ADMIN — PROPOFOL 50 MG: 10 INJECTION, EMULSION INTRAVENOUS at 11:25

## 2025-02-25 RX ADMIN — LIDOCAINE HYDROCHLORIDE 30 MG: 10 INJECTION, SOLUTION EPIDURAL; INFILTRATION; INTRACAUDAL; PERINEURAL at 11:11

## 2025-02-25 RX ADMIN — PROPOFOL 100 MG: 10 INJECTION, EMULSION INTRAVENOUS at 11:30

## 2025-02-25 RX ADMIN — PROPOFOL 100 MG: 10 INJECTION, EMULSION INTRAVENOUS at 11:40

## 2025-02-25 NOTE — ANESTHESIA POSTPROCEDURE EVALUATION
Patient: Rajan Dc    Procedure Summary       Date: 02/25/25 Room / Location: [facility] ENDOSCOPY 1 /  [facility]    Anesthesia Start: 1107 Anesthesia Stop: 1159    Procedure: COLONOSCOPY WITH POLYPECTOMY Diagnosis:       History of colon polyps      (History of colon polyps [Z86.0100])    Surgeons: Kari Alejandro DO Provider: Blanca Mora CRNA    Anesthesia Type: MAC ASA Status: 3            Anesthesia Type: MAC    Vitals  Vitals Value Taken Time   /81 02/25/25 1235   Temp 98.2 °F (36.8 °C) 02/25/25 1203   Pulse 73 02/25/25 1240   Resp 15 02/25/25 1230   SpO2 97 % 02/25/25 1230   Vitals shown include unfiled device data.        Post Anesthesia Care and Evaluation    Patient location during evaluation: bedside  Patient participation: complete - patient participated  Level of consciousness: awake and alert  Pain score: 0  Pain management: adequate    Airway patency: patent  Anesthetic complications: No anesthetic complications  PONV Status: none  Cardiovascular status: acceptable  Respiratory status: acceptable  Hydration status: acceptable  No anesthesia care post op

## 2025-02-25 NOTE — H&P (VIEW-ONLY)
Rajan Dc 69 y.o. male presents @ the req of Dr. Eaton for eval of [facility].        Chief Complaint   Patient presents with    Hernia       [facility]                  HPI   Above noted and reviewed.  Rajan is known to my service.  I repaired an umbilical hernia on him several years ago.  He is here with a right inguinal hernia.  On further discussion with Rajan he has had polyps removed in the past and is due for a colonoscopy.        Review of Systems   All other systems reviewed and are negative.                Current Medications      Current Outpatient Medications:     aspirin 81 MG EC tablet, Take 1 p.o. daily for blood thinner/heart, Disp: , Rfl:     atorvastatin (LIPITOR) 80 MG tablet, TAKE 1 TABLET BY MOUTH DAILY FOR HIGH CHOLESTEROL, Disp: 90 tablet, Rfl: 2    glimepiride (AMARYL) 4 MG tablet, TAKE 1 TABLET BY MOUTH TWICE DAILY WITH BREAKFAST AND SUPPER FOR DIABETES, Disp: 180 tablet, Rfl: 3    Janumet XR  MG tablet, TAKE 1 TABLET BY MOUTH TWICE DAILY FOR DIABETES, Disp: 90 tablet, Rfl: 5    Jardiance 25 MG tablet tablet, TAKE 1 TABLET BY MOUTH EVERY MORNING BEFORE BREAKFAST FOR DIABETES, Disp: 90 tablet, Rfl: 1    Semaglutide, 2 MG/DOSE, (Ozempic, 2 MG/DOSE,) 8 MG/3ML solution pen-injector, Inject  under the skin into the appropriate area as directed 1 (One) Time Per Week. PT CURRENTLY NOT TAKING X 1 YR, Disp: , Rfl:     vitamin D3 125 MCG (5000 UT) capsule capsule, 1 by mouth daily as directed, Disp: 30 capsule, Rfl: 11              Allergies         Allergies   Allergen Reactions    Semaglutide Itching and Rash       Rybelsus only.  Tolerates Ozempic.                  Medical History        Past Medical History:   Diagnosis Date    Chronic ITP (idiopathic thrombocytopenia) 09/19/2018    Chronic migraine w/o aura w/o status migrainosus, not intractable 08/18/2022    Diabetic eye exam 05/22/2017 05/22/2017--routine diabetic eye exam reveals no evidence of diabetic retinopathy.  Astigmatism,  presbyopia, hypermetropia noted.  Very early cataracts noted bilaterally.  2015--patient reports a routine diabetic eye exam without evidence of diabetic retinopathy.    Diverticulosis of colon 10/28/2021     2021--colonoscopy reveals a 6 mm polyp in the transverse colon.  There was an 8 mm polyp in the descending colon.  A 20 mm polyp was found at 50 cm proximal to the anus.  Resection was incomplete.  The resected tissue was retrieved and coagulation for destruction was performed.  #4, sessile polyps in the sigmoid colon that were 5 to 6 mm in size.  Removed.  #2, sessile polyps in the sig    Elevated liver enzymes 2019--AST is normal at 27, ALT normal at 2 9.  Alkaline phosphatase mildly elevated at 137.  Bilirubin is normal.  2019--ultrasound liver ordered by the gastroenterologist reveals increased hepatic echogenicity consistent with steatosis.  Previous cholecystectomy.  No biliary ductal dilatation.  2019--patient was apparently referred to the gastroenterologist by    Encounter for diabetic foot exam 2023--routine diabetic foot exam reveals no evidence of diabetic foot ulcer or preulcerative callus.  Distal pulses are palpable.  No signs of ischemia.  Sensation subjectively intact.     2023--routine diabetic foot exam reveals no evidence of diabetic foot ulcer or preulcerative callus.  Distal pulses are palpable and sensation seems intact.      Esophageal varices determined by endoscopy 10/28/2021     2021--EGD reveals grade 2 varices in the lower third of the esophagus.  A varix with no bleeding was found in the cardia.  No stigmata of recent bleeding.  Mild portal hypertensive gastropathy was found in the gastric fundus.    Family history of premature coronary artery disease 2016     Father  from a myocardial infarction age 61.    Folic acid deficiency 2016    History  of Abnormal pulse oximetry 10/07/2012     10/07/2012--overnight oximetry test revealed significant nocturnal hypoxemia. Oxygen saturations were greater than 90% for only 50.5% of the study. Oxygen saturation less than 90% for three hours 47 minutes which was 49.5% of the study. Oxygen saturation less than 88% for one hour and 36 minutes and 48 seconds, 21.1% of the study.    History of CABG 01/2005 January 2005 status post four-vessel CABG.    History of colon polyps, 8/18/2021--tubular adenoma x12. 10/28/2021     August 18, 2021--colonoscopy reveals a 6 mm polyp in the transverse colon.  There was an 8 mm polyp in the descending colon.  A 20 mm polyp was found at 50 cm proximal to the anus.  Resection was incomplete.  The resected tissue was retrieved and coagulation for destruction was performed.  #4, sessile polyps in the sigmoid colon that were 5 to 6 mm in size.  Removed.  #2, sessile polyps in the sig    History of COVID-19 06/02/2021    History of myocardial infarction, 2005. 04/28/2016 01/01/2005--status post myocardial infarction.   January 2005--status post four-vessel CABG    Hyperlipidemia 04/28/2016    Hypogonadism male, TRT ineffective 09/13/2012 09/13/2012--treatment for symptomatic hypogonadism begun. This was later discontinued due to lack of efficacy and cost.    Liver cirrhosis secondary to RUIZ 10/28/2021     August 18, 2021--EGD reveals grade 2 varices in the lower third of the esophagus.  A varix with no bleeding was found in the cardia.  No stigmata of recent bleeding.  Mild portal hypertensive gastropathy was found in the gastric fundus.  October 2, 2019--ultrasound liver ordered by the gastroenterologist reveals increased hepatic echogenicity consistent with steatosis.  Previous cholecystectomy.      Male erectile disorder 08/27/2020    Microalbuminuria 04/25/2014 04/25/2014--urine microalbumin elevated 28.7. Normal range 0.0--17.0.    RUIZ (nonalcoholic steatohepatitis)  04/28/2016    Non morbid obesity 06/02/2021    Obstructive sleep apnea, 11/13/2012--mild to moderate NIKI.  Unable to tolerate CPAP.  Cannot use oral appliance. 10/07/2012     05/02/2014--nocturnal oxygen 2 L per nasal cannula ordered.   12/03/2013--patient was referred for an oral appliance but this could not be done because patient wears dentures.   11/13/2012--diagnosed with mild to moderate obstructive sleep apnea. Patient unable to tolerate CPAP.   10/07/2012--overnight oximetry test revealed significant nocturnal hypoxemia. Oxygen saturations were greater than 90% for only 50.5% of the study. Oxygen saturation less than 90% for three hours 47 minutes which was 49.5% of the study. Oxygen saturation less than 88% for one hour and 36 minutes and 48 seconds, 21.1% of the study.    Occlusive coronary artery disease, 01/01/2005--status post MI.  January 2005--CABG ×4.  Details not known. 01/01/2005 01/01/2005--status post myocardial infarction.   January 2005--status post four-vessel CABG    Sleep related hypoxia 10/07/2012     05/02/2014--nocturnal oxygen 2 L per nasal cannula ordered.  12/03/2013--patient was referred for an oral appliance but this could not be done because patient wears dentures.   11/13/2012--diagnosed with mild to moderate obstructive sleep apnea. Patient unable to tolerate CPAP.   10/07/2012--overnight oximetry test revealed significant nocturnal hypoxemia. Oxygen saturations were greater than 90% for only 50.5% of the study. Oxygen saturation less than 90% for three hours 47 minutes which was 49.5% of the study. Oxygen saturation less than 88% for one hour and 36 minutes and 48 seconds, 21.1% of the study.    Type 2 diabetes mellitus with microalbuminuria, without long-term current use of insulin 01/01/2005     Diagnosed in 2005.    Urticaria due to drug allergy, March 2024--urticaria due to Rybelsus 03/27/2024    Vitamin D deficiency 04/28/2016                  Surgical History          Past Surgical History:   Procedure Laterality Date    CARDIAC CATHETERIZATION        CHOLECYSTECTOMY        CHOLECYSTECTOMY WITH INTRAOPERATIVE CHOLANGIOGRAM N/A 2019     Procedure: laparoscopic cholecystectomy with intraoperative cholangiogram;  Surgeon: Vida Avilez MD;  Location: [facility] OR;  Service: General    COLONOSCOPY N/A 2021--colonoscopy reveals a 6 mm polyp in the transverse colon.  There was an 8 mm polyp in the descending colon.  A 20 mm polyp was found at 50 cm proximal to the anus.  Resection was incomplete.  The resected tissue was retrieved and coagulation for destruction was performed.  #4, sessile polyps in the sigmoid colon that were 5 to 6 mm in size.  Removed.  #2, sessile polyps in the sig    CORONARY ARTERY BYPASS GRAFT   2005 status post four-vessel CABG.    ENDOSCOPY N/A 2021--EGD reveals grade 2 varices in the lower third of the esophagus.  A varix with no bleeding was found in the cardia.  No stigmata of recent bleeding.  Mild portal hypertensive gastropathy was found in the gastric fundus.    UMBILICAL HERNIA REPAIR N/A 2023     Procedure: UMBILICAL HERNIA REPAIR;  Surgeon: Kari Alejandro DO;  Location: [facility] OR;  Service: General;  Laterality: N/A;                  Social History   Social History            Tobacco Use    Smoking status: Former       Current packs/day: 0.00       Average packs/day: 0.5 packs/day for 40.0 years (20.0 ttl pk-yrs)       Types: Cigarettes       Start date: 1965       Quit date: 2005       Years since quittin.0    Smokeless tobacco: Never    Tobacco comments:       Former smoker quit 13 years ago with a 64.5 pack year history.  Smoked 1 ppd for 32 years and 4.5 ppd for 5 years and quit when he had his open heart surgery.   Vaping Use    Vaping status: Never Used   Substance Use Topics    Alcohol use: No    Drug use: No                      "  Immunization History   Administered Date(s) Administered    Pneumococcal Conjugate 20-Valent (PCV20) 07/21/2022    Pneumococcal Polysaccharide (PPSV23) 03/14/2017    Tdap 02/05/2015               Physical Exam  Vitals and nursing note reviewed.   Constitutional:       Appearance: Normal appearance.   HENT:      Head: Normocephalic and atraumatic.   Cardiovascular:      Rate and Rhythm: Normal rate and regular rhythm.   Pulmonary:      Effort: Pulmonary effort is normal.      Breath sounds: Normal breath sounds.   Abdominal:      General: Bowel sounds are normal.      Palpations: Abdomen is soft.   Genitourinary:     Comments: Right inguinal hernia noted  Neurological:      General: No focal deficit present.      Mental Status: He is alert and oriented to person, place, and time.   Psychiatric:         Mood and Affect: Mood normal.         Behavior: Behavior normal.            Debilities/Disabilities Identified: None     Emotional Behavior: Appropriate        /78   Pulse 72   Resp 16   Ht 175.3 cm (69\")   Wt 104 kg (230 lb)   BMI 33.97 kg/m²            Diagnoses and all orders for this visit:     1. Right inguinal hernia (Primary)     2. History of colon polyps     We will get Rajan scheduled for a colonoscopy and laparoscopic right inguinal hernia repair.  I discussed with Rajan and his wife the benefits and risks of both procedures.  All of their questions were answered and he is willing to proceed.     "

## 2025-02-25 NOTE — OP NOTE
Colonoscopy Procedure Note      Pre-operative Diagnosis: History of colon polyps    Post-operative Diagnosis: Polyps of the hepatic flexure, transverse colon, left colon, sigmoid colon    Procedure: Colonoscopy with polypectomy using hot snare and cold forceps    Surgeon: Flavia    Anesthetic: MAC     Estimated Blood Loss: Minimal    Complications: None    Indications: History of colon polyps    Findings/Treatments:   Hepatic flexure polyps x 2-removed using hot snare  Transverse colon polyps x 5-removed using hot snare  Left colon polyp-removed using cold forceps  Large sigmoid colon polyp x 1 and small sigmoid colon polyp x 1-removed using hot snare       Scope Withdrawal Time:  > 6 minutes      Recommendations: Rajan will need a 1 year repeat colonoscopy due to the large size and number of polyps      Procedure Details     After discussing the benefits and risks of the procedure with the patient, not limited to but including:  Bleeding, infection, perforation, aspiration; informed consent was signed.  The patient was taken into the endoscopy room at [facility] and placed in the left lateral decubitus position.  MAC anesthesia was given with appropriate cardiopulmonary monitoring.  A rectal exam was performed.  Sphincter tone was normal.  The colonoscope was then inserted and carefully advanced to the cecum while visualizing the mucosa.  The cecum was identified by the ileocecal valve and the orifice of the appendix.  Once in the cecum the scope was withdrawn while evaluating mucosa and deflating air.  The polyps we encountered were removed along the way.  The larger ones were removed using hot snare and the smaller ones with cold forceps.  After the sigmoid colon polyps were removed the scope was withdrawn and Rajan was taken to the recovery area in stable postoperative condition having tolerated his procedure well.    Kari Alejandro DO

## 2025-02-25 NOTE — H&P
Rajan Dc 69 y.o. male presents @ the req of Dr. Eaton for eval of [facility].        Chief Complaint   Patient presents with    Hernia       [facility]                  HPI   Above noted and reviewed.  Rajan is known to my service.  I repaired an umbilical hernia on him several years ago.  He is here with a right inguinal hernia.  On further discussion with Rajan he has had polyps removed in the past and is due for a colonoscopy.        Review of Systems   All other systems reviewed and are negative.                Current Medications      Current Outpatient Medications:     aspirin 81 MG EC tablet, Take 1 p.o. daily for blood thinner/heart, Disp: , Rfl:     atorvastatin (LIPITOR) 80 MG tablet, TAKE 1 TABLET BY MOUTH DAILY FOR HIGH CHOLESTEROL, Disp: 90 tablet, Rfl: 2    glimepiride (AMARYL) 4 MG tablet, TAKE 1 TABLET BY MOUTH TWICE DAILY WITH BREAKFAST AND SUPPER FOR DIABETES, Disp: 180 tablet, Rfl: 3    Janumet XR  MG tablet, TAKE 1 TABLET BY MOUTH TWICE DAILY FOR DIABETES, Disp: 90 tablet, Rfl: 5    Jardiance 25 MG tablet tablet, TAKE 1 TABLET BY MOUTH EVERY MORNING BEFORE BREAKFAST FOR DIABETES, Disp: 90 tablet, Rfl: 1    Semaglutide, 2 MG/DOSE, (Ozempic, 2 MG/DOSE,) 8 MG/3ML solution pen-injector, Inject  under the skin into the appropriate area as directed 1 (One) Time Per Week. PT CURRENTLY NOT TAKING X 1 YR, Disp: , Rfl:     vitamin D3 125 MCG (5000 UT) capsule capsule, 1 by mouth daily as directed, Disp: 30 capsule, Rfl: 11              Allergies         Allergies   Allergen Reactions    Semaglutide Itching and Rash       Rybelsus only.  Tolerates Ozempic.                  Medical History        Past Medical History:   Diagnosis Date    Chronic ITP (idiopathic thrombocytopenia) 09/19/2018    Chronic migraine w/o aura w/o status migrainosus, not intractable 08/18/2022    Diabetic eye exam 05/22/2017 05/22/2017--routine diabetic eye exam reveals no evidence of diabetic retinopathy.  Astigmatism,  presbyopia, hypermetropia noted.  Very early cataracts noted bilaterally.  2015--patient reports a routine diabetic eye exam without evidence of diabetic retinopathy.    Diverticulosis of colon 10/28/2021     2021--colonoscopy reveals a 6 mm polyp in the transverse colon.  There was an 8 mm polyp in the descending colon.  A 20 mm polyp was found at 50 cm proximal to the anus.  Resection was incomplete.  The resected tissue was retrieved and coagulation for destruction was performed.  #4, sessile polyps in the sigmoid colon that were 5 to 6 mm in size.  Removed.  #2, sessile polyps in the sig    Elevated liver enzymes 2019--AST is normal at 27, ALT normal at 2 9.  Alkaline phosphatase mildly elevated at 137.  Bilirubin is normal.  2019--ultrasound liver ordered by the gastroenterologist reveals increased hepatic echogenicity consistent with steatosis.  Previous cholecystectomy.  No biliary ductal dilatation.  2019--patient was apparently referred to the gastroenterologist by    Encounter for diabetic foot exam 2023--routine diabetic foot exam reveals no evidence of diabetic foot ulcer or preulcerative callus.  Distal pulses are palpable.  No signs of ischemia.  Sensation subjectively intact.     2023--routine diabetic foot exam reveals no evidence of diabetic foot ulcer or preulcerative callus.  Distal pulses are palpable and sensation seems intact.      Esophageal varices determined by endoscopy 10/28/2021     2021--EGD reveals grade 2 varices in the lower third of the esophagus.  A varix with no bleeding was found in the cardia.  No stigmata of recent bleeding.  Mild portal hypertensive gastropathy was found in the gastric fundus.    Family history of premature coronary artery disease 2016     Father  from a myocardial infarction age 61.    Folic acid deficiency 2016    History  of Abnormal pulse oximetry 10/07/2012     10/07/2012--overnight oximetry test revealed significant nocturnal hypoxemia. Oxygen saturations were greater than 90% for only 50.5% of the study. Oxygen saturation less than 90% for three hours 47 minutes which was 49.5% of the study. Oxygen saturation less than 88% for one hour and 36 minutes and 48 seconds, 21.1% of the study.    History of CABG 01/2005 January 2005 status post four-vessel CABG.    History of colon polyps, 8/18/2021--tubular adenoma x12. 10/28/2021     August 18, 2021--colonoscopy reveals a 6 mm polyp in the transverse colon.  There was an 8 mm polyp in the descending colon.  A 20 mm polyp was found at 50 cm proximal to the anus.  Resection was incomplete.  The resected tissue was retrieved and coagulation for destruction was performed.  #4, sessile polyps in the sigmoid colon that were 5 to 6 mm in size.  Removed.  #2, sessile polyps in the sig    History of COVID-19 06/02/2021    History of myocardial infarction, 2005. 04/28/2016 01/01/2005--status post myocardial infarction.   January 2005--status post four-vessel CABG    Hyperlipidemia 04/28/2016    Hypogonadism male, TRT ineffective 09/13/2012 09/13/2012--treatment for symptomatic hypogonadism begun. This was later discontinued due to lack of efficacy and cost.    Liver cirrhosis secondary to RUIZ 10/28/2021     August 18, 2021--EGD reveals grade 2 varices in the lower third of the esophagus.  A varix with no bleeding was found in the cardia.  No stigmata of recent bleeding.  Mild portal hypertensive gastropathy was found in the gastric fundus.  October 2, 2019--ultrasound liver ordered by the gastroenterologist reveals increased hepatic echogenicity consistent with steatosis.  Previous cholecystectomy.      Male erectile disorder 08/27/2020    Microalbuminuria 04/25/2014 04/25/2014--urine microalbumin elevated 28.7. Normal range 0.0--17.0.    RUIZ (nonalcoholic steatohepatitis)  04/28/2016    Non morbid obesity 06/02/2021    Obstructive sleep apnea, 11/13/2012--mild to moderate NIKI.  Unable to tolerate CPAP.  Cannot use oral appliance. 10/07/2012     05/02/2014--nocturnal oxygen 2 L per nasal cannula ordered.   12/03/2013--patient was referred for an oral appliance but this could not be done because patient wears dentures.   11/13/2012--diagnosed with mild to moderate obstructive sleep apnea. Patient unable to tolerate CPAP.   10/07/2012--overnight oximetry test revealed significant nocturnal hypoxemia. Oxygen saturations were greater than 90% for only 50.5% of the study. Oxygen saturation less than 90% for three hours 47 minutes which was 49.5% of the study. Oxygen saturation less than 88% for one hour and 36 minutes and 48 seconds, 21.1% of the study.    Occlusive coronary artery disease, 01/01/2005--status post MI.  January 2005--CABG ×4.  Details not known. 01/01/2005 01/01/2005--status post myocardial infarction.   January 2005--status post four-vessel CABG    Sleep related hypoxia 10/07/2012     05/02/2014--nocturnal oxygen 2 L per nasal cannula ordered.  12/03/2013--patient was referred for an oral appliance but this could not be done because patient wears dentures.   11/13/2012--diagnosed with mild to moderate obstructive sleep apnea. Patient unable to tolerate CPAP.   10/07/2012--overnight oximetry test revealed significant nocturnal hypoxemia. Oxygen saturations were greater than 90% for only 50.5% of the study. Oxygen saturation less than 90% for three hours 47 minutes which was 49.5% of the study. Oxygen saturation less than 88% for one hour and 36 minutes and 48 seconds, 21.1% of the study.    Type 2 diabetes mellitus with microalbuminuria, without long-term current use of insulin 01/01/2005     Diagnosed in 2005.    Urticaria due to drug allergy, March 2024--urticaria due to Rybelsus 03/27/2024    Vitamin D deficiency 04/28/2016                  Surgical History          Past Surgical History:   Procedure Laterality Date    CARDIAC CATHETERIZATION        CHOLECYSTECTOMY        CHOLECYSTECTOMY WITH INTRAOPERATIVE CHOLANGIOGRAM N/A 2019     Procedure: laparoscopic cholecystectomy with intraoperative cholangiogram;  Surgeon: Vida Avilez MD;  Location: [facility] OR;  Service: General    COLONOSCOPY N/A 2021--colonoscopy reveals a 6 mm polyp in the transverse colon.  There was an 8 mm polyp in the descending colon.  A 20 mm polyp was found at 50 cm proximal to the anus.  Resection was incomplete.  The resected tissue was retrieved and coagulation for destruction was performed.  #4, sessile polyps in the sigmoid colon that were 5 to 6 mm in size.  Removed.  #2, sessile polyps in the sig    CORONARY ARTERY BYPASS GRAFT   2005 status post four-vessel CABG.    ENDOSCOPY N/A 2021--EGD reveals grade 2 varices in the lower third of the esophagus.  A varix with no bleeding was found in the cardia.  No stigmata of recent bleeding.  Mild portal hypertensive gastropathy was found in the gastric fundus.    UMBILICAL HERNIA REPAIR N/A 2023     Procedure: UMBILICAL HERNIA REPAIR;  Surgeon: Kari Alejandro DO;  Location: [facility] OR;  Service: General;  Laterality: N/A;                  Social History   Social History            Tobacco Use    Smoking status: Former       Current packs/day: 0.00       Average packs/day: 0.5 packs/day for 40.0 years (20.0 ttl pk-yrs)       Types: Cigarettes       Start date: 1965       Quit date: 2005       Years since quittin.0    Smokeless tobacco: Never    Tobacco comments:       Former smoker quit 13 years ago with a 64.5 pack year history.  Smoked 1 ppd for 32 years and 4.5 ppd for 5 years and quit when he had his open heart surgery.   Vaping Use    Vaping status: Never Used   Substance Use Topics    Alcohol use: No    Drug use: No                      "  Immunization History   Administered Date(s) Administered    Pneumococcal Conjugate 20-Valent (PCV20) 07/21/2022    Pneumococcal Polysaccharide (PPSV23) 03/14/2017    Tdap 02/05/2015               Physical Exam  Vitals and nursing note reviewed.   Constitutional:       Appearance: Normal appearance.   HENT:      Head: Normocephalic and atraumatic.   Cardiovascular:      Rate and Rhythm: Normal rate and regular rhythm.   Pulmonary:      Effort: Pulmonary effort is normal.      Breath sounds: Normal breath sounds.   Abdominal:      General: Bowel sounds are normal.      Palpations: Abdomen is soft.   Genitourinary:     Comments: Right inguinal hernia noted  Neurological:      General: No focal deficit present.      Mental Status: He is alert and oriented to person, place, and time.   Psychiatric:         Mood and Affect: Mood normal.         Behavior: Behavior normal.            Debilities/Disabilities Identified: None     Emotional Behavior: Appropriate        /78   Pulse 72   Resp 16   Ht 175.3 cm (69\")   Wt 104 kg (230 lb)   BMI 33.97 kg/m²            Diagnoses and all orders for this visit:     1. Right inguinal hernia (Primary)     2. History of colon polyps     We will get Rajan scheduled for a colonoscopy and laparoscopic right inguinal hernia repair.  I discussed with Rajan and his wife the benefits and risks of both procedures.  All of their questions were answered and he is willing to proceed.     "

## 2025-02-25 NOTE — ANESTHESIA PREPROCEDURE EVALUATION
Anesthesia Evaluation     Patient summary reviewed and Nursing notes reviewed   no history of anesthetic complications:   NPO Solid Status: > 8 hours  NPO Liquid Status: > 8 hours           Airway   Mallampati: II  TM distance: >3 FB  Neck ROM: full  No difficulty expected  Dental    (+) poor dentition    Pulmonary - normal exam    breath sounds clear to auscultation  (+) ,shortness of breath, sleep apnea  Cardiovascular - normal exam  Exercise tolerance: good (4-7 METS)    Rhythm: regular  Rate: normal    (+) hypertension well controlled less than 2 medications, past MI  >12 months, CAD, CABG (20 years ago, heart cath on friday, all clear) >6 Months, hyperlipidemia      Neuro/Psych  (+) headaches  GI/Hepatic/Renal/Endo    (+) obesity, hepatitis, liver disease cirrhosis, diabetes mellitus type 2 well controlled  (-) morbid obesity    Musculoskeletal (-) negative ROS    Abdominal   (+) obese   Substance History - negative use     OB/GYN negative ob/gyn ROS         Other - negative ROS                         Anesthesia Plan    ASA 3     MAC     intravenous induction     Anesthetic plan, risks, benefits, and alternatives have been provided, discussed and informed consent has been obtained with: patient.  Pre-procedure education provided  Use of blood products discussed with patient  Consented to blood products.        CODE STATUS:

## 2025-02-26 NOTE — PAT
Spoke with patient via phone to remind him of meds to take DOS and to instruct on NPO status. Pt has CHG soap and knew bathing instructions and meds to take DOS. Questions answered.

## 2025-02-27 ENCOUNTER — HOSPITAL ENCOUNTER (OUTPATIENT)
Facility: HOSPITAL | Age: 70
Setting detail: HOSPITAL OUTPATIENT SURGERY
Discharge: HOME | End: 2025-02-27
Attending: SURGERY | Admitting: SURGERY
Payer: COMMERCIAL

## 2025-02-27 VITALS
TEMPERATURE: 97.1 F | WEIGHT: 227 LBS | DIASTOLIC BLOOD PRESSURE: 74 MMHG | RESPIRATION RATE: 16 BRPM | BODY MASS INDEX: 33.52 KG/M2 | HEART RATE: 75 BPM | SYSTOLIC BLOOD PRESSURE: 142 MMHG | OXYGEN SATURATION: 98 %

## 2025-02-27 DIAGNOSIS — K40.90 RIGHT INGUINAL HERNIA: ICD-10-CM

## 2025-02-27 LAB
DEPRECATED RDW RBC AUTO: 42.1 FL (ref 37–54)
ERYTHROCYTE [DISTWIDTH] IN BLOOD BY AUTOMATED COUNT: 18.2 % (ref 12.3–15.4)
GLUCOSE BLDC GLUCOMTR-MCNC: 230 MG/DL (ref 70–130)
GLUCOSE BLDC GLUCOMTR-MCNC: 290 MG/DL (ref 70–130)
HCT VFR BLD AUTO: 36.2 % (ref 37.5–51)
HGB BLD-MCNC: 10.4 G/DL (ref 13–17.7)
MCH RBC QN AUTO: 19.7 PG (ref 26.6–33)
MCHC RBC AUTO-ENTMCNC: 28.7 G/DL (ref 31.5–35.7)
MCV RBC AUTO: 68.6 FL (ref 79–97)
PLATELET # BLD AUTO: 102 10*3/MM3 (ref 140–450)
PMV BLD AUTO: ABNORMAL FL
QT INTERVAL: 477 MS
QTC INTERVAL: 539 MS
RBC # BLD AUTO: 5.28 10*6/MM3 (ref 4.14–5.8)
WBC NRBC COR # BLD AUTO: 4.67 10*3/MM3 (ref 3.4–10.8)

## 2025-02-27 PROCEDURE — 25010000002 BUPIVACAINE (PF) 0.5 % SOLUTION: Performed by: SURGERY

## 2025-02-27 PROCEDURE — 25810000003 LACTATED RINGERS PER 1000 ML: Performed by: NURSE ANESTHETIST, CERTIFIED REGISTERED

## 2025-02-27 PROCEDURE — 25010000002 LIDOCAINE 1% - EPINEPHRINE 1:100000 1 %-1:100000 SOLUTION: Performed by: SURGERY

## 2025-02-27 PROCEDURE — 36415 COLL VENOUS BLD VENIPUNCTURE: CPT

## 2025-02-27 PROCEDURE — C1781 MESH (IMPLANTABLE): HCPCS | Performed by: SURGERY

## 2025-02-27 PROCEDURE — 82948 REAGENT STRIP/BLOOD GLUCOSE: CPT

## 2025-02-27 PROCEDURE — 25010000002 DEXAMETHASONE PER 1 MG: Performed by: NURSE ANESTHETIST, CERTIFIED REGISTERED

## 2025-02-27 PROCEDURE — 93005 ELECTROCARDIOGRAM TRACING: CPT | Performed by: NURSE ANESTHETIST, CERTIFIED REGISTERED

## 2025-02-27 PROCEDURE — 25010000002 FENTANYL CITRATE (PF) 50 MCG/ML SOLUTION

## 2025-02-27 PROCEDURE — 25010000002 CEFAZOLIN PER 500 MG: Performed by: SURGERY

## 2025-02-27 PROCEDURE — 49650 LAP ING HERNIA REPAIR INIT: CPT | Performed by: SURGERY

## 2025-02-27 PROCEDURE — 49650 LAP ING HERNIA REPAIR INIT: CPT | Performed by: SPECIALIST/TECHNOLOGIST, OTHER

## 2025-02-27 PROCEDURE — 25010000002 SUGAMMADEX 200 MG/2ML SOLUTION

## 2025-02-27 PROCEDURE — 25010000002 LIDOCAINE 2% SOLUTION

## 2025-02-27 PROCEDURE — 93010 ELECTROCARDIOGRAM REPORT: CPT | Performed by: INTERNAL MEDICINE

## 2025-02-27 PROCEDURE — 85027 COMPLETE CBC AUTOMATED: CPT | Performed by: SURGERY

## 2025-02-27 PROCEDURE — 25010000002 SUCCINYLCHOLINE PER 20 MG

## 2025-02-27 PROCEDURE — 25010000002 PROPOFOL 200 MG/20ML EMULSION

## 2025-02-27 PROCEDURE — 25010000002 ONDANSETRON PER 1 MG: Performed by: NURSE ANESTHETIST, CERTIFIED REGISTERED

## 2025-02-27 DEVICE — FLOSEAL WITH RECOTHROM - 5ML
Type: IMPLANTABLE DEVICE | Site: ABDOMEN | Status: FUNCTIONAL
Brand: FLOSEAL HEMOSTATIC MATRIX

## 2025-02-27 DEVICE — CAPSURE PERMANENT FIXATION SYSTEM, 37 CM LENGTH, 5 MM DIAMETER, 30 FASTENERS
Type: IMPLANTABLE DEVICE | Site: ABDOMEN | Status: FUNCTIONAL
Brand: CAPSURE

## 2025-02-27 DEVICE — 3DMAX MID ANATOMICAL MESH, 10 CM X 16 CM (4" X 6"), LARGE, RIGHT
Type: IMPLANTABLE DEVICE | Site: ABDOMEN | Status: FUNCTIONAL
Brand: 3DMAX

## 2025-02-27 RX ORDER — ONDANSETRON 2 MG/ML
4 INJECTION INTRAMUSCULAR; INTRAVENOUS ONCE AS NEEDED
Status: DISCONTINUED | OUTPATIENT
Start: 2025-02-27 | End: 2025-02-27 | Stop reason: HOSPADM

## 2025-02-27 RX ORDER — MAGNESIUM HYDROXIDE 1200 MG/15ML
LIQUID ORAL AS NEEDED
Status: DISCONTINUED | OUTPATIENT
Start: 2025-02-27 | End: 2025-02-27 | Stop reason: HOSPADM

## 2025-02-27 RX ORDER — DEXMEDETOMIDINE HYDROCHLORIDE 100 UG/ML
INJECTION, SOLUTION INTRAVENOUS AS NEEDED
Status: DISCONTINUED | OUTPATIENT
Start: 2025-02-27 | End: 2025-02-27 | Stop reason: SURG

## 2025-02-27 RX ORDER — SODIUM CHLORIDE 0.9 % (FLUSH) 0.9 %
10 SYRINGE (ML) INJECTION EVERY 12 HOURS SCHEDULED
Status: DISCONTINUED | OUTPATIENT
Start: 2025-02-27 | End: 2025-02-27 | Stop reason: HOSPADM

## 2025-02-27 RX ORDER — BUPIVACAINE HYDROCHLORIDE 5 MG/ML
INJECTION, SOLUTION EPIDURAL; INTRACAUDAL AS NEEDED
Status: DISCONTINUED | OUTPATIENT
Start: 2025-02-27 | End: 2025-02-27 | Stop reason: HOSPADM

## 2025-02-27 RX ORDER — SODIUM CHLORIDE, SODIUM LACTATE, POTASSIUM CHLORIDE, CALCIUM CHLORIDE 600; 310; 30; 20 MG/100ML; MG/100ML; MG/100ML; MG/100ML
9 INJECTION, SOLUTION INTRAVENOUS CONTINUOUS PRN
Status: DISCONTINUED | OUTPATIENT
Start: 2025-02-27 | End: 2025-02-27 | Stop reason: HOSPADM

## 2025-02-27 RX ORDER — LIDOCAINE HYDROCHLORIDE AND EPINEPHRINE 10; 10 MG/ML; UG/ML
INJECTION, SOLUTION INFILTRATION; PERINEURAL AS NEEDED
Status: DISCONTINUED | OUTPATIENT
Start: 2025-02-27 | End: 2025-02-27 | Stop reason: HOSPADM

## 2025-02-27 RX ORDER — EPHEDRINE SULFATE 50 MG/ML
INJECTION INTRAVENOUS AS NEEDED
Status: DISCONTINUED | OUTPATIENT
Start: 2025-02-27 | End: 2025-02-27 | Stop reason: SURG

## 2025-02-27 RX ORDER — LIDOCAINE HYDROCHLORIDE 20 MG/ML
INJECTION, SOLUTION INFILTRATION; PERINEURAL AS NEEDED
Status: DISCONTINUED | OUTPATIENT
Start: 2025-02-27 | End: 2025-02-27 | Stop reason: SURG

## 2025-02-27 RX ORDER — DIPHENHYDRAMINE HYDROCHLORIDE 50 MG/ML
12.5 INJECTION INTRAMUSCULAR; INTRAVENOUS ONCE AS NEEDED
Status: DISCONTINUED | OUTPATIENT
Start: 2025-02-27 | End: 2025-02-27 | Stop reason: HOSPADM

## 2025-02-27 RX ORDER — DEXAMETHASONE SODIUM PHOSPHATE 4 MG/ML
8 INJECTION, SOLUTION INTRA-ARTICULAR; INTRALESIONAL; INTRAMUSCULAR; INTRAVENOUS; SOFT TISSUE ONCE AS NEEDED
Status: COMPLETED | OUTPATIENT
Start: 2025-02-27 | End: 2025-02-27

## 2025-02-27 RX ORDER — SODIUM CHLORIDE 0.9 % (FLUSH) 0.9 %
10 SYRINGE (ML) INJECTION AS NEEDED
Status: DISCONTINUED | OUTPATIENT
Start: 2025-02-27 | End: 2025-02-27 | Stop reason: HOSPADM

## 2025-02-27 RX ORDER — ONDANSETRON 2 MG/ML
4 INJECTION INTRAMUSCULAR; INTRAVENOUS ONCE AS NEEDED
Status: COMPLETED | OUTPATIENT
Start: 2025-02-27 | End: 2025-02-27

## 2025-02-27 RX ORDER — MIDAZOLAM HYDROCHLORIDE 2 MG/2ML
0.5 INJECTION, SOLUTION INTRAMUSCULAR; INTRAVENOUS
Status: DISCONTINUED | OUTPATIENT
Start: 2025-02-27 | End: 2025-02-27 | Stop reason: HOSPADM

## 2025-02-27 RX ORDER — ROCURONIUM BROMIDE 10 MG/ML
INJECTION, SOLUTION INTRAVENOUS AS NEEDED
Status: DISCONTINUED | OUTPATIENT
Start: 2025-02-27 | End: 2025-02-27 | Stop reason: SURG

## 2025-02-27 RX ORDER — PROMETHAZINE HYDROCHLORIDE 25 MG/1
25 SUPPOSITORY RECTAL ONCE AS NEEDED
Status: DISCONTINUED | OUTPATIENT
Start: 2025-02-27 | End: 2025-02-27 | Stop reason: HOSPADM

## 2025-02-27 RX ORDER — FENTANYL CITRATE 50 UG/ML
50 INJECTION, SOLUTION INTRAMUSCULAR; INTRAVENOUS
Status: DISCONTINUED | OUTPATIENT
Start: 2025-02-27 | End: 2025-02-27 | Stop reason: HOSPADM

## 2025-02-27 RX ORDER — FAMOTIDINE 10 MG/ML
20 INJECTION, SOLUTION INTRAVENOUS
Status: COMPLETED | OUTPATIENT
Start: 2025-02-27 | End: 2025-02-27

## 2025-02-27 RX ORDER — SODIUM CHLORIDE, SODIUM LACTATE, POTASSIUM CHLORIDE, CALCIUM CHLORIDE 600; 310; 30; 20 MG/100ML; MG/100ML; MG/100ML; MG/100ML
100 INJECTION, SOLUTION INTRAVENOUS ONCE
Status: DISCONTINUED | OUTPATIENT
Start: 2025-02-27 | End: 2025-02-27 | Stop reason: HOSPADM

## 2025-02-27 RX ORDER — PROPOFOL 10 MG/ML
INJECTION, EMULSION INTRAVENOUS AS NEEDED
Status: DISCONTINUED | OUTPATIENT
Start: 2025-02-27 | End: 2025-02-27 | Stop reason: SURG

## 2025-02-27 RX ORDER — FENTANYL CITRATE 50 UG/ML
INJECTION, SOLUTION INTRAMUSCULAR; INTRAVENOUS AS NEEDED
Status: DISCONTINUED | OUTPATIENT
Start: 2025-02-27 | End: 2025-02-27 | Stop reason: SURG

## 2025-02-27 RX ORDER — HYDROCODONE BITARTRATE AND ACETAMINOPHEN 5; 325 MG/1; MG/1
1 TABLET ORAL EVERY 6 HOURS PRN
Qty: 10 TABLET | Refills: 0 | Status: SHIPPED | OUTPATIENT
Start: 2025-02-27

## 2025-02-27 RX ORDER — PROMETHAZINE HYDROCHLORIDE 25 MG/1
25 TABLET ORAL ONCE AS NEEDED
Status: DISCONTINUED | OUTPATIENT
Start: 2025-02-27 | End: 2025-02-27 | Stop reason: HOSPADM

## 2025-02-27 RX ORDER — SUCCINYLCHOLINE CHLORIDE 20 MG/ML
INJECTION INTRAMUSCULAR; INTRAVENOUS AS NEEDED
Status: DISCONTINUED | OUTPATIENT
Start: 2025-02-27 | End: 2025-02-27 | Stop reason: SURG

## 2025-02-27 RX ORDER — HYDROCODONE BITARTRATE AND ACETAMINOPHEN 5; 325 MG/1; MG/1
1 TABLET ORAL ONCE AS NEEDED
Status: COMPLETED | OUTPATIENT
Start: 2025-02-27 | End: 2025-02-27

## 2025-02-27 RX ADMIN — DEXMEDETOMIDINE HYDROCHLORIDE 5 MCG: 100 INJECTION, SOLUTION INTRAVENOUS at 10:20

## 2025-02-27 RX ADMIN — ONDANSETRON 4 MG: 2 INJECTION INTRAMUSCULAR; INTRAVENOUS at 07:43

## 2025-02-27 RX ADMIN — EPHEDRINE SULFATE 5 MG: 50 INJECTION INTRAVENOUS at 10:13

## 2025-02-27 RX ADMIN — DEXMEDETOMIDINE HYDROCHLORIDE 10 MCG: 100 INJECTION, SOLUTION INTRAVENOUS at 09:48

## 2025-02-27 RX ADMIN — HYDROCODONE BITARTRATE AND ACETAMINOPHEN 1 TABLET: 5; 325 TABLET ORAL at 12:18

## 2025-02-27 RX ADMIN — FENTANYL CITRATE 50 MCG: 50 INJECTION, SOLUTION INTRAMUSCULAR; INTRAVENOUS at 12:18

## 2025-02-27 RX ADMIN — SUGAMMADEX 300 MG: 100 INJECTION, SOLUTION INTRAVENOUS at 11:26

## 2025-02-27 RX ADMIN — DEXAMETHASONE SODIUM PHOSPHATE 8 MG: 4 INJECTION, SOLUTION INTRAMUSCULAR; INTRAVENOUS at 07:43

## 2025-02-27 RX ADMIN — ROCURONIUM 20 MG: 50 INJECTION, SOLUTION INTRAVENOUS at 11:08

## 2025-02-27 RX ADMIN — LIDOCAINE HYDROCHLORIDE 60 MG: 20 INJECTION, SOLUTION INFILTRATION; PERINEURAL at 09:48

## 2025-02-27 RX ADMIN — FENTANYL CITRATE 25 MCG: 50 INJECTION, SOLUTION INTRAMUSCULAR; INTRAVENOUS at 11:28

## 2025-02-27 RX ADMIN — FENTANYL CITRATE 25 MCG: 50 INJECTION, SOLUTION INTRAMUSCULAR; INTRAVENOUS at 10:29

## 2025-02-27 RX ADMIN — SUCCINYLCHOLINE CHLORIDE 120 MG: 20 INJECTION, SOLUTION INTRAMUSCULAR; INTRAVENOUS at 09:50

## 2025-02-27 RX ADMIN — ROCURONIUM 5 MG: 50 INJECTION, SOLUTION INTRAVENOUS at 10:30

## 2025-02-27 RX ADMIN — FAMOTIDINE 20 MG: 10 INJECTION INTRAVENOUS at 07:43

## 2025-02-27 RX ADMIN — CEFAZOLIN 2000 MG: 2 INJECTION, POWDER, FOR SOLUTION INTRAVENOUS at 09:52

## 2025-02-27 RX ADMIN — ROCURONIUM 5 MG: 50 INJECTION, SOLUTION INTRAVENOUS at 09:49

## 2025-02-27 RX ADMIN — PROPOFOL 150 MG: 10 INJECTION, EMULSION INTRAVENOUS at 09:49

## 2025-02-27 RX ADMIN — SODIUM CHLORIDE, POTASSIUM CHLORIDE, SODIUM LACTATE AND CALCIUM CHLORIDE 9 ML/HR: 600; 310; 30; 20 INJECTION, SOLUTION INTRAVENOUS at 07:41

## 2025-02-27 RX ADMIN — FENTANYL CITRATE 50 MCG: 50 INJECTION, SOLUTION INTRAMUSCULAR; INTRAVENOUS at 09:45

## 2025-02-27 RX ADMIN — ROCURONIUM 40 MG: 50 INJECTION, SOLUTION INTRAVENOUS at 10:01

## 2025-02-27 NOTE — ANESTHESIA PREPROCEDURE EVALUATION
Anesthesia Evaluation     Patient summary reviewed and Nursing notes reviewed   no history of anesthetic complications:   NPO Solid Status: > 8 hours  NPO Liquid Status: > 8 hours           Airway   Mallampati: II  TM distance: >3 FB  Neck ROM: full  No difficulty expected  Dental    (+) poor dentition    Pulmonary - normal exam    breath sounds clear to auscultation  (+) ,shortness of breath, sleep apnea (noncompliant with cpap)  Cardiovascular - normal exam  Exercise tolerance: good (4-7 METS)    Rhythm: regular  Rate: normal    (+) hypertension well controlled less than 2 medications, past MI  >12 months, CAD, CABG (20 years ago. no cp/sob since.) >6 Months, hyperlipidemia      Neuro/Psych  (+) headaches  GI/Hepatic/Renal/Endo    (+) obesity, hepatitis, liver disease cirrhosis, diabetes mellitus type 2 well controlled  (-) morbid obesity    Musculoskeletal (-) negative ROS    Abdominal   (+) obese   Substance History - negative use     OB/GYN negative ob/gyn ROS         Other - negative ROS       ROS/Med Hx Other: HEART RATE=81  bpm  RR Xkgutvdf=205  ms  SC Qzhvtnba=526  ms  P Horizontal Axis=12  deg  P Front Axis=50  deg  QRSD Euzuhevc=441  ms  QT Chlvqbxe=854  ms  BZfH=654  ms  QRS Axis=-152  deg  T Wave Axis=19  deg  - ABNORMAL ECG -  Sinus rhythm  Right bundle branch block  When compared with ECG of 19-May-2023 10:01:06,  No significant change  Electronically Signed By: Kwabena Dennis ([facility]) 2025-02-05 17:35:21  Date and Time of Study:2025-02-05 11:15:08          Phys Exam Other: Only 4 teeth               Anesthesia Plan    ASA 3     general     intravenous induction     Anesthetic plan, risks, benefits, and alternatives have been provided, discussed and informed consent has been obtained with: patient.  Pre-procedure education provided  Use of blood products discussed with patient  Consented to blood products.        CODE STATUS:

## 2025-02-27 NOTE — ANESTHESIA PROCEDURE NOTES
Airway  Urgency: elective    Date/Time: 2/27/2025 9:52 AM  Airway not difficult    General Information and Staff    Patient location during procedure: OR  CRNA/CAA: Blanca Mora CRNA    Indications and Patient Condition  Indications for airway management: airway protection    Preoxygenated: yes  Mask difficulty assessment: 3 - difficult mask (inadequate, unstable or two providers) +/- NMBA (edentulous and mustache)    Final Airway Details  Final airway type: endotracheal airway      Successful airway: ETT  Cuffed: yes   Successful intubation technique: video laryngoscopy  Endotracheal tube insertion site: oral  Blade: [employer] (used for student education)  Blade size: 4  ETT size (mm): 7.0  Cormack-Lehane Classification: grade I - full view of glottis  Placement verified by: chest auscultation and capnometry   Cuff volume (mL): 7  Measured from: lips  ETT/EBT  to lips (cm): 21  Number of attempts at approach: 1  Assessment: lips, teeth, and gum same as pre-op and atraumatic intubation

## 2025-02-27 NOTE — ANESTHESIA POSTPROCEDURE EVALUATION
Patient: Rajan Dc    Procedure Summary       Date: 02/27/25 Room / Location:  [facility] OR 2 /  [facility]    Anesthesia Start: 0942 Anesthesia Stop: 1145    Procedure: INGUINAL HERNIA REPAIR LAPAROSCOPIC right (Right: Abdomen) Diagnosis:       Right inguinal hernia      (Right inguinal hernia [K40.90])    Surgeons: Kari Alejandro DO Provider: Blanca Mora CRNA    Anesthesia Type: general ASA Status: 3            Anesthesia Type: general    Vitals  Vitals Value Taken Time   /68 02/27/25 1235   Temp 97.1 °F (36.2 °C) 02/27/25 1140   Pulse 74 02/27/25 1235   Resp 9 02/27/25 1235   SpO2 99 % 02/27/25 1235           Post Anesthesia Care and Evaluation    Patient location during evaluation: bedside  Patient participation: complete - patient participated  Level of consciousness: awake and alert  Pain score: 5  Pain management: satisfactory to patient    Airway patency: patent  Anesthetic complications: No anesthetic complications  PONV Status: none  Cardiovascular status: acceptable  Respiratory status: acceptable  Hydration status: acceptable    Comments: Pain level at 5/10. Patient states this is ok with him, requests to go home  Discharged in stable condition.

## 2025-02-27 NOTE — OP NOTE
February 27, 2025      SURGEON:   Kari Aeljandro DO    Assistant: Allison Zendejas CSA was responsible for performing the following activities: Retraction, Closing, and Placing Dressing and their skilled assistance was necessary for the success of this case.      PREOPERATIVE DIAGNOSIS: Right inguinal hernia      POSTOPERATIVE DIAGNOSIS: Right inguinal hernia     PROCEDURE PERFORMED: Laparoscopic right inguinal hernia repair with mesh placement    ANESTHESIA: General     SPECIMEN: None     DRAINS: None     BLOOD LOSS: Minimal     INDICATIONS FOR OPERATION:  Rajan Dc is a 69 y.o. who presented to me with a right inguinal hernia.  Laparoscopic repair was recommended.  All risks (including bleeding, infection, damage to surrounding structures, mesh infection, chronic pain), benefits, and alternatives we explained to the patient and he agreed and wished to proceed.  Informed consent was obtained.     FINDINGS:   Right inguinal hernia     OPERATIVE REPORT: The patient was taken to the operating room, transferred onto the operating room table, and underwent general endotracheal anesthesia without incident. The patient was prepped and draped in the usual sterile fashion.  Preoperative antibiotics were given, and a timeout was performed.  Local was injected and an incision was made above the umbilicus.  Using an open Gupta technique a 12 mm Gupta trocar was then used to the abdomen and it was inflated.  The abdomen was insufflated to 15 mmHg.  A camera was inserted and the abdomen was inspected.  Local was injected on the right lower quadrant and left lower quadrant and incisions were made and under direct vision 5 mm ports were placed.  The peritoneum was incised from the median umbilical ligament laterally towards the lateral abdomen near the level of the arcuate line.  The preperitoneal space was developed bluntly.  A right inguinal hernia defect was identified and this was reduced in its entirety.  Hemostasis  was perfected using Floseal the cord structures were preserved.  The 3D med large mesh was inserted into the abdomen and fixated with 2 tacks to Fareed's ligament and was covering the direct, indirect hernia spaces.  The peritoneum was closed using tacks.  The trocars were then removed under vision.  The Gupta site fascia was closed using 0 Vicryl suture.. The incisions were then closed with 4-0 Vicryl sutures and Dermabond.  All needle and lap counts were correct at the end of the case.  The patient was awoken from general endotracheal anesthesia and taken to the recovery area for further monitoring.     Kari Alejandro DO

## 2025-02-27 NOTE — INTERVAL H&P NOTE
H&P reviewed. The patient was examined and there are no changes to the H&P.      /80 (BP Location: Left arm, Patient Position: Lying)   Pulse 78   Temp 97.9 °F (36.6 °C) (Oral)   Resp 17   Wt 103 kg (227 lb)   SpO2 94%   BMI 33.52 kg/m²

## 2025-04-10 ENCOUNTER — PRIOR AUTHORIZATION (OUTPATIENT)
Dept: INTERNAL MEDICINE | Facility: CLINIC | Age: 70
End: 2025-04-10
Payer: COMMERCIAL

## 2025-04-10 NOTE — TELEPHONE ENCOUNTER
PA approved, pharmacy informed.       How long does this approval last?  OZEMPIC INJ 4MG/3ML, use as directed, is approved through 04/10/2026. Please note: Doses/  quantities above plan limits and/or maximum Food and Drug Administration (FDA) approved  dosing may be subject to further review.

## 2025-04-10 NOTE — TELEPHONE ENCOUNTER
Ozempic (1 MG/DOSE) 4MG/3ML pen-injectors PA done on cover my meds. Waiting on response.    (Key: [id number])

## 2025-04-16 ENCOUNTER — LAB (OUTPATIENT)
Dept: LAB | Facility: HOSPITAL | Age: 70
End: 2025-04-16
Payer: COMMERCIAL

## 2025-04-16 DIAGNOSIS — E55.9 VITAMIN D DEFICIENCY: Chronic | ICD-10-CM

## 2025-04-16 DIAGNOSIS — R80.9 MICROALBUMINURIA: Chronic | ICD-10-CM

## 2025-04-16 DIAGNOSIS — D69.3 CHRONIC ITP (IDIOPATHIC THROMBOCYTOPENIA): Chronic | ICD-10-CM

## 2025-04-16 DIAGNOSIS — K74.60 LIVER CIRRHOSIS SECONDARY TO NASH: Chronic | ICD-10-CM

## 2025-04-16 DIAGNOSIS — K75.81 LIVER CIRRHOSIS SECONDARY TO NASH: Chronic | ICD-10-CM

## 2025-04-16 DIAGNOSIS — Z51.81 THERAPEUTIC DRUG MONITORING: ICD-10-CM

## 2025-04-16 DIAGNOSIS — E78.2 MIXED HYPERLIPIDEMIA: Chronic | ICD-10-CM

## 2025-04-16 DIAGNOSIS — E11.29 TYPE 2 DIABETES MELLITUS WITH MICROALBUMINURIA, WITHOUT LONG-TERM CURRENT USE OF INSULIN: Chronic | ICD-10-CM

## 2025-04-16 DIAGNOSIS — R74.8 ELEVATED LIVER ENZYMES: Chronic | ICD-10-CM

## 2025-04-16 DIAGNOSIS — D50.0 IRON DEFICIENCY ANEMIA DUE TO CHRONIC BLOOD LOSS: ICD-10-CM

## 2025-04-16 DIAGNOSIS — K40.90 RIGHT INGUINAL HERNIA: ICD-10-CM

## 2025-04-16 DIAGNOSIS — K75.81 NASH (NONALCOHOLIC STEATOHEPATITIS): Chronic | ICD-10-CM

## 2025-04-16 DIAGNOSIS — E53.8 FOLIC ACID DEFICIENCY: Primary | Chronic | ICD-10-CM

## 2025-04-16 DIAGNOSIS — R80.9 TYPE 2 DIABETES MELLITUS WITH MICROALBUMINURIA, WITHOUT LONG-TERM CURRENT USE OF INSULIN: Chronic | ICD-10-CM

## 2025-04-16 DIAGNOSIS — N40.0 BENIGN PROSTATIC HYPERPLASIA WITHOUT LOWER URINARY TRACT SYMPTOMS: ICD-10-CM

## 2025-04-16 DIAGNOSIS — Z86.16 HISTORY OF COVID-19: Chronic | ICD-10-CM

## 2025-04-16 LAB
25(OH)D3 SERPL-MCNC: 34.5 NG/ML (ref 30–100)
ALBUMIN SERPL-MCNC: 3.5 G/DL (ref 3.5–5.2)
ALBUMIN UR-MCNC: <1.2 MG/DL
ALBUMIN/GLOB SERPL: 1.1 G/DL
ALP SERPL-CCNC: 122 U/L (ref 39–117)
ALT SERPL W P-5'-P-CCNC: 20 U/L (ref 1–41)
ANION GAP SERPL CALCULATED.3IONS-SCNC: 9.8 MMOL/L (ref 5–15)
AST SERPL-CCNC: 21 U/L (ref 1–40)
BILIRUB SERPL-MCNC: 0.8 MG/DL (ref 0–1.2)
BUN SERPL-MCNC: 10 MG/DL (ref 8–23)
BUN/CREAT SERPL: 11.5 (ref 7–25)
CALCIUM SPEC-SCNC: 9.1 MG/DL (ref 8.6–10.5)
CHLORIDE SERPL-SCNC: 109 MMOL/L (ref 98–107)
CK SERPL-CCNC: 76 U/L (ref 20–200)
CO2 SERPL-SCNC: 24.2 MMOL/L (ref 22–29)
CREAT SERPL-MCNC: 0.87 MG/DL (ref 0.76–1.27)
CREAT UR-MCNC: 60.4 MG/DL
DEPRECATED RDW RBC AUTO: 45.4 FL (ref 37–54)
EGFRCR SERPLBLD CKD-EPI 2021: 94 ML/MIN/1.73
ERYTHROCYTE [DISTWIDTH] IN BLOOD BY AUTOMATED COUNT: 17.8 % (ref 12.3–15.4)
GLOBULIN UR ELPH-MCNC: 3.3 GM/DL
GLUCOSE SERPL-MCNC: 172 MG/DL (ref 65–99)
HBA1C MFR BLD: 7.9 % (ref 4.8–5.6)
HCT VFR BLD AUTO: 38.7 % (ref 37.5–51)
HCYS SERPL-MCNC: 10.4 UMOL/L (ref 0–15)
HGB BLD-MCNC: 10.7 G/DL (ref 13–17.7)
IRON 24H UR-MRATE: 24 MCG/DL (ref 59–158)
IRON SATN MFR SERPL: 5 % (ref 20–50)
MCH RBC QN AUTO: 19.8 PG (ref 26.6–33)
MCHC RBC AUTO-ENTMCNC: 27.6 G/DL (ref 31.5–35.7)
MCV RBC AUTO: 71.5 FL (ref 79–97)
MICROALBUMIN/CREAT UR: NORMAL MG/G{CREAT}
PLATELET # BLD AUTO: 112 10*3/MM3 (ref 140–450)
POTASSIUM SERPL-SCNC: 4.4 MMOL/L (ref 3.5–5.2)
PROT SERPL-MCNC: 6.8 G/DL (ref 6–8.5)
PSA SERPL-MCNC: 0.15 NG/ML (ref 0–4)
RBC # BLD AUTO: 5.41 10*6/MM3 (ref 4.14–5.8)
SODIUM SERPL-SCNC: 143 MMOL/L (ref 136–145)
T3FREE SERPL-MCNC: 3.02 PG/ML (ref 2–4.4)
T4 FREE SERPL-MCNC: 0.78 NG/DL (ref 0.92–1.68)
TIBC SERPL-MCNC: 454 MCG/DL (ref 298–536)
TRANSFERRIN SERPL-MCNC: 305 MG/DL (ref 200–360)
TSH SERPL DL<=0.05 MIU/L-ACNC: 3.45 UIU/ML (ref 0.27–4.2)
WBC NRBC COR # BLD AUTO: 3.79 10*3/MM3 (ref 3.4–10.8)

## 2025-04-16 PROCEDURE — 83540 ASSAY OF IRON: CPT | Performed by: INTERNAL MEDICINE

## 2025-04-16 PROCEDURE — 84153 ASSAY OF PSA TOTAL: CPT | Performed by: INTERNAL MEDICINE

## 2025-04-16 PROCEDURE — 84443 ASSAY THYROID STIM HORMONE: CPT | Performed by: INTERNAL MEDICINE

## 2025-04-16 PROCEDURE — 85027 COMPLETE CBC AUTOMATED: CPT | Performed by: INTERNAL MEDICINE

## 2025-04-16 PROCEDURE — 83704 LIPOPROTEIN BLD QUAN PART: CPT | Performed by: INTERNAL MEDICINE

## 2025-04-16 PROCEDURE — 80061 LIPID PANEL: CPT | Performed by: INTERNAL MEDICINE

## 2025-04-16 PROCEDURE — 82570 ASSAY OF URINE CREATININE: CPT | Performed by: INTERNAL MEDICINE

## 2025-04-16 PROCEDURE — 82043 UR ALBUMIN QUANTITATIVE: CPT | Performed by: INTERNAL MEDICINE

## 2025-04-16 PROCEDURE — 86769 SARS-COV-2 COVID-19 ANTIBODY: CPT | Performed by: INTERNAL MEDICINE

## 2025-04-16 PROCEDURE — 84466 ASSAY OF TRANSFERRIN: CPT | Performed by: INTERNAL MEDICINE

## 2025-04-16 PROCEDURE — 82550 ASSAY OF CK (CPK): CPT | Performed by: INTERNAL MEDICINE

## 2025-04-16 PROCEDURE — 82306 VITAMIN D 25 HYDROXY: CPT | Performed by: INTERNAL MEDICINE

## 2025-04-16 PROCEDURE — 36415 COLL VENOUS BLD VENIPUNCTURE: CPT | Performed by: INTERNAL MEDICINE

## 2025-04-16 PROCEDURE — 84439 ASSAY OF FREE THYROXINE: CPT | Performed by: INTERNAL MEDICINE

## 2025-04-16 PROCEDURE — 84481 FREE ASSAY (FT-3): CPT | Performed by: INTERNAL MEDICINE

## 2025-04-16 PROCEDURE — 83090 ASSAY OF HOMOCYSTEINE: CPT | Performed by: INTERNAL MEDICINE

## 2025-04-16 PROCEDURE — 83036 HEMOGLOBIN GLYCOSYLATED A1C: CPT | Performed by: INTERNAL MEDICINE

## 2025-04-16 PROCEDURE — 80053 COMPREHEN METABOLIC PANEL: CPT | Performed by: INTERNAL MEDICINE

## 2025-04-18 LAB
CHOLEST SERPL-MCNC: 115 MG/DL (ref 100–199)
HDL SERPL-SCNC: 22.7 UMOL/L
HDLC SERPL-MCNC: 55 MG/DL
LDL SERPL QN: 22 NM
LDL SERPL-SCNC: 417 NMOL/L
LDL SMALL SERPL-SCNC: <90 NMOL/L
LDLC SERPL CALC-MCNC: 44 MG/DL (ref 0–99)
TRIGL SERPL-MCNC: 81 MG/DL (ref 0–149)

## 2025-04-21 LAB — SARS-COV-2 AB SERPL QL IA: POSITIVE

## 2025-04-23 ENCOUNTER — OFFICE VISIT (OUTPATIENT)
Dept: INTERNAL MEDICINE | Facility: CLINIC | Age: 70
End: 2025-04-23
Payer: COMMERCIAL

## 2025-04-23 VITALS
HEART RATE: 70 BPM | TEMPERATURE: 97.9 F | RESPIRATION RATE: 16 BRPM | WEIGHT: 231 LBS | OXYGEN SATURATION: 96 % | HEIGHT: 69 IN | SYSTOLIC BLOOD PRESSURE: 142 MMHG | DIASTOLIC BLOOD PRESSURE: 68 MMHG | BODY MASS INDEX: 34.21 KG/M2

## 2025-04-23 DIAGNOSIS — E66.9 NON MORBID OBESITY: Chronic | ICD-10-CM

## 2025-04-23 DIAGNOSIS — Z00.00 ROUTINE PHYSICAL EXAMINATION: ICD-10-CM

## 2025-04-23 DIAGNOSIS — E78.2 MIXED HYPERLIPIDEMIA: Chronic | ICD-10-CM

## 2025-04-23 DIAGNOSIS — I85.00 ESOPHAGEAL VARICES DETERMINED BY ENDOSCOPY: Chronic | ICD-10-CM

## 2025-04-23 DIAGNOSIS — D50.0 IRON DEFICIENCY ANEMIA DUE TO CHRONIC BLOOD LOSS: ICD-10-CM

## 2025-04-23 DIAGNOSIS — G47.33 OBSTRUCTIVE SLEEP APNEA: Chronic | ICD-10-CM

## 2025-04-23 DIAGNOSIS — E55.9 VITAMIN D DEFICIENCY: Chronic | ICD-10-CM

## 2025-04-23 DIAGNOSIS — I25.10 CORONARY ARTERY DISEASE INVOLVING NATIVE CORONARY ARTERY OF NATIVE HEART WITHOUT ANGINA PECTORIS: ICD-10-CM

## 2025-04-23 DIAGNOSIS — D69.3 CHRONIC ITP (IDIOPATHIC THROMBOCYTOPENIA): Chronic | ICD-10-CM

## 2025-04-23 DIAGNOSIS — E53.8 FOLIC ACID DEFICIENCY: Chronic | ICD-10-CM

## 2025-04-23 DIAGNOSIS — R80.9 TYPE 2 DIABETES MELLITUS WITH MICROALBUMINURIA, WITHOUT LONG-TERM CURRENT USE OF INSULIN: Primary | Chronic | ICD-10-CM

## 2025-04-23 DIAGNOSIS — K75.81 NASH (NONALCOHOLIC STEATOHEPATITIS): Chronic | ICD-10-CM

## 2025-04-23 DIAGNOSIS — E11.9 TYPE 2 DIABETES MELLITUS WITHOUT COMPLICATION, WITHOUT LONG-TERM CURRENT USE OF INSULIN: ICD-10-CM

## 2025-04-23 DIAGNOSIS — R80.9 MICROALBUMINURIA: Chronic | ICD-10-CM

## 2025-04-23 DIAGNOSIS — E11.29 TYPE 2 DIABETES MELLITUS WITH MICROALBUMINURIA, WITHOUT LONG-TERM CURRENT USE OF INSULIN: Primary | Chronic | ICD-10-CM

## 2025-04-23 DIAGNOSIS — R74.8 ELEVATED LIVER ENZYMES: Chronic | ICD-10-CM

## 2025-04-23 DIAGNOSIS — K75.81 LIVER CIRRHOSIS SECONDARY TO NASH: Chronic | ICD-10-CM

## 2025-04-23 DIAGNOSIS — I25.2 HISTORY OF MYOCARDIAL INFARCTION: Chronic | ICD-10-CM

## 2025-04-23 DIAGNOSIS — K74.60 LIVER CIRRHOSIS SECONDARY TO NASH: Chronic | ICD-10-CM

## 2025-04-23 DIAGNOSIS — Z51.81 THERAPEUTIC DRUG MONITORING: ICD-10-CM

## 2025-04-23 DIAGNOSIS — Z86.0100 HISTORY OF COLON POLYPS: Chronic | ICD-10-CM

## 2025-04-23 DIAGNOSIS — Z86.16 HISTORY OF COVID-19: Chronic | ICD-10-CM

## 2025-04-23 PROBLEM — R06.02 SHORTNESS OF BREATH: Status: RESOLVED | Noted: 2025-02-20 | Resolved: 2025-04-23

## 2025-04-23 PROBLEM — R06.02 SOB (SHORTNESS OF BREATH): Status: RESOLVED | Noted: 2025-02-12 | Resolved: 2025-04-23

## 2025-04-23 PROBLEM — R94.31 ABNORMAL EKG: Status: RESOLVED | Noted: 2025-02-12 | Resolved: 2025-04-23

## 2025-04-23 PROBLEM — R94.39 ABNORMAL CARDIOVASCULAR STRESS TEST: Status: RESOLVED | Noted: 2025-02-20 | Resolved: 2025-04-23

## 2025-04-23 PROBLEM — K40.90 RIGHT INGUINAL HERNIA: Status: RESOLVED | Noted: 2025-01-17 | Resolved: 2025-04-23

## 2025-04-23 RX ORDER — ASPIRIN 81 MG/1
TABLET ORAL
Start: 2025-04-23

## 2025-04-23 RX ORDER — ATORVASTATIN CALCIUM 80 MG/1
TABLET, FILM COATED ORAL
Start: 2025-04-23

## 2025-04-23 RX ORDER — SITAGLIPTIN AND METFORMIN HYDROCHLORIDE 1000; 50 MG/1; MG/1
TABLET, FILM COATED, EXTENDED RELEASE ORAL
Start: 2025-04-23

## 2025-04-23 RX ORDER — GLIMEPIRIDE 4 MG/1
TABLET ORAL
Start: 2025-04-23

## 2025-04-23 NOTE — PROGRESS NOTES
04/23/2025    Patient Information  Rajan Dc                                                                                          [address] [place] [id number]      1955  [unfilled]  There is no work phone number on file.    Chief Complaint:     Follow-up chronic medical problems and recent lab work.  No new acute complaints.    History of Present Illness:    Patient with multiple chronic medical problems as outlined below in assessment and plan presents today for a follow-up with lab prior in order to monitor his chronic medical issues.  Patient recently had a left inguinal hernia repair and did well.  He had an abnormal stress test prior to the surgery and underwent a cardiac catheterization with no significant changes.  His past medical history reviewed and updated were necessary including health maintenance parameters.  This reveals he needs a colonoscopy, Zostavax and Tdap.  See below.    Review of Systems   Constitutional: Negative.   HENT: Negative.     Eyes: Negative.    Cardiovascular:  Positive for dyspnea on exertion. Negative for chest pain.   Respiratory: Negative.     Endocrine: Negative.    Hematologic/Lymphatic: Negative.    Skin: Negative.    Musculoskeletal: Negative.    Gastrointestinal: Negative.    Genitourinary: Negative.    Neurological: Negative.    Psychiatric/Behavioral: Negative.     Allergic/Immunologic: Negative.        Active Problems:    Patient Active Problem List   Diagnosis    Coronary artery disease involving native coronary artery of native heart without angina pectoris    Folic acid deficiency    Hyperlipidemia    Hypogonadism male, TRT ineffective    Microalbuminuria    RUIZ (nonalcoholic steatohepatitis)    Obstructive sleep apnea, 11/13/2012--mild to moderate NIKI.  Unable to tolerate CPAP.  Cannot use oral appliance.    Sleep related hypoxia    Type 2 diabetes mellitus with microalbuminuria, without long-term current use of insulin     Vitamin D deficiency    Therapeutic drug monitoring    Family history of premature coronary artery disease    Diabetic eye exam    History of myocardial infarction, 2005.    Chronic ITP (idiopathic thrombocytopenia)    Elevated liver enzymes    Male erectile disorder    Iron deficiency anemia due to chronic blood loss    Non morbid obesity    History of COVID-19    History of colon polyps, 8/18/2021--tubular adenoma x12.    Esophageal varices determined by endoscopy    Liver cirrhosis secondary to RUIZ    Diverticulosis of colon    Chronic migraine w/o aura w/o status migrainosus, not intractable    Encounter for diabetic foot exam    Urticaria due to drug allergy, March 2024--urticaria due to Rybelsus         Past Medical History:   Diagnosis Date    Chronic ITP (idiopathic thrombocytopenia) 09/19/2018    Chronic migraine w/o aura w/o status migrainosus, not intractable 08/18/2022    Coronary artery disease involving native coronary artery of native heart without angina pectoris 01/01/2005 February 21, 2021--cardiac catheterization: This revealed normal left main, % occluded with the LIMA to the LAD patent with normal flow.  Circumflex nondominant and had 40% stenosis minimal irregularity of the rest of circumflex.  % occluded with saphenous vein graft to the acute marginal branch and saphenous vein graft to the PDA which is patent and normal flow.  Normal EF of 60%.      Diabetic eye exam 05/22/2017 05/22/2017--routine diabetic eye exam reveals no evidence of diabetic retinopathy.  Astigmatism, presbyopia, hypermetropia noted.  Very early cataracts noted bilaterally.  October 2015--patient reports a routine diabetic eye exam without evidence of diabetic retinopathy.    Diverticulosis of colon 10/28/2021    August 18, 2021--colonoscopy reveals a 6 mm polyp in the transverse colon.  There was an 8 mm polyp in the descending colon.  A 20 mm polyp was found at 50 cm proximal to the anus.  Resection  was incomplete.  The resected tissue was retrieved and coagulation for destruction was performed.  #4, sessile polyps in the sigmoid colon that were 5 to 6 mm in size.  Removed.  #2, sessile polyps in the sig    Elevated liver enzymes 2019--AST is normal at 27, ALT normal at 2 9.  Alkaline phosphatase mildly elevated at 137.  Bilirubin is normal.  2019--ultrasound liver ordered by the gastroenterologist reveals increased hepatic echogenicity consistent with steatosis.  Previous cholecystectomy.  No biliary ductal dilatation.  2019--patient was apparently referred to the gastroenterologist by    Encounter for diabetic foot exam 2023--routine diabetic foot exam reveals no evidence of diabetic foot ulcer or preulcerative callus.  Distal pulses are palpable.  No signs of ischemia.  Sensation subjectively intact.     2023--routine diabetic foot exam reveals no evidence of diabetic foot ulcer or preulcerative callus.  Distal pulses are palpable and sensation seems intact.      Esophageal varices determined by endoscopy 10/28/2021    2021--EGD reveals grade 2 varices in the lower third of the esophagus.  A varix with no bleeding was found in the cardia.  No stigmata of recent bleeding.  Mild portal hypertensive gastropathy was found in the gastric fundus.    Family history of premature coronary artery disease 2016    Father  from a myocardial infarction age 61.    Folic acid deficiency 2016    History of Abnormal pulse oximetry 10/07/2012    10/07/2012--overnight oximetry test revealed significant nocturnal hypoxemia. Oxygen saturations were greater than 90% for only 50.5% of the study. Oxygen saturation less than 90% for three hours 47 minutes which was 49.5% of the study. Oxygen saturation less than 88% for one hour and 36 minutes and 48 seconds, 21.1% of the study.    History of CABG 2005  2005 status post four-vessel CABG.    History of colon polyps, 8/18/2021--tubular adenoma x12. 10/28/2021    August 18, 2021--colonoscopy reveals a 6 mm polyp in the transverse colon.  There was an 8 mm polyp in the descending colon.  A 20 mm polyp was found at 50 cm proximal to the anus.  Resection was incomplete.  The resected tissue was retrieved and coagulation for destruction was performed.  #4, sessile polyps in the sigmoid colon that were 5 to 6 mm in size.  Removed.  #2, sessile polyps in the sig    History of COVID-19 06/02/2021    History of myocardial infarction, 2005. 04/28/2016 01/01/2005--status post myocardial infarction.   January 2005--status post four-vessel CABG    Hyperlipidemia 04/28/2016    Hypogonadism male, TRT ineffective 09/13/2012 09/13/2012--treatment for symptomatic hypogonadism begun. This was later discontinued due to lack of efficacy and cost.    Iron deficiency anemia due to chronic blood loss 06/02/2021    Liver cirrhosis secondary to RUIZ 10/28/2021    August 18, 2021--EGD reveals grade 2 varices in the lower third of the esophagus.  A varix with no bleeding was found in the cardia.  No stigmata of recent bleeding.  Mild portal hypertensive gastropathy was found in the gastric fundus.  October 2, 2019--ultrasound liver ordered by the gastroenterologist reveals increased hepatic echogenicity consistent with steatosis.  Previous cholecystectomy.      Male erectile disorder 08/27/2020    Microalbuminuria 04/25/2014 04/25/2014--urine microalbumin elevated 28.7. Normal range 0.0--17.0.    RUZI (nonalcoholic steatohepatitis) 04/28/2016    Non morbid obesity 06/02/2021    Obstructive sleep apnea, 11/13/2012--mild to moderate NIKI.  Unable to tolerate CPAP.  Cannot use oral appliance. 10/07/2012    05/02/2014--nocturnal oxygen 2 L per nasal cannula ordered.   12/03/2013--patient was referred for an oral appliance but this could not be done because patient wears dentures.    11/13/2012--diagnosed with mild to moderate obstructive sleep apnea. Patient unable to tolerate CPAP.   10/07/2012--overnight oximetry test revealed significant nocturnal hypoxemia. Oxygen saturations were greater than 90% for only 50.5% of the study. Oxygen saturation less than 90% for three hours 47 minutes which was 49.5% of the study. Oxygen saturation less than 88% for one hour and 36 minutes and 48 seconds, 21.1% of the study.    Sleep related hypoxia 10/07/2012    05/02/2014--nocturnal oxygen 2 L per nasal cannula ordered.  12/03/2013--patient was referred for an oral appliance but this could not be done because patient wears dentures.   11/13/2012--diagnosed with mild to moderate obstructive sleep apnea. Patient unable to tolerate CPAP.   10/07/2012--overnight oximetry test revealed significant nocturnal hypoxemia. Oxygen saturations were greater than 90% for only 50.5% of the study. Oxygen saturation less than 90% for three hours 47 minutes which was 49.5% of the study. Oxygen saturation less than 88% for one hour and 36 minutes and 48 seconds, 21.1% of the study.    Type 2 diabetes mellitus with microalbuminuria, without long-term current use of insulin 01/01/2005    Diagnosed in 2005.    Urticaria due to drug allergy, March 2024--urticaria due to Rybelsus 03/27/2024    Vitamin D deficiency 04/28/2016         Past Surgical History:   Procedure Laterality Date    CARDIAC CATHETERIZATION      CARDIAC CATHETERIZATION N/A 2/21/2025    Procedure: Left Heart Cath;  Surgeon: Neptali Oconnor MD;  Location: [facility] CATH INVASIVE LOCATION;  Service: Cardiovascular;  Laterality: N/A;    CARDIAC CATHETERIZATION  2/21/2025    Procedure: Saphenous Vein Graft;  Surgeon: Neptali Oconnor MD;  Location:  [facility] CATH INVASIVE LOCATION;  Service: Cardiovascular;;    CHOLECYSTECTOMY WITH INTRAOPERATIVE CHOLANGIOGRAM N/A 06/24/2019    Procedure: laparoscopic cholecystectomy with intraoperative cholangiogram;   Surgeon: Vida Avilez MD;  Location: [facility] OR;  Service: General    COLONOSCOPY N/A 08/18/2021 August 18, 2021--colonoscopy reveals a 6 mm polyp in the transverse colon.  There was an 8 mm polyp in the descending colon.  A 20 mm polyp was found at 50 cm proximal to the anus.  Resection was incomplete.  The resected tissue was retrieved and coagulation for destruction was performed.  #4, sessile polyps in the sigmoid colon that were 5 to 6 mm in size.  Removed.  #2, sessile polyps in the sig    COLONOSCOPY W/ POLYPECTOMY N/A 2/25/2025    Procedure: COLONOSCOPY WITH POLYPECTOMY;  Surgeon: Kari Alejandro DO;  Location: [facility] OR;  Service: Gastroenterology;  Laterality: N/A;  hepatic flexure polyps x2 (hot snare x2), transverse colon polyps x5 (hot snare x5), left colon polyp, sigmoid colon polyps x2 (hot snare x2)    CORONARY ARTERY BYPASS GRAFT  01/2005 January 2005 status post four-vessel CABG.    ENDOSCOPY N/A 08/18/2021 August 18, 2021--EGD reveals grade 2 varices in the lower third of the esophagus.  A varix with no bleeding was found in the cardia.  No stigmata of recent bleeding.  Mild portal hypertensive gastropathy was found in the gastric fundus.    INGUINAL HERNIA REPAIR Right 2/27/2025    Procedure: INGUINAL HERNIA REPAIR LAPAROSCOPIC right;  Surgeon: Kari Alejandro DO;  Location: [facility];  Service: General;  Laterality: Right;    UMBILICAL HERNIA REPAIR N/A 09/28/2023    Procedure: UMBILICAL HERNIA REPAIR;  Surgeon: Kari Alejandro DO;  Location: [facility] OR;  Service: General;  Laterality: N/A;         Allergies   Allergen Reactions    Semaglutide Itching and Rash     Rybelsus only.  Tolerates Ozempic.           Current Outpatient Medications:     aspirin 81 MG EC tablet, Take 1 p.o. daily for blood thinner/heart, Disp: , Rfl:     atorvastatin (LIPITOR) 80 MG tablet, TAKE 1 TABLET BY MOUTH DAILY FOR HIGH CHOLESTEROL, Disp: , Rfl:     empagliflozin (Jardiance) 25 MG  tablet tablet, Take 1 tablet (25 mg) every day for diabetes, Disp: , Rfl:     glimepiride (AMARYL) 4 MG tablet, TAKE 1 TABLET BY MOUTH TWICE DAILY WITH BREAKFAST AND SUPPER FOR DIABETES, Disp: , Rfl:     metoprolol succinate XL (TOPROL-XL) 25 MG 24 hr tablet, Take 1 tablet by mouth Daily., Disp: 30 tablet, Rfl: 11    Semaglutide, 1 MG/DOSE, (OZEMPIC) 4 MG/3ML solution pen-injector, Inject 1 mg subcutaneously every week as directed.  This is a decreased dosage from 2 mg down to 1 mg.  Prior authorization has already been obtained.  Indications: Type 2 Diabetes, Disp: , Rfl:     SITaglip Phos-metFORMIN HCl ER (Janumet XR)  MG tablet, TAKE 1 TABLET BY MOUTH TWICE DAILY FOR DIABETES, Disp: , Rfl:     vitamin D3 125 MCG (5000 UT) capsule capsule, 1 by mouth daily as directed, Disp: , Rfl:       Family History   Problem Relation Age of Onset    Other Mother         Ventricular Septal Defect    Heart disease Mother     Hypertension Mother     Cancer Mother     Diabetes Mother     Arthritis Mother     Heart attack Father         Prior Myocardial Infarction.  Dad  from a myocardial infarction at age 59.    Heart disease Father     Heart disease Sister     Heart disease Brother     Cancer Daughter     Heart disease Other         Congenital Heart Disease. Multiple family members with septal defects that required surgery.    Malig Hyperthermia Neg Hx          Social History     Socioeconomic History    Marital status:      Spouse name: Dasha    Number of children: 8    Highest education level: 9th grade   Tobacco Use    Smoking status: Former     Current packs/day: 0.00     Average packs/day: 0.5 packs/day for 40.0 years (20.0 ttl pk-yrs)     Types: Cigarettes     Start date: 1965     Quit date: 2005     Years since quittin.3    Smokeless tobacco: Never    Tobacco comments:     Former smoker quit 13 years ago with a 64.5 pack year history.  Smoked 1 ppd for 32 years and 4.5 ppd for 5 years and  "quit when he had his open heart surgery.   Vaping Use    Vaping status: Never Used   Substance and Sexual Activity    Alcohol use: No    Drug use: No    Sexual activity: Yes     Partners: Female         Vitals:    04/23/25 0800   BP: 142/68   Pulse: 70   Resp: 16   Temp: 97.9 °F (36.6 °C)   TempSrc: Oral   SpO2: 96%   Weight: 105 kg (231 lb)   Height: 175.3 cm (69.02\")        Body mass index is 34.1 kg/m².      Physical Exam:      Lab/other results:    SARS antibodies are positive.  CBC reveals a hemoglobin of 10.7 and hematocrit 38.7.  MCV 71.5.  CK normal at 76.  CMP normal except glucose 172.  Hemoglobin A1c 7.9.  Serum iron is low at 24, iron saturation low at 5.  Total cholesterol 115, triglycerides 81, LDL particle #417, HDL particle #22.7.  PSA normal at 0.153.  Thyroid function test normal.  Vitamin D normal.  Homocystine normal.  Microalbumin/creatinine ratio cannot be determined due to lack of microalbumin.    Also reviewed the patient's recent cardiac catheterization.  February 21, 2021--cardiac catheterization: This revealed normal left main, % occluded with the LIMA to the LAD patent with normal flow.  Circumflex nondominant and had 40% stenosis minimal irregularity of the rest of circumflex.  % occluded with saphenous vein graft to the acute marginal branch and saphenous vein graft to the PDA which is patent and normal flow.  Normal EF of 60%.    Assessment/Plan:     Diagnosis Plan   1. Type 2 diabetes mellitus with microalbuminuria, without long-term current use of insulin  glimepiride (AMARYL) 4 MG tablet    Semaglutide, 1 MG/DOSE, (OZEMPIC) 4 MG/3ML solution pen-injector    Comprehensive Metabolic Panel    Hemoglobin A1c    Urinalysis With Microscopic If Indicated (No Culture) - Urine, Clean Catch    Microalbumin / Creatinine Urine Ratio - Urine, Clean Catch      2. Microalbuminuria        3. Hyperlipidemia  atorvastatin (LIPITOR) 80 MG tablet    CK    Comprehensive Metabolic Panel    " Homocysteine    NMR LipoProfile    TSH    T4, Free    T3, Free      4. RUIZ (nonalcoholic steatohepatitis)  Comprehensive Metabolic Panel      5. Elevated liver enzymes  Comprehensive Metabolic Panel      6. Vitamin D deficiency  vitamin D3 125 MCG (5000 UT) capsule capsule    Urinalysis With Microscopic If Indicated (No Culture) - Urine, Clean Catch      7. Folic acid deficiency        8. Chronic ITP (idiopathic thrombocytopenia)  CBC (No Diff)      9. History of COVID-19  SARS-CoV-2 Antibodies, Nucleocapsid (Natural Immunity)      10. Non morbid obesity        11. Liver cirrhosis secondary to RUIZ  Comprehensive Metabolic Panel      12. Coronary artery disease involving native coronary artery of native heart without angina pectoris        13. Esophageal varices determined by endoscopy        14. History of colon polyps, 8/18/2021--tubular adenoma x12.  Ambulatory Referral For Screening Colonoscopy      15. Iron deficiency anemia due to chronic blood loss  Ambulatory Referral For Screening Colonoscopy    CBC (No Diff)      16. History of myocardial infarction, 2005.  aspirin 81 MG EC tablet      17. Type 2 diabetes mellitus without complication, without long-term current use of insulin  SITaglip Phos-metFORMIN HCl ER (Janumet XR)  MG tablet    empagliflozin (Jardiance) 25 MG tablet tablet      18. Therapeutic drug monitoring        19. Obstructive sleep apnea, 11/13/2012--mild to moderate NIKI.  Unable to tolerate CPAP.  Cannot use oral appliance.        20. Routine physical examination  SARS-CoV-2 Antibodies, Nucleocapsid (Natural Immunity)    CBC (No Diff)    CK    Comprehensive Metabolic Panel    Hemoglobin A1c    Homocysteine    NMR LipoProfile    TSH    T4, Free    T3, Free    PSA DIAGNOSTIC    Urinalysis With Microscopic If Indicated (No Culture) - Urine, Clean Catch    Vitamin D,25-Hydroxy    Microalbumin / Creatinine Urine Ratio - Urine, Clean Catch        Patient has type 2 diabetes that is under  fairly decent control with an A1c of 7.9.  Microalbumin is well-controlled.  Hyperlipidemia is under good control.  He has Ruiz and his liver enzymes are normal.  Vitamin D in normal range.  He is on folic acid supplementation.  He has a history of COVID and has positive antibodies.  Chronic ITP is stable.  He has cirrhosis of the liver secondary to RUIZ with varices and no evidence of bleeding although he is anemic and has iron deficiency and he needs a colonoscopy.  Referral placed.  Coronary artery disease seems stable and I was pleased with the results of the heart catheterization.  Patient has Non morbid obesity once again I strongly encouraged low carbohydrate diet, exercise, and weight loss.  He had a recent inguinal hernia repair and did well.    Plan is as follows: Continue with dietary and weight loss efforts as described above.  Low carbohydrate.  No changes in current medical regimen.  Patient will follow-up on or after September 12, 2025 with lab prior for his annual physical.  Referral for gastroenterology regarding the anemia.  Needs colonoscopy and may need EGD.  Recommend Tdap and Shingrix.  Patient not sure but he may be on Medicare and I recommended he check with the local drugstore.        Procedures

## 2025-05-22 DIAGNOSIS — R80.9 TYPE 2 DIABETES MELLITUS WITH MICROALBUMINURIA, WITHOUT LONG-TERM CURRENT USE OF INSULIN: Chronic | ICD-10-CM

## 2025-05-22 DIAGNOSIS — E11.29 TYPE 2 DIABETES MELLITUS WITH MICROALBUMINURIA, WITHOUT LONG-TERM CURRENT USE OF INSULIN: Chronic | ICD-10-CM

## 2025-05-22 DIAGNOSIS — E78.2 MIXED HYPERLIPIDEMIA: Chronic | ICD-10-CM

## 2025-05-23 RX ORDER — ATORVASTATIN CALCIUM 80 MG/1
TABLET, FILM COATED ORAL
Qty: 90 TABLET | Refills: 3 | Status: SHIPPED | OUTPATIENT
Start: 2025-05-23

## 2025-05-23 RX ORDER — ORAL SEMAGLUTIDE 14 MG/1
1 TABLET ORAL DAILY
Qty: 30 TABLET | Refills: 11 | OUTPATIENT
Start: 2025-05-23

## 2025-05-23 RX ORDER — SEMAGLUTIDE 1.34 MG/ML
INJECTION, SOLUTION SUBCUTANEOUS
Qty: 9 ML | Refills: 1 | Status: SHIPPED | OUTPATIENT
Start: 2025-05-23

## 2025-06-19 DIAGNOSIS — R80.9 TYPE 2 DIABETES MELLITUS WITH MICROALBUMINURIA, WITHOUT LONG-TERM CURRENT USE OF INSULIN: Chronic | ICD-10-CM

## 2025-06-19 DIAGNOSIS — E11.9 TYPE 2 DIABETES MELLITUS WITHOUT COMPLICATION, WITHOUT LONG-TERM CURRENT USE OF INSULIN: ICD-10-CM

## 2025-06-19 DIAGNOSIS — E78.2 MIXED HYPERLIPIDEMIA: Chronic | ICD-10-CM

## 2025-06-19 DIAGNOSIS — E11.29 TYPE 2 DIABETES MELLITUS WITH MICROALBUMINURIA, WITHOUT LONG-TERM CURRENT USE OF INSULIN: Chronic | ICD-10-CM

## 2025-06-19 RX ORDER — SEMAGLUTIDE 1.34 MG/ML
1 INJECTION, SOLUTION SUBCUTANEOUS WEEKLY
Qty: 9 ML | Refills: 1 | Status: SHIPPED | OUTPATIENT
Start: 2025-06-19

## 2025-06-19 RX ORDER — SITAGLIPTIN AND METFORMIN HYDROCHLORIDE 1000; 50 MG/1; MG/1
TABLET, FILM COATED, EXTENDED RELEASE ORAL
Start: 2025-06-19

## 2025-06-19 RX ORDER — ATORVASTATIN CALCIUM 80 MG/1
TABLET, FILM COATED ORAL
Qty: 90 TABLET | Refills: 3 | Status: SHIPPED | OUTPATIENT
Start: 2025-06-19

## 2025-06-19 RX ORDER — GLIMEPIRIDE 4 MG/1
TABLET ORAL
Start: 2025-06-19

## 2025-06-19 NOTE — TELEPHONE ENCOUNTER
Caller: Devorah Dc    Relationship: Emergency Contact    Best call back number: [phone]    Requested Prescriptions:   Requested Prescriptions     Pending Prescriptions Disp Refills    atorvastatin (LIPITOR) 80 MG tablet 90 tablet 3     Sig: TAKE 1 TABLET BY MOUTH DAILY FOR HIGH CHOLESTEROL    empagliflozin (Jardiance) 25 MG tablet tablet       Sig: Take 1 tablet (25 mg) every day for diabetes    glimepiride (AMARYL) 4 MG tablet       Sig: TAKE 1 TABLET BY MOUTH TWICE DAILY WITH BREAKFAST AND SUPPER FOR DIABETES    Semaglutide, 1 MG/DOSE, (Ozempic, 1 MG/DOSE,) 4 MG/3ML solution pen-injector 9 mL 1    SITaglip Phos-metFORMIN HCl ER (Janumet XR)  MG tablet       Sig: TAKE 1 TABLET BY MOUTH TWICE DAILY FOR DIABETES      [id number] [address] AT [facility] [phone] [facility] [phone] FX     Last office visit with prescribing clinician: 4/23/2025   Last telemedicine visit with prescribing clinician: Visit date not found   Next office visit with prescribing clinician: 9/4/2025     Marissa Garza Rep   06/19/25 12:30 EDT

## 2025-08-22 DIAGNOSIS — E11.9 TYPE 2 DIABETES MELLITUS WITHOUT COMPLICATION, WITHOUT LONG-TERM CURRENT USE OF INSULIN: ICD-10-CM

## 2025-08-25 RX ORDER — EMPAGLIFLOZIN 25 MG/1
TABLET, FILM COATED ORAL
Qty: 90 TABLET | Refills: 1 | Status: SHIPPED | OUTPATIENT
Start: 2025-08-25

## 2025-08-25 RX ORDER — SITAGLIPTIN AND METFORMIN HYDROCHLORIDE 1000; 50 MG/1; MG/1
1 TABLET, FILM COATED, EXTENDED RELEASE ORAL EVERY 12 HOURS SCHEDULED
Qty: 90 TABLET | Refills: 1 | Status: SHIPPED | OUTPATIENT
Start: 2025-08-25

## (undated) DEVICE — TRAP FLD MINIVAC MEGADYNE 100ML

## (undated) DEVICE — METZENBAUM SCISSOR TIP, DISPOSABLE: Brand: RENEW

## (undated) DEVICE — FRCP BX RADJAW4 NDL 2.8 240CM LG OG BX40

## (undated) DEVICE — SUT VIC 0 UR6 27IN VCP603H

## (undated) DEVICE — SUT VIC 5/0 PS2 18IN J495H

## (undated) DEVICE — 2, DISPOSABLE SUCTION/IRRIGATOR WITH DISPOSABLE TIP: Brand: STRYKEFLOW

## (undated) DEVICE — CATH IV INSYTE AUTOGARD 14G 1 1/2IN ORNG

## (undated) DEVICE — PERCLOSE PROGLIDE™ SUTURE-MEDIATED CLOSURE SYSTEM: Brand: PERCLOSE PROGLIDE™

## (undated) DEVICE — EXTENSION SET, MALE LUER LOCK ADAPTER WITH RETRACTABLE COLLAR

## (undated) DEVICE — SOL IRR H2O BTL 1000ML STRL

## (undated) DEVICE — KT MANIFLD CARDIAC

## (undated) DEVICE — THE SINGLE USE ETRAP – POLYP TRAP IS USED FOR SUCTION RETRIEVAL OF ENDOSCOPICALLY REMOVED POLYPS.: Brand: ETRAP

## (undated) DEVICE — LAG MINOR PROCEDURE: Brand: MEDLINE INDUSTRIES, INC.

## (undated) DEVICE — NDL HYPO ECLPS SFTY 22G 1 1/2IN

## (undated) DEVICE — APPL CHLORAPREP W/TINT 26ML ORNG

## (undated) DEVICE — THE CARR-LOCKE INJECTION NEEDLE IS A SINGLE USE, DISPOSABLE, FLEXIBLE SHEATH INJECTION NEEDLE USED FOR THE INJECTION OF VARIOUS TYPES OF MEDIA THROUGH FLEXIBLE ENDOSCOPES.

## (undated) DEVICE — ENDOPOUCH RETRIEVER SPECIMEN RETRIEVAL BAGS: Brand: ENDOPOUCH RETRIEVER

## (undated) DEVICE — SUT NUROLON 3-0 SH-1 C503D

## (undated) DEVICE — PK LAP GEN 90

## (undated) DEVICE — ADAPT CLN BIOGUARD AIR/H2O DISP

## (undated) DEVICE — DRSNG SURESITE WNDW 2.38X2.75

## (undated) DEVICE — SENSR O2 OXIMAX FNGR A/ 18IN NONSTR

## (undated) DEVICE — SNAR POLYP CAPTIVATOR HEX 27MM 240CM

## (undated) DEVICE — ENDOPATH XCEL BLADELESS TROCARS WITH STABILITY SLEEVES: Brand: ENDOPATH XCEL

## (undated) DEVICE — SUT NUROLON 0 CT1 CR8 18IN C521D

## (undated) DEVICE — DECANTER: Brand: UNBRANDED

## (undated) DEVICE — SUT VIC 3/0 SH 27IN J416H

## (undated) DEVICE — PINNACLE INTRODUCER SHEATH: Brand: PINNACLE

## (undated) DEVICE — LN SMPL CO2 SHTRM SD STREAM W/M LUER

## (undated) DEVICE — TBG PENCL TELESCP MEGADYNE SMOKE EVAC 10FT

## (undated) DEVICE — GLV SURG SENSICARE W/ALOE PF LF 6.5 STRL

## (undated) DEVICE — PENCL SMOKE/EVAC MEGADYNE TELESCP 10FT

## (undated) DEVICE — HARMONIC ACE +7 LAPAROSCOPIC SHEARS ADVANCED HEMOSTASIS 5MM DIAMETER 36CM SHAFT LENGTH  FOR USE WITH GRAY HAND PIECE ONLY: Brand: HARMONIC ACE

## (undated) DEVICE — SUT VIC 3/0 CTI 36IN J944H

## (undated) DEVICE — GLV SURG BIOGEL LTX PF 6 1/2

## (undated) DEVICE — TUBING, SUCTION, 1/4" X 10', STRAIGHT: Brand: MEDLINE

## (undated) DEVICE — GAUZE,SPONGE,2"X2",8PLY,STERILE,LF,2'S: Brand: MEDLINE

## (undated) DEVICE — SNAR POLYP SENSATION STDOVL 27 240 BX40

## (undated) DEVICE — LINER SURG CANSTR SXN S/RIGD 1500CC

## (undated) DEVICE — DRAPE,REIN 53X77,STERILE: Brand: MEDLINE

## (undated) DEVICE — SUT MNCRYL 2/0 SH 27IN Y417H

## (undated) DEVICE — PATIENT RETURN ELECTRODE, SINGLE-USE, CONTACT QUALITY MONITORING, ADULT, WITH 9FT CORD, FOR PATIENTS WEIGING OVER 33LBS. (15KG): Brand: MEGADYNE

## (undated) DEVICE — FEMORAL ENTRY ANGIOGRAPHY SHIELD-YELLOW: Brand: RADPAD

## (undated) DEVICE — APPL HEMOS FOR DELIVERY FLOSEAL

## (undated) DEVICE — ENDOCUT SCISSOR TIP, DISPOSABLE: Brand: RENEW

## (undated) DEVICE — NDL BIOP W/DEPTH MARK 14G 6IN

## (undated) DEVICE — UNDYED BRAIDED (POLYGLACTIN 910), SYNTHETIC ABSORBABLE SUTURE: Brand: COATED VICRYL

## (undated) DEVICE — ENDOPATH PNEUMONEEDLE INSUFFLATION NEEDLES WITH LUER LOCK CONNECTORS 120MM: Brand: ENDOPATH

## (undated) DEVICE — STPCK 3WY D201 DISCOFIX

## (undated) DEVICE — CONTAINER,SPECIMEN,OR STERILE,4OZ: Brand: MEDLINE

## (undated) DEVICE — DRSNG SURESITE WNDW 4X4.5

## (undated) DEVICE — LAPAROSCOPIC SCOPE WARMER: Brand: DEROYAL

## (undated) DEVICE — ANTIBACTERIAL UNDYED BRAIDED (POLYGLACTIN 910), SYNTHETIC ABSORBABLE SUTURE: Brand: COATED VICRYL

## (undated) DEVICE — PAD,NON-ADHERENT,3X8,STERILE,LF,1/PK: Brand: MEDLINE

## (undated) DEVICE — DRSNG TELFA PAD NONADH STR 1S 3X8IN

## (undated) DEVICE — PK CATH CARD 40

## (undated) DEVICE — ADHS SKIN PREMIERPRO EXOFIN TOPICAL HI/VISC .5ML

## (undated) DEVICE — BW-412T DISP COMBO CLEANING BRUSH: Brand: SINGLE USE COMBINATION CLEANING BRUSH

## (undated) DEVICE — CATH DIAG IMPULSE IMT 5F 100CM

## (undated) DEVICE — CATH CHOLANG 4.5F18IN BRGNDY

## (undated) DEVICE — BNDR ABD 4PANEL 12IN 46 TO 62IN

## (undated) DEVICE — SEALANT WND FIBRIN TISSEEL PREFIL/SYR/PRIMAFZ 10ML

## (undated) DEVICE — SOL IRR H2O BO 1000ML STRL

## (undated) DEVICE — SUT VIC 3/0 CT2 27IN J232H

## (undated) DEVICE — DGW .035 FC J3MM 260CM TEF: Brand: EMERALD

## (undated) DEVICE — LOU PACE DEFIB: Brand: MEDLINE INDUSTRIES, INC.

## (undated) DEVICE — ADHS SKIN DERMABOND TOP ADVANCED

## (undated) DEVICE — ERASECAUTI INTERMIT TRAY: Brand: MEDLINE INDUSTRIES, INC.

## (undated) DEVICE — KT ORCA ORCAPOD DISP STRL

## (undated) DEVICE — CATH DIAG IMPULSE FL4 5F 100CM

## (undated) DEVICE — Device

## (undated) DEVICE — SUT VIC 0 TN 27IN DYED JTN0G

## (undated) DEVICE — VIAL FORMALIN CAP 10P 40ML

## (undated) DEVICE — INSUFFLATION TUBING SET, ENDOFLATOR 50: Brand: N.A.

## (undated) DEVICE — LOU LAP CHOLE: Brand: MEDLINE INDUSTRIES, INC.

## (undated) DEVICE — ENDOPATH XCEL UNIVERSAL TROCAR STABLILITY SLEEVES: Brand: ENDOPATH XCEL

## (undated) DEVICE — ENDOPATH 5MM ENDOSCOPIC BLUNT TIP DISSECTORS (12 POUCHES CONTAINING 3 DISSECTORS EACH): Brand: ENDOPATH

## (undated) DEVICE — LAPAROSCOPIC SMOKE FILTRATION SYSTEM: Brand: PALL LAPAROSHIELD® PLUS LAPAROSCOPIC SMOKE FILTRATION SYSTEM

## (undated) DEVICE — CATH DIAG IMPULSE FR4 5F 100CM

## (undated) DEVICE — TRANSFER SET 3": Brand: MEDLINE INDUSTRIES, INC.

## (undated) DEVICE — BITEBLOCK OMNI BLOC

## (undated) DEVICE — MICRO HVTSA, 0.5G AND HVTSA SOURCEMARK PRODUCT CODE M1206 AND M1206-01: Brand: EXOFIN MICRO HVTSA, 0.5G

## (undated) DEVICE — TROCARS: Brand: KII® BALLOON BLUNT TIP SYSTEM

## (undated) DEVICE — SOL NACL 0.9PCT 1000ML

## (undated) DEVICE — CANN O2 ETCO2 FITS ALL CONN CO2 SMPL A/ 7IN DISP LF